# Patient Record
Sex: MALE | Race: ASIAN | NOT HISPANIC OR LATINO
[De-identification: names, ages, dates, MRNs, and addresses within clinical notes are randomized per-mention and may not be internally consistent; named-entity substitution may affect disease eponyms.]

---

## 2017-01-03 ENCOUNTER — MEDICATION RENEWAL (OUTPATIENT)
Age: 72
End: 2017-01-03

## 2017-01-09 ENCOUNTER — MEDICATION RENEWAL (OUTPATIENT)
Age: 72
End: 2017-01-09

## 2017-01-24 ENCOUNTER — APPOINTMENT (OUTPATIENT)
Dept: INTERNAL MEDICINE | Facility: CLINIC | Age: 72
End: 2017-01-24

## 2017-01-24 VITALS — WEIGHT: 160 LBS | HEIGHT: 65.5 IN | BODY MASS INDEX: 26.34 KG/M2

## 2017-01-24 VITALS
SYSTOLIC BLOOD PRESSURE: 160 MMHG | TEMPERATURE: 97.9 F | OXYGEN SATURATION: 96 % | HEART RATE: 71 BPM | DIASTOLIC BLOOD PRESSURE: 86 MMHG

## 2017-01-24 VITALS — DIASTOLIC BLOOD PRESSURE: 80 MMHG | SYSTOLIC BLOOD PRESSURE: 160 MMHG

## 2017-01-25 LAB
25(OH)D3 SERPL-MCNC: 22.9 NG/ML
BASOPHILS # BLD AUTO: 0.01 K/UL
BASOPHILS NFR BLD AUTO: 0.1 %
EOSINOPHIL # BLD AUTO: 0.03 K/UL
EOSINOPHIL NFR BLD AUTO: 0.4 %
HBA1C MFR BLD HPLC: 6.2 %
HCT VFR BLD CALC: 44.3 %
HGB BLD-MCNC: 15.2 G/DL
IMM GRANULOCYTES NFR BLD AUTO: 0.1 %
LYMPHOCYTES # BLD AUTO: 2.1 K/UL
LYMPHOCYTES NFR BLD AUTO: 27.3 %
MAN DIFF?: NORMAL
MCHC RBC-ENTMCNC: 33.3 PG
MCHC RBC-ENTMCNC: 34.3 GM/DL
MCV RBC AUTO: 97.1 FL
MONOCYTES # BLD AUTO: 0.56 K/UL
MONOCYTES NFR BLD AUTO: 7.3 %
NEUTROPHILS # BLD AUTO: 4.99 K/UL
NEUTROPHILS NFR BLD AUTO: 64.8 %
PLATELET # BLD AUTO: 222 K/UL
RBC # BLD: 4.56 M/UL
RBC # FLD: 13.7 %
WBC # FLD AUTO: 7.7 K/UL

## 2017-01-27 LAB
ALBUMIN SERPL ELPH-MCNC: 4.4 G/DL
ALP BLD-CCNC: 70 U/L
ALT SERPL-CCNC: 41 U/L
ANION GAP SERPL CALC-SCNC: 22 MMOL/L
AST SERPL-CCNC: 36 U/L
BILIRUB SERPL-MCNC: 1 MG/DL
BUN SERPL-MCNC: 17 MG/DL
CALCIUM SERPL-MCNC: 9.9 MG/DL
CHLORIDE SERPL-SCNC: 102 MMOL/L
CHOLEST SERPL-MCNC: 164 MG/DL
CHOLEST/HDLC SERPL: 2.9 RATIO
CO2 SERPL-SCNC: 20 MMOL/L
CREAT SERPL-MCNC: 0.96 MG/DL
GLUCOSE SERPL-MCNC: 113 MG/DL
HDLC SERPL-MCNC: 56 MG/DL
LDLC SERPL CALC-MCNC: 81 MG/DL
POTASSIUM SERPL-SCNC: 5.8 MMOL/L
PROT SERPL-MCNC: 7.5 G/DL
SODIUM SERPL-SCNC: 144 MMOL/L
T4 SERPL-MCNC: 8.9 UG/DL
TRIGL SERPL-MCNC: 137 MG/DL
TSH SERPL-ACNC: 2.55 UIU/ML

## 2017-02-07 ENCOUNTER — LABORATORY RESULT (OUTPATIENT)
Age: 72
End: 2017-02-07

## 2017-02-10 ENCOUNTER — MEDICATION RENEWAL (OUTPATIENT)
Age: 72
End: 2017-02-10

## 2017-10-05 ENCOUNTER — APPOINTMENT (OUTPATIENT)
Dept: INTERNAL MEDICINE | Facility: CLINIC | Age: 72
End: 2017-10-05
Payer: MEDICARE

## 2017-10-05 PROCEDURE — G0008: CPT

## 2017-10-05 PROCEDURE — 90662 IIV NO PRSV INCREASED AG IM: CPT

## 2017-11-06 ENCOUNTER — RX RENEWAL (OUTPATIENT)
Age: 72
End: 2017-11-06

## 2017-11-28 ENCOUNTER — MEDICATION RENEWAL (OUTPATIENT)
Age: 72
End: 2017-11-28

## 2018-05-07 ENCOUNTER — MEDICATION RENEWAL (OUTPATIENT)
Age: 73
End: 2018-05-07

## 2018-10-12 ENCOUNTER — APPOINTMENT (OUTPATIENT)
Dept: INTERNAL MEDICINE | Facility: CLINIC | Age: 73
End: 2018-10-12
Payer: MEDICARE

## 2018-10-12 PROCEDURE — G0008: CPT

## 2018-10-12 PROCEDURE — 90662 IIV NO PRSV INCREASED AG IM: CPT

## 2018-11-26 ENCOUNTER — MEDICATION RENEWAL (OUTPATIENT)
Age: 73
End: 2018-11-26

## 2018-11-27 ENCOUNTER — MEDICATION RENEWAL (OUTPATIENT)
Age: 73
End: 2018-11-27

## 2018-12-12 LAB
ALBUMIN SERPL ELPH-MCNC: 4 G/DL
ALP BLD-CCNC: 62 U/L
ALT SERPL-CCNC: 18 U/L
ANION GAP SERPL CALC-SCNC: 10 MMOL/L
AST SERPL-CCNC: 22 U/L
BASOPHILS # BLD AUTO: 0.01 K/UL
BASOPHILS NFR BLD AUTO: 0.2 %
BILIRUB SERPL-MCNC: 1.2 MG/DL
BUN SERPL-MCNC: 17 MG/DL
CALCIUM SERPL-MCNC: 8.8 MG/DL
CHLORIDE SERPL-SCNC: 104 MMOL/L
CHOLEST SERPL-MCNC: 117 MG/DL
CHOLEST/HDLC SERPL: 2.4 RATIO
CK SERPL-CCNC: 166 U/L
CO2 SERPL-SCNC: 26 MMOL/L
CREAT SERPL-MCNC: 1.19 MG/DL
EOSINOPHIL # BLD AUTO: 0.11 K/UL
EOSINOPHIL NFR BLD AUTO: 1.9 %
GLUCOSE SERPL-MCNC: 110 MG/DL
HBA1C MFR BLD HPLC: 6.2 %
HCT VFR BLD CALC: 41.1 %
HDLC SERPL-MCNC: 48 MG/DL
HGB BLD-MCNC: 14.2 G/DL
IMM GRANULOCYTES NFR BLD AUTO: 0 %
LDLC SERPL CALC-MCNC: 48 MG/DL
LYMPHOCYTES # BLD AUTO: 2 K/UL
LYMPHOCYTES NFR BLD AUTO: 34.2 %
MAN DIFF?: NORMAL
MCHC RBC-ENTMCNC: 32.5 PG
MCHC RBC-ENTMCNC: 34.5 GM/DL
MCV RBC AUTO: 94.1 FL
MONOCYTES # BLD AUTO: 0.45 K/UL
MONOCYTES NFR BLD AUTO: 7.7 %
NEUTROPHILS # BLD AUTO: 3.27 K/UL
NEUTROPHILS NFR BLD AUTO: 56 %
NT-PROBNP SERPL-MCNC: 1612 PG/ML
PLATELET # BLD AUTO: 168 K/UL
POTASSIUM SERPL-SCNC: 4.3 MMOL/L
PROT SERPL-MCNC: 7 G/DL
RBC # BLD: 4.37 M/UL
RBC # FLD: 13.9 %
SODIUM SERPL-SCNC: 140 MMOL/L
TRIGL SERPL-MCNC: 106 MG/DL
URATE SERPL-MCNC: 5.1 MG/DL
WBC # FLD AUTO: 5.84 K/UL

## 2018-12-13 LAB
CREAT SPEC-SCNC: 192 MG/DL
MICROALBUMIN 24H UR DL<=1MG/L-MCNC: 3.2 MG/DL
MICROALBUMIN/CREAT 24H UR-RTO: 17 MG/G
T4 SERPL-MCNC: 7.9 UG/DL
TSH SERPL-ACNC: 3.17 UIU/ML

## 2018-12-19 ENCOUNTER — APPOINTMENT (OUTPATIENT)
Dept: INTERNAL MEDICINE | Facility: CLINIC | Age: 73
End: 2018-12-19
Payer: MEDICARE

## 2018-12-19 VITALS
SYSTOLIC BLOOD PRESSURE: 165 MMHG | HEIGHT: 65.5 IN | TEMPERATURE: 97.5 F | HEART RATE: 76 BPM | BODY MASS INDEX: 27.33 KG/M2 | WEIGHT: 166 LBS | OXYGEN SATURATION: 96 % | DIASTOLIC BLOOD PRESSURE: 94 MMHG

## 2018-12-19 DIAGNOSIS — Z86.79 PERSONAL HISTORY OF OTHER DISEASES OF THE CIRCULATORY SYSTEM: ICD-10-CM

## 2018-12-19 DIAGNOSIS — J06.9 ACUTE UPPER RESPIRATORY INFECTION, UNSPECIFIED: ICD-10-CM

## 2018-12-19 DIAGNOSIS — R19.8 OTHER SPECIFIED SYMPTOMS AND SIGNS INVOLVING THE DIGESTIVE SYSTEM AND ABDOMEN: ICD-10-CM

## 2018-12-19 DIAGNOSIS — H81.10 BENIGN PAROXYSMAL VERTIGO, UNSPECIFIED EAR: ICD-10-CM

## 2018-12-19 DIAGNOSIS — M54.5 LOW BACK PAIN: ICD-10-CM

## 2018-12-19 DIAGNOSIS — Z92.29 PERSONAL HISTORY OF OTHER DRUG THERAPY: ICD-10-CM

## 2018-12-19 DIAGNOSIS — Z86.39 PERSONAL HISTORY OF OTHER ENDOCRINE, NUTRITIONAL AND METABOLIC DISEASE: ICD-10-CM

## 2018-12-19 DIAGNOSIS — M54.16 RADICULOPATHY, LUMBAR REGION: ICD-10-CM

## 2018-12-19 PROCEDURE — G0442 ANNUAL ALCOHOL SCREEN 15 MIN: CPT | Mod: 59

## 2018-12-19 PROCEDURE — G0439: CPT

## 2018-12-19 PROCEDURE — 99214 OFFICE O/P EST MOD 30 MIN: CPT | Mod: 25

## 2018-12-19 PROCEDURE — G0444 DEPRESSION SCREEN ANNUAL: CPT | Mod: 59

## 2018-12-19 RX ORDER — BENZONATATE 100 MG/1
100 CAPSULE ORAL 3 TIMES DAILY
Qty: 30 | Refills: 1 | Status: DISCONTINUED | COMMUNITY
Start: 2017-01-09 | End: 2018-12-19

## 2018-12-19 RX ORDER — CEFUROXIME AXETIL 500 MG/1
500 TABLET ORAL
Qty: 20 | Refills: 1 | Status: DISCONTINUED | COMMUNITY
Start: 2017-01-09 | End: 2018-12-19

## 2018-12-19 RX ORDER — LOTEPREDNOL ETABONATE 5 MG/G
0.5 OINTMENT OPHTHALMIC
Qty: 35 | Refills: 0 | Status: DISCONTINUED | COMMUNITY
Start: 2018-08-30 | End: 2018-12-19

## 2018-12-19 RX ORDER — MECLIZINE HYDROCHLORIDE 12.5 MG/1
12.5 TABLET ORAL
Qty: 60 | Refills: 1 | Status: DISCONTINUED | COMMUNITY
Start: 2017-01-24 | End: 2018-12-19

## 2018-12-19 RX ORDER — NEPAFENAC 3 MG/ML
0.3 SUSPENSION/ DROPS OPHTHALMIC
Qty: 12 | Refills: 0 | Status: DISCONTINUED | COMMUNITY
Start: 2016-10-25 | End: 2018-12-19

## 2018-12-19 RX ORDER — AZITHROMYCIN 250 MG/1
250 TABLET, FILM COATED ORAL
Qty: 6 | Refills: 2 | Status: DISCONTINUED | COMMUNITY
Start: 2017-11-28 | End: 2018-12-19

## 2018-12-19 RX ORDER — NEOMYCIN AND POLYMYXIN B SULFATES AND DEXAMETHASONE 3.5; 10000; 1 MG/G; [IU]/G; MG/G
3.5-10000-0.1 OINTMENT OPHTHALMIC
Qty: 35 | Refills: 0 | Status: COMPLETED | COMMUNITY
Start: 2018-04-17

## 2018-12-19 NOTE — PLAN
[FreeTextEntry1] : Medicare Annual\par vaccine and colon up to date\par \par \par blood pressure too high recommend increasing losartan to discuss with cardiology\par Some exertional sob\par mild urination issue\par on flomax and see urologist\par occasional hemmorhoid to rectal surgeon\par back not too bad\par lipids excellent on med\par no alcohol or smoking\par very upbeat\par under care of cardiologist\par

## 2018-12-19 NOTE — HISTORY OF PRESENT ILLNESS
[FreeTextEntry1] : Medicare Annual [de-identified] : Medicare annual\par had flu and pneumonia shot\par colon 2016\par \par afib on warfarin\par recent cst/echo\par narrowing aortic valve\par no cp or sob\par hx of AS\par get oob\par may 2015 change mitral valve and cabg\par See Dr Aaron does psa recently\par

## 2018-12-19 NOTE — HEALTH RISK ASSESSMENT
[Good] : ~his/her~  mood as  good [No falls in past year] : Patient reported no falls in the past year [0] : 2) Feeling down, depressed, or hopeless: Not at all (0) [Retired] : retired [College] : College [] :  [Feels Safe at Home] : Feels safe at home [Fully functional (bathing, dressing, toileting, transferring, walking, feeding)] : Fully functional (bathing, dressing, toileting, transferring, walking, feeding) [Fully functional (using the telephone, shopping, preparing meals, housekeeping, doing laundry, using] : Fully functional and needs no help or supervision to perform IADLs (using the telephone, shopping, preparing meals, housekeeping, doing laundry, using transportation, managing medications and managing finances) [Reports changes in hearing] : Reports changes in hearing [Smoke Detector] : smoke detector [Carbon Monoxide Detector] : carbon monoxide detector [Seat Belt] :  uses seat belt [] : No [[id number]] : 0 [Change in mental status noted] : No change in mental status noted [Language] : denies difficulty with language [Behavior] : denies difficulty with behavior [Learning/Retaining New Information] : denies difficulty learning/retaining new information [Handling Complex Tasks] : denies difficulty handling complex tasks [Reasoning] : denies difficulty with reasoning [Spatial Ability and Orientation] : denies difficulty with spatial ability and orientation [Reports changes in vision] : Reports no changes in vision [Reports changes in dental health] : Reports no changes in dental health [Guns at Home] : no guns at home

## 2018-12-19 NOTE — PHYSICAL EXAM
[No Acute Distress] : no acute distress [Well Nourished] : well nourished [Normal Outer Ear/Nose] : the outer ears and nose were normal in appearance [Normal Oropharynx] : the oropharynx was normal [No JVD] : no jugular venous distention [Supple] : supple [No Respiratory Distress] : no respiratory distress  [Clear to Auscultation] : lungs were clear to auscultation bilaterally [No Edema] : there was no peripheral edema [No Extremity Clubbing/Cyanosis] : no extremity clubbing/cyanosis [No HSM] : no HSM [Normal Bowel Sounds] : normal bowel sounds [No Joint Swelling] : no joint swelling [Grossly Normal Strength/Tone] : grossly normal strength/tone [de-identified] : afib at 66 with syst murmur

## 2018-12-19 NOTE — REVIEW OF SYSTEMS
[Fever] : no fever [Earache] : no earache [Nasal Discharge] : no nasal discharge [Chest Pain] : no chest pain [Orthopena] : no orthopnea [Shortness Of Breath] : no shortness of breath [Wheezing] : no wheezing [Abdominal Pain] : no abdominal pain [Vomiting] : no vomiting [Dysuria] : no dysuria [Hematuria] : no hematuria

## 2019-04-04 ENCOUNTER — MEDICATION RENEWAL (OUTPATIENT)
Age: 74
End: 2019-04-04

## 2019-10-07 ENCOUNTER — APPOINTMENT (OUTPATIENT)
Dept: INTERNAL MEDICINE | Facility: CLINIC | Age: 74
End: 2019-10-07
Payer: MEDICARE

## 2019-10-07 PROCEDURE — G0008: CPT

## 2019-10-07 PROCEDURE — 90662 IIV NO PRSV INCREASED AG IM: CPT

## 2019-10-09 ENCOUNTER — TRANSCRIPTION ENCOUNTER (OUTPATIENT)
Age: 74
End: 2019-10-09

## 2020-01-09 ENCOUNTER — RX RENEWAL (OUTPATIENT)
Age: 75
End: 2020-01-09

## 2020-05-04 RX ADMIN — OXYCODONE HYDROCHLORIDE 5 MILLIGRAM(S): 5 TABLET ORAL at 23:03

## 2020-07-31 DIAGNOSIS — Z11.59 ENCOUNTER FOR SCREENING FOR OTHER VIRAL DISEASES: ICD-10-CM

## 2020-08-06 LAB
25(OH)D3 SERPL-MCNC: 25 NG/ML
ALBUMIN SERPL ELPH-MCNC: 4.4 G/DL
ALP BLD-CCNC: 59 U/L
ALT SERPL-CCNC: 22 U/L
ANION GAP SERPL CALC-SCNC: 13 MMOL/L
APPEARANCE: CLEAR
AST SERPL-CCNC: 23 U/L
BACTERIA: NEGATIVE
BASOPHILS # BLD AUTO: 0.02 K/UL
BASOPHILS NFR BLD AUTO: 0.2 %
BILIRUB SERPL-MCNC: 1.2 MG/DL
BILIRUBIN URINE: NEGATIVE
BLOOD URINE: NEGATIVE
BUN SERPL-MCNC: 18 MG/DL
CALCIUM SERPL-MCNC: 9 MG/DL
CHLORIDE SERPL-SCNC: 102 MMOL/L
CHOLEST SERPL-MCNC: 132 MG/DL
CHOLEST/HDLC SERPL: 2.9 RATIO
CO2 SERPL-SCNC: 21 MMOL/L
COLOR: NORMAL
CREAT SERPL-MCNC: 1.07 MG/DL
EOSINOPHIL # BLD AUTO: 0.05 K/UL
EOSINOPHIL NFR BLD AUTO: 0.6 %
ESTIMATED AVERAGE GLUCOSE: 126 MG/DL
GLUCOSE QUALITATIVE U: NEGATIVE
GLUCOSE SERPL-MCNC: 112 MG/DL
HBA1C MFR BLD HPLC: 6 %
HCT VFR BLD CALC: 40 %
HDLC SERPL-MCNC: 46 MG/DL
HGB BLD-MCNC: 13.1 G/DL
HYALINE CASTS: 0 /LPF
IMM GRANULOCYTES NFR BLD AUTO: 0.5 %
KETONES URINE: NEGATIVE
LDLC SERPL CALC-MCNC: 60 MG/DL
LEUKOCYTE ESTERASE URINE: NEGATIVE
LYMPHOCYTES # BLD AUTO: 2.19 K/UL
LYMPHOCYTES NFR BLD AUTO: 26.6 %
MAN DIFF?: NORMAL
MCHC RBC-ENTMCNC: 32.2 PG
MCHC RBC-ENTMCNC: 32.8 GM/DL
MCV RBC AUTO: 98.3 FL
MICROSCOPIC-UA: NORMAL
MONOCYTES # BLD AUTO: 0.64 K/UL
MONOCYTES NFR BLD AUTO: 7.8 %
NEUTROPHILS # BLD AUTO: 5.29 K/UL
NEUTROPHILS NFR BLD AUTO: 64.3 %
NITRITE URINE: NEGATIVE
PH URINE: 6.5
PLATELET # BLD AUTO: 176 K/UL
POTASSIUM SERPL-SCNC: 5.1 MMOL/L
PROT SERPL-MCNC: 6.7 G/DL
PROTEIN URINE: NEGATIVE
RBC # BLD: 4.07 M/UL
RBC # FLD: 13.7 %
RED BLOOD CELLS URINE: 1 /HPF
SARS-COV-2 IGG SERPL IA-ACNC: 0.09 INDEX
SARS-COV-2 IGG SERPL QL IA: NEGATIVE
SODIUM SERPL-SCNC: 136 MMOL/L
SPECIFIC GRAVITY URINE: 1.01
SQUAMOUS EPITHELIAL CELLS: 0 /HPF
T4 FREE SERPL-MCNC: 1.5 NG/DL
TRIGL SERPL-MCNC: 128 MG/DL
TSH SERPL-ACNC: 2.08 UIU/ML
UROBILINOGEN URINE: NORMAL
WBC # FLD AUTO: 8.23 K/UL
WHITE BLOOD CELLS URINE: 1 /HPF

## 2020-08-10 ENCOUNTER — APPOINTMENT (OUTPATIENT)
Dept: INTERNAL MEDICINE | Facility: CLINIC | Age: 75
End: 2020-08-10
Payer: MEDICARE

## 2020-08-10 VITALS
BODY MASS INDEX: 27.16 KG/M2 | WEIGHT: 165 LBS | TEMPERATURE: 98.2 F | HEART RATE: 77 BPM | DIASTOLIC BLOOD PRESSURE: 81 MMHG | SYSTOLIC BLOOD PRESSURE: 160 MMHG | OXYGEN SATURATION: 99 % | HEIGHT: 65.5 IN

## 2020-08-10 PROCEDURE — 99214 OFFICE O/P EST MOD 30 MIN: CPT | Mod: 25

## 2020-08-10 PROCEDURE — G0439: CPT

## 2020-08-10 RX ORDER — LOSARTAN POTASSIUM 25 MG/1
25 TABLET, FILM COATED ORAL
Qty: 2 | Refills: 0 | Status: DISCONTINUED | COMMUNITY
End: 2020-08-10

## 2020-08-10 NOTE — HISTORY OF PRESENT ILLNESS
[de-identified] : Annual exam\par had all vaccine\par \par ashd under care of cardiology Dr herring s/p bypass and valve Dr Quevedo\par Urology under care of Dr Bello\par take warfarin last inr 2.5  30 mg per week

## 2020-08-10 NOTE — HEALTH RISK ASSESSMENT
[Very Good] : ~his/her~  mood as very good [No] : No [No falls in past year] : Patient reported no falls in the past year [0] : 1) Little interest or pleasure doing things: Not at all (0) [None] : None [Retired] : retired [With Family] : lives with family [College] : College [] :  [Feels Safe at Home] : Feels safe at home [Fully functional (using the telephone, shopping, preparing meals, housekeeping, doing laundry, using] : Fully functional and needs no help or supervision to perform IADLs (using the telephone, shopping, preparing meals, housekeeping, doing laundry, using transportation, managing medications and managing finances) [Fully functional (bathing, dressing, toileting, transferring, walking, feeding)] : Fully functional (bathing, dressing, toileting, transferring, walking, feeding) [Carbon Monoxide Detector] : carbon monoxide detector [Smoke Detector] : smoke detector [Seat Belt] :  uses seat belt [] : No [[id number]] : 0 [Change in mental status noted] : No change in mental status noted [Behavior] : denies difficulty with behavior [Language] : denies difficulty with language [Handling Complex Tasks] : denies difficulty handling complex tasks [Learning/Retaining New Information] : denies difficulty learning/retaining new information [Reasoning] : denies difficulty with reasoning [Spatial Ability and Orientation] : denies difficulty with spatial ability and orientation [Reports changes in hearing] : Reports no changes in hearing [Reports changes in vision] : Reports no changes in vision [Reports changes in dental health] : Reports no changes in dental health [Guns at Home] : no guns at home

## 2020-08-10 NOTE — PHYSICAL EXAM
[No Acute Distress] : no acute distress [Normal Voice/Communication] : normal voice/communication [FreeTextEntry1] : palpable lesion anal verge with blood [Normal] : no carotid or abdominal bruits heard, no varicosities, pedal pulses are present, no peripheral edema, no extremity clubbing or cyanosis and no palpable aorta

## 2020-08-10 NOTE — PLAN
[FreeTextEntry1] : Annual exam\par had all vaccine\par  colon 2016\par \par rectal lesion to proctologist\par diabetes on med\par eye issue see Dr Miguel Cadena  injection monthly\par ashd stable\par \par

## 2020-09-04 ENCOUNTER — APPOINTMENT (OUTPATIENT)
Dept: SURGERY | Facility: CLINIC | Age: 75
End: 2020-09-04
Payer: MEDICARE

## 2020-09-04 VITALS
WEIGHT: 161 LBS | TEMPERATURE: 97.6 F | HEART RATE: 98 BPM | HEIGHT: 65.5 IN | RESPIRATION RATE: 17 BRPM | SYSTOLIC BLOOD PRESSURE: 155 MMHG | BODY MASS INDEX: 26.5 KG/M2 | DIASTOLIC BLOOD PRESSURE: 87 MMHG | OXYGEN SATURATION: 97 %

## 2020-09-04 PROCEDURE — 46600 DIAGNOSTIC ANOSCOPY SPX: CPT

## 2020-09-04 PROCEDURE — 99203 OFFICE O/P NEW LOW 30 MIN: CPT | Mod: 25

## 2020-09-04 NOTE — PHYSICAL EXAM
[Normal Rate and Rhythm] : normal rate and rhythm [Alert] : alert [Calm] : calm [Wheezing] : no wheezing was heard [Abdomen Tenderness] : ~T No ~M abdominal tenderness [de-identified] : nad [de-identified] : nl [de-identified] : Perianal inspection is normal.  There is no fissure fistula or abscess.  Digital rectal examination and anoscopy reveal a friable mobile firm distal rectal/anal canal lesion approximately 2 cm in diameter in the left anterior position.  This was mobile and abutted the dentate line..  Anoscopy reveals a friable ulcerated distal rectal/anal canal lesion which was biopsied with cold biopsy forceps.  Hemostasis was achieved with Monsel solution.

## 2020-09-04 NOTE — HISTORY OF PRESENT ILLNESS
[FreeTextEntry1] : The patient is a 74 year old male here w a hx of valve replacement, on Coumadin (since ). Colonoscopy from 16 demonstrates bleeding internal hemorrhoids were seen. Hx of RBL in . \par Pt reports BRBPR with almost every BM. Long standing history of constipation. Main concern is rectal swelling. He sometimes has to manually remove stool from rectum.  He denies nausea vomiting or any change in his bowel habits.  His mother  of colon cancer in her 60s.

## 2020-09-04 NOTE — ASSESSMENT
[FreeTextEntry1] : In summary the patient has a lesion in the distal rectum/anal canal.  This was biopsied in the office today.  Further work-up and treatment will be based on pathology.  He will likely need a colonoscopy and imaging for further work-up.

## 2020-09-10 ENCOUNTER — APPOINTMENT (OUTPATIENT)
Dept: SURGERY | Facility: CLINIC | Age: 75
End: 2020-09-10
Payer: MEDICARE

## 2020-09-10 LAB — CORE LAB BIOPSY: NORMAL

## 2020-09-10 PROCEDURE — 99213 OFFICE O/P EST LOW 20 MIN: CPT

## 2020-09-10 NOTE — HISTORY OF PRESENT ILLNESS
[FreeTextEntry1] : The patient is a 74 year old male here w a hx of valve replacement, on Coumadin (since ). Colonoscopy from 16 demonstrates bleeding internal hemorrhoids were seen. Hx of RBL in . \par Pt reports BRBPR with almost every BM. Long standing history of constipation. Main concern is rectal swelling. He sometimes has to manually remove stool from rectum. He denies nausea vomiting or any change in his bowel habits. His mother  of colon cancer in her 60s. \par \par The patient was seen in the office last week with complaints of rectal bleeding and difficulty evacuating his stool. Upon manually removing stool from his rectum, he felt a firm mass. Digital rectal examination and anoscopy revealed a friable mobile firm distal/anal canal lesion approximately 2 cm in diameter in the left anterior position. This was mobile and abutted the dentate line. This was biopsied with cold biopsy forceps. Pathology revealed Adenocarcinoma present in the lesion.

## 2020-09-11 ENCOUNTER — APPOINTMENT (OUTPATIENT)
Dept: CT IMAGING | Facility: IMAGING CENTER | Age: 75
End: 2020-09-11
Payer: MEDICARE

## 2020-09-11 ENCOUNTER — OUTPATIENT (OUTPATIENT)
Dept: OUTPATIENT SERVICES | Facility: HOSPITAL | Age: 75
LOS: 1 days | End: 2020-09-11
Payer: MEDICARE

## 2020-09-11 DIAGNOSIS — C20 MALIGNANT NEOPLASM OF RECTUM: ICD-10-CM

## 2020-09-11 PROCEDURE — 71260 CT THORAX DX C+: CPT | Mod: 26

## 2020-09-11 PROCEDURE — 82565 ASSAY OF CREATININE: CPT

## 2020-09-11 PROCEDURE — 74177 CT ABD & PELVIS W/CONTRAST: CPT | Mod: 26

## 2020-09-11 PROCEDURE — 71260 CT THORAX DX C+: CPT

## 2020-09-11 PROCEDURE — 74177 CT ABD & PELVIS W/CONTRAST: CPT

## 2020-09-12 ENCOUNTER — OUTPATIENT (OUTPATIENT)
Dept: OUTPATIENT SERVICES | Facility: HOSPITAL | Age: 75
LOS: 1 days | Discharge: HOME | End: 2020-09-12

## 2020-09-12 DIAGNOSIS — C20 MALIGNANT NEOPLASM OF RECTUM: ICD-10-CM

## 2020-09-13 ENCOUNTER — APPOINTMENT (OUTPATIENT)
Dept: MRI IMAGING | Facility: CLINIC | Age: 75
End: 2020-09-13

## 2020-09-14 ENCOUNTER — RESULT REVIEW (OUTPATIENT)
Age: 75
End: 2020-09-14

## 2020-09-14 ENCOUNTER — APPOINTMENT (OUTPATIENT)
Dept: HEMATOLOGY ONCOLOGY | Facility: CLINIC | Age: 75
End: 2020-09-14
Payer: MEDICARE

## 2020-09-14 ENCOUNTER — OUTPATIENT (OUTPATIENT)
Dept: OUTPATIENT SERVICES | Facility: HOSPITAL | Age: 75
LOS: 1 days | End: 2020-09-14
Payer: MEDICARE

## 2020-09-14 VITALS
HEART RATE: 86 BPM | HEIGHT: 65.75 IN | WEIGHT: 158.73 LBS | SYSTOLIC BLOOD PRESSURE: 129 MMHG | DIASTOLIC BLOOD PRESSURE: 85 MMHG | OXYGEN SATURATION: 99 % | RESPIRATION RATE: 16 BRPM | BODY MASS INDEX: 25.82 KG/M2 | TEMPERATURE: 99.8 F

## 2020-09-14 DIAGNOSIS — Z80.42 FAMILY HISTORY OF MALIGNANT NEOPLASM OF PROSTATE: ICD-10-CM

## 2020-09-14 DIAGNOSIS — C20 MALIGNANT NEOPLASM OF RECTUM: ICD-10-CM

## 2020-09-14 DIAGNOSIS — H35.3290 EXUDATIVE AGE-RELATED MACULAR DEGENERATION, UNSPECIFIED EYE, STAGE UNSPECIFIED: ICD-10-CM

## 2020-09-14 LAB
BASOPHILS # BLD AUTO: 0.03 K/UL — SIGNIFICANT CHANGE UP (ref 0–0.2)
BASOPHILS NFR BLD AUTO: 0.2 % — SIGNIFICANT CHANGE UP (ref 0–2)
EOSINOPHIL # BLD AUTO: 0.01 K/UL — SIGNIFICANT CHANGE UP (ref 0–0.5)
EOSINOPHIL NFR BLD AUTO: 0.1 % — SIGNIFICANT CHANGE UP (ref 0–6)
HCT VFR BLD CALC: 36 % — LOW (ref 39–50)
HGB BLD-MCNC: 12.2 G/DL — LOW (ref 13–17)
IMM GRANULOCYTES NFR BLD AUTO: 0.6 % — SIGNIFICANT CHANGE UP (ref 0–1.5)
LYMPHOCYTES # BLD AUTO: 1.79 K/UL — SIGNIFICANT CHANGE UP (ref 1–3.3)
LYMPHOCYTES # BLD AUTO: 13.1 % — SIGNIFICANT CHANGE UP (ref 13–44)
MCHC RBC-ENTMCNC: 32.6 PG — SIGNIFICANT CHANGE UP (ref 27–34)
MCHC RBC-ENTMCNC: 33.9 G/DL — SIGNIFICANT CHANGE UP (ref 32–36)
MCV RBC AUTO: 96.3 FL — SIGNIFICANT CHANGE UP (ref 80–100)
MONOCYTES # BLD AUTO: 0.82 K/UL — SIGNIFICANT CHANGE UP (ref 0–0.9)
MONOCYTES NFR BLD AUTO: 6 % — SIGNIFICANT CHANGE UP (ref 2–14)
NEUTROPHILS # BLD AUTO: 10.95 K/UL — HIGH (ref 1.8–7.4)
NEUTROPHILS NFR BLD AUTO: 80 % — HIGH (ref 43–77)
NRBC # BLD: 0 /100 WBCS — SIGNIFICANT CHANGE UP (ref 0–0)
PLATELET # BLD AUTO: 255 K/UL — SIGNIFICANT CHANGE UP (ref 150–400)
RBC # BLD: 3.74 M/UL — LOW (ref 4.2–5.8)
RBC # FLD: 12.9 % — SIGNIFICANT CHANGE UP (ref 10.3–14.5)
WBC # BLD: 13.68 K/UL — HIGH (ref 3.8–10.5)
WBC # FLD AUTO: 13.68 K/UL — HIGH (ref 3.8–10.5)

## 2020-09-14 PROCEDURE — 99205 OFFICE O/P NEW HI 60 MIN: CPT

## 2020-09-14 RX ORDER — METOPROLOL TARTRATE 75 MG/1
TABLET, FILM COATED ORAL
Refills: 0 | Status: DISCONTINUED | COMMUNITY
End: 2020-09-14

## 2020-09-14 RX ORDER — LOSARTAN POTASSIUM 100 MG/1
TABLET, FILM COATED ORAL
Refills: 0 | Status: DISCONTINUED | COMMUNITY
End: 2020-09-14

## 2020-09-14 NOTE — PHYSICAL EXAM
[Restricted in physically strenuous activity but ambulatory and able to carry out work of a light or sedentary nature] : Status 1- Restricted in physically strenuous activity but ambulatory and able to carry out work of a light or sedentary nature, e.g., light house work, office work [Normal] : affect appropriate [de-identified] : atrial fibrillation; no murmur [FreeTextEntry1] : patient has midline low-lying rectal mass. It is at 6 o'clock position, mobile, and abuts the proximal anal sphincters.

## 2020-09-14 NOTE — CONSULT LETTER
[Dear  ___] : Dear  [unfilled], [Please see my note below.] : Please see my note below. [Consult Letter:] : I had the pleasure of evaluating your patient, [unfilled]. [DrDrake  ___] : Dr. HEBERT [Consult Closing:] : Thank you very much for allowing me to participate in the care of this patient.  If you have any questions, please do not hesitate to contact me. [Sincerely,] : Sincerely, [DrDrake ___] : Dr. HEBERT [___] : [unfilled]

## 2020-09-14 NOTE — REVIEW OF SYSTEMS
[Negative] : Allergic/Immunologic [FreeTextEntry2] : appetite is down mildly [FreeTextEntry7] : stools are difficult to pass at times

## 2020-09-14 NOTE — HISTORY OF PRESENT ILLNESS
[Disease: _____________________] : Disease: [unfilled] [de-identified] : Colorectal Surgeon: Christian Connelly  [phone]\par Internist / GI:  Raciel Perkins [phone]\par Cardiology: Jona Draper [phone]\par Opthalmology: Michael Rivera [phone]\par Macular degeneration ophthalmology: Zeferino Raman  [phone]\par Cardiac surgeon at [facility]: Zion Weathers [phone]\par \par Daughter: Dr. Nicole Guillaume [phone]\par Daughter: Nimo Rodriguez\par Quoc - patient [phone]\par Lori - wife home phone [phone] [de-identified] : adenocarcinoma [de-identified] : Mr. Guillaume is a 74 year old male with DM2, HTN, AFib presenting to the office for an initial consultation of rectal CA.\par \par Patient reports BRBPR with almost every bowel movement.  He has a history of constipation, main concern is sensation of rectal swelling, and a hard lesion when places finger in rectum.  This is different from prior soft hemorrhoids. He sometimes must manually removes stool from his rectum. He brought this to the attention of his PCP, who palpated a rectal tumor, and referred to surgeon.\par \par Patient was seen by Dr. Christian Connelly (Colorectal Surgery) who performed JAYLIN and anoscopy which revealed a friable mobile firm distal/anal lesion approximately 2 cm in diameter in the left anterior position.  This lesion was biopsied on 9/4/2020. Pathology demonstrated adenocarcinoma. Dr. Connelly plans to do a colonoscopy.\par \par CT chest,abdomen/pelvis on 9/11/2020 demonstrated no evidence of metastatic disease in the chest, abdomen or pelvis\par \par MRI pelvis scheduled 9/15/2020.\par \par Patient had recent episode of red toe after a long car trip. The right big toe was painful and then it improved over a few days. He never had gout before. This had happened once before July 2020 as well. \par He notes that 36 hours after the CT scan, he had shaking chills (rigors), nausea with vomiting x 1 and then this subsided. He also had a drop in blood pressure to SBP upper 80's\par He had IV contrast and oral contrast only. \par He has no focal localizing symptoms for infection, such as cough, abdominal pain, dysuria, or diarrhea. He has baseline urinary frequency.

## 2020-09-15 ENCOUNTER — OUTPATIENT (OUTPATIENT)
Dept: OUTPATIENT SERVICES | Facility: HOSPITAL | Age: 75
LOS: 1 days | End: 2020-09-15

## 2020-09-15 ENCOUNTER — APPOINTMENT (OUTPATIENT)
Dept: MRI IMAGING | Facility: IMAGING CENTER | Age: 75
End: 2020-09-15
Payer: MEDICARE

## 2020-09-15 ENCOUNTER — RESULT REVIEW (OUTPATIENT)
Age: 75
End: 2020-09-15

## 2020-09-15 DIAGNOSIS — C20 MALIGNANT NEOPLASM OF RECTUM: ICD-10-CM

## 2020-09-15 LAB
25(OH)D3 SERPL-MCNC: 24.2 NG/ML
ALBUMIN SERPL ELPH-MCNC: 3.9 G/DL
ALP BLD-CCNC: 73 U/L
ALT SERPL-CCNC: 16 U/L
ANION GAP SERPL CALC-SCNC: 15 MMOL/L
APPEARANCE: CLEAR
APTT BLD: 40.3 SEC
AST SERPL-CCNC: 18 U/L
BACTERIA: NEGATIVE
BILIRUB SERPL-MCNC: 0.9 MG/DL
BILIRUBIN URINE: NEGATIVE
BLOOD URINE: NEGATIVE
BUN SERPL-MCNC: 25 MG/DL
CALCIUM SERPL-MCNC: 9.1 MG/DL
CEA SERPL-MCNC: 1.4 NG/ML
CHLORIDE SERPL-SCNC: 93 MMOL/L
CHOLEST SERPL-MCNC: 104 MG/DL
CHOLEST/HDLC SERPL: 2.9 RATIO
CO2 SERPL-SCNC: 25 MMOL/L
COLOR: YELLOW
CREAT SERPL-MCNC: 1.41 MG/DL
ESTIMATED AVERAGE GLUCOSE: 134 MG/DL
FERRITIN SERPL-MCNC: 138 NG/ML
GLUCOSE QUALITATIVE U: NEGATIVE
GLUCOSE SERPL-MCNC: 101 MG/DL
HBA1C MFR BLD HPLC: 6.3 %
HBV SURFACE AB SER QL: NONREACTIVE
HBV SURFACE AG SER QL: NONREACTIVE
HCV AB SER QL: NONREACTIVE
HCV S/CO RATIO: 0.12 S/CO
HDLC SERPL-MCNC: 37 MG/DL
HYALINE CASTS: 0 /LPF
INR PPP: 2.34 RATIO
KETONES URINE: NEGATIVE
LDLC SERPL CALC-MCNC: 45 MG/DL
LEUKOCYTE ESTERASE URINE: NEGATIVE
MICROSCOPIC-UA: NORMAL
NITRITE URINE: NEGATIVE
PH URINE: 7
POTASSIUM SERPL-SCNC: 4.7 MMOL/L
PROT SERPL-MCNC: 6.6 G/DL
PROTEIN URINE: NORMAL
PT BLD: 26.7 SEC
RED BLOOD CELLS URINE: 1 /HPF
SODIUM SERPL-SCNC: 132 MMOL/L
SPECIFIC GRAVITY URINE: 1.02
SQUAMOUS EPITHELIAL CELLS: 1 /HPF
TRIGL SERPL-MCNC: 114 MG/DL
URATE SERPL-MCNC: 8.1 MG/DL
UROBILINOGEN URINE: ABNORMAL
WHITE BLOOD CELLS URINE: 1 /HPF

## 2020-09-15 PROCEDURE — 86900 BLOOD TYPING SEROLOGIC ABO: CPT

## 2020-09-15 PROCEDURE — 86850 RBC ANTIBODY SCREEN: CPT

## 2020-09-15 PROCEDURE — A9585: CPT

## 2020-09-15 PROCEDURE — 86901 BLOOD TYPING SEROLOGIC RH(D): CPT

## 2020-09-15 PROCEDURE — 72196 MRI PELVIS W/DYE: CPT

## 2020-09-15 PROCEDURE — 72196 MRI PELVIS W/DYE: CPT | Mod: 26

## 2020-09-16 LAB — BACTERIA UR CULT: NORMAL

## 2020-09-17 ENCOUNTER — APPOINTMENT (OUTPATIENT)
Dept: SURGERY | Facility: CLINIC | Age: 75
End: 2020-09-17
Payer: MEDICARE

## 2020-09-17 PROCEDURE — 99442: CPT | Mod: 95

## 2020-09-17 NOTE — HISTORY OF PRESENT ILLNESS
[FreeTextEntry1] : Quoc is  a 73 y/o male with newly diagnosed adenocarcinoma of the distal rectum.  \par CT chest from 9/15/20 demonstrated no evidence of metastatic disease in the chest, abdomen or pelvis. CT A+P from 9/15/20 demonstrated no evidence of metastatic disease in the chest, abdomen or pelvis. MRI of the pelvis IC (9/15/20) demonstrates a 2.1 cm mass entirely contained within the anal canal, 2.4 cm above the anal verge. No regional lymphadenopathy (N0). Sphincter involvement (low rectal tumors): Invades internal sphincter (IS) only. As above, tumor superficially involves the internal sphincter anterior right laterally, but is at least 3 mm away from the outer border of the internal sphincter. Intersphincteric space is clear. No suspicious extra mesorectal nodes seen. \par CEA level 1.7 ng/mL. (9/14/20). A colonoscopy will be performed next week (9/21). \par Patient was seen by Dr. Dietz (ONC), possibilities of local surgery, APR or definitive GURDEEP + chemoradiation with surveillance (watch and wait) were all discussed. \par \par The patient's case was discussed at tumor board yesterday.  The consensus was for transanal excision.\par \par  \par \par

## 2020-09-17 NOTE — ASSESSMENT
[FreeTextEntry1] : In summary the patient has a newly diagnosed adenocarcinoma of the distal rectum/anal canal.  The lesion appears to be confined to the anal canal and only involves the superficial internal sphincter on pelvic MRI and there is no evidence of metastatic disease.  Therefore consensus was for transanal excision rather than abdominal perineal resection or GURDEEP/observation.  I will be performing a colonoscopy on the patient next week.  As long as no synchronous lesions are seen which would change the plan then transanal excision will be scheduled soon thereafter.  Risks benefits and alternatives of surgery including the risks of bleeding infection and recurrence were explained.  I had a detailed discussion with the patient and his family regarding the fact that the main disadvantage of transanal excision is that there is not a pathologic examination of the surrounding lymph nodes.  That being said with an early stage tumor his risk of lymph node metastases is low.

## 2020-09-17 NOTE — CONSULT LETTER
[Dear  ___] : Dear  [unfilled], [Consult Letter:] : I had the pleasure of evaluating your patient, [unfilled]. [Please see my note below.] : Please see my note below. [Consult Closing:] : Thank you very much for allowing me to participate in the care of this patient.  If you have any questions, please do not hesitate to contact me. [Sincerely,] : Sincerely, [FreeTextEntry3] : Christian Connelly M.D., F.A.C.S, F.A.S.C.R.S [DrDrake  ___] : Dr. HEBERT [DrDrake ___] : Dr. HEBERT

## 2020-09-18 ENCOUNTER — LABORATORY RESULT (OUTPATIENT)
Age: 75
End: 2020-09-18

## 2020-09-18 ENCOUNTER — APPOINTMENT (OUTPATIENT)
Dept: DISASTER EMERGENCY | Facility: CLINIC | Age: 75
End: 2020-09-18

## 2020-09-21 ENCOUNTER — RESULT REVIEW (OUTPATIENT)
Age: 75
End: 2020-09-21

## 2020-09-21 ENCOUNTER — OUTPATIENT (OUTPATIENT)
Dept: OUTPATIENT SERVICES | Facility: HOSPITAL | Age: 75
LOS: 1 days | End: 2020-09-21

## 2020-09-21 ENCOUNTER — APPOINTMENT (OUTPATIENT)
Dept: SURGERY | Facility: HOSPITAL | Age: 75
End: 2020-09-21
Payer: MEDICARE

## 2020-09-21 VITALS
SYSTOLIC BLOOD PRESSURE: 122 MMHG | DIASTOLIC BLOOD PRESSURE: 83 MMHG | OXYGEN SATURATION: 100 % | HEART RATE: 85 BPM | RESPIRATION RATE: 20 BRPM

## 2020-09-21 VITALS
OXYGEN SATURATION: 100 % | SYSTOLIC BLOOD PRESSURE: 126 MMHG | WEIGHT: 160.94 LBS | TEMPERATURE: 98 F | RESPIRATION RATE: 22 BRPM | HEART RATE: 96 BPM | HEIGHT: 65 IN | DIASTOLIC BLOOD PRESSURE: 88 MMHG

## 2020-09-21 DIAGNOSIS — C20 MALIGNANT NEOPLASM OF RECTUM: ICD-10-CM

## 2020-09-21 PROCEDURE — 45385 COLONOSCOPY W/LESION REMOVAL: CPT

## 2020-09-21 PROCEDURE — 88305 TISSUE EXAM BY PATHOLOGIST: CPT | Mod: 26

## 2020-09-21 RX ORDER — SODIUM CHLORIDE 9 MG/ML
1000 INJECTION INTRAMUSCULAR; INTRAVENOUS; SUBCUTANEOUS
Refills: 0 | Status: DISCONTINUED | OUTPATIENT
Start: 2020-09-21 | End: 2020-10-06

## 2020-09-21 RX ADMIN — SODIUM CHLORIDE 30 MILLILITER(S): 9 INJECTION INTRAMUSCULAR; INTRAVENOUS; SUBCUTANEOUS at 13:49

## 2020-09-21 NOTE — PRE PROCEDURE NOTE - PRE PROCEDURE EVALUATION
Attending Physician:              Dr. 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    S/P left inguinal hernia repair  2002    S/P ablation of atrial fibrillation  3/2010    Hx of coronary angioplasty  stent to LAD 5/30/1997      ALLERGIES:  No Known Allergies    HOME MEDICATIONS:    AICD/PPM: [ ] yes   [ ] no    PERTINENT LAB DATA:                      PHYSICAL EXAMINATION:    Height (cm): 165.1  Weight (kg): 73  BMI (kg/m2): 26.8  BSA (m2): 1.8T(C): --  HR: --  BP: --  RR: --  SpO2: --    Constitutional: NAD  HEENT: PERRLA, EOMI,    Neck:  No JVD  Respiratory: CTAB/L  Cardiovascular: S1 and S2  Gastrointestinal: BS+, soft, NT/ND  Extremities: No peripheral edema  Neurological: A/O x 3, no focal deficits  Psychiatric: Normal mood, normal affect  Skin: No rashes    ASA Class: I [ ]  II [ ]  III [ ]  IV [ ]    COMMENTS:    The patient is a suitable candidate for the planned procedure unless box checked [ ]  No, explain:

## 2020-09-21 NOTE — ASU PATIENT PROFILE, ADULT - PMH
AF (atrial fibrillation)  s/p ablation  After-cataract of both eyes  1996  BPH (Benign Prostatic Hyperplasia)    CAD (coronary artery disease)    Diverticulosis    H/O hemorrhoids    HTN (hypertension)    Hyperlipidemia

## 2020-09-21 NOTE — ASU PATIENT PROFILE, ADULT - PSH
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

## 2020-09-22 ENCOUNTER — APPOINTMENT (OUTPATIENT)
Dept: SURGERY | Facility: CLINIC | Age: 75
End: 2020-09-22
Payer: MEDICARE

## 2020-09-22 VITALS
SYSTOLIC BLOOD PRESSURE: 113 MMHG | DIASTOLIC BLOOD PRESSURE: 73 MMHG | TEMPERATURE: 99.7 F | OXYGEN SATURATION: 98 % | RESPIRATION RATE: 15 BRPM | HEART RATE: 76 BPM

## 2020-09-22 LAB — SURGICAL PATHOLOGY STUDY: SIGNIFICANT CHANGE UP

## 2020-09-22 PROCEDURE — 99213 OFFICE O/P EST LOW 20 MIN: CPT | Mod: PD

## 2020-09-22 NOTE — HISTORY OF PRESENT ILLNESS
[FreeTextEntry1] : Quoc is a 75 y/o male with newly diagnosed adenocarcinoma of the distal rectum. CT chest from 9/15/20 demonstrated no evidence of metastatic disease in the chest, abdomen or pelvis. CT A+P from 9/15/20 demonstrated no evidence of metastatic disease in the chest, abdomen or pelvis. MRI of the pelvis IC (9/15/20) demonstrates a 2.1 cm mass entirely contained within the anal canal, 2.4 cm above the anal verge. No regional lymphadenopathy (N0). Sphincter involvement (low rectal tumors): Invades internal sphincter (IS) only. As above, tumor superficially involves the internal sphincter anterior right laterally, but is at least 3 mm away from the outer border of the internal sphincter. Intersphincteric space is clear. No suspicious extra mesorectal nodes seen. CEA level 1.7 ng/mL. (9/14/20). Patient was seen by Dr. Dietz (ONC), possibilities of local surgery, APR or definitive GURDEEP + chemoradiation with surveillance (watch and wait) were all discussed. The patient's case was discussed at tumor board. The consensus was for transanal excision. Last seen on 9/17/20. \par \par Colonoscopy yesterday demonstrated a small distal rectal tumor (known carcinoma) and 2 small proximal col colon polyps were removed.  I spoke with the pathologist today and both are benign.\par \par Colonoscopy from 9/21/20 demonstrated malignant tumor in the distal rectum. One 3 mm polyp at the hepatic flexure, removed with a hot snare. Resected and retrieved. One 3 mm polyp at 22 cm proximal to the anus, removed with a hot snare. Resected and retrieved. Diverticulosis in the entire examined colon.

## 2020-09-22 NOTE — ASSESSMENT
[FreeTextEntry1] : In summary the patient has a small likely T1 distal rectal carcinoma.  There is no sign of local or distant spread.  I  recommended he undergo transanal excision.  I explained the risks benefits and alternatives including the risks of bleeding infection and recurrence.  He has a dry cough and has been having chills for the past several days.  He will be following up with  for preoperative evaluation.

## 2020-09-23 ENCOUNTER — INPATIENT (INPATIENT)
Facility: HOSPITAL | Age: 75
LOS: 6 days | Discharge: HOME | DRG: 872 | End: 2020-09-30
Attending: INTERNAL MEDICINE | Admitting: INTERNAL MEDICINE
Payer: MEDICARE

## 2020-09-23 VITALS
WEIGHT: 160.06 LBS | SYSTOLIC BLOOD PRESSURE: 153 MMHG | OXYGEN SATURATION: 97 % | HEART RATE: 81 BPM | DIASTOLIC BLOOD PRESSURE: 102 MMHG | RESPIRATION RATE: 24 BRPM | HEIGHT: 65 IN | TEMPERATURE: 103 F

## 2020-09-23 DIAGNOSIS — R50.9 FEVER, UNSPECIFIED: ICD-10-CM

## 2020-09-23 DIAGNOSIS — C20 MALIGNANT NEOPLASM OF RECTUM: ICD-10-CM

## 2020-09-23 DIAGNOSIS — E11.9 TYPE 2 DIABETES MELLITUS WITHOUT COMPLICATIONS: ICD-10-CM

## 2020-09-23 DIAGNOSIS — I25.10 ATHEROSCLEROTIC HEART DISEASE OF NATIVE CORONARY ARTERY WITHOUT ANGINA PECTORIS: ICD-10-CM

## 2020-09-23 DIAGNOSIS — I48.91 UNSPECIFIED ATRIAL FIBRILLATION: ICD-10-CM

## 2020-09-23 DIAGNOSIS — E78.5 HYPERLIPIDEMIA, UNSPECIFIED: ICD-10-CM

## 2020-09-23 DIAGNOSIS — I10 ESSENTIAL (PRIMARY) HYPERTENSION: ICD-10-CM

## 2020-09-23 DIAGNOSIS — J18.9 PNEUMONIA, UNSPECIFIED ORGANISM: ICD-10-CM

## 2020-09-23 LAB
ALBUMIN SERPL ELPH-MCNC: 3.9 G/DL — SIGNIFICANT CHANGE UP (ref 3.3–5)
ALP SERPL-CCNC: 79 U/L — SIGNIFICANT CHANGE UP (ref 40–120)
ALT FLD-CCNC: 13 U/L — SIGNIFICANT CHANGE UP (ref 10–45)
ANION GAP SERPL CALC-SCNC: 15 MMOL/L — SIGNIFICANT CHANGE UP (ref 5–17)
ANISOCYTOSIS BLD QL: SLIGHT — SIGNIFICANT CHANGE UP
APPEARANCE UR: CLEAR — SIGNIFICANT CHANGE UP
APTT BLD: 25.7 SEC — LOW (ref 27.5–35.5)
AST SERPL-CCNC: 20 U/L — SIGNIFICANT CHANGE UP (ref 10–40)
BACTERIA # UR AUTO: NEGATIVE — SIGNIFICANT CHANGE UP
BASE EXCESS BLDV CALC-SCNC: -1.8 MMOL/L — SIGNIFICANT CHANGE UP (ref -2–2)
BASE EXCESS BLDV CALC-SCNC: -2.2 MMOL/L — LOW (ref -2–2)
BASOPHILS # BLD AUTO: 0 K/UL — SIGNIFICANT CHANGE UP (ref 0–0.2)
BASOPHILS NFR BLD AUTO: 0 % — SIGNIFICANT CHANGE UP (ref 0–2)
BILIRUB SERPL-MCNC: 1 MG/DL — SIGNIFICANT CHANGE UP (ref 0.2–1.2)
BILIRUB UR-MCNC: NEGATIVE — SIGNIFICANT CHANGE UP
BUN SERPL-MCNC: 17 MG/DL — SIGNIFICANT CHANGE UP (ref 7–23)
CA-I SERPL-SCNC: 1.15 MMOL/L — SIGNIFICANT CHANGE UP (ref 1.12–1.3)
CALCIUM SERPL-MCNC: 9.1 MG/DL — SIGNIFICANT CHANGE UP (ref 8.4–10.5)
CHLORIDE BLDV-SCNC: 100 MMOL/L — SIGNIFICANT CHANGE UP (ref 96–108)
CHLORIDE SERPL-SCNC: 98 MMOL/L — SIGNIFICANT CHANGE UP (ref 96–108)
CO2 BLDV-SCNC: 23 MMOL/L — SIGNIFICANT CHANGE UP (ref 22–30)
CO2 BLDV-SCNC: 25 MMOL/L — SIGNIFICANT CHANGE UP (ref 22–30)
CO2 SERPL-SCNC: 23 MMOL/L — SIGNIFICANT CHANGE UP (ref 22–31)
COLOR SPEC: YELLOW — SIGNIFICANT CHANGE UP
CREAT SERPL-MCNC: 1.21 MG/DL — SIGNIFICANT CHANGE UP (ref 0.5–1.3)
DACRYOCYTES BLD QL SMEAR: SLIGHT — SIGNIFICANT CHANGE UP
DIFF PNL FLD: NEGATIVE — SIGNIFICANT CHANGE UP
ELLIPTOCYTES BLD QL SMEAR: SLIGHT — SIGNIFICANT CHANGE UP
EOSINOPHIL # BLD AUTO: 0 K/UL — SIGNIFICANT CHANGE UP (ref 0–0.5)
EOSINOPHIL NFR BLD AUTO: 0 % — SIGNIFICANT CHANGE UP (ref 0–6)
EPI CELLS # UR: 1 /HPF — SIGNIFICANT CHANGE UP
FULL GENE SEQUENCE RESULT: NORMAL
GAS PNL BLDV: 133 MMOL/L — LOW (ref 135–145)
GAS PNL BLDV: SIGNIFICANT CHANGE UP
GAS PNL BLDV: SIGNIFICANT CHANGE UP
GLUCOSE BLDC GLUCOMTR-MCNC: 120 MG/DL — HIGH (ref 70–99)
GLUCOSE BLDC GLUCOMTR-MCNC: 178 MG/DL — HIGH (ref 70–99)
GLUCOSE BLDV-MCNC: 158 MG/DL — HIGH (ref 70–99)
GLUCOSE SERPL-MCNC: 160 MG/DL — HIGH (ref 70–99)
GLUCOSE UR QL: NEGATIVE — SIGNIFICANT CHANGE UP
HCO3 BLDV-SCNC: 22 MMOL/L — SIGNIFICANT CHANGE UP (ref 21–29)
HCO3 BLDV-SCNC: 24 MMOL/L — SIGNIFICANT CHANGE UP (ref 21–29)
HCT VFR BLD CALC: 37.3 % — LOW (ref 39–50)
HCT VFR BLDA CALC: 45 % — SIGNIFICANT CHANGE UP (ref 39–50)
HGB BLD CALC-MCNC: 14.7 G/DL — SIGNIFICANT CHANGE UP (ref 13–17)
HGB BLD-MCNC: 12.6 G/DL — LOW (ref 13–17)
HYALINE CASTS # UR AUTO: 6 /LPF — HIGH (ref 0–2)
INR BLD: 1.81 RATIO — HIGH (ref 0.88–1.16)
INTERPRETATION PGDFRB: NORMAL
KETONES UR-MCNC: NEGATIVE — SIGNIFICANT CHANGE UP
LACTATE BLDV-MCNC: 2.7 MMOL/L — HIGH (ref 0.7–2)
LACTATE BLDV-MCNC: 4.5 MMOL/L — CRITICAL HIGH (ref 0.7–2)
LEUKOCYTE ESTERASE UR-ACNC: NEGATIVE — SIGNIFICANT CHANGE UP
LG PLATELETS BLD QL AUTO: SLIGHT — SIGNIFICANT CHANGE UP
LYMPHOCYTES # BLD AUTO: 0.77 K/UL — LOW (ref 1–3.3)
LYMPHOCYTES # BLD AUTO: 10 % — LOW (ref 13–44)
Lab: NORMAL
MACROCYTES BLD QL: SLIGHT — SIGNIFICANT CHANGE UP
MANUAL SMEAR VERIFICATION: SIGNIFICANT CHANGE UP
MCHC RBC-ENTMCNC: 32.3 PG — SIGNIFICANT CHANGE UP (ref 27–34)
MCHC RBC-ENTMCNC: 33.8 GM/DL — SIGNIFICANT CHANGE UP (ref 32–36)
MCV RBC AUTO: 95.6 FL — SIGNIFICANT CHANGE UP (ref 80–100)
METAMYELOCYTES # FLD: 1 % — HIGH (ref 0–0)
MONOCYTES # BLD AUTO: 0 K/UL — SIGNIFICANT CHANGE UP (ref 0–0.9)
MONOCYTES NFR BLD AUTO: 0 % — LOW (ref 2–14)
NEUTROPHILS # BLD AUTO: 6.88 K/UL — SIGNIFICANT CHANGE UP (ref 1.8–7.4)
NEUTROPHILS NFR BLD AUTO: 84 % — HIGH (ref 43–77)
NEUTS BAND # BLD: 5 % — SIGNIFICANT CHANGE UP (ref 0–8)
NITRITE UR-MCNC: NEGATIVE — SIGNIFICANT CHANGE UP
NRBC # BLD: 0 /100 — SIGNIFICANT CHANGE UP (ref 0–0)
PCO2 BLDV: 37 MMHG — SIGNIFICANT CHANGE UP (ref 35–50)
PCO2 BLDV: 45 MMHG — SIGNIFICANT CHANGE UP (ref 35–50)
PH BLDV: 7.34 — LOW (ref 7.35–7.45)
PH BLDV: 7.39 — SIGNIFICANT CHANGE UP (ref 7.35–7.45)
PH UR: 6 — SIGNIFICANT CHANGE UP (ref 5–8)
PLAT MORPH BLD: ABNORMAL
PLATELET # BLD AUTO: 223 K/UL — SIGNIFICANT CHANGE UP (ref 150–400)
PO2 BLDV: 38 MMHG — SIGNIFICANT CHANGE UP (ref 25–45)
PO2 BLDV: <20 MMHG — LOW (ref 25–45)
POIKILOCYTOSIS BLD QL AUTO: SLIGHT — SIGNIFICANT CHANGE UP
POLYCHROMASIA BLD QL SMEAR: SLIGHT — SIGNIFICANT CHANGE UP
POTASSIUM BLDV-SCNC: 3.6 MMOL/L — SIGNIFICANT CHANGE UP (ref 3.5–5.3)
POTASSIUM SERPL-MCNC: 3.8 MMOL/L — SIGNIFICANT CHANGE UP (ref 3.5–5.3)
POTASSIUM SERPL-SCNC: 3.8 MMOL/L — SIGNIFICANT CHANGE UP (ref 3.5–5.3)
PROT SERPL-MCNC: 7.2 G/DL — SIGNIFICANT CHANGE UP (ref 6–8.3)
PROT UR-MCNC: ABNORMAL
PROTHROM AB SERPL-ACNC: 20.9 SEC — HIGH (ref 10.6–13.6)
RBC # BLD: 3.9 M/UL — LOW (ref 4.2–5.8)
RBC # FLD: 13.2 % — SIGNIFICANT CHANGE UP (ref 10.3–14.5)
RBC BLD AUTO: ABNORMAL
RBC CASTS # UR COMP ASSIST: 2 /HPF — SIGNIFICANT CHANGE UP (ref 0–4)
REF LAB TEST METHOD: NORMAL
REVIEWED BY: NORMAL
ROULEAUX BLD QL SMEAR: PRESENT
SAO2 % BLDV: 17 % — LOW (ref 67–88)
SAO2 % BLDV: 69 % — SIGNIFICANT CHANGE UP (ref 67–88)
SARS-COV-2 RNA SPEC QL NAA+PROBE: SIGNIFICANT CHANGE UP
SODIUM SERPL-SCNC: 136 MMOL/L — SIGNIFICANT CHANGE UP (ref 135–145)
SP GR SPEC: 1.02 — SIGNIFICANT CHANGE UP (ref 1.01–1.02)
TA REPEAT RESULT: NORMAL
TEST PERFORMANCE INFO SPEC: NORMAL
UROBILINOGEN FLD QL: NEGATIVE — SIGNIFICANT CHANGE UP
WBC # BLD: 7.73 K/UL — SIGNIFICANT CHANGE UP (ref 3.8–10.5)
WBC # FLD AUTO: 7.73 K/UL — SIGNIFICANT CHANGE UP (ref 3.8–10.5)
WBC UR QL: 1 /HPF — SIGNIFICANT CHANGE UP (ref 0–5)

## 2020-09-23 PROCEDURE — 71045 X-RAY EXAM CHEST 1 VIEW: CPT | Mod: 26

## 2020-09-23 PROCEDURE — 93010 ELECTROCARDIOGRAM REPORT: CPT

## 2020-09-23 PROCEDURE — 99285 EMERGENCY DEPT VISIT HI MDM: CPT | Mod: CS

## 2020-09-23 PROCEDURE — 99223 1ST HOSP IP/OBS HIGH 75: CPT | Mod: GC,AI

## 2020-09-23 RX ORDER — DEXTROSE 50 % IN WATER 50 %
25 SYRINGE (ML) INTRAVENOUS ONCE
Refills: 0 | Status: DISCONTINUED | OUTPATIENT
Start: 2020-09-23 | End: 2020-09-30

## 2020-09-23 RX ORDER — SODIUM CHLORIDE 9 MG/ML
1000 INJECTION, SOLUTION INTRAVENOUS
Refills: 0 | Status: DISCONTINUED | OUTPATIENT
Start: 2020-09-23 | End: 2020-09-30

## 2020-09-23 RX ORDER — SODIUM CHLORIDE 9 MG/ML
500 INJECTION, SOLUTION INTRAVENOUS ONCE
Refills: 0 | Status: COMPLETED | OUTPATIENT
Start: 2020-09-23 | End: 2020-09-23

## 2020-09-23 RX ORDER — SODIUM CHLORIDE 9 MG/ML
1000 INJECTION, SOLUTION INTRAVENOUS ONCE
Refills: 0 | Status: DISCONTINUED | OUTPATIENT
Start: 2020-09-23 | End: 2020-09-23

## 2020-09-23 RX ORDER — MEROPENEM 1 G/30ML
1000 INJECTION INTRAVENOUS EVERY 8 HOURS
Refills: 0 | Status: DISCONTINUED | OUTPATIENT
Start: 2020-09-23 | End: 2020-09-24

## 2020-09-23 RX ORDER — ASPIRIN/CALCIUM CARB/MAGNESIUM 324 MG
81 TABLET ORAL DAILY
Refills: 0 | Status: DISCONTINUED | OUTPATIENT
Start: 2020-09-23 | End: 2020-09-30

## 2020-09-23 RX ORDER — METRONIDAZOLE 500 MG
500 TABLET ORAL ONCE
Refills: 0 | Status: COMPLETED | OUTPATIENT
Start: 2020-09-23 | End: 2020-09-23

## 2020-09-23 RX ORDER — ROSUVASTATIN CALCIUM 5 MG/1
1 TABLET ORAL
Qty: 0 | Refills: 0 | DISCHARGE

## 2020-09-23 RX ORDER — OMEGA-3 ACID ETHYL ESTERS 1 G
2 CAPSULE ORAL
Refills: 0 | Status: DISCONTINUED | OUTPATIENT
Start: 2020-09-23 | End: 2020-09-30

## 2020-09-23 RX ORDER — ATORVASTATIN CALCIUM 80 MG/1
40 TABLET, FILM COATED ORAL AT BEDTIME
Refills: 0 | Status: DISCONTINUED | OUTPATIENT
Start: 2020-09-23 | End: 2020-09-30

## 2020-09-23 RX ORDER — ACETAMINOPHEN 500 MG
975 TABLET ORAL ONCE
Refills: 0 | Status: COMPLETED | OUTPATIENT
Start: 2020-09-23 | End: 2020-09-23

## 2020-09-23 RX ORDER — METFORMIN HYDROCHLORIDE 850 MG/1
1 TABLET ORAL
Qty: 0 | Refills: 0 | DISCHARGE

## 2020-09-23 RX ORDER — INFLUENZA VIRUS VACCINE 15; 15; 15; 15 UG/.5ML; UG/.5ML; UG/.5ML; UG/.5ML
0.5 SUSPENSION INTRAMUSCULAR ONCE
Refills: 0 | Status: DISCONTINUED | OUTPATIENT
Start: 2020-09-23 | End: 2020-09-30

## 2020-09-23 RX ORDER — DEXTROSE 50 % IN WATER 50 %
15 SYRINGE (ML) INTRAVENOUS ONCE
Refills: 0 | Status: DISCONTINUED | OUTPATIENT
Start: 2020-09-23 | End: 2020-09-30

## 2020-09-23 RX ORDER — WARFARIN SODIUM 2.5 MG/1
1 TABLET ORAL
Qty: 0 | Refills: 0 | DISCHARGE

## 2020-09-23 RX ORDER — WARFARIN SODIUM 2.5 MG/1
5 TABLET ORAL ONCE
Refills: 0 | Status: COMPLETED | OUTPATIENT
Start: 2020-09-23 | End: 2020-09-23

## 2020-09-23 RX ORDER — VANCOMYCIN HCL 1 G
1000 VIAL (EA) INTRAVENOUS ONCE
Refills: 0 | Status: COMPLETED | OUTPATIENT
Start: 2020-09-23 | End: 2020-09-23

## 2020-09-23 RX ORDER — SODIUM CHLORIDE 9 MG/ML
1000 INJECTION, SOLUTION INTRAVENOUS
Refills: 0 | Status: DISCONTINUED | OUTPATIENT
Start: 2020-09-23 | End: 2020-09-24

## 2020-09-23 RX ORDER — VANCOMYCIN HCL 1 G
1000 VIAL (EA) INTRAVENOUS EVERY 12 HOURS
Refills: 0 | Status: DISCONTINUED | OUTPATIENT
Start: 2020-09-23 | End: 2020-09-24

## 2020-09-23 RX ORDER — INSULIN LISPRO 100/ML
VIAL (ML) SUBCUTANEOUS AT BEDTIME
Refills: 0 | Status: DISCONTINUED | OUTPATIENT
Start: 2020-09-23 | End: 2020-09-30

## 2020-09-23 RX ORDER — CEFEPIME 1 G/1
2000 INJECTION, POWDER, FOR SOLUTION INTRAMUSCULAR; INTRAVENOUS ONCE
Refills: 0 | Status: COMPLETED | OUTPATIENT
Start: 2020-09-23 | End: 2020-09-23

## 2020-09-23 RX ORDER — DEXTROSE 50 % IN WATER 50 %
12.5 SYRINGE (ML) INTRAVENOUS ONCE
Refills: 0 | Status: DISCONTINUED | OUTPATIENT
Start: 2020-09-23 | End: 2020-09-30

## 2020-09-23 RX ORDER — GLUCAGON INJECTION, SOLUTION 0.5 MG/.1ML
1 INJECTION, SOLUTION SUBCUTANEOUS ONCE
Refills: 0 | Status: DISCONTINUED | OUTPATIENT
Start: 2020-09-23 | End: 2020-09-30

## 2020-09-23 RX ORDER — INSULIN LISPRO 100/ML
VIAL (ML) SUBCUTANEOUS
Refills: 0 | Status: DISCONTINUED | OUTPATIENT
Start: 2020-09-23 | End: 2020-09-30

## 2020-09-23 RX ORDER — TAMSULOSIN HYDROCHLORIDE 0.4 MG/1
0.4 CAPSULE ORAL AT BEDTIME
Refills: 0 | Status: DISCONTINUED | OUTPATIENT
Start: 2020-09-23 | End: 2020-09-30

## 2020-09-23 RX ADMIN — ATORVASTATIN CALCIUM 40 MILLIGRAM(S): 80 TABLET, FILM COATED ORAL at 21:44

## 2020-09-23 RX ADMIN — SODIUM CHLORIDE 500 MILLILITER(S): 9 INJECTION, SOLUTION INTRAVENOUS at 11:27

## 2020-09-23 RX ADMIN — SODIUM CHLORIDE 100 MILLILITER(S): 9 INJECTION, SOLUTION INTRAVENOUS at 19:55

## 2020-09-23 RX ADMIN — SODIUM CHLORIDE 500 MILLILITER(S): 9 INJECTION, SOLUTION INTRAVENOUS at 14:48

## 2020-09-23 RX ADMIN — TAMSULOSIN HYDROCHLORIDE 0.4 MILLIGRAM(S): 0.4 CAPSULE ORAL at 21:44

## 2020-09-23 RX ADMIN — MEROPENEM 100 MILLIGRAM(S): 1 INJECTION INTRAVENOUS at 21:44

## 2020-09-23 RX ADMIN — Medication 975 MILLIGRAM(S): at 12:10

## 2020-09-23 RX ADMIN — SODIUM CHLORIDE 500 MILLILITER(S): 9 INJECTION, SOLUTION INTRAVENOUS at 12:49

## 2020-09-23 RX ADMIN — CEFEPIME 100 MILLIGRAM(S): 1 INJECTION, POWDER, FOR SOLUTION INTRAMUSCULAR; INTRAVENOUS at 12:19

## 2020-09-23 RX ADMIN — Medication 250 MILLIGRAM(S): at 12:40

## 2020-09-23 RX ADMIN — Medication 250 MILLIGRAM(S): at 22:51

## 2020-09-23 RX ADMIN — SODIUM CHLORIDE 500 MILLILITER(S): 9 INJECTION, SOLUTION INTRAVENOUS at 14:16

## 2020-09-23 RX ADMIN — Medication 81 MILLIGRAM(S): at 18:54

## 2020-09-23 RX ADMIN — Medication 2 GRAM(S): at 21:45

## 2020-09-23 RX ADMIN — Medication 100 MILLIGRAM(S): at 15:48

## 2020-09-23 RX ADMIN — Medication 975 MILLIGRAM(S): at 11:27

## 2020-09-23 RX ADMIN — SODIUM CHLORIDE 500 MILLILITER(S): 9 INJECTION, SOLUTION INTRAVENOUS at 12:10

## 2020-09-23 NOTE — H&P ADULT - PROBLEM SELECTOR PLAN 1
Pt meets SIRS criteria given fever and HR>90. WBC is wnl. Source of fever and chills is unclear, as pt's symptoms began in the last 2 weeks following his diagnosis of rectal cancer. Differential diagnosis includes infection and immune reaction to cancer.   - S/p IV resuscitation in ED.  - Elevated lactate, downtrending   - Pt currently hypotensive, continue with LR infusion at 100cc/hr  - BCx, UCx pending  - S/p vanco, cefepime, flagyl in ED  - c/w vanco and meropenem; narrow depending on culture results  - Recent CT abdomen, chest, pelvis negative for any pathology; consider repeat scans if pt's symptoms do not improve  - Daily CBC to trend WBC  - Trend lactate until wnl Pt meets SIRS criteria given fever and HR>90. WBC is wnl. Source of fever and chills is unclear, as pt's symptoms began in the last 2 weeks following his diagnosis of rectal cancer. Differential diagnosis includes infection and immune reaction to cancer. Recent colonoscopy may have also seeded bacteria into the blood.   - S/p IV resuscitation in ED.  - Elevated lactate, downtrending   - Pt currently hypotensive, continue with LR infusion at 100cc/hr  - BCx, UCx pending  - S/p vanco, cefepime, flagyl in ED  - c/w vanco and meropenem; narrow depending on culture results  - Recent CT abdomen, chest, pelvis negative for any pathology; consider repeat scans if pt's symptoms do not improve  - Daily CBC to trend WBC  - Trend lactate until wnl

## 2020-09-23 NOTE — H&P ADULT - HISTORY OF PRESENT ILLNESS
Pt is 74M hx CAD, AFib, prosthetic mitral valve (2015), HTN, macular degeneration, hemorrhoids, localized rectal cancer (dx 9/2020), presenting with chills for 10 days. Symptoms worsened over the last several days, which prompted him to present to hospital. Symptoms began 2 days after his cancer staging CT scans. He had one episode of vomiting 10 days ago. Pt has had several days of dry cough but denies any fever until 5 days ago. Denies any nausea or diarrhea. Denies any CP, SOB, abdominal pain. Denies any dysuria, hematuria, or blood in the stool, although he states he usually has difficulty passing bowel movements. 5 days ago, he developed fever that was responsive Tylenol. 2 days ago, pt had a colonoscopy that he tolerated well. The day following his colonoscopy, he developed shaking and severe chills.     ED course: Temperature 103.4, SBP 98/58, RR 20, HR 95. Pt received vanco, cefepime, flagyl, 2L LR. Pt is 74M hx DM, CAD, AFib, prosthetic mitral valve (2015), HTN, macular degeneration, hemorrhoids, localized rectal cancer (dx 9/2020), presenting with chills for 10 days. Symptoms worsened over the last several days, which prompted him to present to hospital. Symptoms began 2 days after his cancer staging CT scans. He had one episode of vomiting 10 days ago. Pt has had several days of dry cough but denies any fever until 5 days ago. Denies any nausea or diarrhea. Denies any CP, SOB, abdominal pain. Denies any dysuria, hematuria, or blood in the stool, although he states he usually has difficulty passing bowel movements. 5 days ago, he developed fever that was responsive Tylenol. 2 days ago, pt had a colonoscopy that he tolerated well. The day following his colonoscopy, he developed shaking and severe chills.     ED course: Temperature 103.4, SBP 98/58, RR 20, HR 95. Pt received vanco, cefepime, flagyl, 2L LR. Pt is 74M hx DM, CAD, AFib, prosthetic mitral valve (2015), HTN, macular degeneration, hemorrhoids, localized rectal cancer (dx 9/2020), presenting with chills for 10 days. Symptoms worsened over the last several days, which prompted him to present to hospital. Symptoms began 2 days after his cancer staging CT scans. He had one episode of vomiting 10 days ago. Pt has had several days of dry cough but denies any fever until 5 days ago. Denies any nausea or diarrhea. Denies any CP, SOB, abdominal pain. Denies any dysuria, hematuria, or blood in the stool, although he states he usually has difficulty passing bowel movements. 5 days ago, he developed fever that was responsive Tylenol. 2 days ago, pt had a colonoscopy that he tolerated well. The day following his colonoscopy, he developed shaking and severe chills. Pt is planned for surgical resection of his rectal cancer in the upcoming weeks.     ED course: Temperature 103.4, SBP 98/58, RR 20, HR 95. Pt received vanco, cefepime, flagyl, 2L LR.

## 2020-09-23 NOTE — H&P ADULT - NSHPREVIEWOFSYSTEMS_GEN_ALL_CORE
CONSTITUTIONAL: + fever, + chills, + fatigue, no weight losse  EYES: No eye pain, visual disturbances, or discharge  ENMT:  No difficulty hearing, tinnitus, vertigo; No sinus or throat pain  NECK: No pain or stiffness  RESPIRATORY: No cough, wheezing, chills or hemoptysis; No shortness of breath  CARDIOVASCULAR: No chest pain, palpitations, dizziness, or leg swelling  GASTROINTESTINAL: No abdominal or epigastric pain. No nausea, vomiting, or hematemesis; No diarrhea or constipation. No melena or hematochezia.  GENITOURINARY: No dysuria, frequency, hematuria, or incontinence  NEUROLOGICAL: No headaches, memory loss, loss of strength, numbness, or tremors  SKIN: No itching, burning, rashes, or lesions   LYMPH NODES: No enlarged glands  ENDOCRINE: No heat or cold intolerance; No hair loss  MUSCULOSKELETAL: No joint pain or swelling; No muscle, back, or extremity pain  PSYCHIATRIC: No depression, anxiety, mood swings, or difficulty sleeping  HEME/LYMPH: No easy bruising, or bleeding gums  ALLERY AND IMMUNOLOGIC: No hives or eczema    Review of Systems: Systems otherwise negative except as per HPI

## 2020-09-23 NOTE — ED PROVIDER NOTE - OBJECTIVE STATEMENT
73 yo male with PMHx of HTN, HLD, CAD s/p stent, CHF, Afib p/w fever.  Patient reports that he was recently diagnosed with rectal cancer in the beginning of september. Malignancy work up with CT chest/Abd/pelvis were unremarkable.  Pelvis MRI also negative for lymph node involved.  4 days ago patient noted fevers, chills and rigors.  Taking Tylenol with some improvement.  On Monday, patient had a repeat colonoscopy with his rectal surgeon, Dr. Connelly without complications.  Fevers, chills and rigors have continued.  Contacted his PMD, Dr. michael Perkins and was directed to the ER.  Denies headache, CP, SOB, abd pain, diarrhea, but does endorse intermittent dry cough and one episode of vomiting a few days ago.

## 2020-09-23 NOTE — ED ADULT NURSE NOTE - OBJECTIVE STATEMENT
74YOM pmh HTN, HTN, CAD s/p stent, CHF, afib presents to ED c/o fever, chills. Pt states he was diagnosed with rectal CA on sept 4th. Pt had a CT done saturday and since then having fevers. Pt took advil yesterday. No meds taken today. Pt called his pcp and sent to ED. Pt also c/o nonbloody emsis episode couple days ago. Denies CP, cough, SOB, abd pain. N/V/D, burning upon urination. EKG done, pt placed on cardiac monitor. Safety and comfort measures provided. Will continue to monitor.

## 2020-09-23 NOTE — ED PROVIDER NOTE - PROGRESS NOTE DETAILS
Attending MD Lynn: Informed patient had a 2 second pause, requested repeat EKG, patient admitted by previous team, asked VASU Mercer to inform primary team.  Patient reports feeling "fine" and VASU Mercer was bedside at time of pause.  Patient reports feeling unchanged.  Primary team to be informed.

## 2020-09-23 NOTE — H&P ADULT - NSICDXPASTSURGICALHX_GEN_ALL_CORE_FT
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

## 2020-09-23 NOTE — H&P ADULT - NSICDXPASTMEDICALHX_GEN_ALL_CORE_FT
PAST MEDICAL HISTORY:  AF (atrial fibrillation) s/p ablation    After-cataract of both eyes 1996    BPH (Benign Prostatic Hyperplasia)     CAD (coronary artery disease)     Diverticulosis     H/O hemorrhoids     HTN (hypertension)     Hyperlipidemia

## 2020-09-23 NOTE — ED PROVIDER NOTE - CLINICAL SUMMARY MEDICAL DECISION MAKING FREE TEXT BOX
Satya MARINA: hx of new rectal cancer, here for fever x 4 days, s/p colonoscopy 3 days ago, fever - plan for sepsis work up, cultures, u/a, CXR. patient to be admitted.

## 2020-09-23 NOTE — H&P ADULT - PROBLEM SELECTOR PLAN 7
- Pt is planned for surgical resection, will proceed as an outpatient once pt's current symptoms resolve and possible infection is cleared.

## 2020-09-23 NOTE — H&P ADULT - PROBLEM SELECTOR PLAN 6
- Pt is planned for surgical resection, will proceed as an outpatient once pt's current symptoms resolve and possible infection is cleared. Pt takes 500mg metformin at home  - Low dose ISS  - F/u A1c in AM

## 2020-09-23 NOTE — H&P ADULT - ATTENDING COMMENTS
Follow up with your pediatrician in 1-2 days after discharge Pt w recently diagnosed rectal ca aw high grade fever. Continue broad spectrum abx. F/u cx.

## 2020-09-23 NOTE — H&P ADULT - NSHPLABSRESULTS_GEN_ALL_CORE
LABS:  cret                        12.6   7.73  )-----------( 223      ( 23 Sep 2020 11:31 )             37.3     Manual Differential (09.23.20 @ 11:31)   Elliptocytes: Slight   Red Cell Morphology: Abnormal   Platelet Morphology: Abnormal   Large Platelets: Slight   Manual Smear Verification: Performed   Tear Drops: Slight   Poikilocytosis: Slight   Polychromasia: Slight   Rouleaux Formation: Present   Macrocytosis: Slight   Anisocytosis: Slight   Band Neutrophils %: 5.0 %   Metamyelocytes %: 1.0 %   Nucleated RBC: 0 /100         09-23    136  |  98  |  17  ----------------------------<  160<H>  3.8   |  23  |  1.21    Ca    9.1      23 Sep 2020 11:31    TPro  7.2  /  Alb  3.9  /  TBili  1.0  /  DBili  x   /  AST  20  /  ALT  13  /  AlkPhos  79  09-23    PT/INR - ( 23 Sep 2020 11:31 )   PT: 20.9 sec;   INR: 1.81 ratio         PTT - ( 23 Sep 2020 11:31 )  PTT:25.7 sec    Blood Gas Venous - Lactate: 2.7: Elevated lactate. Consider ordering follow-up lactate to trend. mmoL/L (09.23.20 @ 13:52)   Blood Gas Venous - Lactate: 4.5 mmoL/L (09.23.20 @ 11:20)     Urinalysis (09.23.20 @ 15:31)   Glucose Qualitative, Urine: Negative   Blood, Urine: Negative   pH Urine: 6.0   Color: Yellow   Urine Appearance: Clear   Bilirubin: Negative   Ketone - Urine: Negative   Specific Gravity: 1.016   Protein, Urine: Trace   Urobilinogen: Negative   Nitrite: Negative   Leukocyte Esterase Concentration: Negative       IMAGING:    EXAM:  XR CHEST AP OR PA 1V                        PROCEDURE DATE:  09/23/2020    IMPRESSION:  Small right infiltrate.      EXAM:  MR PELVIS IC    PROCEDURE DATE:  09/15/2020    IMPRESSION:  A 2.1 cm mass entirely contained within the anal canal, 2.4 cm above the anal verge. No regional lymphadenopathy (N0).    Sphincter involvement (low rectal tumors): Invades internal sphincter (IS) only. As above, tumor superficially involves the internal sphincter anterior right laterally, but is at least 3 mm away from the outer border of the internal sphincter. Intersphincteric space is clear.    Suspicious extra mesorectal nodes: None      EXAM:  CT CHEST IC    EXAM:  CT ABDOMEN AND PELVIS   PROCEDURE DATE:  09/11/2020   IMPRESSION:  No evidence of metastatic disease in the chest, abdomen or pelvis.    Colonoscopy:  Impression:          - Malignant tumor in the distal rectum.                       - One 3 mm polyp at the hepatic flexure, removed with a hot snare. Resected                        and retrieved.   - One 3 mm polyp at 22 cm proximal to the anus, removed with a hot snare.                        Resected and retrieved.                       - Diverticulosis in the entire examined colon.                       - The examination was otherwise normal.  Recommendation:      - Repeat colonoscopy in 1 year for surveillance.                       - Await pathology results.
12-Apr-2020 20:44

## 2020-09-23 NOTE — ED ADULT NURSE REASSESSMENT NOTE - NS ED NURSE REASSESS COMMENT FT1
At 1545 pt has a pause of 2 seconds a s seen in the monitor. BP 99/75. O2 sat RA %. Pt denies chest pain, sob or palpitation. Pt fully awake, alert and orientedx4. Repeat EKG done and shows Afib with HR 91-97/min. Dr. Coffman made aware at 1620. States pt is asymptomatic. No further orders made. Will closely monitor patient.

## 2020-09-23 NOTE — CHART NOTE - NSCHARTNOTEFT_GEN_A_CORE
TO BE COMPLETED WITHIN 6 HOURS OF INITIAL ASSESSMENT:    For use in patients that have 2 sepsis criteria and new organ dysfunction   •	New or increased oxygen requirement  •	Creatinine >2mg/dL  •	Bilirubin>2mg/dL  •	Platelet <100,000/mm3  •	INR >1.5, PTT>60  •	Lactate >2    If patient persistent hypotension (SBP<90) or any lactate >4 then provider evaluation (Physician/PA/NP) within 30 minutes of bolus completion is required.    Vital Signs Last 24 Hrs  T(C): 36.9 (23 Sep 2020 13:30), Max: 39.7 (23 Sep 2020 10:30)  T(F): 98.5 (23 Sep 2020 13:30), Max: 103.4 (23 Sep 2020 10:30)  HR: 75 (23 Sep 2020 16:12) (72 - 96)  BP: 99/75 (23 Sep 2020 16:12) (88/54 - 153/102)  BP(mean): 65 (23 Sep 2020 13:00) (65 - 65)  RR: 18 (23 Sep 2020 16:12) (18 - 24)  SpO2: 100% (23 Sep 2020 16:12) (97% - 100%)  		  LUNGS:  [ x ] Clear bilaterally [  ] Wheeze [  ] Rhonchi [  ] Rales [  ] Crackles; Other:  HEART: [  ]RRR [  ] No murmur [  ]  Normal S1S2 [ x ] Tachycardia;  Other:  CAPILLARY REFILL:  	Fingers: [x  ] less than 2 seconds [  ] more than 2 seconds                                           Toes: [ x ]  less than 2 seconds [  ] more than 2 seconds   PERIPHERAL PULSES:  Radial: [ x ] Palpable  [  ]  non-palpable                                         Dorsalis Pedis: [x  ] Palpable  [  ] non-palpable                                         Posterior Tibial: [  x] Palpable  [  ] non-palpable                                          Other:  SKIN:   [  ]  Diaphoretic  [  ]  Mottling  [  ]  Cold extremities  [ x ]  Warm [ x ]  Dry                      Other    BEDSIDE ULTRASOUND FINDINGS (IF APPLICABLE):    Labs:  23 Sep 2020 11:31    136    |  98     |  17     ----------------------------<  160    3.8     |  23     |  1.21     Ca    9.1        23 Sep 2020 11:31    TPro  7.2    /  Alb  3.9    /  TBili  1.0    /  DBili  x      /  AST  20     /  ALT  13     /  AlkPhos  79     23 Sep 2020 11:31                          12.6   7.73  )-----------( 223      ( 23 Sep 2020 11:31 )             37.3     PT/INR - ( 23 Sep 2020 11:31 )   PT: 20.9 sec;   INR: 1.81 ratio         PTT - ( 23 Sep 2020 11:31 )  PTT:25.7 sec  Lactate:    [ ] I have re-evaluated the patient's fluid status and reviewed vital signs. Clinical evaluation demonstrates an appropriate response to fluid resuscitation, with subsequent plan as follows:    Patient received a modified total of fluid resuscitation for the following reason:  [ ] obesity BMI > 30, patient received 30 cc/kg according to Ideal Body Weight   [ ] acute, decompensated CHF   [ ] pulmonary edema    [ ] ESRD with signs of fluid overload  [ ] presence of LVAD     Plan (orders must be placed in EMR):     [  ]  Check Repeat Lactate   [ x ]  No change in current plan  [  ]  Start Vasopressors:  [  ]  Repeat Fluid Bolus:  [  ] other:

## 2020-09-23 NOTE — H&P ADULT - ASSESSMENT
Pt is 74M hx CAD, AFib, prosthetic mitral valve (2015), HTN, macular degeneration, hemorrhoids, localized rectal cancer (dx 9/2020), presenting with fever and chills. Pt meeting SIRS criteria. Pt is 74M hx DM, CAD, AFib, prosthetic mitral valve (2015), HTN, macular degeneration, hemorrhoids, localized rectal cancer (dx 9/2020), presenting with fever and chills. Pt meeting SIRS criteria.

## 2020-09-23 NOTE — H&P ADULT - PROBLEM SELECTOR PLAN 2
Pt takes warfarin 5mg Tuesday, Wednesday, Friday, Sat, Sun; 2.5mg Mon and Thurs  - C/w home regimen of warfarin  - Hold home metoprolol given pt's hypotension

## 2020-09-24 LAB
-  STREPTOCOCCUS SP. (NOT GRP A, B OR S PNEUMONIAE): SIGNIFICANT CHANGE UP
A1C WITH ESTIMATED AVERAGE GLUCOSE RESULT: 6.2 % — HIGH (ref 4–5.6)
ALBUMIN SERPL ELPH-MCNC: 3.1 G/DL — LOW (ref 3.3–5)
ALP SERPL-CCNC: 63 U/L — SIGNIFICANT CHANGE UP (ref 40–120)
ALT FLD-CCNC: 10 U/L — SIGNIFICANT CHANGE UP (ref 10–45)
ANION GAP SERPL CALC-SCNC: 6 MMOL/L — SIGNIFICANT CHANGE UP (ref 5–17)
APTT BLD: 34.3 SEC — SIGNIFICANT CHANGE UP (ref 27.5–35.5)
AST SERPL-CCNC: 16 U/L — SIGNIFICANT CHANGE UP (ref 10–40)
BASOPHILS # BLD AUTO: 0.04 K/UL — SIGNIFICANT CHANGE UP (ref 0–0.2)
BASOPHILS NFR BLD AUTO: 0.2 % — SIGNIFICANT CHANGE UP (ref 0–2)
BILIRUB SERPL-MCNC: 0.7 MG/DL — SIGNIFICANT CHANGE UP (ref 0.2–1.2)
BUN SERPL-MCNC: 14 MG/DL — SIGNIFICANT CHANGE UP (ref 7–23)
CALCIUM SERPL-MCNC: 8.5 MG/DL — SIGNIFICANT CHANGE UP (ref 8.4–10.5)
CHLORIDE SERPL-SCNC: 102 MMOL/L — SIGNIFICANT CHANGE UP (ref 96–108)
CHOLEST SERPL-MCNC: 70 MG/DL — SIGNIFICANT CHANGE UP (ref 10–199)
CO2 SERPL-SCNC: 25 MMOL/L — SIGNIFICANT CHANGE UP (ref 22–31)
CREAT SERPL-MCNC: 1.01 MG/DL — SIGNIFICANT CHANGE UP (ref 0.5–1.3)
CULTURE RESULTS: NO GROWTH — SIGNIFICANT CHANGE UP
EOSINOPHIL # BLD AUTO: 0.07 K/UL — SIGNIFICANT CHANGE UP (ref 0–0.5)
EOSINOPHIL NFR BLD AUTO: 0.3 % — SIGNIFICANT CHANGE UP (ref 0–6)
ESTIMATED AVERAGE GLUCOSE: 131 MG/DL — HIGH (ref 68–114)
GLUCOSE BLDC GLUCOMTR-MCNC: 126 MG/DL — HIGH (ref 70–99)
GLUCOSE BLDC GLUCOMTR-MCNC: 137 MG/DL — HIGH (ref 70–99)
GLUCOSE BLDC GLUCOMTR-MCNC: 170 MG/DL — HIGH (ref 70–99)
GLUCOSE BLDC GLUCOMTR-MCNC: 184 MG/DL — HIGH (ref 70–99)
GLUCOSE SERPL-MCNC: 99 MG/DL — SIGNIFICANT CHANGE UP (ref 70–99)
GRAM STN FLD: SIGNIFICANT CHANGE UP
HCT VFR BLD CALC: 28.9 % — LOW (ref 39–50)
HCT VFR BLD CALC: 31.9 % — LOW (ref 39–50)
HDLC SERPL-MCNC: 27 MG/DL — LOW
HGB BLD-MCNC: 10.9 G/DL — LOW (ref 13–17)
HGB BLD-MCNC: 9.9 G/DL — LOW (ref 13–17)
IMM GRANULOCYTES NFR BLD AUTO: 0.7 % — SIGNIFICANT CHANGE UP (ref 0–1.5)
INR BLD: 2.02 RATIO — HIGH (ref 0.88–1.16)
LACTATE SERPL-SCNC: 1.4 MMOL/L — SIGNIFICANT CHANGE UP (ref 0.7–2)
LIPID PNL WITH DIRECT LDL SERPL: 27 MG/DL — SIGNIFICANT CHANGE UP
LYMPHOCYTES # BLD AUTO: 1.74 K/UL — SIGNIFICANT CHANGE UP (ref 1–3.3)
LYMPHOCYTES # BLD AUTO: 8.6 % — LOW (ref 13–44)
MCHC RBC-ENTMCNC: 32.5 PG — SIGNIFICANT CHANGE UP (ref 27–34)
MCHC RBC-ENTMCNC: 32.5 PG — SIGNIFICANT CHANGE UP (ref 27–34)
MCHC RBC-ENTMCNC: 34.2 GM/DL — SIGNIFICANT CHANGE UP (ref 32–36)
MCHC RBC-ENTMCNC: 34.3 GM/DL — SIGNIFICANT CHANGE UP (ref 32–36)
MCV RBC AUTO: 94.8 FL — SIGNIFICANT CHANGE UP (ref 80–100)
MCV RBC AUTO: 95.2 FL — SIGNIFICANT CHANGE UP (ref 80–100)
METHOD TYPE: SIGNIFICANT CHANGE UP
MONOCYTES # BLD AUTO: 0.98 K/UL — HIGH (ref 0–0.9)
MONOCYTES NFR BLD AUTO: 4.8 % — SIGNIFICANT CHANGE UP (ref 2–14)
NEUTROPHILS # BLD AUTO: 17.24 K/UL — HIGH (ref 1.8–7.4)
NEUTROPHILS NFR BLD AUTO: 85.4 % — HIGH (ref 43–77)
NRBC # BLD: 0 /100 WBCS — SIGNIFICANT CHANGE UP (ref 0–0)
NRBC # BLD: 0 /100 WBCS — SIGNIFICANT CHANGE UP (ref 0–0)
PLATELET # BLD AUTO: 187 K/UL — SIGNIFICANT CHANGE UP (ref 150–400)
PLATELET # BLD AUTO: 205 K/UL — SIGNIFICANT CHANGE UP (ref 150–400)
POTASSIUM SERPL-MCNC: 3.5 MMOL/L — SIGNIFICANT CHANGE UP (ref 3.5–5.3)
POTASSIUM SERPL-SCNC: 3.5 MMOL/L — SIGNIFICANT CHANGE UP (ref 3.5–5.3)
PROT SERPL-MCNC: 5.4 G/DL — LOW (ref 6–8.3)
PROTHROM AB SERPL-ACNC: 23.3 SEC — HIGH (ref 10.6–13.6)
RBC # BLD: 3.05 M/UL — LOW (ref 4.2–5.8)
RBC # BLD: 3.35 M/UL — LOW (ref 4.2–5.8)
RBC # FLD: 13.2 % — SIGNIFICANT CHANGE UP (ref 10.3–14.5)
RBC # FLD: 13.3 % — SIGNIFICANT CHANGE UP (ref 10.3–14.5)
SODIUM SERPL-SCNC: 133 MMOL/L — LOW (ref 135–145)
SPECIMEN SOURCE: SIGNIFICANT CHANGE UP
TOTAL CHOLESTEROL/HDL RATIO MEASUREMENT: 2.6 RATIO — LOW (ref 3.4–9.6)
TRIGL SERPL-MCNC: 83 MG/DL — SIGNIFICANT CHANGE UP (ref 10–149)
WBC # BLD: 20.21 K/UL — HIGH (ref 3.8–10.5)
WBC # BLD: 21.03 K/UL — HIGH (ref 3.8–10.5)
WBC # FLD AUTO: 20.21 K/UL — HIGH (ref 3.8–10.5)
WBC # FLD AUTO: 21.03 K/UL — HIGH (ref 3.8–10.5)

## 2020-09-24 PROCEDURE — 99233 SBSQ HOSP IP/OBS HIGH 50: CPT | Mod: GC

## 2020-09-24 PROCEDURE — 93306 TTE W/DOPPLER COMPLETE: CPT | Mod: 26

## 2020-09-24 RX ORDER — WARFARIN SODIUM 2.5 MG/1
2.5 TABLET ORAL ONCE
Refills: 0 | Status: DISCONTINUED | OUTPATIENT
Start: 2020-09-25 | End: 2020-09-25

## 2020-09-24 RX ORDER — CEFTRIAXONE 500 MG/1
2000 INJECTION, POWDER, FOR SOLUTION INTRAMUSCULAR; INTRAVENOUS EVERY 24 HOURS
Refills: 0 | Status: COMPLETED | OUTPATIENT
Start: 2020-09-24 | End: 2020-09-30

## 2020-09-24 RX ADMIN — Medication 1: at 18:07

## 2020-09-24 RX ADMIN — Medication 81 MILLIGRAM(S): at 11:55

## 2020-09-24 RX ADMIN — CEFTRIAXONE 100 MILLIGRAM(S): 500 INJECTION, POWDER, FOR SOLUTION INTRAMUSCULAR; INTRAVENOUS at 14:45

## 2020-09-24 RX ADMIN — Medication 2 GRAM(S): at 06:30

## 2020-09-24 RX ADMIN — Medication 250 MILLIGRAM(S): at 06:29

## 2020-09-24 RX ADMIN — Medication 2 GRAM(S): at 17:10

## 2020-09-24 RX ADMIN — MEROPENEM 100 MILLIGRAM(S): 1 INJECTION INTRAVENOUS at 06:28

## 2020-09-24 RX ADMIN — TAMSULOSIN HYDROCHLORIDE 0.4 MILLIGRAM(S): 0.4 CAPSULE ORAL at 21:34

## 2020-09-24 RX ADMIN — ATORVASTATIN CALCIUM 40 MILLIGRAM(S): 80 TABLET, FILM COATED ORAL at 21:34

## 2020-09-24 RX ADMIN — SODIUM CHLORIDE 100 MILLILITER(S): 9 INJECTION, SOLUTION INTRAVENOUS at 17:10

## 2020-09-24 RX ADMIN — WARFARIN SODIUM 5 MILLIGRAM(S): 2.5 TABLET ORAL at 04:20

## 2020-09-24 NOTE — PROGRESS NOTE ADULT - PROBLEM SELECTOR PLAN 1
Pt meets SIRS criteria given fever and HR>90. WBC is wnl. Source of fever and chills is unclear, as pt's symptoms began in the last 2 weeks following his diagnosis of rectal cancer. Differential diagnosis includes infection and immune reaction to cancer. Recent colonoscopy may have also seeded bacteria into the blood.   - S/p IV resuscitation in ED.  - Elevated lactate, downtrending now to normal range   - Pt currently hypotensive, continue with LR infusion at 100cc/hr  - BCx showing strep (not group A, B, pneumo)  - Likely to be group D strep (strep bovis). This is highly associated with colorectal cancer, which patient has. Also highly associated with infective endocarditis  - Given pt's fever, chills, prosthetic heart valve, will obtain TTE  - UCx pending  - S/p vanco, cefepime, flagyl in ED  - d/c vanco and josie; start ceftriaxone 2g q24h; narrow depending on culture results  - Recent CT abdomen, chest, pelvis negative for any pathology; consider repeat scans if pt's symptoms do not improve  - Daily CBC to trend WBC Pt meets SIRS criteria given fever and HR>90. WBC 7--> 21. Source of fever and chills is unclear, as pt's symptoms began in the last 2 weeks following his diagnosis of rectal cancer. Differential diagnosis includes infection and immune reaction to cancer. Recent colonoscopy may have also seeded bacteria into the blood.   - S/p IV resuscitation in ED.  - Elevated lactate, downtrending now to normal range   - Pt currently hypotensive, continue with LR infusion at 100cc/hr  - BCx showing strep (not group A, B, pneumo)  - Likely to be group D strep (strep bovis). This is highly associated with colorectal cancer, which patient has. Also highly associated with infective endocarditis  - Given pt's fever, chills, prosthetic heart valve, will obtain TTE  - UCx pending  - S/p vanco, cefepime, flagyl in ED  - d/c vanco and josie; start ceftriaxone 2g q24h; narrow depending on culture results  - Recent CT abdomen, chest, pelvis negative for any pathology; consider repeat scans if pt's symptoms do not improve  - Daily CBC to trend WBC Pt meets SIRS criteria given fever and HR>90. WBC 7--> 21. Source of fever and chills is unclear, as pt's symptoms began in the last 2 weeks following his diagnosis of rectal cancer. Differential diagnosis includes infection and immune reaction to cancer. Recent colonoscopy may have also seeded bacteria into the blood.   - S/p IV resuscitation in ED.  - Elevated lactate, downtrending now to normal range   - Pt currently hypotensive, continue with LR infusion at 100cc/hr  - BCx showing strep (not group A, B, pneumo)  - Repeat BCx until clearance  - Likely to be group D strep (strep bovis). This is highly associated with colorectal cancer, which patient has. Also highly associated with infective endocarditis  - Given pt's fever, chills, prosthetic heart valve, will obtain TTE  - UCx pending  - S/p vanco, cefepime, flagyl in ED  - d/c vanco and josie; start ceftriaxone 2g q24h; narrow depending on culture results  - Recent CT abdomen, chest, pelvis negative for any pathology; consider repeat scans if pt's symptoms do not improve  - Daily CBC to trend WBC

## 2020-09-24 NOTE — PROGRESS NOTE ADULT - SUBJECTIVE AND OBJECTIVE BOX
PROGRESS NOTE:     CONTACT INFO:  Kenyatta Arias MD   PGY-1  Pager: [phone] [facility]    Patient is a 74y old  Male who presents with a chief complaint of Fever (24 Sep 2020 13:26)      SUBJECTIVE / OVERNIGHT EVENTS:  No acute events overnight. Patient seen and evaluated at bedside. No fever/chills.  Denies SOB at rest, chest pain, palpitations, abdominal pain, nausea/vomiting. Denies any dysuria, hematuria, blood in stool.     ADDITIONAL REVIEW OF SYSTEMS:    Negative except as above.     MEDICATIONS  (STANDING):  aspirin enteric coated 81 milliGRAM(s) Oral daily  atorvastatin 40 milliGRAM(s) Oral at bedtime  cefTRIAXone   IVPB 2000 milliGRAM(s) IV Intermittent every 24 hours  dextrose 5%. 1000 milliLiter(s) (50 mL/Hr) IV Continuous <Continuous>  dextrose 50% Injectable 12.5 Gram(s) IV Push once  dextrose 50% Injectable 25 Gram(s) IV Push once  dextrose 50% Injectable 25 Gram(s) IV Push once  influenza   Vaccine 0.5 milliLiter(s) IntraMuscular once  insulin lispro (HumaLOG) corrective regimen sliding scale   SubCutaneous three times a day before meals  insulin lispro (HumaLOG) corrective regimen sliding scale   SubCutaneous at bedtime  lactated ringers. 1000 milliLiter(s) (100 mL/Hr) IV Continuous <Continuous>  omega-3-Acid Ethyl Esters 2 Gram(s) Oral two times a day  tamsulosin 0.4 milliGRAM(s) Oral at bedtime    MEDICATIONS  (PRN):  dextrose 40% Gel 15 Gram(s) Oral once PRN Blood Glucose LESS THAN 70 milliGRAM(s)/deciliter  glucagon  Injectable 1 milliGRAM(s) IntraMuscular once PRN Glucose LESS THAN 70 milligrams/deciliter      CAPILLARY BLOOD GLUCOSE      POCT Blood Glucose.: 170 mg/dL (24 Sep 2020 12:31)  POCT Blood Glucose.: 126 mg/dL (24 Sep 2020 09:23)  POCT Blood Glucose.: 178 mg/dL (23 Sep 2020 21:42)  POCT Blood Glucose.: 120 mg/dL (23 Sep 2020 18:41)    I&O's Summary    23 Sep 2020 07:01  -  24 Sep 2020 07:00  --------------------------------------------------------  IN: 1920 mL / OUT: 1000 mL / NET: 920 mL    24 Sep 2020 07:01  -  24 Sep 2020 14:45  --------------------------------------------------------  IN: 500 mL / OUT: 150 mL / NET: 350 mL        PHYSICAL EXAM:  Vital Signs Last 24 Hrs  T(C): 36.7 (24 Sep 2020 12:01), Max: 36.7 (23 Sep 2020 18:57)  T(F): 98.1 (24 Sep 2020 12:), Max: 98.1 (24 Sep 2020 12:01)  HR: 92 (24 Sep 2020 12:) (73 - 92)  BP: 90/52 (24 Sep 2020 12:) (90/52 - 104/64)  BP(mean): --  RR: 18 (24 Sep 2020 12:01) (18 - 18)  SpO2: 100% (24 Sep 2020 12:01) (99% - 100%)    GENERAL: no acute distress; well-developed  HEAD:  Atraumatic, Normocephalic  EYES: EOMI, PERRLA, conjunctiva and sclera clear, + exophthalmos OD  ENT: MMM; oropharynx clear  NECK: Supple, No JVD  CHEST/LUNG: Clear to auscultation bilaterally; No wheeze  HEART: Irregular rate, normal S1S2, no murmurs, rubs, gallops  ABDOMEN: Soft, Nontender, Nondistended; Bowel sounds present  EXTREMITIES:  2+ Peripheral Pulses, No clubbing, cyanosis, or edema  PSYCH: AAOx3  NEUROLOGY: no focal motor or sensory deficits. 5/5 muscle strength in all extremities.   SKIN: chronic discoloration noted on back; no rashes.    LABS:                        10.9   20.21 )-----------( 205      ( 24 Sep 2020 11:57 )             31.9     09-24    133<L>  |  102  |  14  ----------------------------<  99  3.5   |  25  |  1.01    Ca    8.5      24 Sep 2020 07:36    TPro  5.4<L>  /  Alb  3.1<L>  /  TBili  0.7  /  DBili  x   /  AST  16  /  ALT  10  /  AlkPhos  63  09-24    PT/INR - ( 24 Sep 2020 07:18 )   PT: 23.3 sec;   INR: 2.02 ratio         PTT - ( 24 Sep 2020 07:18 )  PTT:34.3 sec      Lactate, Blood: 1.4 mmol/L (20 @ 07:18)       Urinalysis Basic - ( 23 Sep 2020 15:31 )    Color: Yellow / Appearance: Clear / S.016 / pH: x  Gluc: x / Ketone: Negative  / Bili: Negative / Urobili: Negative   Blood: x / Protein: Trace / Nitrite: Negative   Leuk Esterase: Negative / RBC: 2 /hpf / WBC 1 /HPF   Sq Epi: x / Non Sq Epi: 1 /hpf / Bacteria: Negative      Culture - Blood (collected 23 Sep 2020 16:35)  Source: .Blood Blood-Peripheral  Gram Stain (24 Sep 2020 13:09):    Growth in aerobic bottle: Gram Positive Cocci in Pairs and Chains  Preliminary Report (24 Sep 2020 13:09):    Growth in aerobic bottle: Gram Positive Cocci in Pairs and Chains    Culture - Blood (collected 23 Sep 2020 16:35)  Source: .Blood Blood-Peripheral  Gram Stain (24 Sep 2020 11:48):    Growth in aerobic bottle: Gram Positive Cocci in Pairs and Chains    Growth in anaerobic bottle: Gram Positive Cocci in Pairs and Chains  Preliminary Report (24 Sep 2020 11:48):    Growth in aerobic bottle: Gram Positive Cocci in Pairs and Chains    Growth in anaerobic bottle: Gram Positive Cocci in Pairs and Chains    ***Blood Panel PCR results on this specimen are available    approximately 3 hours after the Gram stain result.***    Gram stain, PCR, and/or culture results may not always    correspond due to difference in methodologies.    ************************************************************    This PCR assay was performed using "[employer].    The following targets are tested for: Enterococcus,    vancomycin resistant enterococci, Listeria monocytogenes,    coagulase negative staphylococci, S. aureus,    methicillin resistant S. aureus, Streptococcus agalactiae    (Group B), S. pneumoniae, S. pyogenes (Group A),    Acinetobacter baumannii, Enterobacter cloacae, E. coli,    Klebsiella oxytoca, K. pneumoniae, Proteus sp.,    Serratia marcescens, Haemophilus influenzae,    Neisseria meningitidis, Pseudomonas aeruginosa, Candida    albicans, C. glabrata, C krusei, C parapsilosis,    C. tropicalis and the KPC resistance gene.    "Due to technical problems, Proteus sp. will Not be reported as part of    the BCID panel until further notice"  Organism: Blood Culture PCR (24 Sep 2020 07:28)  Organism: Blood Culture PCR (24 Sep 2020 07:28)    - Streptococcus sp. (Not Grp A, B or S pneumoniae): Detec (20 @ 16:35)       RADIOLOGY & ADDITIONAL TESTS:  Results Reviewed:   Imaging Personally Reviewed:  Electrocardiogram Personally Reviewed:    COORDINATION OF CARE:  Care Discussed with Consultants/Other Providers [Y/N]:  Prior or Outpatient Records Reviewed [Y/N]:

## 2020-09-24 NOTE — PROGRESS NOTE ADULT - ASSESSMENT
Pt is 74M hx DM, CAD, AFib, prosthetic mitral valve (2015), HTN, macular degeneration, hemorrhoids, localized rectal cancer (dx 9/2020), presenting with fever and chills. Pt meeting SIRS criteria.

## 2020-09-24 NOTE — CONSULT NOTE ADULT - ASSESSMENT
74M with known rectal cancer, now with bacteremia  - Recommend echo, ID consult  - Will plan fo resection as outpt  - D/w Dr. Connelly    [phone]

## 2020-09-24 NOTE — CHART NOTE - NSCHARTNOTEFT_GEN_A_CORE
pt seen and examined- full note to follow  know rectal ca  admitted w sterp bacteremia (potential source includes rectal malignancy)  h/o bioprosthetic mitral valve  rec:  ID eval for abx  echo  elective trans anal excuision as out pt once sepsis controlled  will follow

## 2020-09-24 NOTE — CONSULT NOTE ADULT - SUBJECTIVE AND OBJECTIVE BOX
General Surgery Consult  Consulting surgical team: [facility]  Consulting attending: Maricel     HPI:  Pt is 74M hx DM, CAD, AFib, prosthetic mitral valve (2015), HTN, macular degeneration, hemorrhoids, localized rectal cancer (dx 2020), presenting with chills for 10 days. Symptoms worsened over the last several days, which prompted him to present to hospital. Symptoms began 2 days after his cancer staging CT scans. He had one episode of vomiting 10 days ago. Pt has had several days of dry cough but denies any fever until 5 days ago. Denies any nausea or diarrhea. Denies any CP, SOB, abdominal pain. Denies any dysuria, hematuria, or blood in the stool, although he states he usually has difficulty passing bowel movements. 5 days ago, he developed fever that was responsive Tylenol. 2 days ago, pt had a colonoscopy that he tolerated well. The day following his colonoscopy, he developed shaking and severe chills. Pt is planned for surgical resection of his rectal cancer in the upcoming weeks. Found to have step bacteremia          PAST MEDICAL HISTORY:  H/O hemorrhoids    Diverticulosis    BPH (Benign Prostatic Hyperplasia)    After-cataract of both eyes    Hyperlipidemia    HTN (hypertension)    AF (atrial fibrillation)    CAD (coronary artery disease)        PAST SURGICAL HISTORY:  Retina disorder, left    S/P left inguinal hernia repair    S/P ablation of atrial fibrillation    Hx of coronary angioplasty        MEDICATIONS:  aspirin enteric coated 81 milliGRAM(s) Oral daily  atorvastatin 40 milliGRAM(s) Oral at bedtime  cefTRIAXone   IVPB 2000 milliGRAM(s) IV Intermittent every 24 hours  dextrose 40% Gel 15 Gram(s) Oral once PRN  dextrose 5%. 1000 milliLiter(s) IV Continuous <Continuous>  dextrose 50% Injectable 12.5 Gram(s) IV Push once  dextrose 50% Injectable 25 Gram(s) IV Push once  dextrose 50% Injectable 25 Gram(s) IV Push once  glucagon  Injectable 1 milliGRAM(s) IntraMuscular once PRN  influenza   Vaccine 0.5 milliLiter(s) IntraMuscular once  insulin lispro (HumaLOG) corrective regimen sliding scale   SubCutaneous three times a day before meals  insulin lispro (HumaLOG) corrective regimen sliding scale   SubCutaneous at bedtime  lactated ringers. 1000 milliLiter(s) IV Continuous <Continuous>  omega-3-Acid Ethyl Esters 2 Gram(s) Oral two times a day  tamsulosin 0.4 milliGRAM(s) Oral at bedtime      ALLERGIES:  No Known Allergies      VITALS & I/Os:  Vital Signs Last 24 Hrs  T(C): 36.7 (24 Sep 2020 12:01), Max: 36.7 (23 Sep 2020 18:57)  T(F): 98.1 (24 Sep 2020 12:01), Max: 98.1 (24 Sep 2020 12:01)  HR: 92 (24 Sep 2020 12:01) (73 - 92)  BP: 90/52 (24 Sep 2020 12:01) (90/52 - 104/64)  BP(mean): --  RR: 18 (24 Sep 2020 12:01) (18 - 18)  SpO2: 100% (24 Sep 2020 12:01) (99% - 100%)    I&O's Summary    23 Sep 2020 07:01  -  24 Sep 2020 07:00  --------------------------------------------------------  IN: 1920 mL / OUT: 1000 mL / NET: 920 mL    24 Sep 2020 07:01  -  24 Sep 2020 17:16  --------------------------------------------------------  IN: 500 mL / OUT: 150 mL / NET: 350 mL        PHYSICAL EXAM:  General: No acute distress  Respiratory: Nonlabored  Cardiovascular: RRR  Abdominal: Soft, nondistended, nontender.  Extremities: Warm    LABS:                        10.9   20.21 )-----------( 205      ( 24 Sep 2020 11:57 )             31.9     09-24    133<L>  |  102  |  14  ----------------------------<  99  3.5   |  25  |  1.01    Ca    8.5      24 Sep 2020 07:36    TPro  5.4<L>  /  Alb  3.1<L>  /  TBili  0.7  /  DBili  x   /  AST  16  /  ALT  10  /  AlkPhos  63  09-24    Lactate: Lactate, Blood: 1.4 mmol/L ( @ 07:18)     PT/INR - ( 24 Sep 2020 07:18 )   PT: 23.3 sec;   INR: 2.02 ratio         PTT - ( 24 Sep 2020 07:18 )  PTT:34.3 sec          Urinalysis Basic - ( 23 Sep 2020 15:31 )    Color: Yellow / Appearance: Clear / S.016 / pH: x  Gluc: x / Ketone: Negative  / Bili: Negative / Urobili: Negative   Blood: x / Protein: Trace / Nitrite: Negative   Leuk Esterase: Negative / RBC: 2 /hpf / WBC 1 /HPF   Sq Epi: x / Non Sq Epi: 1 /hpf / Bacteria: Negative        IMAGING:

## 2020-09-25 LAB
ALBUMIN SERPL ELPH-MCNC: 3 G/DL — LOW (ref 3.3–5)
ALP SERPL-CCNC: 63 U/L — SIGNIFICANT CHANGE UP (ref 40–120)
ALT FLD-CCNC: 11 U/L — SIGNIFICANT CHANGE UP (ref 10–45)
ANION GAP SERPL CALC-SCNC: 10 MMOL/L — SIGNIFICANT CHANGE UP (ref 5–17)
APTT BLD: 32.9 SEC — SIGNIFICANT CHANGE UP (ref 27.5–35.5)
AST SERPL-CCNC: 18 U/L — SIGNIFICANT CHANGE UP (ref 10–40)
BILIRUB SERPL-MCNC: 0.4 MG/DL — SIGNIFICANT CHANGE UP (ref 0.2–1.2)
BUN SERPL-MCNC: 10 MG/DL — SIGNIFICANT CHANGE UP (ref 7–23)
CALCIUM SERPL-MCNC: 8.8 MG/DL — SIGNIFICANT CHANGE UP (ref 8.4–10.5)
CHLORIDE SERPL-SCNC: 103 MMOL/L — SIGNIFICANT CHANGE UP (ref 96–108)
CO2 SERPL-SCNC: 24 MMOL/L — SIGNIFICANT CHANGE UP (ref 22–31)
CREAT SERPL-MCNC: 0.87 MG/DL — SIGNIFICANT CHANGE UP (ref 0.5–1.3)
GLUCOSE BLDC GLUCOMTR-MCNC: 129 MG/DL — HIGH (ref 70–99)
GLUCOSE BLDC GLUCOMTR-MCNC: 147 MG/DL — HIGH (ref 70–99)
GLUCOSE BLDC GLUCOMTR-MCNC: 152 MG/DL — HIGH (ref 70–99)
GLUCOSE BLDC GLUCOMTR-MCNC: 182 MG/DL — HIGH (ref 70–99)
GLUCOSE SERPL-MCNC: 121 MG/DL — HIGH (ref 70–99)
HCT VFR BLD CALC: 31.9 % — LOW (ref 39–50)
HGB BLD-MCNC: 10.8 G/DL — LOW (ref 13–17)
INR BLD: 1.79 RATIO — HIGH (ref 0.88–1.16)
MAGNESIUM SERPL-MCNC: 1.8 MG/DL — SIGNIFICANT CHANGE UP (ref 1.6–2.6)
MCHC RBC-ENTMCNC: 32 PG — SIGNIFICANT CHANGE UP (ref 27–34)
MCHC RBC-ENTMCNC: 33.9 GM/DL — SIGNIFICANT CHANGE UP (ref 32–36)
MCV RBC AUTO: 94.4 FL — SIGNIFICANT CHANGE UP (ref 80–100)
NRBC # BLD: 0 /100 WBCS — SIGNIFICANT CHANGE UP (ref 0–0)
PHOSPHATE SERPL-MCNC: 2.9 MG/DL — SIGNIFICANT CHANGE UP (ref 2.5–4.5)
PLATELET # BLD AUTO: 219 K/UL — SIGNIFICANT CHANGE UP (ref 150–400)
POTASSIUM SERPL-MCNC: 3.8 MMOL/L — SIGNIFICANT CHANGE UP (ref 3.5–5.3)
POTASSIUM SERPL-SCNC: 3.8 MMOL/L — SIGNIFICANT CHANGE UP (ref 3.5–5.3)
PROT SERPL-MCNC: 5.5 G/DL — LOW (ref 6–8.3)
PROTHROM AB SERPL-ACNC: 20.7 SEC — HIGH (ref 10.6–13.6)
RBC # BLD: 3.38 M/UL — LOW (ref 4.2–5.8)
RBC # FLD: 13.3 % — SIGNIFICANT CHANGE UP (ref 10.3–14.5)
SODIUM SERPL-SCNC: 137 MMOL/L — SIGNIFICANT CHANGE UP (ref 135–145)
WBC # BLD: 13.12 K/UL — HIGH (ref 3.8–10.5)
WBC # FLD AUTO: 13.12 K/UL — HIGH (ref 3.8–10.5)

## 2020-09-25 PROCEDURE — 99231 SBSQ HOSP IP/OBS SF/LOW 25: CPT | Mod: GC

## 2020-09-25 PROCEDURE — 99222 1ST HOSP IP/OBS MODERATE 55: CPT | Mod: GC

## 2020-09-25 PROCEDURE — 99232 SBSQ HOSP IP/OBS MODERATE 35: CPT | Mod: GC

## 2020-09-25 RX ORDER — WARFARIN SODIUM 2.5 MG/1
5 TABLET ORAL ONCE
Refills: 0 | Status: DISCONTINUED | OUTPATIENT
Start: 2020-09-26 | End: 2020-09-26

## 2020-09-25 RX ORDER — WARFARIN SODIUM 2.5 MG/1
5 TABLET ORAL ONCE
Refills: 0 | Status: COMPLETED | OUTPATIENT
Start: 2020-09-25 | End: 2020-09-25

## 2020-09-25 RX ORDER — METOPROLOL TARTRATE 50 MG
25 TABLET ORAL
Refills: 0 | Status: DISCONTINUED | OUTPATIENT
Start: 2020-09-25 | End: 2020-09-30

## 2020-09-25 RX ADMIN — Medication 100 MILLIGRAM(S): at 14:36

## 2020-09-25 RX ADMIN — Medication 100 MILLIGRAM(S): at 20:54

## 2020-09-25 RX ADMIN — TAMSULOSIN HYDROCHLORIDE 0.4 MILLIGRAM(S): 0.4 CAPSULE ORAL at 22:24

## 2020-09-25 RX ADMIN — Medication 81 MILLIGRAM(S): at 11:09

## 2020-09-25 RX ADMIN — Medication 1: at 12:11

## 2020-09-25 RX ADMIN — CEFTRIAXONE 100 MILLIGRAM(S): 500 INJECTION, POWDER, FOR SOLUTION INTRAMUSCULAR; INTRAVENOUS at 14:31

## 2020-09-25 RX ADMIN — Medication 2 GRAM(S): at 17:28

## 2020-09-25 RX ADMIN — Medication 100 MILLIGRAM(S): at 08:32

## 2020-09-25 RX ADMIN — Medication 25 MILLIGRAM(S): at 17:31

## 2020-09-25 RX ADMIN — Medication 2 GRAM(S): at 06:20

## 2020-09-25 RX ADMIN — ATORVASTATIN CALCIUM 40 MILLIGRAM(S): 80 TABLET, FILM COATED ORAL at 22:24

## 2020-09-25 RX ADMIN — WARFARIN SODIUM 5 MILLIGRAM(S): 2.5 TABLET ORAL at 22:25

## 2020-09-25 NOTE — CONSULT NOTE ADULT - SUBJECTIVE AND OBJECTIVE BOX
Patient is a 74y old  Male who presents with a chief complaint of Fever (25 Sep 2020 11:56)    HPI:  Pt is 74M hx DM, CAD, AFib, prosthetic mitral valve (), HTN, macular degeneration, hemorrhoids, localized rectal cancer (dx 2020), presenting with chills for 10 days. Symptoms worsened over the last several days, which prompted him to present to hospital. Symptoms began 2 days after his cancer staging CT scans. He had one episode of vomiting 10 days ago. Pt has had several days of dry cough but denies any fever until 5 days ago. Denies any nausea or diarrhea. Denies any CP, SOB, abdominal pain. Denies any dysuria, hematuria, or blood in the stool, although he states he usually has difficulty passing bowel movements. 5 days ago, he developed fever that was responsive Tylenol. 2 days ago, pt had a colonoscopy that he tolerated well. The day following his colonoscopy, he developed shaking and severe chills. Pt is planned for surgical resection of his rectal cancer in the upcoming weeks.     ED course: Temperature 103.4, SBP 98/58, RR 20, HR 95. Pt received vanco, cefepime, flagyl, 2L LR.  (23 Sep 2020 17:21)      prior hospital charts reviewed [V]  primary team notes reviewed [V]  other consultant notes reviewed [V]    PAST MEDICAL & SURGICAL HISTORY:  H/O hemorrhoids    Diverticulosis    BPH (Benign Prostatic Hyperplasia)    After-cataract of both eyes      Hyperlipidemia    HTN (hypertension)    AF (atrial fibrillation)  s/p ablation    CAD (coronary artery disease)    Retina disorder, left  detachment repair 1984    S/P left inguinal hernia repair      S/P ablation of atrial fibrillation  3/2010    Hx of coronary angioplasty  stent to LAD 1997        SOCIAL HISTORY:    - Denied smoking/vaping/alcohol/recreational drug use    FAMILY HISTORY:      Allergies  No Known Allergies        ANTIMICROBIALS:  cefTRIAXone   IVPB 2000 every 24 hours      ANTIMICROBIALS (past 90 days):  MEDICATIONS  (STANDING):  cefepime   IVPB   100 mL/Hr IV Intermittent (20 @ 12:19)    cefTRIAXone   IVPB   100 mL/Hr IV Intermittent (20 @ 14:45)    meropenem  IVPB   100 mL/Hr IV Intermittent (20 @ 06:28)   100 mL/Hr IV Intermittent (20 @ 21:44)    metroNIDAZOLE  IVPB   100 mL/Hr IV Intermittent (20 @ 15:48)    vancomycin  IVPB   250 mL/Hr IV Intermittent (20 @ 12:40)    vancomycin  IVPB   250 mL/Hr IV Intermittent (20 @ 06:29)   250 mL/Hr IV Intermittent (20 @ 22:51)        OTHER MEDS:   MEDICATIONS  (STANDING):  aspirin enteric coated 81 daily  atorvastatin 40 at bedtime  dextrose 40% Gel 15 once PRN  dextrose 50% Injectable 12.5 once  dextrose 50% Injectable 25 once  dextrose 50% Injectable 25 once  glucagon  Injectable 1 once PRN  guaiFENesin   Syrup  (Sugar-Free) 100 every 6 hours PRN  influenza   Vaccine 0.5 once  insulin lispro (HumaLOG) corrective regimen sliding scale  three times a day before meals  insulin lispro (HumaLOG) corrective regimen sliding scale  at bedtime  metoprolol tartrate 25 two times a day  tamsulosin 0.4 at bedtime  warfarin 5 once      REVIEW OF SYSTEMS  [  ] ROS unobtainable because:    [ V ] All other systems negative except as noted below:	    Constitutional:  [ V] fever [V ] chills  [ ] weight loss  [ ] weakness  Skin:  [ ] rash [ ] phlebitis	  Eyes: [ ] icterus [ ] pain  [ ] discharge	  ENMT: [ ] sore throat  [ ] thrush [ ] ulcers [ ] exudates  Respiratory: [ ] dyspnea [ ] hemoptysis [ ] cough [ ] sputum	  Cardiovascular:  [ ] chest pain [ ] palpitations [ ] edema	  Gastrointestinal:  [ ] nausea [ ] vomiting [ ] diarrhea [ ] constipation [ ] pain	  Genitourinary:  [ ] dysuria [ ] frequency [ ] hematuria [ ] discharge [ ] flank pain  [ ] incontinence  Musculoskeletal:  [ ] myalgias [ ] arthralgias [ ] arthritis  [ ] back pain  Neurological:  [ ] headache [ ] seizures  [ ] confusion/altered mental status  Psychiatric:  [ ] anxiety [ ] depression	  Hematology/Lymphatics:  [ ] lymphadenopathy  Endocrine:  [ ] adrenal [ ] thyroid  Allergic/Immunologic:	 [ ] transplant [ ] seasonal    VITALS:  Vital Signs Last 24 Hrs  T(F): 98.3 (20 @ 05:05), Max: 103.4 (20 @ 10:30)    Vital Signs Last 24 Hrs  HR: 85 (20 @ 05:05) (79 - 85)  BP: 126/74 (20 @ 05:05) (126/74 - 131/76)  RR: 18 (20 @ 05:05)  SpO2: 96% (20 @ 05:05) (96% - 98%)  Wt(kg): --    EXAM:  GA: NAD  HEENT: oral cavity no lesion  CV: nl S1/S2, no RMG  Lungs: CTAB  Abd: BS+, soft, nontender, no rebounding pain  Ext: no edema  Skin:  IV: no phlebitis    Labs:                        10.8   13.12 )-----------( 219      ( 25 Sep 2020 06:51 )             31.9         137  |  103  |  10  ----------------------------<  121<H>  3.8   |  24  |  0.87    Ca    8.8      25 Sep 2020 06:55  Phos  2.9       Mg     1.8         TPro  5.5<L>  /  Alb  3.0<L>  /  TBili  0.4  /  DBili  x   /  AST  18  /  ALT  11  /  AlkPhos  63        WBC Trend:  WBC Count: 13.12 (20 @ 06:51)  WBC Count: 20.21 (20 @ 11:57)  WBC Count: 21.03 (20 @ 07:32)  WBC Count: 7.73 (20 @ 11:31)      Auto Neutrophil #: 17.24 K/uL (20 @ 11:57)  Auto Neutrophil #: 6.88 K/uL (20 @ 11:31)  Band Neutrophils %: 5.0 % (20 @ 11:31)  Auto Neutrophil #: 10.95 K/uL (20 @ 17:38)      Creatine Trend:  Creatinine, Serum: 0.87 ()  Creatinine, Serum: 1.01 ()  Creatinine, Serum: 1.21 ()      Liver Biochemical Testing Trend:  Alanine Aminotransferase (ALT/SGPT): 11 ()  Alanine Aminotransferase (ALT/SGPT): 10 ()  Alanine Aminotransferase (ALT/SGPT): 13 ()  Aspartate Aminotransferase (AST/SGOT): 18 (20 @ 06:55)  Aspartate Aminotransferase (AST/SGOT): 16 (20 @ 07:36)  Aspartate Aminotransferase (AST/SGOT): 20 (20 @ 11:31)  Bilirubin Total, Serum: 0.4 ()  Bilirubin Total, Serum: 0.7 ()  Bilirubin Total, Serum: 1.0 ()    Urinalysis Basic - ( 23 Sep 2020 15:31 )  Color: Yellow / Appearance: Clear / S.016 / pH: x  Gluc: x / Ketone: Negative  / Bili: Negative / Urobili: Negative   Blood: x / Protein: Trace / Nitrite: Negative   Leuk Esterase: Negative / RBC: 2 /hpf / WBC 1 /HPF   Sq Epi: x / Non Sq Epi: 1 /hpf / Bacteria: Negative        MICROBIOLOGY:      Culture - Urine (collected 23 Sep 2020 20:55)  Source: .Urine Clean Catch (Midstream)  Final Report:    No growth    Culture - Blood (collected 23 Sep 2020 16:35)  Source: .Blood Blood-Peripheral  Preliminary Report:    Growth in aerobic bottle: Streptococcus mitis/oralis group See previous    culture [id number]    Growth in anaerobic bottle: Gram Positive Cocci in Pairs and Chains    Culture - Blood (collected 23 Sep 2020 16:35)  Source: .Blood Blood-Peripheral  Preliminary Report:    Growth in aerobic and anaerobic bottles: Streptococcus mitis/oralis group    ***Blood Panel PCR results on this specimen are available    approximately 3 hours after the Gram stain result.***    Gram stain, PCR, and/or culture results may not always    correspond due to difference in methodologies.    ************************************************************    This PCR assay was performed using [facility].    The following targets are tested for: Enterococcus,    vancomycin resistant enterococci, Listeria monocytogenes,    coagulase negative staphylococci, S. aureus,    methicillin resistant S. aureus, Streptococcus agalactiae    (Group B), S. pneumoniae, S. pyogenes (Group A),    Acinetobacter baumannii, Enterobacter cloacae, E. coli,    Klebsiella oxytoca, K. pneumoniae, Proteus sp.,    Serratia marcescens, Haemophilus influenzae,    Neisseria meningitidis, Pseudomonas aeruginosa, Candida    albicans, C. glabrata, C krusei, C parapsilosis,    C. tropicalis and the KPC resistance gene.    "Due to technical problems, Proteus sp. will Not be reported as part of    the BCID panel until further notice"  Organism: Blood Culture PCR  Organism: Blood Culture PCR    Sensitivities:      -  Streptococcus sp. (Not Grp A, B or S pneumoniae): Detec      Method Type: PCR        OTHER TESTS:  COVID-19 PCR: NotDetec (20 @ 11:38)  Lactate, Blood: 1.4 ( @ 07:18)  Blood Gas Venous - Lactate: 2.7 ( @ 13:52)  Blood Gas Venous - Lactate: 4.5 ( @ 11:20)    A1C with Estimated Average Glucose Result: 6.2 % (20 @ 10:41)      RADIOLOGY:  imaging below personally reviewed    < from: Transthoracic Echocardiogram (20 @ 17:58) >  Conclusions:  1. Bioprosthetic mitral valve replacement. Peak mitral  valve gradient equals 18 mm Hg, mean transmitral valve  gradient equals 8 mm Hg, which is elevated even in the  setting of a bioprosthetic mitral valve replacement.  2. Calcified aortic valve. Peak transaortic valve gradient  equals 5 mm Hg. Minimal aortic regurgitation.  3. Aortic Root: 4.1 cm.  4. Moderately dilated left atrium.  LA volume index = 44  cc/m2.  5. Mild concentric left ventricular hypertrophy.  6. Normal left ventricular systolic function. No segmental  wall motion abnormalities.  7. Mild diastolic dysfunction (Stage I).  8. Normal right ventricular size and function.  9. Estimated right ventricular systolic pressure equals 35  mm Hg, assuming right atrial pressure equals 8 mm Hg,  consistent with borderline pulmonary hypertension.  10. Normal tricuspid valve. Mild tricuspid regurgitation.  *** No previous Echo exam.    < end of copied text >      < from: Xray Chest 1 View AP/PA (20 @ 12:02) >  IMPRESSION:  Small right infiltrate.    < end of copied text >    < from: MR Pelvis w/ IV Cont (09.15.20 @ 15:36) >  IMPRESSION:  A 2.1 cm mass entirely contained within the anal canal, 2.4 cm above the anal verge. No regional lymphadenopathy (N0).    Sphincter involvement (low rectal tumors): Invades internal sphincter (IS) only. As above, tumor superficially involves the internal sphincter anterior right laterally, but is at least 3 mm away from the outer border of the internal sphincter. Intersphincteric space is clear.    Suspicious extra mesorectal nodes: None    < end of copied text >    < from: CT Chest w/ IV Cont (20 @ 17:06) >  FINDINGS:  CHEST:  LUNGS AND LARGE AIRWAYS: Patent central airways. No pulmonary nodules.  PLEURA: No pleural effusion.  VESSELS: Atherosclerotic changes of the aorta and coronary arteries. Status post CABG.  HEART: Cardiomegaly. Mitral valve replacement. Aortic valvular calcification. No pericardial effusion.  MEDIASTINUM AND JOSH: No lymphadenopathy.  CHEST WALL AND LOWER NECK: Within normal limits.    ABDOMEN AND PELVIS:  LIVER: Within normal limits.  BILE DUCTS: Normal caliber.  GALLBLADDER: Cholelithiasis.  SPLEEN: Within normal limits.  PANCREAS: Within normal limits.  ADRENALS: Within normal limits.  KIDNEYS/URETERS: Within normal limits.    BLADDER: Small diverticulum at the right lateral bladder wall.  REPRODUCTIVE ORGANS: Enlarged prostate.    BOWEL: Patient's known rectal mass ispoorly delineated at CT. No bowel obstruction. Scattered colonic diverticulosis. Normal appendix.  PERITONEUM: No ascites.  VESSELS: Within normal limits.  RETROPERITONEUM/LYMPH NODES: No lymphadenopathy.  ABDOMINAL WALL: Fat-containing left inguinalhernia.  BONES: Degenerative changes.    IMPRESSION:  No evidence of metastatic disease in the chest, abdomen or pelvis.    < end of copied text >    < from: Colonoscopy (20 @ 13:59) >  Impression:          - Malignant tumor in the distal rectum.                       - One 3 mm polyp at the hepatic flexure, removed with a hot snare. Resected                        and retrieved.   - One 3 mm polyp at 22 cm proximal to the anus, removed with a hot snare.                        Resected and retrieved.                       - Diverticulosis in the entire examined colon.                       - The examination was otherwise normal.    < end of copied text >        Surgical Pathology Report:  (20 @ 14:30)  Surgical Final Report  Final Diagnosis  1. Colon, hepatic flexure, polyp, biopsy  - Tubular adenoma.    2. Colon, polyp at 22 cm, biopsy  - Polypoid colonic mucosa with diathermy artifact.  - Negative for malignancy.       Patient is a 74y old  Male who presents with a chief complaint of Fever (25 Sep 2020 11:56)    HPI:  Pt is 74M hx DM, CAD, AFib, prosthetic mitral valve (), HTN, macular degeneration, hemorrhoids, localized rectal cancer (dx 2020), presenting with chills for 10 days. Symptoms worsened over the last several days, which prompted him to present to hospital. Symptoms began 2 days after his cancer staging CT scans. He had one episode of vomiting 10 days ago. Pt has had several days of dry cough but denies any fever until 5 days ago. Denies any nausea or diarrhea. Denies any CP, SOB, abdominal pain. Denies any dysuria, hematuria, or blood in the stool, although he states he usually has difficulty passing bowel movements. 5 days ago, he developed fever that was responsive Tylenol. 2 days ago, pt had a colonoscopy that he tolerated well. The day following his colonoscopy, he developed shaking and severe chills. Pt is planned for surgical resection of his rectal cancer in the upcoming weeks.     ED course: Temperature 103.4, SBP 98/58, RR 20, HR 95. Pt received vanco, cefepime, flagyl, 2L LR.  (23 Sep 2020 17:21)          prior hospital charts reviewed [V]  primary team notes reviewed [V]  other consultant notes reviewed [V]    PAST MEDICAL & SURGICAL HISTORY:  H/O hemorrhoids    Diverticulosis    BPH (Benign Prostatic Hyperplasia)    After-cataract of both eyes      Hyperlipidemia    HTN (hypertension)    AF (atrial fibrillation)  s/p ablation    CAD (coronary artery disease)    Retina disorder, left  detachment repair 1984    S/P left inguinal hernia repair      S/P ablation of atrial fibrillation  3/2010    Hx of coronary angioplasty  stent to LAD 1997        SOCIAL HISTORY:    - Denied smoking/vaping/alcohol/recreational drug use  - Born in [place], moved [place] in  when he was 27 yo.  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  - Lives with wife in [place]. No pet.    FAMILY HISTORY:  - Father- heart disease  - Mother- colon cancer at 64 yo  - Brother - heart attack  - Brother- heart disease, EtOH  - Brother- prostate disease    Allergies  No Known Allergies      ANTIMICROBIALS:  cefTRIAXone   IVPB 2000 every 24 hours      ANTIMICROBIALS (past 90 days):  MEDICATIONS  (STANDING):  cefepime   IVPB   100 mL/Hr IV Intermittent (20 @ 12:19)    cefTRIAXone   IVPB   100 mL/Hr IV Intermittent (20 @ 14:45)    meropenem  IVPB   100 mL/Hr IV Intermittent (20 @ 06:28)   100 mL/Hr IV Intermittent (20 @ 21:44)    metroNIDAZOLE  IVPB   100 mL/Hr IV Intermittent (20 @ 15:48)    vancomycin  IVPB   250 mL/Hr IV Intermittent (20 @ 12:40)    vancomycin  IVPB   250 mL/Hr IV Intermittent (20 @ 06:29)   250 mL/Hr IV Intermittent (20 @ 22:51)        OTHER MEDS:   MEDICATIONS  (STANDING):  aspirin enteric coated 81 daily  atorvastatin 40 at bedtime  dextrose 40% Gel 15 once PRN  dextrose 50% Injectable 12.5 once  dextrose 50% Injectable 25 once  dextrose 50% Injectable 25 once  glucagon  Injectable 1 once PRN  guaiFENesin   Syrup  (Sugar-Free) 100 every 6 hours PRN  influenza   Vaccine 0.5 once  insulin lispro (HumaLOG) corrective regimen sliding scale  three times a day before meals  insulin lispro (HumaLOG) corrective regimen sliding scale  at bedtime  metoprolol tartrate 25 two times a day  tamsulosin 0.4 at bedtime  warfarin 5 once      REVIEW OF SYSTEMS  [  ] ROS unobtainable because:    [ V ] All other systems negative except as noted below:	    Constitutional:  [ V] fever [V ] chills  [ ] weight loss  [ ] weakness  Skin:  [ ] rash [ ] phlebitis	  Eyes: [ ] icterus [ ] pain  [ ] discharge	  ENMT: [ ] sore throat  [ ] thrush [ ] ulcers [ ] exudates  Respiratory: [ ] dyspnea [ ] hemoptysis [V ] cough [ ] sputum	  Cardiovascular:  [ ] chest pain [ ] palpitations [ ] edema	  Gastrointestinal:  [ ] nausea [ ] vomiting [ ] diarrhea [ ] constipation [ ] pain	  Genitourinary:  [ ] dysuria [ ] frequency [ ] hematuria [ ] discharge [ ] flank pain  [ ] incontinence  Musculoskeletal:  [ ] myalgias [ ] arthralgias [ ] arthritis  [ ] back pain  Neurological:  [ ] headache [ ] seizures  [ ] confusion/altered mental status  Psychiatric:  [ ] anxiety [ ] depression	  Hematology/Lymphatics:  [ ] lymphadenopathy  Endocrine:  [ ] adrenal [ ] thyroid  Allergic/Immunologic:	 [ ] transplant [ ] seasonal    VITALS:  Vital Signs Last 24 Hrs  T(F): 98.3 (20 @ 05:05), Max: 103.4 (20 @ 10:30)    Vital Signs Last 24 Hrs  HR: 85 (20 @ 05:05) (79 - 85)  BP: 126/74 (20 @ 05:05) (126/74 - 131/76)  RR: 18 (20 @ 05:05)  SpO2: 96% (20 @ 05:05) (96% - 98%)  Wt(kg): --    EXAM:  GA: NAD  HEENT: missing several left lower teeth, there is no obvious tooth decay, there is a left sided small salivary stone  CV: nl S1/S2, no RMG  Lungs: CTAB  Abd: BS+, soft, nontender, no rebounding pain  Ext: no edema  Skin: no rash  IV: no phlebitis    Labs:                        10.8   13.12 )-----------( 219      ( 25 Sep 2020 06:51 )             31.9         137  |  103  |  10  ----------------------------<  121<H>  3.8   |  24  |  0.87    Ca    8.8      25 Sep 2020 06:55  Phos  2.9       Mg     1.8         TPro  5.5<L>  /  Alb  3.0<L>  /  TBili  0.4  /  DBili  x   /  AST  18  /  ALT  11  /  AlkPhos  63        WBC Trend:  WBC Count: 13.12 (20 @ 06:51)  WBC Count: 20.21 (20 @ 11:57)  WBC Count: 21.03 (20 @ 07:32)  WBC Count: 7.73 (20 @ 11:31)      Auto Neutrophil #: 17.24 K/uL (20 @ 11:57)  Auto Neutrophil #: 6.88 K/uL (20 @ 11:31)  Band Neutrophils %: 5.0 % (20 @ 11:31)  Auto Neutrophil #: 10.95 K/uL (20 @ 17:38)      Creatine Trend:  Creatinine, Serum: 0.87 ()  Creatinine, Serum: 1.01 ()  Creatinine, Serum: 1.21 ()      Liver Biochemical Testing Trend:  Alanine Aminotransferase (ALT/SGPT): 11 ()  Alanine Aminotransferase (ALT/SGPT): 10 ()  Alanine Aminotransferase (ALT/SGPT): 13 ()  Aspartate Aminotransferase (AST/SGOT): 18 (20 @ 06:55)  Aspartate Aminotransferase (AST/SGOT): 16 (20 @ 07:36)  Aspartate Aminotransferase (AST/SGOT): 20 (20 @ 11:31)  Bilirubin Total, Serum: 0.4 ()  Bilirubin Total, Serum: 0.7 ()  Bilirubin Total, Serum: 1.0 ()    Urinalysis Basic - ( 23 Sep 2020 15:31 )  Color: Yellow / Appearance: Clear / S.016 / pH: x  Gluc: x / Ketone: Negative  / Bili: Negative / Urobili: Negative   Blood: x / Protein: Trace / Nitrite: Negative   Leuk Esterase: Negative / RBC: 2 /hpf / WBC 1 /HPF   Sq Epi: x / Non Sq Epi: 1 /hpf / Bacteria: Negative        MICROBIOLOGY:      Culture - Urine (collected 23 Sep 2020 20:55)  Source: .Urine Clean Catch (Midstream)  Final Report:    No growth    Culture - Blood (collected 23 Sep 2020 16:35)  Source: .Blood Blood-Peripheral  Preliminary Report:    Growth in aerobic bottle: Streptococcus mitis/oralis group See previous    culture [id number]    Growth in anaerobic bottle: Gram Positive Cocci in Pairs and Chains    Culture - Blood (collected 23 Sep 2020 16:35)  Source: .Blood Blood-Peripheral  Preliminary Report:    Growth in aerobic and anaerobic bottles: Streptococcus mitis/oralis group    ***Blood Panel PCR results on this specimen are available    approximately 3 hours after the Gram stain result.***    Gram stain, PCR, and/or culture results may not always    correspond due to difference in methodologies.    ************************************************************    This PCR assay was performed using [employer] [facility].    The following targets are tested for: Enterococcus,    vancomycin resistant enterococci, Listeria monocytogenes,    coagulase negative staphylococci, S. aureus,    methicillin resistant S. aureus, Streptococcus agalactiae    (Group B), S. pneumoniae, S. pyogenes (Group A),    Acinetobacter baumannii, Enterobacter cloacae, E. coli,    Klebsiella oxytoca, K. pneumoniae, Proteus sp.,    Serratia marcescens, Haemophilus influenzae,    Neisseria meningitidis, Pseudomonas aeruginosa, Candida    albicans, C. glabrata, C krusei, C parapsilosis,    C. tropicalis and the KPC resistance gene.    "Due to technical problems, Proteus sp. will Not be reported as part of    the BCID panel until further notice"  Organism: Blood Culture PCR  Organism: Blood Culture PCR    Sensitivities:      -  Streptococcus sp. (Not Grp A, B or S pneumoniae): Detec      Method Type: PCR        OTHER TESTS:  COVID-19 PCR: NotDetec (20 @ 11:38)  Lactate, Blood: 1.4 ( @ 07:18)  Blood Gas Venous - Lactate: 2.7 ( @ 13:52)  Blood Gas Venous - Lactate: 4.5 ( @ 11:20)    A1C with Estimated Average Glucose Result: 6.2 % (20 @ 10:41)      RADIOLOGY:  imaging below personally reviewed    < from: Transthoracic Echocardiogram (20 @ 17:58) >  Conclusions:  1. Bioprosthetic mitral valve replacement. Peak mitral  valve gradient equals 18 mm Hg, mean transmitral valve  gradient equals 8 mm Hg, which is elevated even in the  setting of a bioprosthetic mitral valve replacement.  2. Calcified aortic valve. Peak transaortic valve gradient  equals 5 mm Hg. Minimal aortic regurgitation.  3. Aortic Root: 4.1 cm.  4. Moderately dilated left atrium.  LA volume index = 44  cc/m2.  5. Mild concentric left ventricular hypertrophy.  6. Normal left ventricular systolic function. No segmental  wall motion abnormalities.  7. Mild diastolic dysfunction (Stage I).  8. Normal right ventricular size and function.  9. Estimated right ventricular systolic pressure equals 35  mm Hg, assuming right atrial pressure equals 8 mm Hg,  consistent with borderline pulmonary hypertension.  10. Normal tricuspid valve. Mild tricuspid regurgitation.  *** No previous Echo exam.    < end of copied text >      < from: Xray Chest 1 View AP/PA (20 @ 12:02) >  IMPRESSION:  Small right infiltrate.    < end of copied text >    < from: MR Pelvis w/ IV Cont (09.15.20 @ 15:36) >  IMPRESSION:  A 2.1 cm mass entirely contained within the anal canal, 2.4 cm above the anal verge. No regional lymphadenopathy (N0).    Sphincter involvement (low rectal tumors): Invades internal sphincter (IS) only. As above, tumor superficially involves the internal sphincter anterior right laterally, but is at least 3 mm away from the outer border of the internal sphincter. Intersphincteric space is clear.    Suspicious extra mesorectal nodes: None    < end of copied text >    < from: CT Chest w/ IV Cont (20 @ 17:06) >  FINDINGS:  CHEST:  LUNGS AND LARGE AIRWAYS: Patent central airways. No pulmonary nodules.  PLEURA: No pleural effusion.  VESSELS: Atherosclerotic changes of the aorta and coronary arteries. Status post CABG.  HEART: Cardiomegaly. Mitral valve replacement. Aortic valvular calcification. No pericardial effusion.  MEDIASTINUM AND JOSH: No lymphadenopathy.  CHEST WALL AND LOWER NECK: Within normal limits.    ABDOMEN AND PELVIS:  LIVER: Within normal limits.  BILE DUCTS: Normal caliber.  GALLBLADDER: Cholelithiasis.  SPLEEN: Within normal limits.  PANCREAS: Within normal limits.  ADRENALS: Within normal limits.  KIDNEYS/URETERS: Within normal limits.    BLADDER: Small diverticulum at the right lateral bladder wall.  REPRODUCTIVE ORGANS: Enlarged prostate.    BOWEL: Patient's known rectal mass ispoorly delineated at CT. No bowel obstruction. Scattered colonic diverticulosis. Normal appendix.  PERITONEUM: No ascites.  VESSELS: Within normal limits.  RETROPERITONEUM/LYMPH NODES: No lymphadenopathy.  ABDOMINAL WALL: Fat-containing left inguinalhernia.  BONES: Degenerative changes.    IMPRESSION:  No evidence of metastatic disease in the chest, abdomen or pelvis.    < end of copied text >    < from: Colonoscopy (20 @ 13:59) >  Impression:          - Malignant tumor in the distal rectum.                       - One 3 mm polyp at the hepatic flexure, removed with a hot snare. Resected                        and retrieved.   - One 3 mm polyp at 22 cm proximal to the anus, removed with a hot snare.                        Resected and retrieved.                       - Diverticulosis in the entire examined colon.                       - The examination was otherwise normal.    < end of copied text >        Surgical Pathology Report:  (20 @ 14:30)  Surgical Final Report  Final Diagnosis  1. Colon, hepatic flexure, polyp, biopsy  - Tubular adenoma.    2. Colon, polyp at 22 cm, biopsy  - Polypoid colonic mucosa with diathermy artifact.  - Negative for malignancy.

## 2020-09-25 NOTE — CONSULT NOTE ADULT - ATTENDING COMMENTS
Patient seen and examined  Above verified  Agree with above unless as noted below.  74M hx DM(a1c=6.2%), CAD, AFib, prosthetic mitral valve (2015), HTN, macular degeneration, hemorrhoids, localized rectal cancer (dx 9/2020), presenting with chills for 10 days.   S mitis oralis bacteremia-high grade  No abd symptoms  Cta s above  TTea s above  Hoevere clinical picture and high grade bacteremia suspicious for possible PVE  Await VANCE of Strep  await repeat blood Cx  Pt hemodynamically stable  No s/s peripheral stigmata or embolic events  rec:  1) follow Id sensi of isolate  2) REEMA-will help define management and r/o establish baseline/complications of possible IE  3) Continue ceftriaxone 2 gm IV daily  agent x 1 today  may need further gentamicin with close monitoring depending on VANCE and course/Cx/REEMA data  4)Abdominal imaging  5) dental eval    Will tailor plan for ID issues  per course,results.Will defer to primary team on management of other issues.  Assessment, plan and recommendations as detailed above were discussed with the medical/primary  team.  Infectious Diseases Service will cover over weekend.  Please call [phone] if issues

## 2020-09-25 NOTE — PROGRESS NOTE ADULT - PROBLEM SELECTOR PLAN 1
Pt meets SIRS criteria given fever and HR>90. WBC 7--> 21-->13. BCx showing strep species. Likely 2/2 pt's recently diagnosed rectal cancer. Recent biopsy/colonoscopy may have also seeded bacteria into the blood.   - S/p IV resuscitation in ED.  - Elevated lactate, downtrending now to normal range   - Pt with soft-normal pressures, continue with LR infusion at 100cc/hr  - BCx showing strep (not group A, B, pneumo)  - Repeat BCx until clearance  - Likely to be group D strep (strep bovis). This is highly associated with colorectal cancer, which patient has. Also highly associated with infective endocarditis  - TTE negative for vegetation  - UCx negative  - ID consulted as pt will need clearance for surgery; f/u recs  - S/p vanco, cefepime, flagyl in ED  - C/w ceftriaxone 2g q24h; narrow depending on sensitivity results  - Recent CT abdomen, chest, pelvis negative for any pathology  - Daily CBC to trend WBC

## 2020-09-25 NOTE — PROGRESS NOTE ADULT - ASSESSMENT
74M with known rectal cancer, now with bacteremia.  - Recommend echo, ID consult  - Will plan fo resection as outpt  - D/w Dr. Maric[facility] [phone]

## 2020-09-25 NOTE — PROGRESS NOTE ADULT - SUBJECTIVE AND OBJECTIVE BOX
Subjective: NAEON. Denies CP, SOB, fever, chills    Objective:  Exam:  General: No acute distress  Respiratory: Nonlabored  Cardiovascular: RRR  Abdominal: Soft, nondistended, nontender.  Extremities: Warm    Vital Signs Last 24 Hrs  T(C): 36.8 (25 Sep 2020 05:05), Max: 36.8 (24 Sep 2020 20:44)  T(F): 98.3 (25 Sep 2020 05:05), Max: 98.3 (25 Sep 2020 05:05)  HR: 85 (25 Sep 2020 05:05) (79 - 92)  BP: 126/74 (25 Sep 2020 05:05) (90/52 - 131/76)  BP(mean): --  RR: 18 (25 Sep 2020 05:05) (18 - 18)  SpO2: 96% (25 Sep 2020 05:05) (96% - 100%)    I&O's Detail    23 Sep 2020 07:01  -  24 Sep 2020 07:00  --------------------------------------------------------  IN:    IV PiggyBack: 600 mL    Lactated Ringers: 1200 mL    Oral Fluid: 120 mL  Total IN: 1920 mL    OUT:    Voided (mL): 1000 mL  Total OUT: 1000 mL    Total NET: 920 mL      24 Sep 2020 07:01  -  25 Sep 2020 05:29  --------------------------------------------------------  IN:    Lactated Ringers: 500 mL    Oral Fluid: 810 mL  Total IN: 1310 mL    OUT:    Voided (mL): 1100 mL  Total OUT: 1100 mL    Total NET: 210 mL      MEDICATIONS  (STANDING):  aspirin enteric coated 81 milliGRAM(s) Oral daily  atorvastatin 40 milliGRAM(s) Oral at bedtime  cefTRIAXone   IVPB 2000 milliGRAM(s) IV Intermittent every 24 hours  dextrose 5%. 1000 milliLiter(s) (50 mL/Hr) IV Continuous <Continuous>  dextrose 50% Injectable 12.5 Gram(s) IV Push once  dextrose 50% Injectable 25 Gram(s) IV Push once  dextrose 50% Injectable 25 Gram(s) IV Push once  influenza   Vaccine 0.5 milliLiter(s) IntraMuscular once  insulin lispro (HumaLOG) corrective regimen sliding scale   SubCutaneous three times a day before meals  insulin lispro (HumaLOG) corrective regimen sliding scale   SubCutaneous at bedtime  omega-3-Acid Ethyl Esters 2 Gram(s) Oral two times a day  tamsulosin 0.4 milliGRAM(s) Oral at bedtime  warfarin 2.5 milliGRAM(s) Oral once    MEDICATIONS  (PRN):  dextrose 40% Gel 15 Gram(s) Oral once PRN Blood Glucose LESS THAN 70 milliGRAM(s)/deciliter  glucagon  Injectable 1 milliGRAM(s) IntraMuscular once PRN Glucose LESS THAN 70 milligrams/deciliter      LABS:                        10.9   20.21 )-----------( 205      ( 24 Sep 2020 11:57 )             31.9     09-24    133<L>  |  102  |  14  ----------------------------<  99  3.5   |  25  |  1.01    Ca    8.5      24 Sep 2020 07:36    TPro  5.4<L>  /  Alb  3.1<L>  /  TBili  0.7  /  DBili  x   /  AST  16  /  ALT  10  /  AlkPhos  63  09-24    PT/INR - ( 24 Sep 2020 07:18 )   PT: 23.3 sec;   INR: 2.02 ratio         PTT - ( 24 Sep 2020 07:18 )  PTT:34.3 sec  LIVER FUNCTIONS - ( 24 Sep 2020 07:36 )  Alb: 3.1 g/dL / Pro: 5.4 g/dL / ALK PHOS: 63 U/L / ALT: 10 U/L / AST: 16 U/L / GGT: x           Urinalysis Basic - ( 23 Sep 2020 15:31 )    Color: Yellow / Appearance: Clear / S.016 / pH: x  Gluc: x / Ketone: Negative  / Bili: Negative / Urobili: Negative   Blood: x / Protein: Trace / Nitrite: Negative   Leuk Esterase: Negative / RBC: 2 /hpf / WBC 1 /HPF   Sq Epi: x / Non Sq Epi: 1 /hpf / Bacteria: Negative

## 2020-09-25 NOTE — CONSULT NOTE ADULT - SUBJECTIVE AND OBJECTIVE BOX
74 year old male S/P tissue MVR and chronic AF had a biopsy of a rectal mass on 9/4 with out antibiotic prophylaxis and then had a formal colonoscopy on 9/12 also with out antibiotic prophylaxis which confirmed rectal carcinoma.  He then developed fever and shaking chills and has been admitted with 2/2 bottle Strep species bacteremia.  He is now OOB to the chair and is feeling better.  He denies dyspnea or orthopnea    Meds:  Warfarin             Ceftriaxone             ASA 81 mg qd             Lipitor 40 mg qd             Flomax 0.4 mg qd             Lovaza 2 grams bid    /74  HR 85 (AF)  Lungs clear   Irregular rhythm 1/6 systolic murmur  No edema    WBC 20.21 down to 13.12  Hgb 10.8  Hct 31.9  Plt 219K  BUN 10  Crt 0.87  INR 1.79     Echo - normal EF  prosthetic MV with mildly elevated transmitral pressures  no MR  Minimal AI            moderately dilated LA    No vegetations noted    EKG:  AF    Imp:  Strep bacteremia likely due to the rectal mass biopsy and then the colonoscopy with out antibiotics.  No evidence of CHF or valvular dysfunction.  He is now afebrile with improvement in the WBC.    Rec:  ID and sensitivities of the Strep species.  ID to determine the length of antibiotics  No need for REEMA at the present time.  Continue Warfarin to maintain an INR between 2-3.0  Would restart Metoprolol 25 mg bid for AF rate control

## 2020-09-25 NOTE — PROGRESS NOTE ADULT - SUBJECTIVE AND OBJECTIVE BOX
PROGRESS NOTE:     CONTACT INFO:  Kenyatta Arias MD   PGY-1  Pager: [phone] [facility]    Patient is a 74y old  Male who presents with a chief complaint of Fever (25 Sep 2020 05:22)      SUBJECTIVE / OVERNIGHT EVENTS:  No acute events overnight. Patient seen and evaluated at bedside. No fever/chills.  Denies SOB at rest, chest pain, palpitations, abdominal pain, nausea/vomiting    ADDITIONAL REVIEW OF SYSTEMS:    MEDICATIONS  (STANDING):  aspirin enteric coated 81 milliGRAM(s) Oral daily  atorvastatin 40 milliGRAM(s) Oral at bedtime  cefTRIAXone   IVPB 2000 milliGRAM(s) IV Intermittent every 24 hours  dextrose 5%. 1000 milliLiter(s) (50 mL/Hr) IV Continuous <Continuous>  dextrose 50% Injectable 12.5 Gram(s) IV Push once  dextrose 50% Injectable 25 Gram(s) IV Push once  dextrose 50% Injectable 25 Gram(s) IV Push once  influenza   Vaccine 0.5 milliLiter(s) IntraMuscular once  insulin lispro (HumaLOG) corrective regimen sliding scale   SubCutaneous three times a day before meals  insulin lispro (HumaLOG) corrective regimen sliding scale   SubCutaneous at bedtime  omega-3-Acid Ethyl Esters 2 Gram(s) Oral two times a day  tamsulosin 0.4 milliGRAM(s) Oral at bedtime  warfarin 5 milliGRAM(s) Oral once    MEDICATIONS  (PRN):  dextrose 40% Gel 15 Gram(s) Oral once PRN Blood Glucose LESS THAN 70 milliGRAM(s)/deciliter  glucagon  Injectable 1 milliGRAM(s) IntraMuscular once PRN Glucose LESS THAN 70 milligrams/deciliter      CAPILLARY BLOOD GLUCOSE      POCT Blood Glucose.: 137 mg/dL (24 Sep 2020 21:36)  POCT Blood Glucose.: 184 mg/dL (24 Sep 2020 17:51)  POCT Blood Glucose.: 170 mg/dL (24 Sep 2020 12:31)  POCT Blood Glucose.: 126 mg/dL (24 Sep 2020 09:23)    I&O's Summary    24 Sep 2020 07:01  -  25 Sep 2020 07:00  --------------------------------------------------------  IN: 1310 mL / OUT: 1100 mL / NET: 210 mL    25 Sep 2020 07:01  -  25 Sep 2020 08:13  --------------------------------------------------------  IN: 120 mL / OUT: 150 mL / NET: -30 mL        PHYSICAL EXAM:  Vital Signs Last 24 Hrs  T(C): 36.8 (25 Sep 2020 05:05), Max: 36.8 (24 Sep 2020 20:44)  T(F): 98.3 (25 Sep 2020 05:05), Max: 98.3 (25 Sep 2020 05:05)  HR: 85 (25 Sep 2020 05:05) (79 - 92)  BP: 126/74 (25 Sep 2020 05:05) (90/52 - 131/76)  BP(mean): --  RR: 18 (25 Sep 2020 05:05) (18 - 18)  SpO2: 96% (25 Sep 2020 05:05) (96% - 100%)    CONSTITUTIONAL: NAD, well-developed  RESPIRATORY: Normal respiratory effort; lungs are clear to auscultation bilaterally  CARDIOVASCULAR: Regular rate and rhythm, normal S1 and S2, no murmur/rub/gallop; No lower extremity edema; Peripheral pulses are 2+ bilaterally  ABDOMEN: Nontender to palpation, normoactive bowel sounds, no rebound/guarding; No hepatosplenomegaly  MUSCLOSKELETAL: no clubbing or cyanosis of digits; no joint swelling or tenderness to palpation  PSYCH: A+O to person, place, and time; affect appropriate    LABS:                        10.8   13.12 )-----------( 219      ( 25 Sep 2020 06:51 )             31.9     09-25    137  |  103  |  10  ----------------------------<  121<H>  3.8   |  24  |  0.87    Ca    8.8      25 Sep 2020 06:55  Phos  2.9     09-25  Mg     1.8     -25    TPro  5.5<L>  /  Alb  3.0<L>  /  TBili  0.4  /  DBili  x   /  AST  18  /  ALT  11  /  AlkPhos  63  09-25    PT/INR - ( 25 Sep 2020 06:55 )   PT: 20.7 sec;   INR: 1.79 ratio         PTT - ( 25 Sep 2020 06:55 )  PTT:32.9 sec      Urinalysis Basic - ( 23 Sep 2020 15:31 )    Color: Yellow / Appearance: Clear / S.016 / pH: x  Gluc: x / Ketone: Negative  / Bili: Negative / Urobili: Negative   Blood: x / Protein: Trace / Nitrite: Negative   Leuk Esterase: Negative / RBC: 2 /hpf / WBC 1 /HPF   Sq Epi: x / Non Sq Epi: 1 /hpf / Bacteria: Negative        Culture - Urine (collected 23 Sep 2020 20:55)  Source: .Urine Clean Catch (Midstream)  Final Report (24 Sep 2020 20:23):    No growth    Culture - Blood (collected 23 Sep 2020 16:35)  Source: .Blood Blood-Peripheral  Gram Stain (24 Sep 2020 16:48):    Growth in aerobic bottle: Gram Positive Cocci in Pairs and Chains    Growth in anaerobic bottle: Gram Positive Cocci in Pairs and Chains  Preliminary Report (24 Sep 2020 16:48):    Growth in aerobic bottle: Gram Positive Cocci in Pairs and Chains    Growth in anaerobic bottle: Gram Positive Cocci in Pairs and Chains    Culture - Blood (collected 23 Sep 2020 16:35)  Source: .Blood Blood-Peripheral  Gram Stain (24 Sep 2020 11:48):    Growth in aerobic bottle: Gram Positive Cocci in Pairs and Chains    Growth in anaerobic bottle: Gram Positive Cocci in Pairs and Chains  Preliminary Report (24 Sep 2020 11:48):    Growth in aerobic bottle: Gram Positive Cocci in Pairs and Chains    Growth in anaerobic bottle: Gram Positive Cocci in Pairs and Chains    ***Blood Panel PCR results on this specimen are available    approximately 3 hours after the Gram stain result.***    Gram stain, PCR, and/or culture results may not always    correspond due to difference in methodologies.    ************************************************************    This PCR assay was performed using [employer].    The following targets are tested for: Enterococcus,    vancomycin resistant enterococci, Listeria monocytogenes,    coagulase negative staphylococci, S. aureus,    methicillin resistant S. aureus, Streptococcus agalactiae    (Group B), S. pneumoniae, S. pyogenes (Group A),    Acinetobacter baumannii, Enterobacter cloacae, E. coli,    Klebsiella oxytoca, K. pneumoniae, Proteus sp.,    Serratia marcescens, Haemophilus influenzae,    Neisseria meningitidis, Pseudomonas aeruginosa, Candida    albicans, C. glabrata, C krusei, C parapsilosis,    C. tropicalis and the KPC resistance gene.    "Due to technical problems, Proteus sp. will Not be reported as part of    the BCID panel until further notice"  Organism: Blood Culture PCR (24 Sep 2020 07:28)  Organism: Blood Culture PCR (24 Sep 2020 07:28)        RADIOLOGY & ADDITIONAL TESTS:  Results Reviewed:   Imaging Personally Reviewed:  Electrocardiogram Personally Reviewed:    COORDINATION OF CARE:  Care Discussed with Consultants/Other Providers [Y/N]:  Prior or Outpatient Records Reviewed [Y/N]:   PROGRESS NOTE:     CONTACT INFO:  Kenyatta Arias MD   PGY-1  Pager: [phone] [facility]    Patient is a 74y old  Male who presents with a chief complaint of Fever (25 Sep 2020 05:22)      SUBJECTIVE / OVERNIGHT EVENTS:  No acute events overnight. Patient seen and evaluated at bedside. Reports feeling well overall. Complains of chronic dry cough. No fever/chills.  Denies SOB at rest, chest pain, palpitations, abdominal pain, nausea/vomiting.     ADDITIONAL REVIEW OF SYSTEMS:    Negative except as above.     MEDICATIONS  (STANDING):  aspirin enteric coated 81 milliGRAM(s) Oral daily  atorvastatin 40 milliGRAM(s) Oral at bedtime  cefTRIAXone   IVPB 2000 milliGRAM(s) IV Intermittent every 24 hours  dextrose 5%. 1000 milliLiter(s) (50 mL/Hr) IV Continuous <Continuous>  dextrose 50% Injectable 12.5 Gram(s) IV Push once  dextrose 50% Injectable 25 Gram(s) IV Push once  dextrose 50% Injectable 25 Gram(s) IV Push once  influenza   Vaccine 0.5 milliLiter(s) IntraMuscular once  insulin lispro (HumaLOG) corrective regimen sliding scale   SubCutaneous three times a day before meals  insulin lispro (HumaLOG) corrective regimen sliding scale   SubCutaneous at bedtime  omega-3-Acid Ethyl Esters 2 Gram(s) Oral two times a day  tamsulosin 0.4 milliGRAM(s) Oral at bedtime  warfarin 5 milliGRAM(s) Oral once    MEDICATIONS  (PRN):  dextrose 40% Gel 15 Gram(s) Oral once PRN Blood Glucose LESS THAN 70 milliGRAM(s)/deciliter  glucagon  Injectable 1 milliGRAM(s) IntraMuscular once PRN Glucose LESS THAN 70 milligrams/deciliter      CAPILLARY BLOOD GLUCOSE      POCT Blood Glucose.: 137 mg/dL (24 Sep 2020 21:36)  POCT Blood Glucose.: 184 mg/dL (24 Sep 2020 17:51)  POCT Blood Glucose.: 170 mg/dL (24 Sep 2020 12:31)  POCT Blood Glucose.: 126 mg/dL (24 Sep 2020 09:23)    I&O's Summary    24 Sep 2020 07:01  -  25 Sep 2020 07:00  --------------------------------------------------------  IN: 1310 mL / OUT: 1100 mL / NET: 210 mL    25 Sep 2020 07:01  -  25 Sep 2020 08:13  --------------------------------------------------------  IN: 120 mL / OUT: 150 mL / NET: -30 mL        PHYSICAL EXAM:  Vital Signs Last 24 Hrs  T(C): 36.8 (25 Sep 2020 05:05), Max: 36.8 (24 Sep 2020 20:44)  T(F): 98.3 (25 Sep 2020 05:05), Max: 98.3 (25 Sep 2020 05:05)  HR: 85 (25 Sep 2020 05:05) (79 - 92)  BP: 126/74 (25 Sep 2020 05:05) (90/52 - 131/76)  BP(mean): --  RR: 18 (25 Sep 2020 05:05) (18 - 18)  SpO2: 96% (25 Sep 2020 05:05) (96% - 100%)    GENERAL: no acute distress; well-developed  HEAD:  Atraumatic, Normocephalic  EYES: EOMI, PERRLA, conjunctiva and sclera clear, + exophthalmos OD  ENT: MMM; oropharynx clear  NECK: Supple, No JVD  CHEST/LUNG: Clear to auscultation bilaterally; No wheeze  HEART: Irregular rate, normal S1S2, no murmurs, rubs, gallops  ABDOMEN: Soft, Nontender, Nondistended; Bowel sounds present  EXTREMITIES:  2+ Peripheral Pulses, No clubbing, cyanosis, or edema  PSYCH: AAOx3  NEUROLOGY: no focal motor or sensory deficits. 5/5 muscle strength in all extremities.   SKIN: chronic discoloration noted on back; no rashes.    LABS:                        10.8   13.12 )-----------( 219      ( 25 Sep 2020 06:51 )             31.9     -    137  |  103  |  10  ----------------------------<  121<H>  3.8   |  24  |  0.87    Ca    8.8      25 Sep 2020 06:55  Phos  2.9     09-25  Mg     1.8     09-25    TPro  5.5<L>  /  Alb  3.0<L>  /  TBili  0.4  /  DBili  x   /  AST  18  /  ALT  11  /  AlkPhos  63  09-25    PT/INR - ( 25 Sep 2020 06:55 )   PT: 20.7 sec;   INR: 1.79 ratio         PTT - ( 25 Sep 2020 06:55 )  PTT:32.9 sec      Urinalysis Basic - ( 23 Sep 2020 15:31 )    Color: Yellow / Appearance: Clear / S.016 / pH: x  Gluc: x / Ketone: Negative  / Bili: Negative / Urobili: Negative   Blood: x / Protein: Trace / Nitrite: Negative   Leuk Esterase: Negative / RBC: 2 /hpf / WBC 1 /HPF   Sq Epi: x / Non Sq Epi: 1 /hpf / Bacteria: Negative        Culture - Urine (collected 23 Sep 2020 20:55)  Source: .Urine Clean Catch (Midstream)  Final Report (24 Sep 2020 20:23):    No growth    Culture - Blood (collected 23 Sep 2020 16:35)  Source: .Blood Blood-Peripheral  Gram Stain (24 Sep 2020 16:48):    Growth in aerobic bottle: Gram Positive Cocci in Pairs and Chains    Growth in anaerobic bottle: Gram Positive Cocci in Pairs and Chains  Preliminary Report (24 Sep 2020 16:48):    Growth in aerobic bottle: Gram Positive Cocci in Pairs and Chains    Growth in anaerobic bottle: Gram Positive Cocci in Pairs and Chains    Culture - Blood (collected 23 Sep 2020 16:35)  Source: .Blood Blood-Peripheral  Gram Stain (24 Sep 2020 11:48):    Growth in aerobic bottle: Gram Positive Cocci in Pairs and Chains    Growth in anaerobic bottle: Gram Positive Cocci in Pairs and Chains  Preliminary Report (24 Sep 2020 11:48):    Growth in aerobic bottle: Gram Positive Cocci in Pairs and Chains    Growth in anaerobic bottle: Gram Positive Cocci in Pairs and Chains    ***Blood Panel PCR results on this specimen are available    approximately 3 hours after the Gram stain result.***    Gram stain, PCR, and/or culture results may not always    correspond due to difference in methodologies.    ************************************************************    This PCR assay was performed using [employer].    The following targets are tested for: Enterococcus,    vancomycin resistant enterococci, Listeria monocytogenes,    coagulase negative staphylococci, S. aureus,    methicillin resistant S. aureus, Streptococcus agalactiae    (Group B), S. pneumoniae, S. pyogenes (Group A),    Acinetobacter baumannii, Enterobacter cloacae, E. coli,    Klebsiella oxytoca, K. pneumoniae, Proteus sp.,    Serratia marcescens, Haemophilus influenzae,    Neisseria meningitidis, Pseudomonas aeruginosa, Candida    albicans, C. glabrata, C krusei, C parapsilosis,    C. tropicalis and the KPC resistance gene.    "Due to technical problems, Proteus sp. will Not be reported as part of    the BCID panel until further notice"  Organism: Blood Culture PCR (24 Sep 2020 07:28)  Organism: Blood Culture PCR (24 Sep 2020 07:28)    - Streptococcus sp. (Not Grp A, B or S pneumoniae): Detec (20 @ 16:35)       RADIOLOGY & ADDITIONAL TESTS:    TTE :  Conclusions:  1. Bioprosthetic mitral valve replacement. Peak mitral  valve gradient equals 18 mm Hg, mean transmitral valve  gradient equals 8 mm Hg, which is elevated even in the  setting of a bioprosthetic mitral valve replacement.  2. Calcified aortic valve. Peak transaortic valve gradient  equals 5 mm Hg. Minimal aortic regurgitation.  3. Aortic Root: 4.1 cm.  4. Moderately dilated left atrium.  LA volume index = 44  cc/m2.  5. Mild concentric left ventricular hypertrophy.  6. Normal left ventricular systolic function. No segmental  wall motion abnormalities.  7. Mild diastolic dysfunction (Stage I).  8. Normal right ventricular size and function.  9. Estimated right ventricular systolic pressure equals 35  mm Hg, assuming right atrial pressure equals 8 mm Hg,  consistent with borderline pulmonary hypertension.  10. Normal tricuspid valve. Mild tricuspid regurgitation.

## 2020-09-26 DIAGNOSIS — R78.81 BACTEREMIA: ICD-10-CM

## 2020-09-26 LAB
-  CEFTRIAXONE: SIGNIFICANT CHANGE UP
-  CLINDAMYCIN: SIGNIFICANT CHANGE UP
-  ERYTHROMYCIN: SIGNIFICANT CHANGE UP
-  LEVOFLOXACIN: SIGNIFICANT CHANGE UP
-  PENICILLIN: SIGNIFICANT CHANGE UP
-  VANCOMYCIN: SIGNIFICANT CHANGE UP
ALBUMIN SERPL ELPH-MCNC: 3.3 G/DL — SIGNIFICANT CHANGE UP (ref 3.3–5)
ALP SERPL-CCNC: 70 U/L — SIGNIFICANT CHANGE UP (ref 40–120)
ALT FLD-CCNC: 21 U/L — SIGNIFICANT CHANGE UP (ref 10–45)
ANION GAP SERPL CALC-SCNC: 12 MMOL/L — SIGNIFICANT CHANGE UP (ref 5–17)
APTT BLD: 33.4 SEC — SIGNIFICANT CHANGE UP (ref 27.5–35.5)
AST SERPL-CCNC: 27 U/L — SIGNIFICANT CHANGE UP (ref 10–40)
BILIRUB SERPL-MCNC: 0.5 MG/DL — SIGNIFICANT CHANGE UP (ref 0.2–1.2)
BUN SERPL-MCNC: 11 MG/DL — SIGNIFICANT CHANGE UP (ref 7–23)
CALCIUM SERPL-MCNC: 9 MG/DL — SIGNIFICANT CHANGE UP (ref 8.4–10.5)
CHLORIDE SERPL-SCNC: 99 MMOL/L — SIGNIFICANT CHANGE UP (ref 96–108)
CO2 SERPL-SCNC: 25 MMOL/L — SIGNIFICANT CHANGE UP (ref 22–31)
CREAT SERPL-MCNC: 0.9 MG/DL — SIGNIFICANT CHANGE UP (ref 0.5–1.3)
CULTURE RESULTS: SIGNIFICANT CHANGE UP
CULTURE RESULTS: SIGNIFICANT CHANGE UP
GLUCOSE BLDC GLUCOMTR-MCNC: 106 MG/DL — HIGH (ref 70–99)
GLUCOSE BLDC GLUCOMTR-MCNC: 128 MG/DL — HIGH (ref 70–99)
GLUCOSE BLDC GLUCOMTR-MCNC: 132 MG/DL — HIGH (ref 70–99)
GLUCOSE BLDC GLUCOMTR-MCNC: 133 MG/DL — HIGH (ref 70–99)
GLUCOSE SERPL-MCNC: 109 MG/DL — HIGH (ref 70–99)
HCT VFR BLD CALC: 33.8 % — LOW (ref 39–50)
HGB BLD-MCNC: 11.5 G/DL — LOW (ref 13–17)
INR BLD: 1.5 RATIO — HIGH (ref 0.88–1.16)
MAGNESIUM SERPL-MCNC: 1.7 MG/DL — SIGNIFICANT CHANGE UP (ref 1.6–2.6)
MCHC RBC-ENTMCNC: 32.2 PG — SIGNIFICANT CHANGE UP (ref 27–34)
MCHC RBC-ENTMCNC: 34 GM/DL — SIGNIFICANT CHANGE UP (ref 32–36)
MCV RBC AUTO: 94.7 FL — SIGNIFICANT CHANGE UP (ref 80–100)
METHOD TYPE: SIGNIFICANT CHANGE UP
METHOD TYPE: SIGNIFICANT CHANGE UP
NRBC # BLD: 0 /100 WBCS — SIGNIFICANT CHANGE UP (ref 0–0)
ORGANISM # SPEC MICROSCOPIC CNT: SIGNIFICANT CHANGE UP
PHOSPHATE SERPL-MCNC: 3.2 MG/DL — SIGNIFICANT CHANGE UP (ref 2.5–4.5)
PLATELET # BLD AUTO: 230 K/UL — SIGNIFICANT CHANGE UP (ref 150–400)
POTASSIUM SERPL-MCNC: 3.8 MMOL/L — SIGNIFICANT CHANGE UP (ref 3.5–5.3)
POTASSIUM SERPL-SCNC: 3.8 MMOL/L — SIGNIFICANT CHANGE UP (ref 3.5–5.3)
PROT SERPL-MCNC: 6.2 G/DL — SIGNIFICANT CHANGE UP (ref 6–8.3)
PROTHROM AB SERPL-ACNC: 17.5 SEC — HIGH (ref 10.6–13.6)
RBC # BLD: 3.57 M/UL — LOW (ref 4.2–5.8)
RBC # FLD: 13.2 % — SIGNIFICANT CHANGE UP (ref 10.3–14.5)
SODIUM SERPL-SCNC: 136 MMOL/L — SIGNIFICANT CHANGE UP (ref 135–145)
SPECIMEN SOURCE: SIGNIFICANT CHANGE UP
SPECIMEN SOURCE: SIGNIFICANT CHANGE UP
WBC # BLD: 10.77 K/UL — HIGH (ref 3.8–10.5)
WBC # FLD AUTO: 10.77 K/UL — HIGH (ref 3.8–10.5)

## 2020-09-26 PROCEDURE — 74177 CT ABD & PELVIS W/CONTRAST: CPT | Mod: 26

## 2020-09-26 PROCEDURE — 99233 SBSQ HOSP IP/OBS HIGH 50: CPT | Mod: GC

## 2020-09-26 PROCEDURE — 99232 SBSQ HOSP IP/OBS MODERATE 35: CPT

## 2020-09-26 RX ORDER — WARFARIN SODIUM 2.5 MG/1
7.5 TABLET ORAL ONCE
Refills: 0 | Status: COMPLETED | OUTPATIENT
Start: 2020-09-26 | End: 2020-09-26

## 2020-09-26 RX ADMIN — Medication 100 MILLIGRAM(S): at 08:28

## 2020-09-26 RX ADMIN — WARFARIN SODIUM 7.5 MILLIGRAM(S): 2.5 TABLET ORAL at 21:53

## 2020-09-26 RX ADMIN — ATORVASTATIN CALCIUM 40 MILLIGRAM(S): 80 TABLET, FILM COATED ORAL at 21:53

## 2020-09-26 RX ADMIN — TAMSULOSIN HYDROCHLORIDE 0.4 MILLIGRAM(S): 0.4 CAPSULE ORAL at 21:53

## 2020-09-26 RX ADMIN — CEFTRIAXONE 100 MILLIGRAM(S): 500 INJECTION, POWDER, FOR SOLUTION INTRAMUSCULAR; INTRAVENOUS at 15:15

## 2020-09-26 RX ADMIN — Medication 100 MILLIGRAM(S): at 17:49

## 2020-09-26 RX ADMIN — Medication 2 GRAM(S): at 17:43

## 2020-09-26 RX ADMIN — Medication 81 MILLIGRAM(S): at 11:54

## 2020-09-26 RX ADMIN — Medication 25 MILLIGRAM(S): at 05:53

## 2020-09-26 RX ADMIN — Medication 25 MILLIGRAM(S): at 17:43

## 2020-09-26 RX ADMIN — Medication 2 GRAM(S): at 05:53

## 2020-09-26 NOTE — PROGRESS NOTE ADULT - SUBJECTIVE AND OBJECTIVE BOX
Department of Internal Medicine  Noemi Hernandez M.D. | PGY-3  Pager: [phone] (NS), [phone] (GRECIAJ)        Patient is a 74y old  Male who presents with a chief complaint of Fever (25 Sep 2020 13:11)      SUBJECTIVE / OVERNIGHT EVENTS:  ADDITIONAL REVIEW OF SYSTEMS:    MEDICATIONS  (STANDING):  aspirin enteric coated 81 milliGRAM(s) Oral daily  atorvastatin 40 milliGRAM(s) Oral at bedtime  cefTRIAXone   IVPB 2000 milliGRAM(s) IV Intermittent every 24 hours  dextrose 5%. 1000 milliLiter(s) (50 mL/Hr) IV Continuous <Continuous>  dextrose 50% Injectable 12.5 Gram(s) IV Push once  dextrose 50% Injectable 25 Gram(s) IV Push once  dextrose 50% Injectable 25 Gram(s) IV Push once  influenza   Vaccine 0.5 milliLiter(s) IntraMuscular once  insulin lispro (HumaLOG) corrective regimen sliding scale   SubCutaneous three times a day before meals  insulin lispro (HumaLOG) corrective regimen sliding scale   SubCutaneous at bedtime  metoprolol tartrate 25 milliGRAM(s) Oral two times a day  omega-3-Acid Ethyl Esters 2 Gram(s) Oral two times a day  tamsulosin 0.4 milliGRAM(s) Oral at bedtime  warfarin 5 milliGRAM(s) Oral once    MEDICATIONS  (PRN):  dextrose 40% Gel 15 Gram(s) Oral once PRN Blood Glucose LESS THAN 70 milliGRAM(s)/deciliter  glucagon  Injectable 1 milliGRAM(s) IntraMuscular once PRN Glucose LESS THAN 70 milligrams/deciliter  guaiFENesin   Syrup  (Sugar-Free) 100 milliGRAM(s) Oral every 6 hours PRN Cough      CAPILLARY BLOOD GLUCOSE      POCT Blood Glucose.: 152 mg/dL (25 Sep 2020 22:31)  POCT Blood Glucose.: 129 mg/dL (25 Sep 2020 17:11)  POCT Blood Glucose.: 182 mg/dL (25 Sep 2020 11:54)  POCT Blood Glucose.: 147 mg/dL (25 Sep 2020 08:27)    I&O's Summary    24 Sep 2020 07:01  -  25 Sep 2020 07:00  --------------------------------------------------------  IN: 1310 mL / OUT: 1100 mL / NET: 210 mL    25 Sep 2020 07:01  -  26 Sep 2020 06:40  --------------------------------------------------------  IN: 890 mL / OUT: 1050 mL / NET: -160 mL        PHYSICAL EXAM:  Vital Signs Last 24 Hrs  T(C): 36.6 (26 Sep 2020 06:10), Max: 36.9 (25 Sep 2020 20:45)  T(F): 97.8 (26 Sep 2020 06:10), Max: 98.5 (25 Sep 2020 20:45)  HR: 99 (26 Sep 2020 06:10) (75 - 99)  BP: 145/87 (26 Sep 2020 06:10) (112/68 - 145/87)  BP(mean): --  RR: 18 (26 Sep 2020 06:10) (18 - 18)  SpO2: 99% (26 Sep 2020 06:10) (98% - 99%)  CONSTITUTIONAL: NAD, well-developed, well-groomed  EYES: PERRLA; conjunctiva and sclera clear  ENMT: Moist oral mucosa, no pharyngeal injection or exudates; normal dentition  NECK: Supple, no palpable masses; no thyromegaly  RESPIRATORY: Normal respiratory effort; lungs are clear to auscultation bilaterally  CARDIOVASCULAR: Regular rate and rhythm, normal S1 and S2, no murmur/rub/gallop; No lower extremity edema; Peripheral pulses are 2+ bilaterally  ABDOMEN: Nontender to palpation, normoactive bowel sounds, no rebound/guarding; No hepatosplenomegaly  MUSCULOSKELETAL:  Normal gait; no clubbing or cyanosis of digits; no joint swelling or tenderness to palpation  PSYCH: A+O to person, place, and time; affect appropriate  NEUROLOGY: CN 2-12 are intact and symmetric; no gross sensory deficits   SKIN: No rashes; no palpable lesions    LABS:                        10.8   13.12 )-----------( 219      ( 25 Sep 2020 06:51 )             31.9     09-25    137  |  103  |  10  ----------------------------<  121<H>  3.8   |  24  |  0.87    Ca    8.8      25 Sep 2020 06:55  Phos  2.9     09-25  Mg     1.8     09-25    TPro  5.5<L>  /  Alb  3.0<L>  /  TBili  0.4  /  DBili  x   /  AST  18  /  ALT  11  /  AlkPhos  63  09-25    PT/INR - ( 25 Sep 2020 06:55 )   PT: 20.7 sec;   INR: 1.79 ratio         PTT - ( 25 Sep 2020 06:55 )  PTT:32.9 sec          Culture - Urine (collected 23 Sep 2020 20:55)  Source: .Urine Clean Catch (Midstream)  Final Report (24 Sep 2020 20:23):    No growth    Culture - Blood (collected 23 Sep 2020 16:35)  Source: .Blood Blood-Peripheral  Gram Stain (24 Sep 2020 16:48):    Growth in aerobic bottle: Gram Positive Cocci in Pairs and Chains    Growth in anaerobic bottle: Gram Positive Cocci in Pairs and Chains  Preliminary Report (25 Sep 2020 17:22):    Growth in aerobic and anaerobic bottles: Streptococcus mitis/oralis group    See previous culture [id number]    Culture - Blood (collected 23 Sep 2020 16:35)  Source: .Blood Blood-Peripheral  Gram Stain (24 Sep 2020 11:48):    Growth in aerobic bottle: Gram Positive Cocci in Pairs and Chains    Growth in anaerobic bottle: Gram Positive Cocci in Pairs and Chains  Preliminary Report (25 Sep 2020 12:35):    Growth in aerobic and anaerobic bottles: Streptococcus mitis/oralis group    ***Blood Panel PCR results on this specimen are available    approximately 3 hours after the Gram stain result.***    Gram stain, PCR, and/or culture results may not always    correspond due to difference in methodologies.    ************************************************************    This PCR assay was performed using [employer].    The following targets are tested for: Enterococcus,    vancomycin resistant enterococci, Listeria monocytogenes,    coagulase negative staphylococci, S. aureus,    methicillin resistant S. aureus, Streptococcus agalactiae    (Group B), S. pneumoniae, S. pyogenes (Group A),    Acinetobacter baumannii, Enterobacter cloacae, E. coli,    Klebsiella oxytoca, K. pneumoniae, Proteus sp.,    Serratia marcescens, Haemophilus influenzae,    Neisseria meningitidis, Pseudomonas aeruginosa, Candida    albicans, C. glabrata, C krusei, C parapsilosis,    C. tropicalis and the KPC resistance gene.    "Due to technical problems, Proteus sp. will Not be reported as part of    the BCID panel until further notice"  Organism: Blood Culture PCR (24 Sep 2020 07:28)  Organism: Blood Culture PCR (24 Sep 2020 07:28)        RADIOLOGY & ADDITIONAL TESTS:  Results Reviewed:   Imaging Personally Reviewed:  Electrocardiogram Personally Reviewed:    COORDINATION OF CARE:  Care Discussed with Consultants/Other Providers [Y/N]:  Prior or Outpatient Records Reviewed [Y/N]:   Department of Internal Medicine  Noemi Hernandez M.D. | PGY-3  Pager: [phone] (NS), [phone] (URMILA)        Patient is a 74y old  Male who presents with a chief complaint of Fever (25 Sep 2020 13:11)      SUBJECTIVE / OVERNIGHT EVENTS:  - No acute events overnight  - Has a dry cough that is improving with Robitussin  - No fevers/chills, chest pain, shortness of breath, abdominal pain, nausea or vomiting  ADDITIONAL REVIEW OF SYSTEMS:    MEDICATIONS  (STANDING):  aspirin enteric coated 81 milliGRAM(s) Oral daily  atorvastatin 40 milliGRAM(s) Oral at bedtime  cefTRIAXone   IVPB 2000 milliGRAM(s) IV Intermittent every 24 hours  dextrose 5%. 1000 milliLiter(s) (50 mL/Hr) IV Continuous <Continuous>  dextrose 50% Injectable 12.5 Gram(s) IV Push once  dextrose 50% Injectable 25 Gram(s) IV Push once  dextrose 50% Injectable 25 Gram(s) IV Push once  influenza   Vaccine 0.5 milliLiter(s) IntraMuscular once  insulin lispro (HumaLOG) corrective regimen sliding scale   SubCutaneous three times a day before meals  insulin lispro (HumaLOG) corrective regimen sliding scale   SubCutaneous at bedtime  metoprolol tartrate 25 milliGRAM(s) Oral two times a day  omega-3-Acid Ethyl Esters 2 Gram(s) Oral two times a day  tamsulosin 0.4 milliGRAM(s) Oral at bedtime  warfarin 5 milliGRAM(s) Oral once    MEDICATIONS  (PRN):  dextrose 40% Gel 15 Gram(s) Oral once PRN Blood Glucose LESS THAN 70 milliGRAM(s)/deciliter  glucagon  Injectable 1 milliGRAM(s) IntraMuscular once PRN Glucose LESS THAN 70 milligrams/deciliter  guaiFENesin   Syrup  (Sugar-Free) 100 milliGRAM(s) Oral every 6 hours PRN Cough      CAPILLARY BLOOD GLUCOSE      POCT Blood Glucose.: 152 mg/dL (25 Sep 2020 22:31)  POCT Blood Glucose.: 129 mg/dL (25 Sep 2020 17:11)  POCT Blood Glucose.: 182 mg/dL (25 Sep 2020 11:54)  POCT Blood Glucose.: 147 mg/dL (25 Sep 2020 08:27)    I&O's Summary    24 Sep 2020 07:01  -  25 Sep 2020 07:00  --------------------------------------------------------  IN: 1310 mL / OUT: 1100 mL / NET: 210 mL    25 Sep 2020 07:01  -  26 Sep 2020 06:40  --------------------------------------------------------  IN: 890 mL / OUT: 1050 mL / NET: -160 mL        PHYSICAL EXAM:  Vital Signs Last 24 Hrs  T(C): 36.6 (26 Sep 2020 06:10), Max: 36.9 (25 Sep 2020 20:45)  T(F): 97.8 (26 Sep 2020 06:10), Max: 98.5 (25 Sep 2020 20:45)  HR: 99 (26 Sep 2020 06:10) (75 - 99)  BP: 145/87 (26 Sep 2020 06:10) (112/68 - 145/87)  BP(mean): --  RR: 18 (26 Sep 2020 06:10) (18 - 18)  SpO2: 99% (26 Sep 2020 06:10) (98% - 99%)      GENERAL: no acute distress; well-developed  HEAD:  Atraumatic, Normocephalic  EYES: EOMI, PERRLA, conjunctiva and sclera clear, + exophthalmos OD  ENT: MMM; oropharynx clear  NECK: Supple, No JVD  CHEST/LUNG: Clear to auscultation bilaterally; No wheeze  HEART: Irregular rate, normal S1S2, no murmurs, rubs, gallops  ABDOMEN: Soft, Nontender, Nondistended; Bowel sounds present  EXTREMITIES:  2+ Peripheral Pulses, No clubbing, cyanosis, or edema  PSYCH: AAOx3  NEUROLOGY: no focal motor or sensory deficits. 5/5 muscle strength in all extremities.   SKIN: warm and dry       LABS:                                    11.5   10.77 )-----------( 230      ( 26 Sep 2020 06:56 )             33.8     09-26    136  |  99  |  11  ----------------------------<  109<H>  3.8   |  25  |  0.90    Ca    9.0      26 Sep 2020 06:55  Phos  3.2     09-26  Mg     1.7     09-26    TPro  6.2  /  Alb  3.3  /  TBili  0.5  /  DBili  x   /  AST  27  /  ALT  21  /  AlkPhos  70  09-26    PT/INR - ( 26 Sep 2020 06:57 )   PT: 17.5 sec;   INR: 1.50 ratio         PTT - ( 26 Sep 2020 06:57 )  PTT:33.4 sec            Culture - Urine (collected 23 Sep 2020 20:55)  Source: .Urine Clean Catch (Midstream)  Final Report (24 Sep 2020 20:23):    No growth    Culture - Blood (collected 23 Sep 2020 16:35)  Source: .Blood Blood-Peripheral  Gram Stain (24 Sep 2020 16:48):    Growth in aerobic bottle: Gram Positive Cocci in Pairs and Chains    Growth in anaerobic bottle: Gram Positive Cocci in Pairs and Chains  Preliminary Report (25 Sep 2020 17:22):    Growth in aerobic and anaerobic bottles: Streptococcus mitis/oralis group    See previous culture [id number]    Culture - Blood (collected 23 Sep 2020 16:35)  Source: .Blood Blood-Peripheral  Gram Stain (24 Sep 2020 11:48):    Growth in aerobic bottle: Gram Positive Cocci in Pairs and Chains    Growth in anaerobic bottle: Gram Positive Cocci in Pairs and Chains  Preliminary Report (25 Sep 2020 12:35):    Growth in aerobic and anaerobic bottles: Streptococcus mitis/oralis group    ***Blood Panel PCR results on this specimen are available    approximately 3 hours after the Gram stain result.***    Gram stain, PCR, and/or culture results may not always    correspond due to difference in methodologies.    ************************************************************    This PCR assay was performed using [employer].    The following targets are tested for: Enterococcus,    vancomycin resistant enterococci, Listeria monocytogenes,    coagulase negative staphylococci, S. aureus,    methicillin resistant S. aureus, Streptococcus agalactiae    (Group B), S. pneumoniae, S. pyogenes (Group A),    Acinetobacter baumannii, Enterobacter cloacae, E. coli,    Klebsiella oxytoca, K. pneumoniae, Proteus sp.,    Serratia marcescens, Haemophilus influenzae,    Neisseria meningitidis, Pseudomonas aeruginosa, Candida    albicans, C. glabrata, C krusei, C parapsilosis,    C. tropicalis and the KPC resistance gene.    "Due to technical problems, Proteus sp. will Not be reported as part of    the BCID panel until further notice"  Organism: Blood Culture PCR (24 Sep 2020 07:28)  Organism: Blood Culture PCR (24 Sep 2020 07:28)

## 2020-09-26 NOTE — PROGRESS NOTE ADULT - PROBLEM SELECTOR PLAN 1
Pt meets SIRS criteria given fever and HR>90. WBC 7--> 21-->13. BCx showing strep species. Likely 2/2 pt's recently diagnosed rectal cancer. Recent biopsy/colonoscopy may have also seeded bacteria into the blood.   - S/p IV resuscitation in ED.  - Elevated lactate, downtrending now to normal range   - Pt with soft-normal pressures, continue with LR infusion at 100cc/hr  - BCx showing strep (not group A, B, pneumo)  - Repeat BCx pending  - Likely to be group D strep (strep bovis). This is highly associated with colorectal cancer, which patient has. Also highly associated with infective endocarditis  - TTE negative for vegetation  - UCx negative  - ID consulted as pt will need clearance for surgery; f/u recs  - C/w ceftriaxone 2g q24h; narrow depending on sensitivity results  - Recent CT abdomen, chest, pelvis negative for any pathology pending repeat CT A/P   - Daily CBC to trend WBC Likely 2/2 pt's recently diagnosed rectal cancer. Recent biopsy/colonoscopy may have also seeded bacteria into the blood.   - Repeat BCx NGTD 9/25  - As per ID repeat BCx 9/27 ordered for AM  - Likely to be group D strep (strep bovis). This is highly associated with colorectal cancer, which patient has. Also highly associated with infective endocarditis  - TTE negative for vegetation  - UCx negative  - ID consulted as pt will need clearance for surgery; f/u recs  - C/w ceftriaxone 2g q24h; narrow depending on sensitivity results  - Recent CT abdomen, chest, pelvis negative for any pathology pending repeat CT A/P   - Daily CBC to trend WBC  - REEMA to r/o endocarditis/vegetation

## 2020-09-26 NOTE — PHYSICAL THERAPY INITIAL EVALUATION ADULT - PLANNED THERAPY INTERVENTIONS, PT EVAL
gait training/bed mobility training/stair training; GOAL: Pt will negotiate up & down 12 stairs independently with unilateral HR & least restrictive AD , in 2 weeks./transfer training/balance training

## 2020-09-26 NOTE — PHYSICAL THERAPY INITIAL EVALUATION ADULT - PERTINENT HX OF CURRENT PROBLEM, REHAB EVAL
74M hx DM, CAD, AFib, prosthetic mitral valve (2015), HTN, macular degeneration, hemorrhoids, localized rectal cancer (dx 9/2020), p/w fever & chills, symptoms worsened over the last several days, which prompted him to present to hospital. Pt is planned for surgical resection of his rectal cancer in the upcoming weeks. Found to have step bacteremia.

## 2020-09-26 NOTE — PHYSICAL THERAPY INITIAL EVALUATION ADULT - CRITERIA FOR SKILLED THERAPEUTIC INTERVENTIONS
risk reduction/prevention/therapy frequency/rehab potential/anticipated equipment needs at discharge/anticipated discharge recommendation/functional limitations in following categories/impairments found

## 2020-09-26 NOTE — PROGRESS NOTE ADULT - PROBLEM SELECTOR PLAN 2
Pt takes warfarin 5mg Tuesday, Wednesday, Friday, Sat, Sun; 2.5mg Mon and Thurs  - C/w home regimen of warfarin  - Hold home metoprolol given pt's hypotension Pt takes warfarin 5mg Tuesday, Wednesday, Friday, Sat, Sun; 2.5mg Mon and Thurs  - Continue Warfarin dosing, noted to have INR 1.50 goal INR 2-3  - Continue Metoprolol Tartrate 25mg BID for rate control

## 2020-09-26 NOTE — PROGRESS NOTE ADULT - ASSESSMENT
Pt is 74M hx DM, CAD, AFib, prosthetic mitral valve (2015), HTN, macular degeneration, hemorrhoids, localized rectal cancer (dx 9/2020), presenting with fever and chills in the setting of Strep mitis/oralis bacteremia likely 2/2 to history of rectal cancer. Pending REEMA.

## 2020-09-26 NOTE — PHYSICAL THERAPY INITIAL EVALUATION ADULT - LIVES WITH, PROFILE
Pt lives with spouse in home with a few stairs to entrance. Prior to admission pt independent with all functional mobility including ambulation without AD./spouse Pt lives with spouse in home with 3 + 5 stairs to entrance b/l HR however far apart, flight of stairs up to bedroom with b/l HR. Prior to admission pt independent with all functional mobility including community ambulation without AD, however has cane from previous hospitalization/spouse

## 2020-09-26 NOTE — PROGRESS NOTE ADULT - ASSESSMENT
74 year old male S/P tissue MVR and chronic AF had a biopsy of a rectal mass on 9/4 with out antibiotic prophylaxis and then had a formal colonoscopy on 9/12 also with out antibiotic prophylaxis which confirmed rectal carcinoma.  He then developed fever and shaking chills and has been admitted with 2/2 bottle Strep species bacteremia.  Species is streptococcus mitis  Patient feeling better  ABx as per ID.  Keep INR 2-3  The patient is stable from a cardiovascular perspective.

## 2020-09-26 NOTE — PROGRESS NOTE ADULT - SUBJECTIVE AND OBJECTIVE BOX
SUBJECTIVE: The patient denies chest pain, shortness of breath, arm pain or jaw pain, dizziness or palpitations.    	  MEDICATIONS:  metoprolol tartrate 25 milliGRAM(s) Oral two times a day  tamsulosin 0.4 milliGRAM(s) Oral at bedtime    cefTRIAXone   IVPB 2000 milliGRAM(s) IV Intermittent every 24 hours    guaiFENesin   Syrup  (Sugar-Free) 100 milliGRAM(s) Oral every 6 hours PRN        atorvastatin 40 milliGRAM(s) Oral at bedtime  dextrose 40% Gel 15 Gram(s) Oral once PRN  dextrose 50% Injectable 12.5 Gram(s) IV Push once  dextrose 50% Injectable 25 Gram(s) IV Push once  dextrose 50% Injectable 25 Gram(s) IV Push once  glucagon  Injectable 1 milliGRAM(s) IntraMuscular once PRN  insulin lispro (HumaLOG) corrective regimen sliding scale   SubCutaneous three times a day before meals  insulin lispro (HumaLOG) corrective regimen sliding scale   SubCutaneous at bedtime    aspirin enteric coated 81 milliGRAM(s) Oral daily  dextrose 5%. 1000 milliLiter(s) IV Continuous <Continuous>  influenza   Vaccine 0.5 milliLiter(s) IntraMuscular once  warfarin 5 milliGRAM(s) Oral once      REVIEW OF SYSTEMS:    CONSTITUTIONAL: No fever, weight loss, or fatigue  EYES: No eye pain, visual disturbances, or discharge  NECK: No pain or stiffness  RESPIRATORY: No cough, wheezing, chills or hemoptysis; No Shortness of Breath  CARDIOVASCULAR: No chest pain, palpitations, dizziness, or leg swelling  GASTROINTESTINAL: No abdominal or epigastric pain. No nausea, vomiting, or hematemesis; No diarrhea or constipation. No melena or hematochezia.  GENITOURINARY: No dysuria, frequency, hematuria, or incontinence  NEUROLOGICAL: No headaches, memory loss, loss of strength, numbness, or tremors  SKIN: No itching, burning, rashes, or lesions   LYMPH Nodes: No enlarged glands  MUSCULOSKELETAL: No joint pain or swelling; No muscle, back, or extremity pain  All other review of systems are negative.  	  [ ] Unable to obtain    PHYSICAL EXAM:  T(C): 36.6 (09-26-20 @ 06:10), Max: 36.9 (09-25-20 @ 20:45)  HR: 99 (09-26-20 @ 06:10) (75 - 99)  BP: 145/87 (09-26-20 @ 06:10) (112/68 - 145/87)  RR: 18 (09-26-20 @ 06:10) (18 - 18)  SpO2: 99% (09-26-20 @ 06:10) (98% - 99%)  Wt(kg): --  I&O's Summary    25 Sep 2020 07:01  -  26 Sep 2020 07:00  --------------------------------------------------------  IN: 890 mL / OUT: 1050 mL / NET: -160 mL          PHYSICAL EXAM    Appearance: Normal	  HEENT:   Normal oral mucosa, PERRL, EOMI	  NECK: Soft and supple, No LAD, No JVD  Cardiovascular: Regular Rate and Rhythm, Normal S1 S2, No murmurs, No clicks, gallops or rubs  Respiratory: Lungs clear to auscultation	  Gastrointestinal:  Soft, Non-tender, + BS	  Skin: No rashes, No ecchymoses, No cyanosis  Neurologic: Non-focal  Extremities: No clubbing, cyanosis or edema  Vascular: Peripheral pulses palpable 2+ bilaterally    	    LABS:	 	  Blood Cultures:  Culture Results:   Growth in aerobic and anaerobic bottles: Streptococcus mitis/oralis group                                 11.5   10.77 )-----------( 230      ( 26 Sep 2020 06:56 )             33.8     09-26    136  |  99  |  11  ----------------------------<  109<H>  3.8   |  25  |  0.90    Ca    9.0      26 Sep 2020 06:55  Phos  3.2     09-26  Mg     1.7     09-26    TPro  6.2  /  Alb  3.3  /  TBili  0.5  /  DBili  x   /  AST  27  /  ALT  21  /  AlkPhos  70  09-26      INR: 1.50    < from: Transthoracic Echocardiogram (09.24.20 @ 17:58) >  Conclusions:  1. Bioprosthetic mitral valve replacement. Peak mitral  valve gradient equals 18 mm Hg, mean transmitral valve  gradient equals 8 mm Hg, which is elevated even in the  setting of a bioprosthetic mitral valve replacement.  2. Calcified aortic valve. Peak transaortic valve gradient  equals 5 mm Hg. Minimal aortic regurgitation.  3. Aortic Root: 4.1 cm.  4. Moderately dilated left atrium.  LA volume index = 44  cc/m2.  5. Mild concentric left ventricular hypertrophy.  6. Normal left ventricular systolic function. No segmental  wall motion abnormalities.  7. Mild diastolic dysfunction (Stage I).  8. Normal right ventricular size and function.  9. Estimated right ventricular systolic pressure equals 35  mm Hg, assuming right atrial pressure equals 8 mm Hg,  consistent with borderline pulmonary hypertension.  10. Normal tricuspid valve. Mild tricuspid regurgitation.  *** No previous Echo exam.    < end of copied text >

## 2020-09-26 NOTE — PROGRESS NOTE ADULT - ASSESSMENT
ASSESSMENT/RECOMMENDATIONS:  Pt is 74M hx DM(a1c=6.2%), CAD, AFib, prosthetic mitral valve (2015), HTN, macular degeneration, hemorrhoids, localized rectal cancer (dx 9/2020), presenting with chills for 10 days.     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 at [facility]  Rectal biopsy on 9/4/2020 without abx  Started having severe chills on 9/13  Had colonscopy on 9/21  Went to ED with severe shaking chills on 9/23    Sazs=647.4 (9/23)  Leukocytosis WBC: 7>21>13  Band=5% (9/23)  EUGENE, resolved, now kidney function normal  UA, CXR negative  BCx (9/23): Streptococcus mitis/oralis group  TTE did not report vegetation    #Streptococcus mitis/oralis group bacteremia: source GI vs oral- S/P biopsy of anal mass and colonoscopy (no Abx prophylaxis)  - BC from 9/23 S to CAX and PCN  - c/w Ceftriaxone 2g q24h (since 9/24-)  - With high grade bacteremia and prophetic bovine mitral valve, will need to treat 4-6 weeks of IV abx, preferentially 6 weeks as if having endocarditis  - S/P one dose of gentamicin 140mg IV  - Will need REEMA next week to decide if he needs longer duration of gentamicin. REEMA will help define management and r/o establish baseline/complications of possible IE  - Will repeat BCx on 8/27 Sunday  - Will need dental evaluation as this can be a source of S peace Sky MD  pager [phone]  office [phone]  After 5pm/weekends call [phone]

## 2020-09-26 NOTE — PROGRESS NOTE ADULT - SUBJECTIVE AND OBJECTIVE BOX
Patient is a 74y old  Male who presents with a chief complaint of Fever (26 Sep 2020 08:30)    Being followed by ID for Streptococcal bacteremia    Interval history:  Repeat BC remain negative  Afebrile  Decline in WBC  No acute events      ROS:  No cough, SOB, CP  No N/V/D./abd pain  No other complaints      Antimicrobials:    cefTRIAXone   IVPB 2000 milliGRAM(s) IV Intermittent every 24 hours      Vital Signs Last 24 Hrs  T(C): 36.6 (09-26-20 @ 06:10), Max: 36.9 (09-25-20 @ 20:45)  T(F): 97.8 (09-26-20 @ 06:10), Max: 98.5 (09-25-20 @ 20:45)  HR: 99 (09-26-20 @ 06:10) (75 - 99)  BP: 145/87 (09-26-20 @ 06:10) (112/68 - 145/87)  BP(mean): --  RR: 18 (09-26-20 @ 06:10) (18 - 18)  SpO2: 99% (09-26-20 @ 06:10) (98% - 99%)    Physical Exam:    Constitutional well preserved, NAD    HEENT EOMI, No pallor or icterus    No oral exudate or erythema, S/P dental extractions    Neck supple no LN    Chest Clear to auscultation    CVS S1 S2 WNl No murmur or rub or gallop    Abd soft BS normal No tenderness no masses    Ext No cyanosis clubbing or edema    IV site no erythema tenderness or discharge    Joints no swelling or LOM    CNS AAO X 3 non focal    Lab Data:                          11.5   10.77 )-----------( 230      ( 26 Sep 2020 06:56 )             33.8       09-26    136  |  99  |  11  ----------------------------<  109<H>  3.8   |  25  |  0.90    Ca    9.0      26 Sep 2020 06:55  Phos  3.2     09-26  Mg     1.7     09-26    TPro  6.2  /  Alb  3.3  /  TBili  0.5  /  DBili  x   /  AST  27  /  ALT  21  /  AlkPhos  70  09-26        .Blood Blood-Venous  09-25-20   No growth to date.  --  --      .Urine Clean Catch (Midstream)  09-23-20   No growth  --  --      .Blood Blood-Peripheral  09-23-20   Growth in aerobic and anaerobic bottles: Streptococcus mitis/oralis group  ***Blood Panel PCR results on this specimen are available  approximately 3 hours after the Gram stain result.***  Gram stain, PCR, and/or culture results may not always  correspond due to difference in methodologies.  ************************************************************  This PCR assay was performed using [employer].  The following targets are tested for: Enterococcus,  vancomycin resistant enterococci, Listeria monocytogenes,  coagulase negative staphylococci, S. aureus,  methicillin resistant S. aureus, Streptococcus agalactiae  (Group B), S. pneumoniae, S. pyogenes (Group A),  Acinetobacter baumannii, Enterobacter cloacae, E. coli,  Klebsiella oxytoca, K. pneumoniae, Proteus sp.,  Serratia marcescens, Haemophilus influenzae,  Neisseria meningitidis, Pseudomonas aeruginosa, Candida  albicans, C. glabrata, C krusei, C parapsilosis,  C. tropicalis and the KPC resistance gene.  "Due to technical problems, Proteus sp. will Not be reported as part of  the BCID panel until further notice"  --  Blood Culture PCR  Streptococcus mitis/oralis group      < from: Xray Chest 1 View AP/PA (09.23.20 @ 12:02) >  EXAM:  XR CHEST AP OR PA 1V                            PROCEDURE DATE:  09/23/2020            INTERPRETATION:  Chest one view    HISTORY: Sepsis    COMPARISON STUDY: 9/11/2020    Frontal expiratory view of the chest shows the heart to be similarly enlarged in size. The lungs show small right lower lobe infiltrate and there is no evidence of pneumothorax nor pleural effusion.    IMPRESSION:  Small right infiltrate.    < end of copied text >  < from: MR Pelvis w/ IV Cont (09.15.20 @ 15:36) >  EXAM:  MR PELVIS IC        PROCEDURE DATE:  09/15/2020           INTERPRETATION:  CLINICAL INFORMATION: Biopsy-proven distal rectal adenocarcinoma.    COMPARISON: CT chest, abdomen and pelvis 09/11/2020.    PROCEDURE:  MR of the pelvis was performed with and without IV contrast as per Rectal Cancer Staging Protocol including thin section T2 weighted imaging.  IV Contrast: Gadavist. 7 cc administered, 3 cc discarded.  Levsin 0.25 mg administered sublingual.  Rectal Gel: 60 cc rectal gel administered.    FINDINGS:    TUMOR LOCATION: Low (0-5.0 cm)    Tumor is entirely contained within the anal canal.    Distance of lowest extent of tumor from anal verge: 2.4 cm  Distance of lowest extent of tumor from top of anal sphincter: Not applicable.  Relationship to anterior peritoneal reflection: Below    TUMOR CHARACTERISTICS:  Craniocaudal extent: 2.1 cm  Circumferential extent/location (clock face): Right anterolateral/10-12:00.  Morphology: Polypoid  Mucinous: No.    Low Rectal Tumors (Invasion of the anal sphincter complex): Invades internal sphincter (IS) only. Tumor superficially involves the internal sphincter anterior right laterally, but is at least 3 mm away from the outer border of the internal sphincter (series 7, image 27). Intersphincteric space is free of tumor.    EXTRAMURAL VASCULAR INVASION (EMVI): Absent    CRM (FOR T3 ONLY): Not applicable.    LYMPH NODES (suspicious criteria: round shape, irregular borders, heterogeneous signal intensity):    Mesorectal/Superior Rectal Lymph Nodes: N0: No visible lymph nodes/deposits    Extramesorectal lymph nodes: None.    ADDITIONAL COMMENTS: Mildly trabeculated urinary bladder wall, may be seen in the setting of chronic bladder outlet obstruction, with a 1.6 cm right posterolateral bladder wall diverticulum. Prostate measures 4.6 cm in transverse dimension with median lobe hypertrophy. Sigmoid diverticulosis. Small bilateral fat-containing inguinal hernias, asymmetric to the left. A small right hydrocele.    IMPRESSION:  A 2.1 cm mass entirely contained within the anal canal, 2.4 cm above the anal verge. No regional lymphadenopathy (N0).    Sphincter involvement (low rectal tumors): Invades internal sphincter (IS) only. As above, tumor superficially involves the internal sphincter anterior right laterally, but is at least 3 mm away from the outer border of the internal sphincter. Intersphincteric space is clear.    Suspicious extra mesorectal nodes: None    Findings were discussed with Dr. DOUGLAS DONNELLY on 09/16/2020.        < end of copied text >      < from: Transthoracic Echocardiogram (09.24.20 @ 17:58) >  Patient name: JOSEPHINE DODD  YOB: 1945   Age: 74 (M)   MR#: [id number]  Study Date: 9/24/2020  Location: [address] [place], [place] [id number]: Yarelis Torres [facility]  Study quality: Technically difficult  Referring Physician: Brittani Naranjo MD  Blood Pressure: 90/52 mmHg  Height: 165 cm  Weight: 73 kg  BSA: 1.8 m2  ------------------------------------------------------------------------  PROCEDURE: Transthoracic echocardiogram with 2-D, M-Mode  and complete spectral and color flow Doppler.  INDICATION: Acute and subacute infective endocarditis  (I33.0)  ------------------------------------------------------------------------  Dimensions:    Normal Values:  LA:     5.8    2.0 - 4.0 cm  Ao:     4.1    2.0 - 3.8 cm  SEPTUM: 1.2    0.6 - 1.2 cm  PWT:    1.3    0.6 - 1.1 cm  LVIDd:  4.6    3.0 - 5.6 cm  LVIDs:  2.9    1.8 - 4.0 cm  Derived variables:  LVMI: 121 g/m2  RWT: 0.56  Fractional short: 37 %  Doppler Peak Velocity (m/sec): MV=2.1 AoV=1.1  ------------------------------------------------------------------------  Observations:  Mitral Valve: Bioprosthetic mitral valve replacement. Peak  mitral valve gradient equals 18 mm Hg, mean transmitral  valve gradient equals 8 mm Hg, which is elevated even in  the setting of a bioprosthetic mitral valve replacement.  Aortic Valve/Aorta: Calcified aortic valve. Peak  transaortic valve gradient equals 5 mm Hg. Minimal aortic  regurgitation.  Peak left ventricular outflow tract  gradient equals 2 mm Hg.  Aortic Root: 4.1 cm.  Left Atrium: Moderately dilated left atrium.  LA volume  index = 44 cc/m2.  Left Ventricle: Normal left ventricular systolic function.  No segmental wall motion abnormalities. Mild concentric  left ventricular hypertrophy. Mild diastolic dysfunction  (Stage I).  Right Heart: Normal right atrium. Normal right ventricular  size and function. Normal tricuspid valve. Mild tricuspid  regurgitation. Normal pulmonic valve. Mild pulmonic  regurgitation.  Pericardium/Pleura: Normal pericardium with no pericardial  effusion.  Hemodynamic: Estimated right atrial pressure is 8 mm Hg.  Estimated right ventricular systolic pressure equals 35 mm  Hg, assuming right atrial pressure equals 8 mm Hg,  consistent with borderline pulmonary hypertension.  ------------------------------------------------------------------------  Conclusions:  1. Bioprosthetic mitral valve replacement. Peak mitral  valve gradient equals 18 mm Hg, mean transmitral valve  gradient equals 8 mm Hg, which is elevated even in the  setting of a bioprosthetic mitral valve replacement.  2. Calcified aortic valve. Peak transaortic valve gradient  equals 5 mm Hg. Minimal aortic regurgitation.  3. Aortic Root: 4.1 cm.  4. Moderately dilated left atrium.  LA volume index = 44  cc/m2.  5. Mild concentric left ventricular hypertrophy.  6. Normal left ventricular systolic function. No segmental  wall motion abnormalities.  7. Mild diastolic dysfunction (Stage I).  8. Normal right ventricular size and function.  9. Estimated right ventricular systolic pressure equals 35  mm Hg, assuming right atrial pressure equals 8 mm Hg,  consistent with borderline pulmonary hypertension.  10. Normal tricuspid valve. Mild tricuspid regurgitation.  *** No previous Echo exam.  ------------------------------------------------------------------------  Confirmed on  9/24/2020 - 21:54:38 by Catrina Enamorado M.D.  ------------------------------------------------------------------------    < end of copied text >

## 2020-09-27 LAB
ALBUMIN SERPL ELPH-MCNC: 3.1 G/DL — LOW (ref 3.3–5)
ALP SERPL-CCNC: 65 U/L — SIGNIFICANT CHANGE UP (ref 40–120)
ALT FLD-CCNC: 24 U/L — SIGNIFICANT CHANGE UP (ref 10–45)
ANION GAP SERPL CALC-SCNC: 10 MMOL/L — SIGNIFICANT CHANGE UP (ref 5–17)
APTT BLD: 32.1 SEC — SIGNIFICANT CHANGE UP (ref 27.5–35.5)
AST SERPL-CCNC: 27 U/L — SIGNIFICANT CHANGE UP (ref 10–40)
BILIRUB SERPL-MCNC: 0.4 MG/DL — SIGNIFICANT CHANGE UP (ref 0.2–1.2)
BUN SERPL-MCNC: 10 MG/DL — SIGNIFICANT CHANGE UP (ref 7–23)
CALCIUM SERPL-MCNC: 8.9 MG/DL — SIGNIFICANT CHANGE UP (ref 8.4–10.5)
CHLORIDE SERPL-SCNC: 100 MMOL/L — SIGNIFICANT CHANGE UP (ref 96–108)
CO2 SERPL-SCNC: 24 MMOL/L — SIGNIFICANT CHANGE UP (ref 22–31)
CREAT SERPL-MCNC: 0.84 MG/DL — SIGNIFICANT CHANGE UP (ref 0.5–1.3)
DPYD GENOTYPE: NORMAL
DPYD PHENOTYPE: NORMAL
DPYD SPECIMEN: NORMAL
GLUCOSE BLDC GLUCOMTR-MCNC: 111 MG/DL — HIGH (ref 70–99)
GLUCOSE BLDC GLUCOMTR-MCNC: 117 MG/DL — HIGH (ref 70–99)
GLUCOSE BLDC GLUCOMTR-MCNC: 124 MG/DL — HIGH (ref 70–99)
GLUCOSE BLDC GLUCOMTR-MCNC: 137 MG/DL — HIGH (ref 70–99)
GLUCOSE SERPL-MCNC: 123 MG/DL — HIGH (ref 70–99)
HCT VFR BLD CALC: 31.4 % — LOW (ref 39–50)
HGB BLD-MCNC: 10.6 G/DL — LOW (ref 13–17)
INR BLD: 1.59 RATIO — HIGH (ref 0.88–1.16)
MAGNESIUM SERPL-MCNC: 1.8 MG/DL — SIGNIFICANT CHANGE UP (ref 1.6–2.6)
MCHC RBC-ENTMCNC: 32 PG — SIGNIFICANT CHANGE UP (ref 27–34)
MCHC RBC-ENTMCNC: 33.8 GM/DL — SIGNIFICANT CHANGE UP (ref 32–36)
MCV RBC AUTO: 94.9 FL — SIGNIFICANT CHANGE UP (ref 80–100)
NRBC # BLD: 0 /100 WBCS — SIGNIFICANT CHANGE UP (ref 0–0)
PHOSPHATE SERPL-MCNC: 3.4 MG/DL — SIGNIFICANT CHANGE UP (ref 2.5–4.5)
PLATELET # BLD AUTO: 238 K/UL — SIGNIFICANT CHANGE UP (ref 150–400)
POTASSIUM SERPL-MCNC: 4 MMOL/L — SIGNIFICANT CHANGE UP (ref 3.5–5.3)
POTASSIUM SERPL-SCNC: 4 MMOL/L — SIGNIFICANT CHANGE UP (ref 3.5–5.3)
PROT SERPL-MCNC: 5.8 G/DL — LOW (ref 6–8.3)
PROTHROM AB SERPL-ACNC: 18.5 SEC — HIGH (ref 10.6–13.6)
RBC # BLD: 3.31 M/UL — LOW (ref 4.2–5.8)
RBC # FLD: 13.2 % — SIGNIFICANT CHANGE UP (ref 10.3–14.5)
SODIUM SERPL-SCNC: 134 MMOL/L — LOW (ref 135–145)
WBC # BLD: 6.54 K/UL — SIGNIFICANT CHANGE UP (ref 3.8–10.5)
WBC # FLD AUTO: 6.54 K/UL — SIGNIFICANT CHANGE UP (ref 3.8–10.5)

## 2020-09-27 PROCEDURE — 99232 SBSQ HOSP IP/OBS MODERATE 35: CPT | Mod: GC

## 2020-09-27 RX ORDER — PETROLATUM,WHITE
1 JELLY (GRAM) TOPICAL
Refills: 0 | Status: DISCONTINUED | OUTPATIENT
Start: 2020-09-27 | End: 2020-09-30

## 2020-09-27 RX ORDER — WARFARIN SODIUM 2.5 MG/1
5 TABLET ORAL ONCE
Refills: 0 | Status: DISCONTINUED | OUTPATIENT
Start: 2020-09-27 | End: 2020-09-27

## 2020-09-27 RX ORDER — WARFARIN SODIUM 2.5 MG/1
7.5 TABLET ORAL ONCE
Refills: 0 | Status: COMPLETED | OUTPATIENT
Start: 2020-09-27 | End: 2020-09-27

## 2020-09-27 RX ADMIN — Medication 2 GRAM(S): at 17:20

## 2020-09-27 RX ADMIN — TAMSULOSIN HYDROCHLORIDE 0.4 MILLIGRAM(S): 0.4 CAPSULE ORAL at 22:31

## 2020-09-27 RX ADMIN — Medication 100 MILLIGRAM(S): at 22:39

## 2020-09-27 RX ADMIN — Medication 1 APPLICATION(S): at 17:21

## 2020-09-27 RX ADMIN — Medication 100 MILLIGRAM(S): at 16:13

## 2020-09-27 RX ADMIN — Medication 2 GRAM(S): at 05:35

## 2020-09-27 RX ADMIN — ATORVASTATIN CALCIUM 40 MILLIGRAM(S): 80 TABLET, FILM COATED ORAL at 22:31

## 2020-09-27 RX ADMIN — WARFARIN SODIUM 7.5 MILLIGRAM(S): 2.5 TABLET ORAL at 22:32

## 2020-09-27 RX ADMIN — Medication 25 MILLIGRAM(S): at 05:35

## 2020-09-27 RX ADMIN — Medication 25 MILLIGRAM(S): at 17:20

## 2020-09-27 RX ADMIN — CEFTRIAXONE 100 MILLIGRAM(S): 500 INJECTION, POWDER, FOR SOLUTION INTRAMUSCULAR; INTRAVENOUS at 14:41

## 2020-09-27 RX ADMIN — Medication 81 MILLIGRAM(S): at 10:56

## 2020-09-27 NOTE — CONSULT NOTE ADULT - SUBJECTIVE AND OBJECTIVE BOX
Patient is a 74y old  Male who presents with a chief complaint of Fever (27 Sep 2020 09:22)      HPI:  Pt is 74M hx DM, CAD, AFib, prosthetic mitral valve (2015), HTN, macular degeneration, hemorrhoids, localized rectal cancer (dx 9/2020), presenting with chills for 10 days. Symptoms worsened over the last several days, which prompted him to present to hospital. Symptoms began 2 days after his cancer staging CT scans. He had one episode of vomiting 10 days ago. Pt has had several days of dry cough but denies any fever until 5 days ago. Denies any nausea or diarrhea. Denies any CP, SOB, abdominal pain. Denies any dysuria, hematuria, or blood in the stool, although he states he usually has difficulty passing bowel movements. 5 days ago, he developed fever that was responsive Tylenol. 2 days ago, pt had a colonoscopy that he tolerated well. The day following his colonoscopy, he developed shaking and severe chills. Pt is planned for surgical resection of his rectal cancer in the upcoming weeks.     ED course: Temperature 103.4, SBP 98/58, RR 20, HR 95. Pt received vanco, cefepime, flagyl, 2L LR.  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 1000 milliLiter(s) (50 mL/Hr) IV Continuous <Continuous>  dextrose 50% Injectable 12.5 Gram(s) IV Push once  dextrose 50% Injectable 25 Gram(s) IV Push once  dextrose 50% Injectable 25 Gram(s) IV Push once  influenza   Vaccine 0.5 milliLiter(s) IntraMuscular once  insulin lispro (HumaLOG) corrective regimen sliding scale   SubCutaneous three times a day before meals  insulin lispro (HumaLOG) corrective regimen sliding scale   SubCutaneous at bedtime  metoprolol tartrate 25 milliGRAM(s) Oral two times a day  omega-3-Acid Ethyl Esters 2 Gram(s) Oral two times a day  petrolatum white Ointment 1 Application(s) Topical two times a day  tamsulosin 0.4 milliGRAM(s) Oral at bedtime  warfarin 7.5 milliGRAM(s) Oral once    MEDICATIONS  (PRN):  dextrose 40% Gel 15 Gram(s) Oral once PRN Blood Glucose LESS THAN 70 milliGRAM(s)/deciliter  glucagon  Injectable 1 milliGRAM(s) IntraMuscular once PRN Glucose LESS THAN 70 milligrams/deciliter  guaiFENesin   Syrup  (Sugar-Free) 100 milliGRAM(s) Oral every 6 hours PRN Cough      Allergies    No Known Allergies    Intolerances      Vital Signs Last 24 Hrs  T(C): 36.6 (27 Sep 2020 17:19), Max: 36.7 (26 Sep 2020 20:36)  T(F): 97.8 (27 Sep 2020 17:19), Max: 98.1 (26 Sep 2020 20:36)  HR: 71 (27 Sep 2020 17:19) (68 - 86)  BP: 128/84 (27 Sep 2020 17:19) (128/84 - 154/84)  BP(mean): --  RR: 16 (27 Sep 2020 17:19) (16 - 18)  SpO2: 99% (27 Sep 2020 17:19) (96% - 100%)    EOE:  TMJ ( -  ) clicks                    ( -   ) pops                    (  -  ) crepitus             Mandible FROM             Facial bones and MOM grossly intact             ( -  ) trismus             ( -  ) LAD             ( -  ) swelling             ( -  ) asymmetry             ( -  ) palpation             ( -  ) SOB             ( -  ) dysphagia             ( -  ) LOC    IOE:  permanent dentition: grossly intact with multiple teeth presenting with caries, and few missing teeth            hard/soft palate:  ( -  ) palatal torus           tongue/FOM WNL           labial/buccal mucosa WNL           ( -  ) percussion           ( -  ) palpation           ( -  ) swelling     Dentition present: multiple teeth present  Mobility: Class 1 mobility on #1  Caries: no gross decay visible    Radiographs: 1 pan taken and interpreted. PAP visible on #30 PA and existing root remnant visible for #20    LABS:                        10.6   6.54  )-----------( 238      ( 27 Sep 2020 07:19 )             31.4     09-27    134<L>  |  100  |  10  ----------------------------<  123<H>  4.0   |  24  |  0.84    Ca    8.9      27 Sep 2020 07:19  Phos  3.4     09-27  Mg     1.8     09-27    TPro  5.8<L>  /  Alb  3.1<L>  /  TBili  0.4  /  DBili  x   /  AST  27  /  ALT  24  /  AlkPhos  65  09-27    WBC Count: 6.54 K/uL [3.80 - 10.50] (09-27 @ 07:19)    Platelet Count - Automated: 238 K/uL [150 - 400] (09-27 @ 07:19)  Platelet Count - Automated: 230 K/uL [150 - 400] (09-26 @ 06:56)    INR: 1.59 ratio <H> [0.88 - 1.16] (09-27 @ 07:19)  INR: 1.50 ratio <H> [0.88 - 1.16] (09-26 @ 06:57)    Culture Results:   No growth to date. (09-25 @ 09:50)  Culture Results:   No growth to date. (09-25 @ 09:50)      PROCEDURE:  Verbal and written consent given. 1 pan taken and interpreted. Limited Oral Evaluation completed. No acute signs of infection and swelling visible. Patient reports that he premedicates prior to his 4 month recall appointments since 2009. Patient denies any pain or discomfort intra-orally, and denies any pain upon palpation of #30 and #20. Patient reports that he is aware of #30 PAP infection and root remnant. Patient informed of dental recommendations to seek outpatient dental treatment in regards to #30 and #20, and rest of comprehensive treatment. Med Team informed that dental source of infection is highly unlikely. Patient dismissed.     RECOMMENDATIONS:   1) Dental F/U with outpatient dentist for comprehensive dental care.   2) If any difficulty swallowing/breathing, fever occur, page dental.     Orin Franz DMD, #[id number]  Oral surgeon consulted: Dr. Palacio

## 2020-09-27 NOTE — PROGRESS NOTE ADULT - SUBJECTIVE AND OBJECTIVE BOX
PROGRESS NOTE:     CONTACT INFO:  Kenyatta Arias MD   PGY-1  Pager: [phone] [facility]    Patient is a 74y old  Male who presents with a chief complaint of Fever (26 Sep 2020 10:09)      SUBJECTIVE / OVERNIGHT EVENTS:  No acute events overnight. Patient seen and evaluated at bedside. No fever/chills. Complains of continued dry cough when speaking.  Denies SOB at rest, chest pain, palpitations, abdominal pain, nausea/vomiting    ADDITIONAL REVIEW OF SYSTEMS:    Negative except as above.     MEDICATIONS  (STANDING):  aspirin enteric coated 81 milliGRAM(s) Oral daily  atorvastatin 40 milliGRAM(s) Oral at bedtime  cefTRIAXone   IVPB 2000 milliGRAM(s) IV Intermittent every 24 hours  dextrose 5%. 1000 milliLiter(s) (50 mL/Hr) IV Continuous <Continuous>  dextrose 50% Injectable 12.5 Gram(s) IV Push once  dextrose 50% Injectable 25 Gram(s) IV Push once  dextrose 50% Injectable 25 Gram(s) IV Push once  influenza   Vaccine 0.5 milliLiter(s) IntraMuscular once  insulin lispro (HumaLOG) corrective regimen sliding scale   SubCutaneous three times a day before meals  insulin lispro (HumaLOG) corrective regimen sliding scale   SubCutaneous at bedtime  metoprolol tartrate 25 milliGRAM(s) Oral two times a day  omega-3-Acid Ethyl Esters 2 Gram(s) Oral two times a day  tamsulosin 0.4 milliGRAM(s) Oral at bedtime  warfarin 5 milliGRAM(s) Oral once    MEDICATIONS  (PRN):  dextrose 40% Gel 15 Gram(s) Oral once PRN Blood Glucose LESS THAN 70 milliGRAM(s)/deciliter  glucagon  Injectable 1 milliGRAM(s) IntraMuscular once PRN Glucose LESS THAN 70 milligrams/deciliter  guaiFENesin   Syrup  (Sugar-Free) 100 milliGRAM(s) Oral every 6 hours PRN Cough      CAPILLARY BLOOD GLUCOSE      POCT Blood Glucose.: 133 mg/dL (26 Sep 2020 22:04)  POCT Blood Glucose.: 106 mg/dL (26 Sep 2020 17:56)  POCT Blood Glucose.: 128 mg/dL (26 Sep 2020 12:21)    I&O's Summary    26 Sep 2020 07:01  -  27 Sep 2020 07:00  --------------------------------------------------------  IN: 0 mL / OUT: 2150 mL / NET: -2150 mL        PHYSICAL EXAM:  Vital Signs Last 24 Hrs  T(C): 36.5 (27 Sep 2020 05:28), Max: 36.8 (26 Sep 2020 15:00)  T(F): 97.7 (27 Sep 2020 05:28), Max: 98.2 (26 Sep 2020 15:00)  HR: 86 (27 Sep 2020 05:28) (68 - 86)  BP: 154/84 (27 Sep 2020 05:28) (119/67 - 154/84)  BP(mean): --  RR: 18 (27 Sep 2020 05:28) (16 - 18)  SpO2: 96% (27 Sep 2020 05:28) (96% - 100%)    GENERAL: no acute distress; well-developed  HEAD:  Atraumatic, Normocephalic  EYES: EOMI, PERRLA, conjunctiva and sclera clear, + exophthalmos OD  ENT: MMM; oropharynx clear  NECK: Supple, No JVD  CHEST/LUNG: Clear to auscultation bilaterally; No wheeze  HEART: Irregular rate, normal S1S2, no murmurs, rubs, gallops  ABDOMEN: Soft, Nontender, Nondistended; Bowel sounds present  EXTREMITIES:  2+ Peripheral Pulses, No clubbing, cyanosis, or edema  PSYCH: AAOx3  NEUROLOGY: no focal motor or sensory deficits. 5/5 muscle strength in all extremities.   SKIN: chronic discoloration noted on back; no rashes.    LABS:                        10.6   6.54  )-----------( 238      ( 27 Sep 2020 07:19 )             31.4     09-27    134<L>  |  100  |  10  ----------------------------<  123<H>  4.0   |  24  |  0.84    Ca    8.9      27 Sep 2020 07:19  Phos  3.4     09-27  Mg     1.8     09-27    TPro  5.8<L>  /  Alb  3.1<L>  /  TBili  0.4  /  DBili  x   /  AST  27  /  ALT  24  /  AlkPhos  65  09-27    PT/INR - ( 27 Sep 2020 07:19 )   PT: 18.5 sec;   INR: 1.59 ratio         PTT - ( 27 Sep 2020 07:19 )  PTT:32.1 sec          Culture - Blood (collected 25 Sep 2020 09:50)  Source: .Blood Blood-Peripheral  Preliminary Report (26 Sep 2020 10:01):    No growth to date.    Culture - Blood (collected 25 Sep 2020 09:50)  Source: .Blood Blood-Venous  Preliminary Report (26 Sep 2020 10:01):    No growth to date.        RADIOLOGY & ADDITIONAL TESTS:    EXAM:  CT ABDOMEN AND PELVIS IC                          PROCEDURE DATE:  09/26/2020      INTERPRETATION:  CLINICAL INFORMATION: Sepsis of unclear etiology. Distal rectal adenocarcinoma.    COMPARISON: CT abdomen pelvis 9/11/2020.    IMPRESSION:  No evidence of infectious source in the abdomen or pelvis.

## 2020-09-27 NOTE — PROGRESS NOTE ADULT - SUBJECTIVE AND OBJECTIVE BOX
SUBJECTIVE: The patient denies chest pain, shortness of breath, arm pain or jaw pain, dizziness or palpitations.    MEDICATIONS  (STANDING):  aspirin enteric coated 81 milliGRAM(s) Oral daily  atorvastatin 40 milliGRAM(s) Oral at bedtime  cefTRIAXone   IVPB 2000 milliGRAM(s) IV Intermittent every 24 hours  dextrose 5%. 1000 milliLiter(s) (50 mL/Hr) IV Continuous <Continuous>  dextrose 50% Injectable 12.5 Gram(s) IV Push once  dextrose 50% Injectable 25 Gram(s) IV Push once  dextrose 50% Injectable 25 Gram(s) IV Push once  influenza   Vaccine 0.5 milliLiter(s) IntraMuscular once  insulin lispro (HumaLOG) corrective regimen sliding scale   SubCutaneous three times a day before meals  insulin lispro (HumaLOG) corrective regimen sliding scale   SubCutaneous at bedtime  metoprolol tartrate 25 milliGRAM(s) Oral two times a day  omega-3-Acid Ethyl Esters 2 Gram(s) Oral two times a day  tamsulosin 0.4 milliGRAM(s) Oral at bedtime  warfarin 5 milliGRAM(s) Oral once    MEDICATIONS  (PRN):  dextrose 40% Gel 15 Gram(s) Oral once PRN Blood Glucose LESS THAN 70 milliGRAM(s)/deciliter  glucagon  Injectable 1 milliGRAM(s) IntraMuscular once PRN Glucose LESS THAN 70 milligrams/deciliter  guaiFENesin   Syrup  (Sugar-Free) 100 milliGRAM(s) Oral every 6 hours PRN Cough        REVIEW OF SYSTEMS:    CONSTITUTIONAL: No fever, weight loss, or fatigue  EYES: No eye pain, visual disturbances, or discharge  NECK: No pain or stiffness  RESPIRATORY: No cough, wheezing, chills or hemoptysis; No Shortness of Breath  CARDIOVASCULAR: No chest pain, palpitations, dizziness, or leg swelling  GASTROINTESTINAL: No abdominal or epigastric pain. No nausea, vomiting, or hematemesis; No diarrhea or constipation. No melena or hematochezia.  GENITOURINARY: No dysuria, frequency, hematuria, or incontinence  NEUROLOGICAL: No headaches, memory loss, loss of strength, numbness, or tremors  SKIN: No itching, burning, rashes, or lesions   LYMPH Nodes: No enlarged glands  MUSCULOSKELETAL: No joint pain or swelling; No muscle, back, or extremity pain  All other review of systems are negative.  	  [ ] Unable to obtain    PHYSICAL EXAM:  T(F): 97.7 (09-27-20 @ 05:28), Max: 98.2 (09-26-20 @ 15:00)  HR: 86 (09-27-20 @ 05:28) (68 - 86)  BP: 154/84 (09-27-20 @ 05:28) (119/67 - 154/84)  RR: 18 (09-27-20 @ 05:28) (16 - 18)  SpO2: 96% (09-27-20 @ 05:28) (96% - 100%)  Wt(kg): --  ,   I&O's Summary    26 Sep 2020 07:01  -  27 Sep 2020 07:00  --------------------------------------------------------  IN: 0 mL / OUT: 2150 mL / NET: -2150 mL          PHYSICAL EXAM    Appearance: Normal	  HEENT:   Normal oral mucosa, PERRL, EOMI	  NECK: Soft and supple, No LAD, No JVD  Cardiovascular: Regular Rate and Rhythm, Normal S1 S2, No murmurs, No clicks, gallops or rubs  Respiratory: Lungs clear to auscultation	  Gastrointestinal:  Soft, Non-tender, + BS	  Skin: No rashes, No ecchymoses, No cyanosis  Neurologic: Non-focal  Extremities: No clubbing, cyanosis or edema  Vascular: Peripheral pulses palpable 2+ bilaterally    	    LABS:	 	  Blood Cultures:  Culture Results:   Growth in aerobic and anaerobic bottles: Streptococcus mitis/oralis group                             10.6   6.54  )-----------( 238      ( 27 Sep 2020 07:19 )             31.4               09-27    134<L>  |  100  |  10  ----------------------------<  123<H>  4.0   |  24  |  0.84    Ca    8.9      27 Sep 2020 07:19  Phos  3.4     09-27  Mg     1.8     09-27    TPro  5.8<L>  /  Alb  3.1<L>  /  TBili  0.4  /  DBili  x   /  AST  27  /  ALT  24  /  AlkPhos  65  09-27    PT/INR - ( 27 Sep 2020 07:19 )   PT: 18.5 sec;   INR: 1.59 ratio         PTT - ( 27 Sep 2020 07:19 )  PTT:32.1 sec                         < from: Transthoracic Echocardiogram (09.24.20 @ 17:58) >  Conclusions:  1. Bioprosthetic mitral valve replacement. Peak mitral  valve gradient equals 18 mm Hg, mean transmitral valve  gradient equals 8 mm Hg, which is elevated even in the  setting of a bioprosthetic mitral valve replacement.  2. Calcified aortic valve. Peak transaortic valve gradient  equals 5 mm Hg. Minimal aortic regurgitation.  3. Aortic Root: 4.1 cm.  4. Moderately dilated left atrium.  LA volume index = 44  cc/m2.  5. Mild concentric left ventricular hypertrophy.  6. Normal left ventricular systolic function. No segmental  wall motion abnormalities.  7. Mild diastolic dysfunction (Stage I).  8. Normal right ventricular size and function.  9. Estimated right ventricular systolic pressure equals 35  mm Hg, assuming right atrial pressure equals 8 mm Hg,  consistent with borderline pulmonary hypertension.  10. Normal tricuspid valve. Mild tricuspid regurgitation.  *** No previous Echo exam.    < end of copied text >

## 2020-09-27 NOTE — PROGRESS NOTE ADULT - PROBLEM SELECTOR PLAN 1
Likely 2/2 pt's recently diagnosed rectal cancer. Recent biopsy/colonoscopy may have also seeded bacteria into the blood.   - Repeat BCx NGTD 9/25  - F/u repeat BCx 9/27  - Found to have S. mitis  - Dental consult to evaluate for dental source of bacteremia, as S. mitis is known oral narda  - TTE negative for vegetation  - REEMA tomorrow to rule out endocarditis  - UCx negative  - ID consulted as pt will need clearance for surgery; f/u recs  - C/w ceftriaxone 2g q24h  - Per ID, pt will need 4-6 weeks of abx, preferrably 6 weeks.   - Will need PICC line placement  - S/P one dose of gentamicin 140mg IV  - f/u REEMA results to decide if he needs longer duration of gentamicin.   - Recent CT abdomen, chest, pelvis negative for any pathology   - repeat CT A/P this admission also negative   - Daily CBC to trend WBC Pt with bactermemia due to S. mitis. Pt with bovine prosthetic mitral valve.   - Repeat BCx NGTD 9/25  - F/u repeat BCx 9/27  - Found to have S. mitis  - Dental consult to evaluate for dental source of bacteremia, as S. mitis is known oral narda  - TTE negative for vegetation  - REEMA tomorrow to rule out endocarditis  - UCx negative  - ID consulted as pt will need clearance for surgery; f/u recs  - C/w ceftriaxone 2g q24h  - Per ID, pt will need 4-6 weeks of abx, preferrably 6 weeks.   - Will need PICC line placement  - S/P one dose of gentamicin 140mg IV  - f/u REEMA results to decide if he needs longer duration of gentamicin.   - Recent CT abdomen, chest, pelvis negative for any pathology   - repeat CT A/P this admission also negative   - Daily CBC to trend WBC

## 2020-09-27 NOTE — PROGRESS NOTE ADULT - PROBLEM SELECTOR PLAN 2
Pt takes warfarin 5mg Tuesday, Wednesday, Friday, Sat, Sun; 2.5mg Mon and Thurs  - Continue Warfarin dosing, noted to have INR 1.59 goal INR 2-3  - Continue Metoprolol Tartrate 25mg BID for rate control

## 2020-09-27 NOTE — PROGRESS NOTE ADULT - ASSESSMENT
74 year old male S/P tissue MVR and chronic AF had a biopsy of a rectal mass on 9/4 with out antibiotic prophylaxis and then had a formal colonoscopy on 9/12 also with out antibiotic prophylaxis which confirmed rectal carcinoma.  He then developed fever and shaking chills and has been admitted with 2/2 bottle Strep species bacteremia.  Species is streptococcus mitis  For REEMA to evaluate prosthetic mitral valve tomorrow  ABx as per ID.  Keep INR 2-3  The patient is stable from a cardiovascular perspective.

## 2020-09-28 DIAGNOSIS — R05 COUGH: ICD-10-CM

## 2020-09-28 LAB
ALBUMIN SERPL ELPH-MCNC: 3.1 G/DL — LOW (ref 3.3–5)
ALP SERPL-CCNC: 68 U/L — SIGNIFICANT CHANGE UP (ref 40–120)
ALT FLD-CCNC: 27 U/L — SIGNIFICANT CHANGE UP (ref 10–45)
ANION GAP SERPL CALC-SCNC: 11 MMOL/L — SIGNIFICANT CHANGE UP (ref 5–17)
APTT BLD: 36.2 SEC — HIGH (ref 27.5–35.5)
AST SERPL-CCNC: 25 U/L — SIGNIFICANT CHANGE UP (ref 10–40)
BILIRUB SERPL-MCNC: 0.3 MG/DL — SIGNIFICANT CHANGE UP (ref 0.2–1.2)
BUN SERPL-MCNC: 12 MG/DL — SIGNIFICANT CHANGE UP (ref 7–23)
CALCIUM SERPL-MCNC: 9.1 MG/DL — SIGNIFICANT CHANGE UP (ref 8.4–10.5)
CHLORIDE SERPL-SCNC: 101 MMOL/L — SIGNIFICANT CHANGE UP (ref 96–108)
CO2 SERPL-SCNC: 25 MMOL/L — SIGNIFICANT CHANGE UP (ref 22–31)
CREAT SERPL-MCNC: 0.83 MG/DL — SIGNIFICANT CHANGE UP (ref 0.5–1.3)
GLUCOSE BLDC GLUCOMTR-MCNC: 110 MG/DL — HIGH (ref 70–99)
GLUCOSE BLDC GLUCOMTR-MCNC: 122 MG/DL — HIGH (ref 70–99)
GLUCOSE BLDC GLUCOMTR-MCNC: 130 MG/DL — HIGH (ref 70–99)
GLUCOSE BLDC GLUCOMTR-MCNC: 151 MG/DL — HIGH (ref 70–99)
GLUCOSE SERPL-MCNC: 126 MG/DL — HIGH (ref 70–99)
HCT VFR BLD CALC: 32.1 % — LOW (ref 39–50)
HGB BLD-MCNC: 10.9 G/DL — LOW (ref 13–17)
INR BLD: 2.17 RATIO — HIGH (ref 0.88–1.16)
MAGNESIUM SERPL-MCNC: 1.9 MG/DL — SIGNIFICANT CHANGE UP (ref 1.6–2.6)
MCHC RBC-ENTMCNC: 32.2 PG — SIGNIFICANT CHANGE UP (ref 27–34)
MCHC RBC-ENTMCNC: 34 GM/DL — SIGNIFICANT CHANGE UP (ref 32–36)
MCV RBC AUTO: 94.7 FL — SIGNIFICANT CHANGE UP (ref 80–100)
NRBC # BLD: 0 /100 WBCS — SIGNIFICANT CHANGE UP (ref 0–0)
PHOSPHATE SERPL-MCNC: 3.8 MG/DL — SIGNIFICANT CHANGE UP (ref 2.5–4.5)
PLATELET # BLD AUTO: 245 K/UL — SIGNIFICANT CHANGE UP (ref 150–400)
POTASSIUM SERPL-MCNC: 4.1 MMOL/L — SIGNIFICANT CHANGE UP (ref 3.5–5.3)
POTASSIUM SERPL-SCNC: 4.1 MMOL/L — SIGNIFICANT CHANGE UP (ref 3.5–5.3)
PROT SERPL-MCNC: 5.9 G/DL — LOW (ref 6–8.3)
PROTHROM AB SERPL-ACNC: 24.9 SEC — HIGH (ref 10.6–13.6)
RBC # BLD: 3.39 M/UL — LOW (ref 4.2–5.8)
RBC # FLD: 12.9 % — SIGNIFICANT CHANGE UP (ref 10.3–14.5)
SODIUM SERPL-SCNC: 137 MMOL/L — SIGNIFICANT CHANGE UP (ref 135–145)
WBC # BLD: 6.13 K/UL — SIGNIFICANT CHANGE UP (ref 3.8–10.5)
WBC # FLD AUTO: 6.13 K/UL — SIGNIFICANT CHANGE UP (ref 3.8–10.5)

## 2020-09-28 PROCEDURE — 93312 ECHO TRANSESOPHAGEAL: CPT | Mod: 26

## 2020-09-28 PROCEDURE — 99233 SBSQ HOSP IP/OBS HIGH 50: CPT | Mod: GC

## 2020-09-28 PROCEDURE — 99232 SBSQ HOSP IP/OBS MODERATE 35: CPT

## 2020-09-28 PROCEDURE — 93320 DOPPLER ECHO COMPLETE: CPT | Mod: 26

## 2020-09-28 PROCEDURE — 93325 DOPPLER ECHO COLOR FLOW MAPG: CPT | Mod: 26

## 2020-09-28 RX ORDER — WARFARIN SODIUM 2.5 MG/1
2.5 TABLET ORAL ONCE
Refills: 0 | Status: DISCONTINUED | OUTPATIENT
Start: 2020-09-28 | End: 2020-09-28

## 2020-09-28 RX ORDER — WARFARIN SODIUM 2.5 MG/1
2.5 TABLET ORAL ONCE
Refills: 0 | Status: COMPLETED | OUTPATIENT
Start: 2020-09-28 | End: 2020-09-28

## 2020-09-28 RX ADMIN — Medication 1: at 17:17

## 2020-09-28 RX ADMIN — Medication 81 MILLIGRAM(S): at 13:55

## 2020-09-28 RX ADMIN — Medication 2 GRAM(S): at 17:18

## 2020-09-28 RX ADMIN — Medication 100 MILLIGRAM(S): at 06:49

## 2020-09-28 RX ADMIN — Medication 100 MILLIGRAM(S): at 21:17

## 2020-09-28 RX ADMIN — Medication 25 MILLIGRAM(S): at 06:50

## 2020-09-28 RX ADMIN — Medication 100 MILLIGRAM(S): at 13:55

## 2020-09-28 RX ADMIN — Medication 1 APPLICATION(S): at 17:18

## 2020-09-28 RX ADMIN — TAMSULOSIN HYDROCHLORIDE 0.4 MILLIGRAM(S): 0.4 CAPSULE ORAL at 21:17

## 2020-09-28 RX ADMIN — Medication 25 MILLIGRAM(S): at 17:18

## 2020-09-28 RX ADMIN — CEFTRIAXONE 100 MILLIGRAM(S): 500 INJECTION, POWDER, FOR SOLUTION INTRAMUSCULAR; INTRAVENOUS at 13:55

## 2020-09-28 RX ADMIN — WARFARIN SODIUM 2.5 MILLIGRAM(S): 2.5 TABLET ORAL at 21:17

## 2020-09-28 RX ADMIN — Medication 2 GRAM(S): at 06:50

## 2020-09-28 RX ADMIN — ATORVASTATIN CALCIUM 40 MILLIGRAM(S): 80 TABLET, FILM COATED ORAL at 21:16

## 2020-09-28 RX ADMIN — Medication 1 APPLICATION(S): at 06:50

## 2020-09-28 NOTE — PROGRESS NOTE ADULT - SUBJECTIVE AND OBJECTIVE BOX
SUBJECTIVE:  No CP  no SOB    MEDICATIONS:  metoprolol tartrate 25 milliGRAM(s) Oral two times a day  tamsulosin 0.4 milliGRAM(s) Oral at bedtime    cefTRIAXone   IVPB 2000 milliGRAM(s) IV Intermittent every 24 hours    benzonatate 100 milliGRAM(s) Oral three times a day  guaiFENesin   Syrup  (Sugar-Free) 100 milliGRAM(s) Oral every 6 hours PRN      atorvastatin 40 milliGRAM(s) Oral at bedtime  dextrose 40% Gel 15 Gram(s) Oral once PRN  dextrose 50% Injectable 25 Gram(s) IV Push once  dextrose 50% Injectable 12.5 Gram(s) IV Push once  dextrose 50% Injectable 25 Gram(s) IV Push once  glucagon  Injectable 1 milliGRAM(s) IntraMuscular once PRN  insulin lispro (HumaLOG) corrective regimen sliding scale   SubCutaneous three times a day before meals  insulin lispro (HumaLOG) corrective regimen sliding scale   SubCutaneous at bedtime    aspirin enteric coated 81 milliGRAM(s) Oral daily  dextrose 5%. 1000 milliLiter(s) IV Continuous <Continuous>  influenza   Vaccine 0.5 milliLiter(s) IntraMuscular once  petrolatum white Ointment 1 Application(s) Topical two times a day  warfarin 2.5 milliGRAM(s) Oral once      REVIEW OF SYSTEMS:    CONSTITUTIONAL: No fever, weight loss, or fatigue  EYES: No eye pain, visual disturbances, or discharge  NECK: No pain or stiffness  RESPIRATORY: No cough, wheezing, chills or hemoptysis; No Shortness of Breath  CARDIOVASCULAR: No chest pain, palpitations, dizziness, or leg swelling  GASTROINTESTINAL: No abdominal or epigastric pain. No nausea, vomiting, or hematemesis; No diarrhea or constipation. No melena or hematochezia.  GENITOURINARY: No dysuria, frequency, hematuria, or incontinence  NEUROLOGICAL: No headaches, memory loss, loss of strength, numbness, or tremors  SKIN: No itching, burning, rashes, or lesions   LYMPH Nodes: No enlarged glands  MUSCULOSKELETAL: No joint pain or swelling; No muscle, back, or extremity pain  All other review of systems are negative.  	  [ ] Unable to obtain    PHYSICAL EXAM:  T(C): 36.6 (09-28-20 @ 11:45), Max: 36.6 (09-27-20 @ 20:48)  HR: 67 (09-28-20 @ 13:30) (64 - 82)  BP: 149/75 (09-28-20 @ 13:30) (100/63 - 154/80)  RR: 18 (09-28-20 @ 13:30) (16 - 18)  SpO2: 100% (09-28-20 @ 13:30) (97% - 100%)  Wt(kg): --  I&O's Summary    27 Sep 2020 07:01  -  28 Sep 2020 07:00  --------------------------------------------------------  IN: 240 mL / OUT: 1600 mL / NET: -1360 mL    28 Sep 2020 07:01  -  28 Sep 2020 18:10  --------------------------------------------------------  IN: 50 mL / OUT: 0 mL / NET: 50 mL      Height (cm): 165.1 (09-28 @ 10:42)  Weight (kg): 72.6 (09-28 @ 10:42)  BMI (kg/m2): 26.6 (09-28 @ 10:42)  BSA (m2): 1.8 (09-28 @ 10:42)    PHYSICAL EXAM    Appearance: Normal	  HEENT:   Normal oral mucosa, PERRL, EOMI	  NECK: Soft and supple, No LAD, No JVD  Cardiovascular: Regular Rate and Rhythm, Normal S1 S2,II/VI SM  Respiratory: Lungs clear to auscultation	  Gastrointestinal:  Soft, Non-tender, + BS	  Skin: No rashes, No ecchymoses, No cyanosis  Neurologic: Non-focal  Extremities: No clubbing, cyanosis or edema  Vascular: Peripheral pulses palpable 2+ bilaterally    LABS:	 	                            10.9   6.13  )-----------( 245      ( 28 Sep 2020 07:16 )             32.1     09-28    137  |  101  |  12  ----------------------------<  126<H>  4.1   |  25  |  0.83    Ca    9.1      28 Sep 2020 07:16  Phos  3.8     09-28  Mg     1.9     09-28    TPro  5.9<L>  /  Alb  3.1<L>  /  TBili  0.3  /  DBili  x   /  AST  25  /  ALT  27  /  AlkPhos  68  09-28            < from: Transthoracic Echocardiogram (09.24.20 @ 17:58) >  Conclusions:  1. Bioprosthetic mitral valve replacement. Peak mitral  valve gradient equals 18 mm Hg, mean transmitral valve  gradient equals 8 mm Hg, which is elevated even in the  setting of a bioprosthetic mitral valve replacement.  2. Calcified aortic valve. Peak transaortic valve gradient  equals 5 mm Hg. Minimal aortic regurgitation.  3. Aortic Root: 4.1 cm.  4. Moderately dilated left atrium.  LA volume index = 44  cc/m2.  5. Mild concentric left ventricular hypertrophy.  6. Normal left ventricular systolic function. No segmental  wall motion abnormalities.  7. Mild diastolic dysfunction (Stage I).  8. Normal right ventricular size and function.  9. Estimated right ventricular systolic pressure equals 35  mm Hg, assuming right atrial pressure equals 8 mm Hg,  consistent with borderline pulmonary hypertension.  10. Normal tricuspid valve. Mild tricuspid regurgitation.  *** No previous Echo exam.

## 2020-09-28 NOTE — PROGRESS NOTE ADULT - SUBJECTIVE AND OBJECTIVE BOX
PROGRESS NOTE:     CONTACT INFO:  Kenyatta Arias MD   PGY-1  Pager: [phone] [facility]    Patient is a 74y old  Male who presents with a chief complaint of Fever (28 Sep 2020 10:21)      SUBJECTIVE / OVERNIGHT EVENTS:  No acute events overnight. Patient seen and evaluated at bedside. No fever/chills.  Denies SOB at rest, chest pain, palpitations, abdominal pain, nausea/vomiting. States that his cough has improved with Tessalon Perle.     ADDITIONAL REVIEW OF SYSTEMS:    Negative except as above.     MEDICATIONS  (STANDING):  aspirin enteric coated 81 milliGRAM(s) Oral daily  atorvastatin 40 milliGRAM(s) Oral at bedtime  benzonatate 100 milliGRAM(s) Oral three times a day  cefTRIAXone   IVPB 2000 milliGRAM(s) IV Intermittent every 24 hours  dextrose 5%. 1000 milliLiter(s) (50 mL/Hr) IV Continuous <Continuous>  dextrose 50% Injectable 12.5 Gram(s) IV Push once  dextrose 50% Injectable 25 Gram(s) IV Push once  dextrose 50% Injectable 25 Gram(s) IV Push once  influenza   Vaccine 0.5 milliLiter(s) IntraMuscular once  insulin lispro (HumaLOG) corrective regimen sliding scale   SubCutaneous three times a day before meals  insulin lispro (HumaLOG) corrective regimen sliding scale   SubCutaneous at bedtime  metoprolol tartrate 25 milliGRAM(s) Oral two times a day  omega-3-Acid Ethyl Esters 2 Gram(s) Oral two times a day  petrolatum white Ointment 1 Application(s) Topical two times a day  tamsulosin 0.4 milliGRAM(s) Oral at bedtime  warfarin 2.5 milliGRAM(s) Oral once    MEDICATIONS  (PRN):  dextrose 40% Gel 15 Gram(s) Oral once PRN Blood Glucose LESS THAN 70 milliGRAM(s)/deciliter  glucagon  Injectable 1 milliGRAM(s) IntraMuscular once PRN Glucose LESS THAN 70 milligrams/deciliter  guaiFENesin   Syrup  (Sugar-Free) 100 milliGRAM(s) Oral every 6 hours PRN Cough      CAPILLARY BLOOD GLUCOSE      POCT Blood Glucose.: 130 mg/dL (28 Sep 2020 08:25)  POCT Blood Glucose.: 137 mg/dL (27 Sep 2020 22:03)  POCT Blood Glucose.: 111 mg/dL (27 Sep 2020 17:32)  POCT Blood Glucose.: 117 mg/dL (27 Sep 2020 13:05)    I&O's Summary    27 Sep 2020 07:01  -  28 Sep 2020 07:00  --------------------------------------------------------  IN: 240 mL / OUT: 1600 mL / NET: -1360 mL        PHYSICAL EXAM:  Vital Signs Last 24 Hrs  T(C): 36.6 (28 Sep 2020 11:45), Max: 36.6 (27 Sep 2020 17:19)  T(F): 97.8 (28 Sep 2020 11:45), Max: 97.9 (28 Sep 2020 04:41)  HR: 64 (28 Sep 2020 12:45) (64 - 84)  BP: 154/80 (28 Sep 2020 12:45) (100/63 - 154/80)  BP(mean): --  RR: 16 (28 Sep 2020 12:45) (16 - 18)  SpO2: 100% (28 Sep 2020 12:45) (97% - 100%)    CGENERAL: no acute distress; well-developed  HEAD:  Atraumatic, Normocephalic  EYES: EOMI, PERRLA, conjunctiva and sclera clear, + exophthalmos OD  ENT: MMM; oropharynx clear  NECK: Supple, No JVD  CHEST/LUNG: Clear to auscultation bilaterally; No wheeze  HEART: Irregular rate, normal S1S2, no murmurs, rubs, gallops  ABDOMEN: Soft, Nontender, Nondistended; Bowel sounds present  EXTREMITIES:  2+ Peripheral Pulses, No clubbing, cyanosis, or edema  PSYCH: AAOx3  NEUROLOGY: no focal motor or sensory deficits. 5/5 muscle strength in all extremities.   SKIN: chronic discoloration noted on back; no rashes.    LABS:                        10.9   6.13  )-----------( 245      ( 28 Sep 2020 07:16 )             32.1     09-28    137  |  101  |  12  ----------------------------<  126<H>  4.1   |  25  |  0.83    Ca    9.1      28 Sep 2020 07:16  Phos  3.8     09-28  Mg     1.9     09-28    TPro  5.9<L>  /  Alb  3.1<L>  /  TBili  0.3  /  DBili  x   /  AST  25  /  ALT  27  /  AlkPhos  68  09-28    PT/INR - ( 27 Sep 2020 07:19 )   PT: 18.5 sec;   INR: 1.59 ratio         PTT - ( 27 Sep 2020 07:19 )  PTT:32.1 sec        Culture - Blood (collected 27 Sep 2020 09:58)  Source: .Blood Blood-Venous  Preliminary Report (28 Sep 2020 10:01):    No growth to date.    Culture - Blood (collected 27 Sep 2020 09:58)  Source: .Blood Blood-Peripheral  Preliminary Report (28 Sep 2020 10:01):    No growth to date.

## 2020-09-28 NOTE — PROGRESS NOTE ADULT - SUBJECTIVE AND OBJECTIVE BOX
Discussed with pt that probably better to stay at dose of 40 mg daily for amphe/dextroamphet.  Refills submitted.   Patient is a 74y old  Male who presents with a chief complaint of Fever (27 Sep 2020 19:24)    Being followed by ID for S mitis bacteremia    Interval history:feels well  no complaints  for REEMA today   No other acute events      ROS:  No cough,SOB,CP  No N/V/D  No abd pain  No urinary complaints  No HA  No joint or limb pain  No other complaints      Antimicrobials:    cefTRIAXone   IVPB 2000 milliGRAM(s) IV Intermittent every 24 hours      other medications reviewed    Vital Signs Last 24 Hrs  T(C): 36.6 (09-28-20 @ 04:41), Max: 36.6 (09-27-20 @ 17:19)  T(F): 97.9 (09-28-20 @ 04:41), Max: 97.9 (09-28-20 @ 04:41)  HR: 77 (09-28-20 @ 06:50) (71 - 84)  BP: 124/78 (09-28-20 @ 06:50) (100/63 - 132/84)  BP(mean): --  RR: 18 (09-28-20 @ 04:41) (16 - 18)  SpO2: 98% (09-28-20 @ 04:41) (97% - 99%)    Physical Exam:    Constitutional well preserved,comfortable,pleasant    HEENT PERRLA EOMI,No pallor or icterus    No oral exudate or erythema    Neck supple no JVD or LN    Chest Good AE,CTA    CVS RRR S1 S2 WNl PSM no  rub or gallop    Abd soft BS normal No tenderness no masses    Ext No cyanosis clubbing or edema    IV site no erythema tenderness or discharge    Joints no swelling or LOM    CNS AAO X 3 no focal    Lab Data:                          10.9   6.13  )-----------( 245      ( 28 Sep 2020 07:16 )             32.1       09-28    137  |  101  |  12  ----------------------------<  126<H>  4.1   |  25  |  0.83    Ca    9.1      28 Sep 2020 07:16  Phos  3.8     09-28  Mg     1.9     09-28    TPro  5.9<L>  /  Alb  3.1<L>  /  TBili  0.3  /  DBili  x   /  AST  25  /  ALT  27  /  AlkPhos  68  09-28        Culture - Blood (collected 27 Sep 2020 09:58)  Source: .Blood Blood-Venous  Preliminary Report (28 Sep 2020 10:01):    No growth to date.    Culture - Blood (collected 27 Sep 2020 09:58)  Source: .Blood Blood-Peripheral  Preliminary Report (28 Sep 2020 10:01):    No growth to date.    Culture - Blood (collected 25 Sep 2020 09:50)  Source: .Blood Blood-Peripheral  Preliminary Report (26 Sep 2020 10:01):    No growth to date.    Culture - Blood (collected 25 Sep 2020 09:50)  Source: .Blood Blood-Venous  Preliminary Report (26 Sep 2020 10:01):    No growth to date.    Culture - Urine (collected 23 Sep 2020 20:55)  Source: .Urine Clean Catch (Midstream)  Final Report (24 Sep 2020 20:23):    No growth    Culture - Blood (collected 23 Sep 2020 16:35)  Source: .Blood Blood-Peripheral  Gram Stain (24 Sep 2020 16:48):    Growth in aerobic bottle: Gram Positive Cocci in Pairs and Chains    Growth in anaerobic bottle: Gram Positive Cocci in Pairs and Chains  Final Report (26 Sep 2020 11:29):    Growth in aerobic and anaerobic bottles: Streptococcus mitis/oralis group       Culture - Blood (collected 23 Sep 2020 16:35)  Source: .Blood Blood-Peripheral  Gram Stain (24 Sep 2020 11:48):    Growth in aerobic bottle: Gram Positive Cocci in Pairs and Chains    Growth in anaerobic bottle: Gram Positive Cocci in Pairs and Chains  Final Report (26 Sep 2020 08:56):    Growth in aerobic and anaerobic bottles: Streptococcus mitis/oralis group   Organism: Streptococcus mitis/oralis group (26 Sep 2020 08:56)      -  Ceftriaxone: S 0.032      -  Penicillin: S 0.032      Method Type: ETEST  Organism: Streptococcus mitis/oralis group (26 Sep 2020 08:56)      -  Clindamycin: S      -  Erythromycin: I      -  Levofloxacin: S      -  Vancomycin: S      Method Type: KB  Organism: Blood Culture PCR (26 Sep 2020 08:56)      -  Streptococcus sp. (Not Grp A, B or S pneumoniae): Detec      Method Type: PCR              < from: CT Abdomen and Pelvis w/ IV Cont (09.26.20 @ 13:13) >    IMPRESSION:  No evidence of infectious source in the abdomen or pelvis.        < end of copied text >          Imaging studies(films) independently reviewed.Findings as detailed in report above

## 2020-09-28 NOTE — PROGRESS NOTE ADULT - ASSESSMENT
74M hx DM(a1c=6.2%), CAD, AFib, prosthetic mitral valve (2015), HTN, macular degeneration, hemorrhoids, localized rectal cancer (dx 9/2020), presenting with chills for 10 days.   S mitis oralis bacteremia-high grade  No abd symptoms  Clinically stable  Repeat Cx so far negative  abd Ct no abscess  PCn VANCE 0.03    A) Strep mitis bacteremia  1) follow repeat cx  2) Follow REEMA  3) Continue ceftriaxone  will  decide on gent based on REEMA and repeat cx-pt will be at risk for toxicity and given low VANCE may be ok with short course /no gent  4) dental input noted-will defer to them on plan  5) will need 6 weeks IV abx- PIVCC wednesday if Cx stay negative  Opat to be set up    B) Prosthetic valve ? endocarditis  await REEMA  Cx as above  plan as above    C) Rectal ca  continue care per CRS  timing of surgery per CRS    Will tailor plan for ID issues  per course,results.Will defer to primary team on management of other issues.  Assessment, plan and recommendations as detailed above were discussed with the medical/primary  team.  Will Follow.  Beeper [phone] [facility] [id number].   Wknd/afterhours/No response-[phone] or Fellow on call

## 2020-09-28 NOTE — PROGRESS NOTE ADULT - ASSESSMENT
74 year old male S/P tissue MVR and chronic AF had a biopsy of a rectal mass on 9/4 with out antibiotic prophylaxis and then had a formal colonoscopy on 9/12 also with out antibiotic prophylaxis which confirmed rectal carcinoma.  He then developed fever and shaking chills and has been admitted with 2/2 bottle Strep species bacteremia.  Species is streptococcus mitis  ABx as per ID.  Keep INR 2-3  REEMA performed --> Normal left ventricular systolic function. No segmental  wall motion abnormalities.  Bioprosthetic mitral valve with normal function. Mild  mitral regurgitation.  No evidence of endocarditits. If clinical index of  suspicion is high, consider repeat REEMA in one week.    Thankfully no sign of prosthetic valve endocarditis. Continue present treatment plan. Stable from CV standpoint

## 2020-09-28 NOTE — PROGRESS NOTE ADULT - PROBLEM SELECTOR PLAN 2
Pt takes warfarin 5mg Tuesday, Wednesday, Friday, Sat, Sun; 2.5mg Mon and Thurs  - Continue Warfarin dosing, noted to have INR 1.59 on admission, goal INR 2-3  - Continue Metoprolol Tartrate 25mg BID for rate control

## 2020-09-28 NOTE — PROGRESS NOTE ADULT - PROBLEM SELECTOR PLAN 1
Pt with bactermemia due to S. mitis. Pt with bovine prosthetic mitral valve.   - Repeat BCx NGTD 9/25  - Repeat BCx NGTD 9/27  - Found to have S. mitis  - Dental consult to evaluate for dental source of bacteremia, as S. mitis is known oral narda  - Per dental, no active infection and pt should f/u for regular dental care as an outpt.  - TTE negative for vegetation  - REEMA today to rule out endocarditis  - UCx negative  - Per ID, pt should receive PICC line on Wednesday and should receive 6 weeks abx starting from date of first negative BCx.  - If REEMA positive for vegetation, will add gentamicin  - S/P one dose of gentamicin 140mg IV  - C/w ceftriaxone 2g q24h  - Recent CT abdomen, chest, pelvis negative for any pathology   - repeat CT A/P this admission also negative   - Daily CBC to trend WBC

## 2020-09-29 ENCOUNTER — TRANSCRIPTION ENCOUNTER (OUTPATIENT)
Age: 75
End: 2020-09-29

## 2020-09-29 LAB
ALBUMIN SERPL ELPH-MCNC: 3.2 G/DL — LOW (ref 3.3–5)
ALP SERPL-CCNC: 69 U/L — SIGNIFICANT CHANGE UP (ref 40–120)
ALT FLD-CCNC: 25 U/L — SIGNIFICANT CHANGE UP (ref 10–45)
ANION GAP SERPL CALC-SCNC: 11 MMOL/L — SIGNIFICANT CHANGE UP (ref 5–17)
APTT BLD: 35.5 SEC — SIGNIFICANT CHANGE UP (ref 27.5–35.5)
AST SERPL-CCNC: 25 U/L — SIGNIFICANT CHANGE UP (ref 10–40)
BILIRUB SERPL-MCNC: 0.3 MG/DL — SIGNIFICANT CHANGE UP (ref 0.2–1.2)
BUN SERPL-MCNC: 15 MG/DL — SIGNIFICANT CHANGE UP (ref 7–23)
CALCIUM SERPL-MCNC: 8.9 MG/DL — SIGNIFICANT CHANGE UP (ref 8.4–10.5)
CHLORIDE SERPL-SCNC: 102 MMOL/L — SIGNIFICANT CHANGE UP (ref 96–108)
CO2 SERPL-SCNC: 24 MMOL/L — SIGNIFICANT CHANGE UP (ref 22–31)
CREAT SERPL-MCNC: 0.87 MG/DL — SIGNIFICANT CHANGE UP (ref 0.5–1.3)
GLUCOSE BLDC GLUCOMTR-MCNC: 107 MG/DL — HIGH (ref 70–99)
GLUCOSE BLDC GLUCOMTR-MCNC: 124 MG/DL — HIGH (ref 70–99)
GLUCOSE BLDC GLUCOMTR-MCNC: 143 MG/DL — HIGH (ref 70–99)
GLUCOSE BLDC GLUCOMTR-MCNC: 151 MG/DL — HIGH (ref 70–99)
GLUCOSE SERPL-MCNC: 108 MG/DL — HIGH (ref 70–99)
HCT VFR BLD CALC: 34.6 % — LOW (ref 39–50)
HGB BLD-MCNC: 11.7 G/DL — LOW (ref 13–17)
INR BLD: 2.49 RATIO — HIGH (ref 0.88–1.16)
MAGNESIUM SERPL-MCNC: 1.8 MG/DL — SIGNIFICANT CHANGE UP (ref 1.6–2.6)
MCHC RBC-ENTMCNC: 32.1 PG — SIGNIFICANT CHANGE UP (ref 27–34)
MCHC RBC-ENTMCNC: 33.8 GM/DL — SIGNIFICANT CHANGE UP (ref 32–36)
MCV RBC AUTO: 94.8 FL — SIGNIFICANT CHANGE UP (ref 80–100)
NRBC # BLD: 0 /100 WBCS — SIGNIFICANT CHANGE UP (ref 0–0)
PHOSPHATE SERPL-MCNC: 3.9 MG/DL — SIGNIFICANT CHANGE UP (ref 2.5–4.5)
PLATELET # BLD AUTO: 251 K/UL — SIGNIFICANT CHANGE UP (ref 150–400)
POTASSIUM SERPL-MCNC: 4 MMOL/L — SIGNIFICANT CHANGE UP (ref 3.5–5.3)
POTASSIUM SERPL-SCNC: 4 MMOL/L — SIGNIFICANT CHANGE UP (ref 3.5–5.3)
PROT SERPL-MCNC: 6.1 G/DL — SIGNIFICANT CHANGE UP (ref 6–8.3)
PROTHROM AB SERPL-ACNC: 28.4 SEC — HIGH (ref 10.6–13.6)
RBC # BLD: 3.65 M/UL — LOW (ref 4.2–5.8)
RBC # FLD: 13 % — SIGNIFICANT CHANGE UP (ref 10.3–14.5)
SODIUM SERPL-SCNC: 137 MMOL/L — SIGNIFICANT CHANGE UP (ref 135–145)
WBC # BLD: 6.39 K/UL — SIGNIFICANT CHANGE UP (ref 3.8–10.5)
WBC # FLD AUTO: 6.39 K/UL — SIGNIFICANT CHANGE UP (ref 3.8–10.5)

## 2020-09-29 PROCEDURE — 99233 SBSQ HOSP IP/OBS HIGH 50: CPT

## 2020-09-29 PROCEDURE — 99232 SBSQ HOSP IP/OBS MODERATE 35: CPT | Mod: GC

## 2020-09-29 RX ORDER — WARFARIN SODIUM 2.5 MG/1
5 TABLET ORAL ONCE
Refills: 0 | Status: COMPLETED | OUTPATIENT
Start: 2020-09-29 | End: 2020-09-29

## 2020-09-29 RX ORDER — CEFTRIAXONE 500 MG/1
2 INJECTION, POWDER, FOR SOLUTION INTRAMUSCULAR; INTRAVENOUS
Qty: 72 | Refills: 0
Start: 2020-09-29 | End: 2020-11-03

## 2020-09-29 RX ADMIN — Medication 100 MILLIGRAM(S): at 05:37

## 2020-09-29 RX ADMIN — Medication 1 APPLICATION(S): at 05:37

## 2020-09-29 RX ADMIN — Medication 2 GRAM(S): at 05:37

## 2020-09-29 RX ADMIN — TAMSULOSIN HYDROCHLORIDE 0.4 MILLIGRAM(S): 0.4 CAPSULE ORAL at 21:47

## 2020-09-29 RX ADMIN — ATORVASTATIN CALCIUM 40 MILLIGRAM(S): 80 TABLET, FILM COATED ORAL at 21:47

## 2020-09-29 RX ADMIN — Medication 25 MILLIGRAM(S): at 17:21

## 2020-09-29 RX ADMIN — CEFTRIAXONE 100 MILLIGRAM(S): 500 INJECTION, POWDER, FOR SOLUTION INTRAMUSCULAR; INTRAVENOUS at 13:41

## 2020-09-29 RX ADMIN — WARFARIN SODIUM 5 MILLIGRAM(S): 2.5 TABLET ORAL at 21:47

## 2020-09-29 RX ADMIN — Medication 2 GRAM(S): at 17:21

## 2020-09-29 RX ADMIN — Medication 100 MILLIGRAM(S): at 21:47

## 2020-09-29 RX ADMIN — Medication 1 APPLICATION(S): at 17:21

## 2020-09-29 RX ADMIN — Medication 100 MILLIGRAM(S): at 11:55

## 2020-09-29 RX ADMIN — Medication 100 MILLIGRAM(S): at 13:37

## 2020-09-29 RX ADMIN — Medication 81 MILLIGRAM(S): at 11:52

## 2020-09-29 RX ADMIN — Medication 25 MILLIGRAM(S): at 05:36

## 2020-09-29 NOTE — PROGRESS NOTE ADULT - PROBLEM SELECTOR PLAN 1
Pt with bactermemia due to S. mitis. Pt with bovine prosthetic mitral valve.   - Repeat BCx Negative 9/25  - Repeat BCx Negative 9/27  - Found to have S. mitis  - Dental consult to evaluate for dental source of bacteremia, as S. mitis is known oral narda  - Per dental, no active infection and pt should f/u for regular dental care as an outpt.  - TTE negative for vegetation  - REEMA negative for endocarditis  - UCx negative  - Per ID, pt should receive PICC today and should receive 6 weeks abx starting from date of first negative BCx.  - Per ID, will hold off on gentamicin  - Per ID, reasonable to proceed with surgery in 3-4 weeks-per Dr Connelly not advisable to wait longer.  - C/w ceftriaxone 2g q24h  - Recent CT abdomen, chest, pelvis negative for any pathology   - repeat CT A/P this admission also negative   - Daily CBC to trend WBC

## 2020-09-29 NOTE — DISCHARGE NOTE PROVIDER - NSDCMRMEDTOKEN_GEN_ALL_CORE_FT
aspirin 81 mg oral tablet: 1 tab(s) orally once a day  cefTRIAXone 2 g injection: 2 gram(s) intravenously every 24 hours   Centrum oral tablet: 1 tab(s) orally once a day  CoQ10 300 mg oral capsule: 1 cap(s) orally once a day  Edarbyclor 40 mg-12.5 mg oral tablet: 1 tab(s) orally once a day  Flomax 0.4 mg oral capsule: 1 cap(s) orally once a day  Lovaza 1000 mg oral capsule: 2 cap(s) orally 2 times a day  metFORMIN 500 mg oral tablet, extended release: 1 tab(s) orally once a day  metoprolol tartrate 25 mg oral tablet: 1 tab(s) orally 2 times a day  rosuvastatin 10 mg oral tablet: 1 tab(s) orally once a day  warfarin 2.5 mg oral tablet: 1 tab(s) orally once a day Monday and Thursday  warfarin 5 mg oral tablet: 1 tab(s) orally once a day, Tuesday, Wednesday, Friday, Saturday, Sunday   aspirin 81 mg oral tablet: 1 tab(s) orally once a day  Centrum oral tablet: 1 tab(s) orally once a day  CoQ10 300 mg oral capsule: 1 cap(s) orally once a day  Edarbyclor 40 mg-12.5 mg oral tablet: 1 tab(s) orally once a day  Flomax 0.4 mg oral capsule: 1 cap(s) orally once a day  Lovaza 1000 mg oral capsule: 2 cap(s) orally 2 times a day  metFORMIN 500 mg oral tablet, extended release: 1 tab(s) orally once a day  metoprolol tartrate 25 mg oral tablet: 1 tab(s) orally 2 times a day  rosuvastatin 10 mg oral tablet: 1 tab(s) orally once a day  Tessalon Perles 100 mg oral capsule: 1 cap(s) orally every 2 hours, As Needed   warfarin 2.5 mg oral tablet: 1 tab(s) orally once a day Monday and Thursday  warfarin 5 mg oral tablet: 1 tab(s) orally once a day, Tuesday, Wednesday, Friday, Saturday, Sunday

## 2020-09-29 NOTE — DISCHARGE NOTE PROVIDER - CARE PROVIDERS DIRECT ADDRESSES
[email or web address]

## 2020-09-29 NOTE — PROGRESS NOTE ADULT - ASSESSMENT
#S/p tissue mvr  #AF on coumadin, inr 2.49   #streptococcus mitis bacteremia.  ABx as per ID.  REEMA: No evidence of endocarditis.  Awaits picc line for completion home antibiotic.

## 2020-09-29 NOTE — PROGRESS NOTE ADULT - PROBLEM SELECTOR PLAN 2
Pt takes warfarin 5mg Tuesday, Wednesday, Friday, Sat, Sun; 2.5mg Mon and Thurs  - Continue Warfarin dosing, goal INR 2-3  - Continue Metoprolol Tartrate 25mg BID for rate control

## 2020-09-29 NOTE — PROGRESS NOTE ADULT - ASSESSMENT
Pt is 74M hx DM, CAD, AFib, prosthetic mitral valve (2015), HTN, macular degeneration, hemorrhoids, localized rectal cancer (dx 9/2020), presenting with fever and chills in the setting of Strep mitis/oralis bacteremia likely 2/2 to history of rectal cancer. REEMA negative.

## 2020-09-29 NOTE — DIETITIAN INITIAL EVALUATION ADULT. - ADD RECOMMEND
Encouraged continued good PO intake as tolerated. Diet education provided. Patient made aware RD remains available. Monitor PO intake, weight, labs, skin, GI status, diet.

## 2020-09-29 NOTE — PROGRESS NOTE ADULT - SUBJECTIVE AND OBJECTIVE BOX
Patient is a 74y old  Male who presents with a chief complaint of Fever (29 Sep 2020 03:28)    Being followed by ID for S mitis oralis bacteremia    Interval history:feels well  no complaints   No other acute events      ROS:  No cough,SOB,CP  No N/V/D  No abd pain  No urinary complaints  No HA  No joint or limb pain  No other complaints      Antimicrobials:    cefTRIAXone   IVPB 2000 milliGRAM(s) IV Intermittent every 24 hours      other medications reviewed    Vital Signs Last 24 Hrs  T(C): 36.6 (09-29-20 @ 05:14), Max: 36.6 (09-28-20 @ 11:45)  T(F): 97.9 (09-29-20 @ 05:14), Max: 97.9 (09-29-20 @ 05:14)  HR: 76 (09-29-20 @ 05:14) (64 - 82)  BP: 135/74 (09-29-20 @ 05:14) (101/78 - 154/80)  BP(mean): --  RR: 18 (09-29-20 @ 05:14) (16 - 18)  SpO2: 96% (09-29-20 @ 05:14) (96% - 100%)    Physical Exam:    Constitutional well preserved,comfortable,pleasant    HEENT PERRLA EOMI,No pallor or icterus    No oral exudate or erythema    Neck supple no JVD or LN    Chest Good AE,CTA    CVS RRR S1 S2 WNl ESM no  rub or gallop    Abd soft BS normal No tenderness no masses    Ext No cyanosis clubbing or edema    IV site no erythema tenderness or discharge    Joints no swelling or LOM    CNS AAO X 3 no focal    Lab Data:                          11.7   6.39  )-----------( 251      ( 29 Sep 2020 07:11 )             34.6       09-29    137  |  102  |  15  ----------------------------<  108<H>  4.0   |  24  |  0.87    Ca    8.9      29 Sep 2020 07:11  Phos  3.9     09-29  Mg     1.8     09-29    TPro  6.1  /  Alb  3.2<L>  /  TBili  0.3  /  DBili  x   /  AST  25  /  ALT  25  /  AlkPhos  69  09-29        Culture - Blood (collected 27 Sep 2020 09:58)  Source: .Blood Blood-Venous  Preliminary Report (28 Sep 2020 10:01):    No growth to date.    Culture - Blood (collected 27 Sep 2020 09:58)  Source: .Blood Blood-Peripheral  Preliminary Report (28 Sep 2020 10:01):    No growth to date.    Culture - Blood (collected 25 Sep 2020 09:50)  Source: .Blood Blood-Peripheral  Preliminary Report (26 Sep 2020 10:01):    No growth to date.    Culture - Blood (collected 25 Sep 2020 09:50)  Source: .Blood Blood-Venous  Preliminary Report (26 Sep 2020 10:01):    No growth to date.      < from: Transesophageal Echocardiogram w/o TTE (09.28.20 @ 10:54) >  Conclusions:  Normal left ventricular systolic function. No segmental  wall motion abnormalities.  Bioprosthetic mitral valve with normal function. Mild  mitral regurgitation.  No evidence of endocarditits. If clinical index of  suspicion is high, consider repeat REEMA in one week.    < end of copied text >    microGrowth in anaerobic bottle: Gram Positive Cocci in Pairs and Chains   - Ceftriaxone: S 0.032   - Clindamycin: S   - Erythromycin: I   - Levofloxacin: S   - Penicillin: S 0.032   - Vancomycin: S             < from: CT Abdomen and Pelvis w/ IV Cont (09.26.20 @ 13:13) >    IMPRESSION:  No evidence of infectious source in the abdomen or pelvis.            < end of copied text >    Imaging studies(films) independently reviewed.Findings as detailed in report above

## 2020-09-29 NOTE — DISCHARGE NOTE PROVIDER - HOSPITAL COURSE
Pt is 74M hx DM, CAD, AFib, prosthetic mitral valve (2015), HTN, macular degeneration, hemorrhoids, localized rectal cancer (dx 9/2020), presenting with chills for 10 days. Symptoms worsened over the last several days, which prompted him to present to hospital. Symptoms began 2 days after his cancer staging CT scans. He had one episode of vomiting 10 days ago. Pt has had several days of dry cough but denies any fever until 5 days ago. Denies any nausea or diarrhea. Denies any CP, SOB, abdominal pain. Denies any dysuria, hematuria, or blood in the stool, although he states he usually has difficulty passing bowel movements. 5 days ago, he developed fever that was responsive Tylenol. 2 days ago, pt had a colonoscopy that he tolerated well. The day following his colonoscopy, he developed shaking and severe chills. Pt is planned for surgical resection of his rectal cancer in the upcoming weeks.     ED course: Temperature 103.4, SBP 98/58, RR 20, HR 95. Pt received vanco, cefepime, flagyl, 2L LR.     Blood culture grew S. mitis species. Pt was started on ceftriaxone. Dental evaluation was done and found no active infection. TTE and REEMA were negative for endocarditis. CT A/P was negative for any intra-abdominal pathology. Given pt's history of prosthetic heart valve, ID decided pt should have 6 weeks of ceftriaxone. There was no need for gentamicin. PICC line was placed prior to discharge and pt was given a prescription for ceftriaxone to end on 11/4/20. Per ID, it is reasonable to proceed with colorectal surgery in 3-4 weeks if Dr. Connelly feels comfortable with it.    Pt was continued on his home regimen of warfarin for AFib. He was continued on Metoprolol Tartrate 25mg BID for rate control.    Pt was discharged afebrile and hemodynamically stable.     Pt is 74M hx DM, CAD, AFib, bioprosthetic mitral valve (2015), HTN, macular degeneration, hemorrhoids, recently diagnosed localized rectal cancer (dx 9/2020), presenting with chills for 10 days. Symptoms worsened over the last several days, which prompted him to present to hospital.     Symptoms began 2 days after his cancer staging CT scans. He had one episode of vomiting 10 days ago. Pt has had several days of dry cough but denies any fever until 5 days ago. 2 days ago, pt had a colonoscopy that he tolerated well. The day following his colonoscopy, he developed shaking and severe chills. Pt is planned for surgical resection of his rectal cancer in the upcoming weeks.     ED course: Temperature 103.4, SBP 98/58, RR 20, HR 95. Pt received vanco, cefepime, flagyl, 2L LR. Sepsis present on admission.     Blood culture grew S. mitis species. Pt was started on ceftriaxone. Dental evaluation was done and found no active infection. TTE and REEMA were negative for endocarditis. CT A/P was negative for any intra-abdominal pathology.     Given pt's history of bioprosthetic heart valve, ID decided pt should have 6 weeks of ceftriaxone. There was no need for gentamicin. PICC line was placed prior to discharge and pt was given a prescription for ceftriaxone to end on 11/4/20. Per ID, it is reasonable to proceed with colorectal surgery in 3-4 weeks if Dr. Connelly feels comfortable with it.    Pt was continued on his home regimen of warfarin for AFib. He was continued on Metoprolol Tartrate 25mg BID for rate control.    Pt was discharged afebrile and hemodynamically stable.        Diagnoses: Streptococcus bacteremia; Sepsis due to bacteremia; Rectal cancer; Chronic a fib; HTN; Therapeutic drug monitoring; Hyponatremia; Prerenal azotemia; DM2   Pt is 74M hx DM, CAD, AFib, bioprosthetic mitral valve (2015), HTN, macular degeneration, hemorrhoids, recently diagnosed localized rectal cancer (dx 9/2020), presenting with chills for 10 days. Symptoms worsened over the last several days, which prompted him to present to hospital.     Symptoms began 2 days after his cancer staging CT scans. He had one episode of vomiting 10 days ago. Pt has had several days of dry cough but denies any fever until 5 days ago. 2 days ago, pt had a colonoscopy that he tolerated well. The day following his colonoscopy, he developed shaking and severe chills. Pt is planned for surgical resection of his rectal cancer in the upcoming weeks.     ED course: Temperature 103.4, SBP 98/58, RR 20, HR 95. Pt received vanco, cefepime, flagyl, 2L LR. Severe sepsis w hypotension and lactic acidosis present on admission.     Blood culture grew S. mitis species. Pt was started on ceftriaxone. Dental evaluation was done and found no active infection. TTE and REEMA were negative for endocarditis. CT A/P was negative for any intra-abdominal pathology.     Given pt's history of bioprosthetic heart valve, ID decided pt should have 6 weeks of ceftriaxone. There was no need for gentamicin. PICC line was placed prior to discharge and pt was given a prescription for ceftriaxone to end on 11/4/20. Per ID, it is reasonable to proceed with colorectal surgery in 3-4 weeks if Dr. Connelly feels comfortable with it.    Pt was continued on his home regimen of warfarin for AFib. He was continued on Metoprolol Tartrate 25mg BID for rate control.    Pt was discharged afebrile and hemodynamically stable.        Diagnoses: Streptococcus bacteremia; Severe sepsis due to bacteremia; Rectal cancer; Chronic a fib; HTN; Therapeutic drug monitoring; Hyponatremia; Prerenal azotemia; DM2; Lactic acidosis

## 2020-09-29 NOTE — DIETITIAN INITIAL EVALUATION ADULT. - OTHER INFO
Pt reports good appetite and PO intake, consuming 100% of meals. Denies nausea/vomiting/diarrhea/constipation. Last BM today (9/29) per pt. Denies difficulties chewing/swallowing. Confirmed NKFA.    Pt reports good appetite and PO intake PTA with no recent changes. Pt reports following [employer] Vegetarian diet, accepts milk and eggs. Denies recent weight changes. Reports taking multivitamin (Centrum) PTA. Pt reports being mindful of carbohydrate intake. Checks glucose fingersticks at home, once in morning and once in afternoon, states values typically ~100-110 mg/dL. Denies insulin use PTA, DM managed with metformin. Most recent HbA1c (6.2) suggests good glycemic control.     Encouraged continued good PO intake as tolerated. Reinforced importance of being mindful of carbohydrate intake. Pt declined further education at this time. Pt with no nutrition-related questions or concerns. Made aware RD remains available.

## 2020-09-29 NOTE — PROVIDER CONTACT NOTE (OTHER) - RECOMMENDATIONS
Contacted by member of PICC team stating unable to place PICC line due to high INR. Stated if night dose of coumadin was held would lower INR to recommended range.

## 2020-09-29 NOTE — PROGRESS NOTE ADULT - SUBJECTIVE AND OBJECTIVE BOX
PROGRESS NOTE:     CONTACT INFO:  Kenyatta Arias MD   PGY-1  Pager: [phone] [facility]    Patient is a 74y old  Male who presents with a chief complaint of Fever (29 Sep 2020 11:06)      SUBJECTIVE / OVERNIGHT EVENTS:  No acute events overnight. Patient seen and evaluated at bedside. Reports that his cough has resolved. No fever/chills.  Denies SOB at rest, chest pain, palpitations, abdominal pain, nausea/vomiting    ADDITIONAL REVIEW OF SYSTEMS:    Negative except as above.     MEDICATIONS  (STANDING):  aspirin enteric coated 81 milliGRAM(s) Oral daily  atorvastatin 40 milliGRAM(s) Oral at bedtime  benzonatate 100 milliGRAM(s) Oral three times a day  cefTRIAXone   IVPB 2000 milliGRAM(s) IV Intermittent every 24 hours  dextrose 5%. 1000 milliLiter(s) (50 mL/Hr) IV Continuous <Continuous>  dextrose 50% Injectable 12.5 Gram(s) IV Push once  dextrose 50% Injectable 25 Gram(s) IV Push once  dextrose 50% Injectable 25 Gram(s) IV Push once  influenza   Vaccine 0.5 milliLiter(s) IntraMuscular once  insulin lispro (HumaLOG) corrective regimen sliding scale   SubCutaneous three times a day before meals  insulin lispro (HumaLOG) corrective regimen sliding scale   SubCutaneous at bedtime  metoprolol tartrate 25 milliGRAM(s) Oral two times a day  omega-3-Acid Ethyl Esters 2 Gram(s) Oral two times a day  petrolatum white Ointment 1 Application(s) Topical two times a day  tamsulosin 0.4 milliGRAM(s) Oral at bedtime  warfarin 5 milliGRAM(s) Oral once    MEDICATIONS  (PRN):  dextrose 40% Gel 15 Gram(s) Oral once PRN Blood Glucose LESS THAN 70 milliGRAM(s)/deciliter  glucagon  Injectable 1 milliGRAM(s) IntraMuscular once PRN Glucose LESS THAN 70 milligrams/deciliter  guaiFENesin   Syrup  (Sugar-Free) 100 milliGRAM(s) Oral every 6 hours PRN Cough      CAPILLARY BLOOD GLUCOSE      POCT Blood Glucose.: 143 mg/dL (29 Sep 2020 09:55)  POCT Blood Glucose.: 122 mg/dL (28 Sep 2020 22:00)  POCT Blood Glucose.: 151 mg/dL (28 Sep 2020 17:15)  POCT Blood Glucose.: 110 mg/dL (28 Sep 2020 14:07)    I&O's Summary    28 Sep 2020 07:01  -  29 Sep 2020 07:00  --------------------------------------------------------  IN: 50 mL / OUT: 1000 mL / NET: -950 mL    29 Sep 2020 07:01  -  29 Sep 2020 12:45  --------------------------------------------------------  IN: 400 mL / OUT: 300 mL / NET: 100 mL        PHYSICAL EXAM:  Vital Signs Last 24 Hrs  T(C): 36.6 (29 Sep 2020 05:14), Max: 36.6 (28 Sep 2020 21:09)  T(F): 97.9 (29 Sep 2020 05:14), Max: 97.9 (29 Sep 2020 05:14)  HR: 76 (29 Sep 2020 05:14) (67 - 76)  BP: 135/74 (29 Sep 2020 05:14) (109/66 - 149/75)  BP(mean): --  RR: 18 (29 Sep 2020 05:14) (18 - 18)  SpO2: 96% (29 Sep 2020 05:14) (96% - 100%)    GENERAL: no acute distress; well-developed  HEAD:  Atraumatic, Normocephalic  EYES: EOMI, PERRLA, conjunctiva and sclera clear, + exophthalmos OD  ENT: MMM; oropharynx clear  NECK: Supple, No JVD  CHEST/LUNG: Clear to auscultation bilaterally; No wheeze  HEART: Irregular rate, normal S1S2, no murmurs, rubs, gallops  ABDOMEN: Soft, Nontender, Nondistended; Bowel sounds present  EXTREMITIES:  2+ Peripheral Pulses, No clubbing, cyanosis, or edema  PSYCH: AAOx3  NEUROLOGY: no focal motor or sensory deficits. 5/5 muscle strength in all extremities.   SKIN: chronic discoloration noted on back; no rashes.    LABS:                        11.7   6.39  )-----------( 251      ( 29 Sep 2020 07:11 )             34.6     09-29    137  |  102  |  15  ----------------------------<  108<H>  4.0   |  24  |  0.87    Ca    8.9      29 Sep 2020 07:11  Phos  3.9     09-29  Mg     1.8     09-29    TPro  6.1  /  Alb  3.2<L>  /  TBili  0.3  /  DBili  x   /  AST  25  /  ALT  25  /  AlkPhos  69  09-29    PT/INR - ( 29 Sep 2020 07:11 )   PT: 28.4 sec;   INR: 2.49 ratio         PTT - ( 29 Sep 2020 07:11 )  PTT:35.5 sec          Culture - Blood (collected 27 Sep 2020 09:58)  Source: .Blood Blood-Venous  Preliminary Report (28 Sep 2020 10:01):    No growth to date.    Culture - Blood (collected 27 Sep 2020 09:58)  Source: .Blood Blood-Peripheral  Preliminary Report (28 Sep 2020 10:01):    No growth to date.        RADIOLOGY & ADDITIONAL TESTS:      PROCEDURE: Transesophageal (REEMA) was performed.  Informed  consent was first obtained for REEMA. The patient was sedated  - see anesthesia record.  The procedure was monitored with  automatic blood pressure monitoring, ECG tracings and pulse  oximetry. The transesophageal probe was placed in the  esophagus posterior to the heart without complications.  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 Mean gradient 4.9 mmHg at  79 mmHg.  Mild mitral regurgitation.  Aortic Valve/Aorta: Calcified aortic valve with normal  opening. Mild aortic regurgitation.  Normal sized aortic root, aortic arch and descending  thoracic aorta.  No atherosclerosis seen.  Left Atrium: Severe left atrial enlargement.  Left Ventricle: Normal left ventricular internal dimensions  and wall thickness.  Normal left ventricular systolic function. No segmental  wall motion abnormalities.  Right Heart: Moderate right atrial enlargement. Normal  right ventricular size and function.  Normal appearing tricuspid valve leaflets. Moderate  tricuspid regurgitation.  Normal pulmonic valve. Mild pulmonic regurgitation.  Pericardium/Pleura: Normal pericardium with no pericardial  effusion.  Hemodynamic: No evidence of pulmoanry hypertension.  ------------------------------------------------------------------------  Conclusions:  Normal left ventricular systolic function. No segmental  wall motion abnormalities.  Bioprosthetic mitral valve with normal function. Mild  mitral regurgitation.  No evidence of endocarditits. If clinical index of  suspicion is high, consider repeat REEMA in one week.

## 2020-09-29 NOTE — DIETITIAN INITIAL EVALUATION ADULT. - PROBLEM SELECTOR PLAN 1
Pt meets SIRS criteria given fever and HR>90. WBC is wnl. Source of fever and chills is unclear, as pt's symptoms began in the last 2 weeks following his diagnosis of rectal cancer. Differential diagnosis includes infection and immune reaction to cancer. Recent colonoscopy may have also seeded bacteria into the blood.   - S/p IV resuscitation in ED.  - Elevated lactate, downtrending   - Pt currently hypotensive, continue with LR infusion at 100cc/hr  - BCx, UCx pending  - S/p vanco, cefepime, flagyl in ED  - c/w vanco and meropenem; narrow depending on culture results  - Recent CT abdomen, chest, pelvis negative for any pathology; consider repeat scans if pt's symptoms do not improve  - Daily CBC to trend WBC  - Trend lactate until wnl

## 2020-09-29 NOTE — DISCHARGE NOTE PROVIDER - NSDCCPTREATMENT_GEN_ALL_CORE_FT
PRINCIPAL PROCEDURE  Procedure: Transthoracic echocardiography (TTE)  Findings and Treatment: Conclusions:  Normal left ventricular systolic function. No segmental  wall motion abnormalities.  Bioprosthetic mitral valve with normal function. Mild  mitral regurgitation.  No evidence of endocarditits. If clinical index of  suspicion is high, consider repeat REEMA in one week.      SECONDARY PROCEDURE  Procedure: CT abdomen and pelvis  Findings and Treatment: IMPRESSION:  No evidence of infectious source in the abdomen or pelvis.    Procedure: Transesophageal echocardiogram  Findings and Treatment: Conclusions:  Normal left ventricular systolic function. No segmental  wall motion abnormalities.  Bioprosthetic mitral valve with normal function. Mild  mitral regurgitation.  No evidence of endocarditits. If clinical index of  suspicion is high, consider repeat REEMA in one week.

## 2020-09-29 NOTE — PROGRESS NOTE ADULT - SUBJECTIVE AND OBJECTIVE BOX
Surgery Progress Note    SUBJECTIVE: Pt seen and examined at bedside. Patient comfortable and in no-apparent distress. No nausea, vomiting, diarrhea. Pain is controlled.     Vital Signs Last 24 Hrs  T(C): 36.6 (28 Sep 2020 21:09), Max: 36.6 (28 Sep 2020 04:41)  T(F): 97.8 (28 Sep 2020 21:09), Max: 97.9 (28 Sep 2020 04:41)  HR: 70 (28 Sep 2020 21:09) (64 - 82)  BP: 109/66 (28 Sep 2020 21:09) (101/78 - 154/80)  BP(mean): --  RR: 18 (28 Sep 2020 21:09) (16 - 18)  SpO2: 98% (28 Sep 2020 21:09) (98% - 100%)    PHYSICAL EXAM:  General: No acute distress  Respiratory: Nonlabored  Cardiovascular: RRR  Abdominal: Soft, nondistended, nontender.  Extremities: Warm  LABS:                        10.9   6.13  )-----------( 245      ( 28 Sep 2020 07:16 )             32.1     09-28    137  |  101  |  12  ----------------------------<  126<H>  4.1   |  25  |  0.83    Ca    9.1      28 Sep 2020 07:16  Phos  3.8     09-28  Mg     1.9     09-28    TPro  5.9<L>  /  Alb  3.1<L>  /  TBili  0.3  /  DBili  x   /  AST  25  /  ALT  27  /  AlkPhos  68  09-28    PT/INR - ( 28 Sep 2020 15:16 )   PT: 24.9 sec;   INR: 2.17 ratio         PTT - ( 28 Sep 2020 15:16 )  PTT:36.2 sec      INs and OUTs:    09-27-20 @ 07:01  -  09-28-20 @ 07:00  --------------------------------------------------------  IN: 240 mL / OUT: 1600 mL / NET: -1360 mL    09-28-20 @ 07:01  -  09-29-20 @ 03:29  --------------------------------------------------------  IN: 50 mL / OUT: 0 mL / NET: 50 mL

## 2020-09-29 NOTE — PROGRESS NOTE ADULT - SUBJECTIVE AND OBJECTIVE BOX
SUBJECTIVE: Asymptomatic.  	  MEDICATIONS:  metoprolol tartrate 25 milliGRAM(s) Oral two times a day  tamsulosin 0.4 milliGRAM(s) Oral at bedtime  cefTRIAXone   IVPB 2000 milliGRAM(s) IV Intermittent every 24 hours  benzonatate 100 milliGRAM(s) Oral three times a day  guaiFENesin   Syrup  (Sugar-Free) 100 milliGRAM(s) Oral every 6 hours PRN  atorvastatin 40 milliGRAM(s) Oral at bedtime  dextrose 40% Gel 15 Gram(s) Oral once PRN  dextrose 50% Injectable 12.5 Gram(s) IV Push once  dextrose 50% Injectable 25 Gram(s) IV Push once  dextrose 50% Injectable 25 Gram(s) IV Push once  glucagon  Injectable 1 milliGRAM(s) IntraMuscular once PRN  insulin lispro (HumaLOG) corrective regimen sliding scale   SubCutaneous three times a day before meals  insulin lispro (HumaLOG) corrective regimen sliding scale   SubCutaneous at bedtime  aspirin enteric coated 81 milliGRAM(s) Oral daily  dextrose 5%. 1000 milliLiter(s) IV Continuous <Continuous>  influenza   Vaccine 0.5 milliLiter(s) IntraMuscular once  petrolatum white Ointment 1 Application(s) Topical two times a day  warfarin 5 milliGRAM(s) Oral once      REVIEW OF SYSTEMS:    CONSTITUTIONAL: No fever, weight loss, or fatigue  EYES: No eye pain, visual disturbances, or discharge  NECK: No pain or stiffness  RESPIRATORY: No cough, wheezing, chills or hemoptysis; No Shortness of Breath  CARDIOVASCULAR: No chest pain, palpitations, dizziness, or leg swelling  GASTROINTESTINAL: No abdominal or epigastric pain. No nausea, vomiting, or hematemesis; No diarrhea or constipation. No melena or hematochezia.  GENITOURINARY: No dysuria, frequency, hematuria, or incontinence  NEUROLOGICAL: No headaches, memory loss, loss of strength, numbness, or tremors  SKIN: No itching, burning, rashes, or lesions   LYMPH Nodes: No enlarged glands  MUSCULOSKELETAL: No joint pain or swelling; No muscle, back, or extremity pain  All other review of systems are negative.  	      PHYSICAL EXAM:  T(C): 36.6 (09-29-20 @ 05:14), Max: 36.6 (09-28-20 @ 11:45)  HR: 76 (09-29-20 @ 05:14) (64 - 82)  BP: 135/74 (09-29-20 @ 05:14) (101/78 - 154/80)  RR: 18 (09-29-20 @ 05:14) (16 - 18)  SpO2: 96% (09-29-20 @ 05:14) (96% - 100%)  Wt(kg): --  I&O's Summary    28 Sep 2020 07:01  -  29 Sep 2020 07:00  --------------------------------------------------------  IN: 50 mL / OUT: 1000 mL / NET: -950 mL      Height (cm): 165.1 (09-28 @ 10:42)  Weight (kg): 72.6 (09-28 @ 10:42)  BMI (kg/m2): 26.6 (09-28 @ 10:42)  BSA (m2): 1.8 (09-28 @ 10:42)    PHYSICAL EXAM    Appearance: Normal	  HEENT:   Normal oral mucosa, PERRL, EOMI	  NECK: Soft and supple, No LAD, No JVD  Cardiovascular: Regular Rate and Rhythm, Normal S1 S2, healed scar, crisp pv sounds  Skin: No rashes, No ecchymoses, No cyanosis  Neurologic: Non-focal  Extremities: No clubbing, cyanosis or edema    LABS:	 	             inr 2.49               11.7   6.39  )-----------( 251      ( 29 Sep 2020 07:11 )             34.6     09-29    137  |  102  |  15  ----------------------------<  108<H>  4.0   |  24  |  0.87    Ca    8.9      29 Sep 2020 07:11  Phos  3.9     09-29  Mg     1.8     09-29    TPro  6.1  /  Alb  3.2<L>  /  TBili  0.3  /  DBili  x   /  AST  25  /  ALT  25  /  AlkPhos  69  09-29

## 2020-09-29 NOTE — DISCHARGE NOTE PROVIDER - NSDCCPCAREPLAN_GEN_ALL_CORE_FT
PRINCIPAL DISCHARGE DIAGNOSIS  Diagnosis: Bacteremia due to Streptococcus  Assessment and Plan of Treatment: You came in to the hospital experiencing fever and chills. We alexandre blood cultures and found you to have bacteremia from Strep. mitis. We started you on an antibiotic called ceftriaxone. Per our ID specialists, you should continue this as an outpatient through your PICC line until 11/6/2020. We performed TTE and REEMA, which ruled out endocarditis. The dentists evaluated you and did not find any active infection. They suggested you follow up with a dentist as an outpatient for routine care. It is possible that the source of your infection was your rectal cancer.      SECONDARY DISCHARGE DIAGNOSES  Diagnosis: Rectal cancer  Assessment and Plan of Treatment: You have recently diagnosed rectal cancer. As explained above, this may have been the source of your bacterial infection. You are planned to have excisional surgery with Dr. Connelly in the upcoming weeks. You, Dr. Connelly, and Dr. Barrett should make a plan as to when this surgery will take place, given your ongoing antibiotic treatment.    Diagnosis: Atrial fibrillation  Assessment and Plan of Treatment: You have previously diagnosed atrial fibrillation on warfarin and metoprolol. We continued your home medication regimen.

## 2020-09-29 NOTE — DIETITIAN INITIAL EVALUATION ADULT. - DIET TYPE
PO diet: Regular, Lacto Veg Regular - Lacto Ovo Vegetarian (pt accepts milk and eggs). Monitor glucose fingersticks, add Consistent Carbohydrate restrictions as needed.

## 2020-09-29 NOTE — DIETITIAN INITIAL EVALUATION ADULT. - REASON INDICATOR FOR ASSESSMENT
Pt seen for length of stay assessment. Source: comprehensive chart review, pt. Pt is a 73 yo male with PMH of DM, CAD, afib, prosthetic mitral valve (2015), HTN, macular degeneration, hemorrhoids, localized rectal ca (dx 9/2020), who presented with chills x 10 days, admitted 9/23. Found with "strep mitis/oralis bacteremia likely 2/2 to history of rectal cancer. REEMA negative."

## 2020-09-29 NOTE — DISCHARGE NOTE PROVIDER - CARE PROVIDER_API CALL
Christian Connelly  COLON/RECTAL SURGERY  [address] [facility] [address], [address]  [place] [address]  Phone: [phone]  Fax: [phone]  Follow Up Time:     Fred Barrett  INFECTIOUS DISEASE  [address] [place] [address]  Phone: [phone]  Fax: [phone]  Follow Up Time:     Raciel Perkins  GASTROENTEROLOGY  [address] [place] [address], [address]  [place] [address]  Phone: [phone]  Fax: [phone]  Follow Up Time:

## 2020-09-29 NOTE — PROGRESS NOTE ADULT - ASSESSMENT
ASSESSMENT    Plan  74M with known rectal cancer, now with bacteremia  - f/u REEMA  - Will plan fo resection as outpt    Green Team Surgery [phone] ASSESSMENT  74M with known rectal cancer, now with bacteremia    Plan:  - TTE w/o endocarditis  - Will plan fo resection as outpt    Green Team Surgery [phone] ASSESSMENT  74M with known rectal cancer, now with bacteremia    Plan:  - TTE w/o endocarditis  - Will plan fo resection as outpt  - will sign off at this time, call for any questions or concerns    Green Team Surgery [phone]

## 2020-09-29 NOTE — DIETITIAN INITIAL EVALUATION ADULT. - PHYSICAL APPEARANCE
other (specify)/well nourished Ht: 65 inches (165.1 cm) Wt: 160 pounds (72.7 kg)  BMI: 26.7 kg/m2  IBW: 136 pounds +/-10% %IBW: 118%  Skin: no pressure injuries per flowsheets   Edema: no edema per flowsheets

## 2020-09-30 ENCOUNTER — TRANSCRIPTION ENCOUNTER (OUTPATIENT)
Age: 75
End: 2020-09-30

## 2020-09-30 VITALS
SYSTOLIC BLOOD PRESSURE: 153 MMHG | RESPIRATION RATE: 18 BRPM | OXYGEN SATURATION: 99 % | TEMPERATURE: 99 F | HEART RATE: 75 BPM | DIASTOLIC BLOOD PRESSURE: 73 MMHG

## 2020-09-30 LAB
ALBUMIN SERPL ELPH-MCNC: 3.4 G/DL — SIGNIFICANT CHANGE UP (ref 3.3–5)
ALP SERPL-CCNC: 67 U/L — SIGNIFICANT CHANGE UP (ref 40–120)
ALT FLD-CCNC: 25 U/L — SIGNIFICANT CHANGE UP (ref 10–45)
ANION GAP SERPL CALC-SCNC: 9 MMOL/L — SIGNIFICANT CHANGE UP (ref 5–17)
APTT BLD: 40.5 SEC — HIGH (ref 27.5–35.5)
AST SERPL-CCNC: 25 U/L — SIGNIFICANT CHANGE UP (ref 10–40)
BILIRUB SERPL-MCNC: 0.3 MG/DL — SIGNIFICANT CHANGE UP (ref 0.2–1.2)
BUN SERPL-MCNC: 20 MG/DL — SIGNIFICANT CHANGE UP (ref 7–23)
CALCIUM SERPL-MCNC: 9 MG/DL — SIGNIFICANT CHANGE UP (ref 8.4–10.5)
CHLORIDE SERPL-SCNC: 101 MMOL/L — SIGNIFICANT CHANGE UP (ref 96–108)
CO2 SERPL-SCNC: 27 MMOL/L — SIGNIFICANT CHANGE UP (ref 22–31)
CREAT SERPL-MCNC: 1.03 MG/DL — SIGNIFICANT CHANGE UP (ref 0.5–1.3)
CULTURE RESULTS: SIGNIFICANT CHANGE UP
CULTURE RESULTS: SIGNIFICANT CHANGE UP
GLUCOSE BLDC GLUCOMTR-MCNC: 108 MG/DL — HIGH (ref 70–99)
GLUCOSE BLDC GLUCOMTR-MCNC: 110 MG/DL — HIGH (ref 70–99)
GLUCOSE BLDC GLUCOMTR-MCNC: 110 MG/DL — HIGH (ref 70–99)
GLUCOSE SERPL-MCNC: 110 MG/DL — HIGH (ref 70–99)
HCT VFR BLD CALC: 32.6 % — LOW (ref 39–50)
HGB BLD-MCNC: 10.9 G/DL — LOW (ref 13–17)
INR BLD: 2.5 RATIO — HIGH (ref 0.88–1.16)
MAGNESIUM SERPL-MCNC: 1.9 MG/DL — SIGNIFICANT CHANGE UP (ref 1.6–2.6)
MCHC RBC-ENTMCNC: 32.1 PG — SIGNIFICANT CHANGE UP (ref 27–34)
MCHC RBC-ENTMCNC: 33.4 GM/DL — SIGNIFICANT CHANGE UP (ref 32–36)
MCV RBC AUTO: 95.9 FL — SIGNIFICANT CHANGE UP (ref 80–100)
NRBC # BLD: 0 /100 WBCS — SIGNIFICANT CHANGE UP (ref 0–0)
PHOSPHATE SERPL-MCNC: 3.6 MG/DL — SIGNIFICANT CHANGE UP (ref 2.5–4.5)
PLATELET # BLD AUTO: 266 K/UL — SIGNIFICANT CHANGE UP (ref 150–400)
POTASSIUM SERPL-MCNC: 3.9 MMOL/L — SIGNIFICANT CHANGE UP (ref 3.5–5.3)
POTASSIUM SERPL-SCNC: 3.9 MMOL/L — SIGNIFICANT CHANGE UP (ref 3.5–5.3)
PROT SERPL-MCNC: 6.1 G/DL — SIGNIFICANT CHANGE UP (ref 6–8.3)
PROTHROM AB SERPL-ACNC: 28.7 SEC — HIGH (ref 10.6–13.6)
RBC # BLD: 3.4 M/UL — LOW (ref 4.2–5.8)
RBC # FLD: 13.2 % — SIGNIFICANT CHANGE UP (ref 10.3–14.5)
SODIUM SERPL-SCNC: 137 MMOL/L — SIGNIFICANT CHANGE UP (ref 135–145)
SPECIMEN SOURCE: SIGNIFICANT CHANGE UP
SPECIMEN SOURCE: SIGNIFICANT CHANGE UP
WBC # BLD: 5.79 K/UL — SIGNIFICANT CHANGE UP (ref 3.8–10.5)
WBC # FLD AUTO: 5.79 K/UL — SIGNIFICANT CHANGE UP (ref 3.8–10.5)

## 2020-09-30 PROCEDURE — 99285 EMERGENCY DEPT VISIT HI MDM: CPT | Mod: 25

## 2020-09-30 PROCEDURE — 87086 URINE CULTURE/COLONY COUNT: CPT

## 2020-09-30 PROCEDURE — 82962 GLUCOSE BLOOD TEST: CPT

## 2020-09-30 PROCEDURE — 85027 COMPLETE CBC AUTOMATED: CPT

## 2020-09-30 PROCEDURE — 71045 X-RAY EXAM CHEST 1 VIEW: CPT

## 2020-09-30 PROCEDURE — 88305 TISSUE EXAM BY PATHOLOGIST: CPT

## 2020-09-30 PROCEDURE — 84295 ASSAY OF SERUM SODIUM: CPT

## 2020-09-30 PROCEDURE — 85610 PROTHROMBIN TIME: CPT

## 2020-09-30 PROCEDURE — 93306 TTE W/DOPPLER COMPLETE: CPT

## 2020-09-30 PROCEDURE — 85018 HEMOGLOBIN: CPT

## 2020-09-30 PROCEDURE — 93320 DOPPLER ECHO COMPLETE: CPT

## 2020-09-30 PROCEDURE — 99232 SBSQ HOSP IP/OBS MODERATE 35: CPT

## 2020-09-30 PROCEDURE — 36573 INSJ PICC RS&I 5 YR+: CPT

## 2020-09-30 PROCEDURE — 80061 LIPID PANEL: CPT

## 2020-09-30 PROCEDURE — 83036 HEMOGLOBIN GLYCOSYLATED A1C: CPT

## 2020-09-30 PROCEDURE — 85014 HEMATOCRIT: CPT

## 2020-09-30 PROCEDURE — 87184 SC STD DISK METHOD PER PLATE: CPT

## 2020-09-30 PROCEDURE — 87150 DNA/RNA AMPLIFIED PROBE: CPT

## 2020-09-30 PROCEDURE — 83735 ASSAY OF MAGNESIUM: CPT

## 2020-09-30 PROCEDURE — 82947 ASSAY GLUCOSE BLOOD QUANT: CPT

## 2020-09-30 PROCEDURE — U0003: CPT

## 2020-09-30 PROCEDURE — 85730 THROMBOPLASTIN TIME PARTIAL: CPT

## 2020-09-30 PROCEDURE — 74177 CT ABD & PELVIS W/CONTRAST: CPT

## 2020-09-30 PROCEDURE — 99239 HOSP IP/OBS DSCHRG MGMT >30: CPT

## 2020-09-30 PROCEDURE — 82330 ASSAY OF CALCIUM: CPT

## 2020-09-30 PROCEDURE — 87040 BLOOD CULTURE FOR BACTERIA: CPT

## 2020-09-30 PROCEDURE — 83605 ASSAY OF LACTIC ACID: CPT

## 2020-09-30 PROCEDURE — 93005 ELECTROCARDIOGRAM TRACING: CPT

## 2020-09-30 PROCEDURE — C1751: CPT

## 2020-09-30 PROCEDURE — 84132 ASSAY OF SERUM POTASSIUM: CPT

## 2020-09-30 PROCEDURE — 85025 COMPLETE CBC W/AUTO DIFF WBC: CPT

## 2020-09-30 PROCEDURE — 82435 ASSAY OF BLOOD CHLORIDE: CPT

## 2020-09-30 PROCEDURE — 84100 ASSAY OF PHOSPHORUS: CPT

## 2020-09-30 PROCEDURE — 93312 ECHO TRANSESOPHAGEAL: CPT

## 2020-09-30 PROCEDURE — 97110 THERAPEUTIC EXERCISES: CPT

## 2020-09-30 PROCEDURE — 82803 BLOOD GASES ANY COMBINATION: CPT

## 2020-09-30 PROCEDURE — 97161 PT EVAL LOW COMPLEX 20 MIN: CPT

## 2020-09-30 PROCEDURE — 96374 THER/PROPH/DIAG INJ IV PUSH: CPT

## 2020-09-30 PROCEDURE — 81001 URINALYSIS AUTO W/SCOPE: CPT

## 2020-09-30 PROCEDURE — 96375 TX/PRO/DX INJ NEW DRUG ADDON: CPT

## 2020-09-30 PROCEDURE — 80053 COMPREHEN METABOLIC PANEL: CPT

## 2020-09-30 PROCEDURE — 93325 DOPPLER ECHO COLOR FLOW MAPG: CPT

## 2020-09-30 RX ORDER — WARFARIN SODIUM 2.5 MG/1
5 TABLET ORAL ONCE
Refills: 0 | Status: DISCONTINUED | OUTPATIENT
Start: 2020-09-30 | End: 2020-09-30

## 2020-09-30 RX ORDER — CEFTRIAXONE 500 MG/1
2 INJECTION, POWDER, FOR SOLUTION INTRAMUSCULAR; INTRAVENOUS
Qty: 70 | Refills: 0
Start: 2020-09-30 | End: 2020-11-03

## 2020-09-30 RX ADMIN — CEFTRIAXONE 100 MILLIGRAM(S): 500 INJECTION, POWDER, FOR SOLUTION INTRAMUSCULAR; INTRAVENOUS at 13:35

## 2020-09-30 RX ADMIN — Medication 1 APPLICATION(S): at 05:02

## 2020-09-30 RX ADMIN — Medication 2 GRAM(S): at 05:02

## 2020-09-30 RX ADMIN — Medication 2 GRAM(S): at 18:50

## 2020-09-30 RX ADMIN — Medication 100 MILLIGRAM(S): at 13:34

## 2020-09-30 RX ADMIN — Medication 1 APPLICATION(S): at 18:50

## 2020-09-30 RX ADMIN — Medication 81 MILLIGRAM(S): at 13:34

## 2020-09-30 RX ADMIN — Medication 25 MILLIGRAM(S): at 18:49

## 2020-09-30 RX ADMIN — Medication 25 MILLIGRAM(S): at 05:02

## 2020-09-30 RX ADMIN — Medication 100 MILLIGRAM(S): at 05:02

## 2020-09-30 NOTE — DISCHARGE NOTE NURSING/CASE MANAGEMENT/SOCIAL WORK - PATIENT PORTAL LINK FT
You can access the FollowMyHealth Patient Portal offered by [facility] by registering at the following website: [email or web address]. By joining [employer]’s FollowMyHealth portal, you will also be able to view your health information using other applications (apps) compatible with our system.

## 2020-09-30 NOTE — PROGRESS NOTE ADULT - SUBJECTIVE AND OBJECTIVE BOX
PROGRESS NOTE:     CONTACT INFO:  Kenyatta Arias MD   PGY-1  Pager: [phone] [facility]    Patient is a 74y old  Male who presents with a chief complaint of Fever (30 Sep 2020 10:43)      SUBJECTIVE / OVERNIGHT EVENTS:  No acute events overnight. Patient seen and evaluated at bedside. No fever/chills. States that his cough has resolved on Tessalon Perle. Denies SOB at rest, chest pain, palpitations, abdominal pain, nausea/vomiting. Pt is pending PICC placement today.     ADDITIONAL REVIEW OF SYSTEMS:    MEDICATIONS  (STANDING):  aspirin enteric coated 81 milliGRAM(s) Oral daily  atorvastatin 40 milliGRAM(s) Oral at bedtime  benzonatate 100 milliGRAM(s) Oral three times a day  cefTRIAXone   IVPB 2000 milliGRAM(s) IV Intermittent every 24 hours  dextrose 5%. 1000 milliLiter(s) (50 mL/Hr) IV Continuous <Continuous>  dextrose 50% Injectable 12.5 Gram(s) IV Push once  dextrose 50% Injectable 25 Gram(s) IV Push once  dextrose 50% Injectable 25 Gram(s) IV Push once  influenza   Vaccine 0.5 milliLiter(s) IntraMuscular once  insulin lispro (HumaLOG) corrective regimen sliding scale   SubCutaneous three times a day before meals  insulin lispro (HumaLOG) corrective regimen sliding scale   SubCutaneous at bedtime  metoprolol tartrate 25 milliGRAM(s) Oral two times a day  omega-3-Acid Ethyl Esters 2 Gram(s) Oral two times a day  petrolatum white Ointment 1 Application(s) Topical two times a day  tamsulosin 0.4 milliGRAM(s) Oral at bedtime    MEDICATIONS  (PRN):  dextrose 40% Gel 15 Gram(s) Oral once PRN Blood Glucose LESS THAN 70 milliGRAM(s)/deciliter  glucagon  Injectable 1 milliGRAM(s) IntraMuscular once PRN Glucose LESS THAN 70 milligrams/deciliter  guaiFENesin   Syrup  (Sugar-Free) 100 milliGRAM(s) Oral every 6 hours PRN Cough      CAPILLARY BLOOD GLUCOSE      POCT Blood Glucose.: 110 mg/dL (30 Sep 2020 08:39)  POCT Blood Glucose.: 151 mg/dL (29 Sep 2020 21:32)  POCT Blood Glucose.: 124 mg/dL (29 Sep 2020 18:32)  POCT Blood Glucose.: 107 mg/dL (29 Sep 2020 13:08)    I&O's Summary    29 Sep 2020 07:01  -  30 Sep 2020 07:00  --------------------------------------------------------  IN: 790 mL / OUT: 1700 mL / NET: -910 mL        PHYSICAL EXAM:  Vital Signs Last 24 Hrs  T(C): 36.5 (30 Sep 2020 04:16), Max: 36.7 (29 Sep 2020 13:30)  T(F): 97.7 (30 Sep 2020 04:16), Max: 98.1 (29 Sep 2020 13:30)  HR: 77 (30 Sep 2020 04:16) (69 - 77)  BP: 157/70 (30 Sep 2020 04:16) (105/62 - 157/70)  BP(mean): --  RR: 18 (30 Sep 2020 04:16) (18 - 18)  SpO2: 100% (30 Sep 2020 04:16) (100% - 100%)    GENERAL: no acute distress; well-developed  HEAD:  Atraumatic, Normocephalic  EYES: EOMI, PERRLA, conjunctiva and sclera clear, + exophthalmos OD  ENT: MMM; oropharynx clear  NECK: Supple, No JVD  CHEST/LUNG: Clear to auscultation bilaterally; No wheeze  HEART: Irregular rate, normal S1S2, no murmurs, rubs, gallops  ABDOMEN: Soft, Nontender, Nondistended; Bowel sounds present  EXTREMITIES:  2+ Peripheral Pulses, No clubbing, cyanosis, or edema  PSYCH: AAOx3  NEUROLOGY: no focal motor or sensory deficits. 5/5 muscle strength in all extremities.   SKIN: chronic discoloration noted on back; no rashes.    LABS:                        10.9   5.79  )-----------( 266      ( 30 Sep 2020 07:08 )             32.6     09-30    137  |  101  |  20  ----------------------------<  110<H>  3.9   |  27  |  1.03    Ca    9.0      30 Sep 2020 07:07  Phos  3.6     09-30  Mg     1.9     09-30    TPro  6.1  /  Alb  3.4  /  TBili  0.3  /  DBili  x   /  AST  25  /  ALT  25  /  AlkPhos  67  09-30    PT/INR - ( 29 Sep 2020 07:11 )   PT: 28.4 sec;   INR: 2.49 ratio         PTT - ( 29 Sep 2020 07:11 )  PTT:35.5 sec

## 2020-09-30 NOTE — PRE PROCEDURE NOTE - HISTORY OF PRESENT ILLNESS
Interventional Radiology  Pre-Procedure Note    This is a 74y  Male      HPI:  Pt is 74M hx DM, CAD, AFib, prosthetic mitral valve (2015), HTN, macular degeneration, hemorrhoids, localized rectal cancer (dx 9/2020), presenting with chills for 10 days. Symptoms worsened over the last several days, which prompted him to present to hospital. Symptoms began 2 days after his cancer staging CT scans. He had one episode of vomiting 10 days ago. Pt has had several days of dry cough but denies any fever until 5 days ago. Denies any nausea or diarrhea. Denies any CP, SOB, abdominal pain. Denies any dysuria, hematuria, or blood in the stool, although he states he usually has difficulty passing bowel movements. 5 days ago, he developed fever that was responsive Tylenol. 2 days ago, pt had a colonoscopy that he tolerated well. The day following his colonoscopy, he developed shaking and severe chills. Pt is planned for surgical resection of his rectal cancer in the upcoming weeks.     ED course: Temperature 103.4, SBP 98/58, RR 20, HR 95. Pt received vanco, cefepime, flagyl, 2L LR.  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 1000 milliLiter(s) IV Continuous <Continuous>  dextrose 50% Injectable 12.5 Gram(s) IV Push once  dextrose 50% Injectable 25 Gram(s) IV Push once  dextrose 50% Injectable 25 Gram(s) IV Push once  glucagon  Injectable 1 milliGRAM(s) IntraMuscular once PRN  guaiFENesin   Syrup  (Sugar-Free) 100 milliGRAM(s) Oral every 6 hours PRN  influenza   Vaccine 0.5 milliLiter(s) IntraMuscular once  insulin lispro (HumaLOG) corrective regimen sliding scale   SubCutaneous three times a day before meals  insulin lispro (HumaLOG) corrective regimen sliding scale   SubCutaneous at bedtime  metoprolol tartrate 25 milliGRAM(s) Oral two times a day  omega-3-Acid Ethyl Esters 2 Gram(s) Oral two times a day  petrolatum white Ointment 1 Application(s) Topical two times a day  tamsulosin 0.4 milliGRAM(s) Oral at bedtime  warfarin 5 milliGRAM(s) Oral once      Labs:                         10.9   5.79  )-----------( 266      ( 30 Sep 2020 07:08 )             32.6       09-30    137  |  101  |  20  ----------------------------<  110<H>  3.9   |  27  |  1.03    Ca    9.0      30 Sep 2020 07:07  Phos  3.6     09-30  Mg     1.9     09-30    TPro  6.1  /  Alb  3.4  /  TBili  0.3  /  DBili  x   /  AST  25  /  ALT  25  /  AlkPhos  67  09-30

## 2020-09-30 NOTE — CONSULT NOTE ADULT - CONSULT REASON
rectal ca
PICC placement for Abx tx
R/O endocarditis
Rule out source of dental infection
Streptococcus mitis/oralis group bacteremia

## 2020-09-30 NOTE — PROGRESS NOTE ADULT - PROBLEM SELECTOR PLAN 4
- atorvastatin as above  - lipid panel showing decreased HDL, LDL wnl
- atorvastatin as above  - f/u lipid panel
- atorvastatin as above  - lipid panel showing decreased HDL, LDL wnl
- atorvastatin as above  - lipid panel showing decreased HDL, LDL wnl

## 2020-09-30 NOTE — PROGRESS NOTE ADULT - ATTENDING COMMENTS
I agree with the above surgery resident's note.  out pt trans anal excision when cleared by ID
73 yo w recently diagnosed localized rectal ca, bioprosthetic mitral valve aw fever, strep mitis bacteremia. Improving, repeat cx sent today. REEMA next week.
Patient seen and examined. Feels well. 74M hx DM, CAD, AFib, prosthetic mitral valve (2015), HTN, macular degeneration, hemorrhoids, localized rectal cancer (dx 9/2020), presenting with fever and chills in the setting of Strep mitis/oralis bacteremia. Awaiting REEMA, NPO MN. High risk for underlying endocarditis given hx of valve replacement and likely will need prolonged abx course. On abx per ID. Afebrile. Cards following. Inc warfarin does 7.5mg. for low INR.    Maurizio Orta MD  Division of Hospital Medicine  Pager: [phone]  Office: [phone]
Strep bacteremia, possibly group D. On IV Ceftriaxone. Repeat cx tomorrow. Echo pending.
Awaiting PICC. D/c planning.
D/c home after PICC.
REEMA negative for vegetations. Will arrange PICC.
Patient seen and examined. Feels well. 74M hx DM, CAD, AFib, prosthetic mitral valve (2015), HTN, macular degeneration, hemorrhoids, localized rectal cancer (dx 9/2020), presenting with fever and chills in the setting of Strep mitis/oralis bacteremia. Awaiting REEMA. High risk for underlying endocarditis given valve replacement. On abx per ID. Afebrile. Cards following.    Maurizio Orta MD  Division of Hospital Medicine  Pager: [phone]  Office: [phone]

## 2020-09-30 NOTE — PROGRESS NOTE ADULT - PROBLEM SELECTOR PLAN 6
Pt takes 500mg metformin at home  - Low dose ISS  - A1c 6.2
Pt takes 500mg metformin at home  - Low dose ISS  - F/u A1c in AM
Pt takes 500mg metformin at home  - Low dose ISS  - A1c 6.2

## 2020-09-30 NOTE — DISCHARGE NOTE NURSING/CASE MANAGEMENT/SOCIAL WORK - NSSCNAMETXT_GEN_ALL_CORE
[facility] will be contacting you to schedule home visit for 10/1/2020.

## 2020-09-30 NOTE — PROGRESS NOTE ADULT - SUBJECTIVE AND OBJECTIVE BOX
Patient is a 74y old  Male who presents with a chief complaint of Fever (29 Sep 2020 15:55)    Being followed by ID for S mitis bacteremia, h/o valve replacement    Interval history:feels well  for PICC line  No complaints   No acute events      ROS:  No cough,SOB,CP  No N/V/D./abd pain  No other complaints      Antimicrobials:    cefTRIAXone   IVPB 2000 milliGRAM(s) IV Intermittent every 24 hours    Other medications reviewed    Vital Signs Last 24 Hrs  T(C): 36.5 (09-30-20 @ 04:16), Max: 36.7 (09-29-20 @ 13:30)  T(F): 97.7 (09-30-20 @ 04:16), Max: 98.1 (09-29-20 @ 13:30)  HR: 77 (09-30-20 @ 04:16) (69 - 77)  BP: 157/70 (09-30-20 @ 04:16) (105/62 - 157/70)  BP(mean): --  RR: 18 (09-30-20 @ 04:16) (18 - 18)  SpO2: 100% (09-30-20 @ 04:16) (100% - 100%)    Physical Exam:        HEENT PERRLA EOMI    No oral exudate or erythema    Chest Good AE,CTA    CVS RRR S1 S2 WNlPSM no rub or gallop    Abd soft BS normal No tenderness no masses    IV site no erythema tenderness or discharge    CNS AAO X 3 no focal    Lab Data:                          10.9   5.79  )-----------( 266      ( 30 Sep 2020 07:08 )             32.6       09-30    137  |  101  |  20  ----------------------------<  110<H>  3.9   |  27  |  1.03    Ca    9.0      30 Sep 2020 07:07  Phos  3.6     09-30  Mg     1.9     09-30    TPro  6.1  /  Alb  3.4  /  TBili  0.3  /  DBili  x   /  AST  25  /  ALT  25  /  AlkPhos  67  09-30        Culture - Blood (collected 27 Sep 2020 09:58)  Source: .Blood Blood-Venous  Preliminary Report (28 Sep 2020 10:01):    No growth to date.    Culture - Blood (collected 27 Sep 2020 09:58)  Source: .Blood Blood-Peripheral  Preliminary Report (28 Sep 2020 10:01):    No growth to date.      < from: CT Abdomen and Pelvis w/ IV Cont (09.26.20 @ 13:13) >    IMPRESSION:  No evidence of infectious source in the abdomen or pelvis.          < end of copied text >  < from: Transesophageal Echocardiogram w/o TTE (09.28.20 @ 10:54) >  ------------------------------------------------------------------------  Conclusions:  Normal left ventricular systolic function. No segmental  wall motion abnormalities.  Bioprosthetic mitral valve with normal function. Mild  mitral regurgitation.  No evidence of endocarditits. If clinical index of  suspicion is high, consider repeat REEMA in one week.    < end of copied text >

## 2020-09-30 NOTE — PROGRESS NOTE ADULT - PROBLEM SELECTOR PLAN 8
Pt complaining of chronic dry cough when speaking, now resolved.   - C/w robitussin PRN  - Cough improved on Tessalon Perle, continue.
Pt complaining of chronic dry cough when speaking.  - C/w robitussin PRN  - Cough improved on Tessalon Perle, continue.
Pt complaining of chronic dry cough when speaking.  - C/w robitussin PRN  - Cough improved on Tessalon Perle, continue.

## 2020-09-30 NOTE — CONSULT NOTE ADULT - SUBJECTIVE AND OBJECTIVE BOX
Vascular & Interventional Radiology Brief Consult Note    Evaluate for Procedure: PICC placement for Abx tx  HPI: 74y Male admitted s/p valvular repair and s. mitis infection. IR consulted for PICC placement for antibiotics.     Allergies:   Medications (Abx/Cardiac/Anticoagulation/Blood Products)  aspirin enteric coated: 81 milliGRAM(s) Oral (09-29 @ 11:52)  cefTRIAXone   IVPB: 100 mL/Hr IV Intermittent (09-29 @ 13:41)  metoprolol tartrate: 25 milliGRAM(s) Oral (09-30 @ 05:02)  tamsulosin: 0.4 milliGRAM(s) Oral (09-29 @ 21:47)  warfarin: 2.5 milliGRAM(s) Oral (09-28 @ 21:17)  warfarin: 5 milliGRAM(s) Oral (09-29 @ 21:47)    Data:    T(C): 36.5  HR: 77  BP: 157/70  RR: 18  SpO2: 100%    -WBC 5.79 / HgB 10.9 / Hct 32.6 / Plt 266  -Na 137 / Cl 101 / BUN 20 / Glucose 110  -K 3.9 / CO2 27 / Cr 1.03  -ALT 25 / Alk Phos 67 / T.Bili 0.3  -INR 2.49 / PTT 35.5    Assessment/Plan:   - 74y Male with s. mitis infection s/p valve repair for PICC line placement for Abx.   - Case reviewed and discussed with Dr. Engel, please place IR procedure order as discussed.

## 2020-09-30 NOTE — PROGRESS NOTE ADULT - ASSESSMENT
74M hx DM(a1c=6.2%), CAD, AFib, prosthetic mitral valve (2015), HTN, macular degeneration, hemorrhoids, localized rectal cancer (dx 9/2020), presenting with chills for 10 days.   S mitis oralis bacteremia-high grade  No abd symptoms  Clinically stable  Repeat Cx so far negative  abd Ct no abscess  PCN VANCE 0.03  REEMA no vegetation    A) Strep mitis bacteremia  repeat blood cx negative  Ct no abscess  REEMA no vegetation or s/s paravalvular abscess/leak  Given Prosthetic valve + immunecomproised status will recommend 6 weeks of antimicrobials  Given low VANCE + no PVE on REEMA + rapid clearance risk/benefit assessment will favor not using gentamicin (pt at significant risk for toxicity with his age)  Since getting fuull course no need for repeat REEMA unless meliza s/s worsening or repeat cx positive  Can proceed with PICC  PICC side effects,abx side effects discussed.Risks/benefits and alternatives explained.  CBC,CMP weekly,fax results to [phone].Call [phone] with critical results.  PICC care per home care.  To follow in ID clinic in 3-4 weeks ,asked to call and make appointment.      B) Prosthetic valve ? endocarditis    plan as above    C) Rectal ca  case d/w Dr Connelly  Think reasonable to proceed with surgery in 3-4 weeks-per Dr connelly not advisable to wait longer.Pt will be on abx + will ahve gotten atleast 2-3 weeks already  would recommend continuing ceftriaxone lopez-op and after for 6 weeks  and anaerobic coverage (flagyl) during surgery as prophylaxis.Other per protocol      Will tailor plan for ID issues  per course,results.Will defer to primary team on management of other issues.  Assessment, plan and recommendations as detailed above were discussed with the medical/primary  team , Dr Connelly (surgery) and daughter(who is a physycian)  For DC when OPAT setup    Will Follow.  Beeper [id number] [facility] [id number].   Wknd/afterhours/No response-[phone] or Fellow on call

## 2020-09-30 NOTE — PROGRESS NOTE ADULT - PROBLEM SELECTOR PROBLEM 2
AF (atrial fibrillation)

## 2020-09-30 NOTE — PRE PROCEDURE NOTE - NSPROCASSESMENT_GEN_ALL_CORE
PICC insertion. The full procedure including risks and benefits was discussed with the patient. Consent obtained.

## 2020-09-30 NOTE — PROGRESS NOTE ADULT - REASON FOR ADMISSION
Fever

## 2020-09-30 NOTE — PROGRESS NOTE ADULT - SUBJECTIVE AND OBJECTIVE BOX
No complaints   Awaiting PICC line  BP 10677  HR 77  Lungs clear  RR 1-2/6 systolic murmur  No edema    INR 2.49  WBC 5.79  Hgb 10.9  Hct 32.6  Plt 266K    Imp:  Strep Mitis bacteremia   No evidence of endocarditis  Awaiting PICC line and then DC on home antibiotics

## 2020-09-30 NOTE — PROGRESS NOTE ADULT - PROBLEM SELECTOR PLAN 7
- Pt is planned for surgical resection, will proceed as an outpatient once pt's current symptoms resolve and possible infection is cleared.
- Pt is planned for surgical resection, will proceed as an outpatient once pt's current symptoms resolve and possible infection is cleared.  - Per ID, OK for surgery in 3-4 weeks as above.
- Pt is planned for surgical resection, will proceed as an outpatient once pt's current symptoms resolve and possible infection is cleared.  - Per ID, OK for surgery in 3-4 weeks as above.
- Pt is planned for surgical resection, will proceed as an outpatient once pt's current symptoms resolve and possible infection is cleared.
- Pt is planned for surgical resection, will proceed as an outpatient once pt's current symptoms resolve and possible infection is cleared.

## 2020-09-30 NOTE — PROGRESS NOTE ADULT - PROBLEM SELECTOR PLAN 3
- c/w aspirin, atorvastatin

## 2020-10-01 DIAGNOSIS — J18.9 PNEUMONIA, UNSPECIFIED ORGANISM: ICD-10-CM

## 2020-10-02 LAB
CULTURE RESULTS: SIGNIFICANT CHANGE UP
CULTURE RESULTS: SIGNIFICANT CHANGE UP
SPECIMEN SOURCE: SIGNIFICANT CHANGE UP
SPECIMEN SOURCE: SIGNIFICANT CHANGE UP

## 2020-10-05 DIAGNOSIS — Z23 ENCOUNTER FOR IMMUNIZATION: ICD-10-CM

## 2020-10-06 ENCOUNTER — APPOINTMENT (OUTPATIENT)
Dept: INTERNAL MEDICINE | Facility: CLINIC | Age: 75
End: 2020-10-06
Payer: MEDICARE

## 2020-10-06 PROCEDURE — G0008: CPT

## 2020-10-06 PROCEDURE — 90662 IIV NO PRSV INCREASED AG IM: CPT

## 2020-10-08 ENCOUNTER — NON-APPOINTMENT (OUTPATIENT)
Age: 75
End: 2020-10-08

## 2020-10-08 ENCOUNTER — OUTPATIENT (OUTPATIENT)
Dept: OUTPATIENT SERVICES | Facility: HOSPITAL | Age: 75
LOS: 1 days | End: 2020-10-08
Payer: MEDICARE

## 2020-10-08 VITALS
SYSTOLIC BLOOD PRESSURE: 152 MMHG | OXYGEN SATURATION: 97 % | WEIGHT: 160.06 LBS | HEART RATE: 78 BPM | HEIGHT: 65 IN | TEMPERATURE: 99 F | RESPIRATION RATE: 16 BRPM | DIASTOLIC BLOOD PRESSURE: 88 MMHG

## 2020-10-08 DIAGNOSIS — I48.91 UNSPECIFIED ATRIAL FIBRILLATION: ICD-10-CM

## 2020-10-08 DIAGNOSIS — Z95.2 PRESENCE OF PROSTHETIC HEART VALVE: Chronic | ICD-10-CM

## 2020-10-08 DIAGNOSIS — C20 MALIGNANT NEOPLASM OF RECTUM: ICD-10-CM

## 2020-10-08 DIAGNOSIS — Z01.818 ENCOUNTER FOR OTHER PREPROCEDURAL EXAMINATION: ICD-10-CM

## 2020-10-08 DIAGNOSIS — I10 ESSENTIAL (PRIMARY) HYPERTENSION: ICD-10-CM

## 2020-10-08 DIAGNOSIS — E11.9 TYPE 2 DIABETES MELLITUS WITHOUT COMPLICATIONS: ICD-10-CM

## 2020-10-08 LAB
A1C WITH ESTIMATED AVERAGE GLUCOSE RESULT: 6.4 % — HIGH (ref 4–5.6)
ALBUMIN SERPL ELPH-MCNC: 4 G/DL — SIGNIFICANT CHANGE UP (ref 3.3–5)
ALP SERPL-CCNC: 79 U/L — SIGNIFICANT CHANGE UP (ref 40–120)
ALT FLD-CCNC: 20 U/L — SIGNIFICANT CHANGE UP (ref 10–45)
ANION GAP SERPL CALC-SCNC: 12 MMOL/L — SIGNIFICANT CHANGE UP (ref 5–17)
AST SERPL-CCNC: 20 U/L — SIGNIFICANT CHANGE UP (ref 10–40)
BILIRUB SERPL-MCNC: 0.6 MG/DL — SIGNIFICANT CHANGE UP (ref 0.2–1.2)
BLD GP AB SCN SERPL QL: NEGATIVE — SIGNIFICANT CHANGE UP
BUN SERPL-MCNC: 14 MG/DL — SIGNIFICANT CHANGE UP (ref 7–23)
CALCIUM SERPL-MCNC: 9.4 MG/DL — SIGNIFICANT CHANGE UP (ref 8.4–10.5)
CEA SERPL-MCNC: 1.4 NG/ML — SIGNIFICANT CHANGE UP (ref 0–3.8)
CHLORIDE SERPL-SCNC: 101 MMOL/L — SIGNIFICANT CHANGE UP (ref 96–108)
CO2 SERPL-SCNC: 22 MMOL/L — SIGNIFICANT CHANGE UP (ref 22–31)
CREAT SERPL-MCNC: 0.9 MG/DL — SIGNIFICANT CHANGE UP (ref 0.5–1.3)
ESTIMATED AVERAGE GLUCOSE: 137 MG/DL — HIGH (ref 68–114)
GLUCOSE SERPL-MCNC: 124 MG/DL — HIGH (ref 70–99)
HCT VFR BLD CALC: 36.4 % — LOW (ref 39–50)
HGB BLD-MCNC: 12 G/DL — LOW (ref 13–17)
INR BLD: 3.27 RATIO — HIGH (ref 0.88–1.16)
MCHC RBC-ENTMCNC: 31.4 PG — SIGNIFICANT CHANGE UP (ref 27–34)
MCHC RBC-ENTMCNC: 33 GM/DL — SIGNIFICANT CHANGE UP (ref 32–36)
MCV RBC AUTO: 95.3 FL — SIGNIFICANT CHANGE UP (ref 80–100)
NRBC # BLD: 0 /100 WBCS — SIGNIFICANT CHANGE UP (ref 0–0)
PLATELET # BLD AUTO: 271 K/UL — SIGNIFICANT CHANGE UP (ref 150–400)
POTASSIUM SERPL-MCNC: 4 MMOL/L — SIGNIFICANT CHANGE UP (ref 3.5–5.3)
POTASSIUM SERPL-SCNC: 4 MMOL/L — SIGNIFICANT CHANGE UP (ref 3.5–5.3)
PROT SERPL-MCNC: 7.2 G/DL — SIGNIFICANT CHANGE UP (ref 6–8.3)
PROTHROM AB SERPL-ACNC: 36.8 SEC — HIGH (ref 10.6–13.6)
RBC # BLD: 3.82 M/UL — LOW (ref 4.2–5.8)
RBC # FLD: 13.3 % — SIGNIFICANT CHANGE UP (ref 10.3–14.5)
RH IG SCN BLD-IMP: POSITIVE — SIGNIFICANT CHANGE UP
SODIUM SERPL-SCNC: 135 MMOL/L — SIGNIFICANT CHANGE UP (ref 135–145)
WBC # BLD: 6.02 K/UL — SIGNIFICANT CHANGE UP (ref 3.8–10.5)
WBC # FLD AUTO: 6.02 K/UL — SIGNIFICANT CHANGE UP (ref 3.8–10.5)

## 2020-10-08 PROCEDURE — G0463: CPT

## 2020-10-08 PROCEDURE — 83036 HEMOGLOBIN GLYCOSYLATED A1C: CPT

## 2020-10-08 PROCEDURE — 86901 BLOOD TYPING SEROLOGIC RH(D): CPT

## 2020-10-08 PROCEDURE — 80053 COMPREHEN METABOLIC PANEL: CPT

## 2020-10-08 PROCEDURE — 85027 COMPLETE CBC AUTOMATED: CPT

## 2020-10-08 PROCEDURE — 82378 CARCINOEMBRYONIC ANTIGEN: CPT

## 2020-10-08 PROCEDURE — 86850 RBC ANTIBODY SCREEN: CPT

## 2020-10-08 PROCEDURE — 86900 BLOOD TYPING SEROLOGIC ABO: CPT

## 2020-10-08 PROCEDURE — 85610 PROTHROMBIN TIME: CPT

## 2020-10-08 RX ORDER — OMEGA-3 ACID ETHYL ESTERS 1 G
2 CAPSULE ORAL
Qty: 0 | Refills: 0 | DISCHARGE

## 2020-10-08 RX ORDER — CEFOTETAN DISODIUM 1 G
2 VIAL (EA) INJECTION ONCE
Refills: 0 | Status: DISCONTINUED | OUTPATIENT
Start: 2020-10-15 | End: 2020-10-29

## 2020-10-08 RX ORDER — UBIDECARENONE 100 MG
1 CAPSULE ORAL
Qty: 0 | Refills: 0 | DISCHARGE

## 2020-10-08 RX ORDER — TAMSULOSIN HYDROCHLORIDE 0.4 MG/1
1 CAPSULE ORAL
Qty: 0 | Refills: 0 | DISCHARGE

## 2020-10-08 RX ORDER — ROSUVASTATIN CALCIUM 5 MG/1
1 TABLET ORAL
Qty: 0 | Refills: 0 | DISCHARGE

## 2020-10-08 RX ORDER — SODIUM CHLORIDE 9 MG/ML
3 INJECTION INTRAMUSCULAR; INTRAVENOUS; SUBCUTANEOUS EVERY 8 HOURS
Refills: 0 | Status: DISCONTINUED | OUTPATIENT
Start: 2020-10-15 | End: 2020-10-29

## 2020-10-08 RX ORDER — ASPIRIN/CALCIUM CARB/MAGNESIUM 324 MG
1 TABLET ORAL
Qty: 0 | Refills: 0 | DISCHARGE

## 2020-10-08 RX ORDER — MULTIVIT-MIN/FERROUS GLUCONATE 9 MG/15 ML
1 LIQUID (ML) ORAL
Qty: 0 | Refills: 0 | DISCHARGE

## 2020-10-08 RX ORDER — AZILSARTAN KAMEDOXOMIL AND CHLORTHALIDONE 40; 12.5 MG/1; MG/1
1 TABLET ORAL
Qty: 0 | Refills: 0 | DISCHARGE

## 2020-10-08 RX ORDER — METFORMIN HYDROCHLORIDE 850 MG/1
1 TABLET ORAL
Qty: 0 | Refills: 0 | DISCHARGE

## 2020-10-08 RX ORDER — LIDOCAINE HCL 20 MG/ML
0.2 VIAL (ML) INJECTION ONCE
Refills: 0 | Status: DISCONTINUED | OUTPATIENT
Start: 2020-10-15 | End: 2020-10-29

## 2020-10-08 NOTE — H&P PST ADULT - NSICDXPASTMEDICALHX_GEN_ALL_CORE_FT
PAST MEDICAL HISTORY:  AF (atrial fibrillation) s/p ablation 2010- on Coumadin    After-cataract of both eyes 1996    AMD (age related macular degeneration) Right eye    BPH (Benign Prostatic Hyperplasia)     CAD (coronary artery disease)     CAD (coronary artery disease) stented coronary- LAD x 1 1997    Diverticulosis     H/O hemorrhoids     HTN (hypertension)     Hyperlipidemia      PAST MEDICAL HISTORY:  AF (atrial fibrillation) s/p ablation 2010- on Coumadin    After-cataract of both eyes 1996    AMD (age related macular degeneration) Right eye    Bacteremia due to Streptococcus 9/2020- on Ceftriaxone via PICC line x 6 weeks    BPH (Benign Prostatic Hyperplasia)     CAD (coronary artery disease) stented coronary- LAD x 1 1997    CAD (coronary artery disease)     Diverticulosis     H/O hemorrhoids     HTN (hypertension)     Hyperlipidemia

## 2020-10-08 NOTE — H&P PST ADULT - NSICDXPASTSURGICALHX_GEN_ALL_CORE_FT
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

## 2020-10-08 NOTE — H&P PST ADULT - NSICDXPROBLEM_GEN_ALL_CORE_FT
PROBLEM DIAGNOSES  Problem: Malignant neoplasm of rectum  Assessment and Plan: Trans anal excision of rectal carcinoma  labs- CBC, CMP, Hb A1C, PT/INR, T&S  pre op instructions discussed    Problem: Type 2 diabetes mellitus  Assessment and Plan: FS BS DOS    Problem: Benign hypertension  Assessment and Plan: continue with meds    Problem: Atrial fibrillation  Assessment and Plan: LD of Coumadin 10/12/20

## 2020-10-08 NOTE — H&P PST ADULT - HISTORY OF PRESENT ILLNESS
75 yo M with h/o HTN, T2DM,MVR with bovine valve, CABG x 2V(2015), A.Fib (on coumadin LD 10/12/20) c/o irregular BM, rectal bleed since 6/2020. Pt had surgical consult-s/p colonoscopy/biopsy, CT abdomen/chest/pevis revealed rectal ca- scheduled for transanal excision of rectal carcinoma on 10/15/20  Pt has PICC line inserted on 9/30/20-on ceftriaxone x 6 weeks    **Covid 19 PCR on 10/12/20 75 yo M with h/o HTN, T2DM,MVR with bovine valve, CABG x 2V(2015), A.Fib (on coumadin LD 10/12/20) c/o irregular BM, rectal bleed since 6/2020. Pt had surgical consult-s/p colonoscopy/biopsy, CT abdomen/chest/pevis revealed rectal ca- scheduled for transanal excision of rectal carcinoma on 10/15/20  Pt was admitted to [facility] with bacteremia due to strep on 9/23/20, PICC line inserted on 9/30/20-on ceftriaxone x 6 weeks    **Covid 19 PCR on 10/12/20

## 2020-10-09 ENCOUNTER — APPOINTMENT (OUTPATIENT)
Dept: INTERNAL MEDICINE | Facility: CLINIC | Age: 75
End: 2020-10-09
Payer: MEDICARE

## 2020-10-09 VITALS
BODY MASS INDEX: 25.7 KG/M2 | DIASTOLIC BLOOD PRESSURE: 95 MMHG | OXYGEN SATURATION: 98 % | WEIGHT: 158 LBS | HEART RATE: 55 BPM | HEIGHT: 65.75 IN | TEMPERATURE: 97.9 F | SYSTOLIC BLOOD PRESSURE: 152 MMHG

## 2020-10-09 PROCEDURE — 99214 OFFICE O/P EST MOD 30 MIN: CPT

## 2020-10-09 NOTE — PHYSICAL EXAM
[Normal Rate] : normal rate  [Normal] : soft, non-tender, non-distended, no masses palpated, no HSM and normal bowel sounds [de-identified] : afib

## 2020-10-09 NOTE — HISTORY OF PRESENT ILLNESS
[Atrial Fibrillation] : atrial fibrillation [Coronary Artery Disease] : coronary artery disease [No Pertinent Pulmonary History] : no history of asthma, COPD, sleep apnea, or smoking [No Adverse Anesthesia Reaction] : no adverse anesthesia reaction in self or family member [Chronic Anticoagulation] : chronic anticoagulation [Aortic Stenosis] : no aortic stenosis [Recent Myocardial Infarction] : no recent myocardial infarction [Implantable Device/Pacemaker] : no implantable device/pacemaker [(Patient denies any chest pain, claudication, dyspnea on exertion, orthopnea, palpitations or syncope)] : Patient denies any chest pain, claudication, dyspnea on exertion, orthopnea, palpitations or syncope [FreeTextEntry1] : TRansanal rectal resection [FreeTextEntry2] : oct 15 [FreeTextEntry3] : Dr Connelly [FreeTextEntry4] : Rectal cancer for removal\par history of cabg and vlave replacement and afib\par maintained on asa and warfarin\par inr 3.27 on oct 8  (30mg) week\par recent blood infection still on ceftriaxone to get antibiotic pre op\par echo normal lv function

## 2020-10-09 NOTE — PLAN
[FreeTextEntry1] : Pre op\par nl lv fucntion\par cbc and cmp normal\par inr 3.27\par take 2.5 mg 10/8 and 10/9 and stop\par take inr day prior surgery\par continue aspirin and metoprolol\par \par stop diabetic med and edarbi day prior to surgery\par stop lovaza\par \par Medically cleared for surgery\par \par Antibiotics per other md

## 2020-10-12 ENCOUNTER — OUTPATIENT (OUTPATIENT)
Dept: OUTPATIENT SERVICES | Facility: HOSPITAL | Age: 75
LOS: 1 days | End: 2020-10-12
Payer: MEDICARE

## 2020-10-12 DIAGNOSIS — Z11.59 ENCOUNTER FOR SCREENING FOR OTHER VIRAL DISEASES: ICD-10-CM

## 2020-10-12 DIAGNOSIS — Z95.2 PRESENCE OF PROSTHETIC HEART VALVE: Chronic | ICD-10-CM

## 2020-10-12 LAB — SARS-COV-2 RNA SPEC QL NAA+PROBE: SIGNIFICANT CHANGE UP

## 2020-10-12 PROCEDURE — U0003: CPT

## 2020-10-13 ENCOUNTER — NON-APPOINTMENT (OUTPATIENT)
Age: 75
End: 2020-10-13

## 2020-10-14 ENCOUNTER — TRANSCRIPTION ENCOUNTER (OUTPATIENT)
Age: 75
End: 2020-10-14

## 2020-10-15 ENCOUNTER — OUTPATIENT (OUTPATIENT)
Dept: OUTPATIENT SERVICES | Facility: HOSPITAL | Age: 75
LOS: 1 days | End: 2020-10-15
Payer: MEDICARE

## 2020-10-15 ENCOUNTER — RESULT REVIEW (OUTPATIENT)
Age: 75
End: 2020-10-15

## 2020-10-15 ENCOUNTER — APPOINTMENT (OUTPATIENT)
Dept: SURGERY | Facility: HOSPITAL | Age: 75
End: 2020-10-15
Payer: MEDICARE

## 2020-10-15 VITALS
TEMPERATURE: 98 F | HEART RATE: 81 BPM | RESPIRATION RATE: 18 BRPM | OXYGEN SATURATION: 100 % | WEIGHT: 160.06 LBS | SYSTOLIC BLOOD PRESSURE: 125 MMHG | DIASTOLIC BLOOD PRESSURE: 78 MMHG | HEIGHT: 65 IN

## 2020-10-15 VITALS
HEART RATE: 75 BPM | RESPIRATION RATE: 18 BRPM | OXYGEN SATURATION: 100 % | SYSTOLIC BLOOD PRESSURE: 163 MMHG | DIASTOLIC BLOOD PRESSURE: 75 MMHG

## 2020-10-15 DIAGNOSIS — C20 MALIGNANT NEOPLASM OF RECTUM: ICD-10-CM

## 2020-10-15 DIAGNOSIS — M79.661 PAIN IN RIGHT LOWER LEG: ICD-10-CM

## 2020-10-15 DIAGNOSIS — Z95.2 PRESENCE OF PROSTHETIC HEART VALVE: Chronic | ICD-10-CM

## 2020-10-15 DIAGNOSIS — M79.662 PAIN IN RIGHT LOWER LEG: ICD-10-CM

## 2020-10-15 LAB
GLUCOSE BLDC GLUCOMTR-MCNC: 135 MG/DL — HIGH (ref 70–99)
INR BLD: 1.39 RATIO — HIGH (ref 0.88–1.16)
PROTHROM AB SERPL-ACNC: 16.4 SEC — HIGH (ref 10.6–13.6)

## 2020-10-15 PROCEDURE — 88305 TISSUE EXAM BY PATHOLOGIST: CPT

## 2020-10-15 PROCEDURE — 45171 EXC RECT TUM TRANSANAL PART: CPT

## 2020-10-15 PROCEDURE — 88341 IMHCHEM/IMCYTCHM EA ADD ANTB: CPT

## 2020-10-15 PROCEDURE — 88342 IMHCHEM/IMCYTCHM 1ST ANTB: CPT | Mod: 26

## 2020-10-15 PROCEDURE — 85610 PROTHROMBIN TIME: CPT

## 2020-10-15 PROCEDURE — 88305 TISSUE EXAM BY PATHOLOGIST: CPT | Mod: 26

## 2020-10-15 PROCEDURE — 88341 IMHCHEM/IMCYTCHM EA ADD ANTB: CPT | Mod: 26

## 2020-10-15 PROCEDURE — 45172 EXC RECT TUM TRANSANAL FULL: CPT

## 2020-10-15 PROCEDURE — 88342 IMHCHEM/IMCYTCHM 1ST ANTB: CPT

## 2020-10-15 PROCEDURE — 82962 GLUCOSE BLOOD TEST: CPT

## 2020-10-15 RX ORDER — ACETAMINOPHEN 500 MG
1000 TABLET ORAL ONCE
Refills: 0 | Status: COMPLETED | OUTPATIENT
Start: 2020-10-15 | End: 2020-10-15

## 2020-10-15 RX ORDER — OXYCODONE HYDROCHLORIDE 5 MG/1
1 TABLET ORAL
Qty: 6 | Refills: 0
Start: 2020-10-15 | End: 2020-10-16

## 2020-10-15 RX ORDER — FENTANYL CITRATE 50 UG/ML
25 INJECTION INTRAVENOUS
Refills: 0 | Status: DISCONTINUED | OUTPATIENT
Start: 2020-10-15 | End: 2020-10-16

## 2020-10-15 RX ORDER — ONDANSETRON 8 MG/1
4 TABLET, FILM COATED ORAL EVERY 4 HOURS
Refills: 0 | Status: DISCONTINUED | OUTPATIENT
Start: 2020-10-15 | End: 2020-10-16

## 2020-10-15 RX ORDER — SODIUM CHLORIDE 9 MG/ML
1000 INJECTION, SOLUTION INTRAVENOUS
Refills: 0 | Status: DISCONTINUED | OUTPATIENT
Start: 2020-10-15 | End: 2020-10-29

## 2020-10-15 RX ADMIN — Medication 1000 MILLIGRAM(S): at 17:17

## 2020-10-15 RX ADMIN — FENTANYL CITRATE 25 MICROGRAM(S): 50 INJECTION INTRAVENOUS at 17:17

## 2020-10-15 RX ADMIN — FENTANYL CITRATE 25 MICROGRAM(S): 50 INJECTION INTRAVENOUS at 17:02

## 2020-10-15 RX ADMIN — Medication 400 MILLIGRAM(S): at 17:02

## 2020-10-15 NOTE — ASU DISCHARGE PLAN (ADULT/PEDIATRIC) - PROVIDER TOKENS
October 25, 2019      Ignacia Magdaleno DO  [address] [facility] [address]           [address]  [address], [address]  [facility] [address]  Phone: [phone]  Fax: [phone]          Patient: Jill Perez   MR Number: [id number]   YOB: 1959   Date of Visit: 10/25/2019       Dear Dr. Ignacia Magdaleno:    Thank you for referring Jill Perez to me for evaluation. Attached you will find relevant portions of my assessment and plan of care.    If you have questions, please do not hesitate to call me. I look forward to following Jill Perez along with you.    Sincerely,    Damien Lucio,     Enclosure  CC:  No Recipients    If you would like to receive this communication electronically, please contact [email or web address] or [phone] to request more information on [employer] Link access.    For providers and/or their staff who would like to refer a patient to [facility], please contact us through our one-stop-shop provider referral line, [facility], at [phone].    If you feel you have received this communication in error or would no longer like to receive these types of communications, please e-mail [email or web address]          PROVIDER:[TOKEN:[[id number]],FOLLOWUP:[2 weeks]]

## 2020-10-15 NOTE — PRE-ANESTHESIA EVALUATION ADULT - NSRADCARDRESULTSFT_GEN_ALL_CORE
< from: Transthoracic Echocardiogram (09.24.20 @ 17:58) >    Conclusions:  1. Bioprosthetic mitral valve replacement. Peak mitral  valve gradient equals 18 mm Hg, mean transmitral valve  gradient equals 8 mm Hg, which is elevated even in the  setting of a bioprosthetic mitral valve replacement.  2. Calcified aortic valve. Peak transaortic valve gradient  equals 5 mm Hg. Minimal aortic regurgitation.  3. Aortic Root: 4.1 cm.  4. Moderately dilated left atrium.  LA volume index = 44  cc/m2.  5. Mild concentric left ventricular hypertrophy.  6. Normal left ventricular systolic function. No segmental  wall motion abnormalities.  7. Mild diastolic dysfunction (Stage I).  8. Normal right ventricular size and function.  9. Estimated right ventricular systolic pressure equals 35  mm Hg, assuming right atrial pressure equals 8 mm Hg,  consistent with borderline pulmonary hypertension.  10. Normal tricuspid valve. Mild tricuspid regurgitation.  *** No previous Echo exam.    < end of copied text >    < from: Transthoracic Echocardiogram (09.24.20 @ 17:58) >    Conclusions:  1. Bioprosthetic mitral valve replacement. Peak mitral  valve gradient equals 18 mm Hg, mean transmitral valve  gradient equals 8 mm Hg, which is elevated even in the  setting of a bioprosthetic mitral valve replacement.  2. Calcified aortic valve. Peak transaortic valve gradient  equals 5 mm Hg. Minimal aortic regurgitation.  3. Aortic Root: 4.1 cm.  4. Moderately dilated left atrium.  LA volume index = 44  cc/m2.  5. Mild concentric left ventricular hypertrophy.  6. Normal left ventricular systolic function. No segmental  wall motion abnormalities.  7. Mild diastolic dysfunction (Stage I).  8. Normal right ventricular size and function.  9. Estimated right ventricular systolic pressure equals 35  mm Hg, assuming right atrial pressure equals 8 mm Hg,  consistent with borderline pulmonary hypertension.  10. Normal tricuspid valve. Mild tricuspid regurgitation.  *** No previous Echo exam.    < end of copied text >

## 2020-10-15 NOTE — ASU DISCHARGE PLAN (ADULT/PEDIATRIC) - CARE PROVIDER_API CALL
Christian Connelly  COLON/RECTAL SURGERY  [address] [facility], [facility] [address]  [place] [address]  Phone: [phone]  Fax: [phone]  Follow Up Time: 2 weeks

## 2020-10-15 NOTE — BRIEF OPERATIVE NOTE - OPERATION/FINDINGS
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

## 2020-10-19 ENCOUNTER — APPOINTMENT (OUTPATIENT)
Dept: ULTRASOUND IMAGING | Facility: IMAGING CENTER | Age: 75
End: 2020-10-19
Payer: MEDICARE

## 2020-10-19 ENCOUNTER — OUTPATIENT (OUTPATIENT)
Dept: OUTPATIENT SERVICES | Facility: HOSPITAL | Age: 75
LOS: 1 days | End: 2020-10-19

## 2020-10-19 DIAGNOSIS — M79.669 PAIN IN UNSPECIFIED LOWER LEG: ICD-10-CM

## 2020-10-19 DIAGNOSIS — Z95.2 PRESENCE OF PROSTHETIC HEART VALVE: Chronic | ICD-10-CM

## 2020-10-19 PROBLEM — M79.661 BILATERAL CALF PAIN: Status: ACTIVE | Noted: 2020-10-19

## 2020-10-19 PROBLEM — I25.10 ATHEROSCLEROTIC HEART DISEASE OF NATIVE CORONARY ARTERY WITHOUT ANGINA PECTORIS: Chronic | Status: ACTIVE | Noted: 2020-10-08

## 2020-10-19 PROBLEM — R78.81 BACTEREMIA: Chronic | Status: ACTIVE | Noted: 2020-10-08

## 2020-10-19 PROBLEM — H35.30 UNSPECIFIED MACULAR DEGENERATION: Chronic | Status: ACTIVE | Noted: 2020-10-08

## 2020-10-19 PROCEDURE — 93970 EXTREMITY STUDY: CPT | Mod: 26

## 2020-10-20 ENCOUNTER — TRANSCRIPTION ENCOUNTER (OUTPATIENT)
Age: 75
End: 2020-10-20

## 2020-10-21 ENCOUNTER — INPATIENT (INPATIENT)
Facility: HOSPITAL | Age: 75
LOS: 8 days | Discharge: HOME | DRG: 857 | End: 2020-10-30
Attending: COLON & RECTAL SURGERY | Admitting: COLON & RECTAL SURGERY
Payer: MEDICARE

## 2020-10-21 ENCOUNTER — NON-APPOINTMENT (OUTPATIENT)
Age: 75
End: 2020-10-21

## 2020-10-21 VITALS
DIASTOLIC BLOOD PRESSURE: 65 MMHG | WEIGHT: 160.06 LBS | OXYGEN SATURATION: 98 % | HEIGHT: 65 IN | RESPIRATION RATE: 18 BRPM | HEART RATE: 106 BPM | TEMPERATURE: 98 F | SYSTOLIC BLOOD PRESSURE: 176 MMHG

## 2020-10-21 DIAGNOSIS — Z95.2 PRESENCE OF PROSTHETIC HEART VALVE: Chronic | ICD-10-CM

## 2020-10-21 DIAGNOSIS — T81.49XA INFECTION FOLLOWING A PROCEDURE, OTHER SURGICAL SITE, INITIAL ENCOUNTER: ICD-10-CM

## 2020-10-21 LAB
ALBUMIN SERPL ELPH-MCNC: 3.6 G/DL — SIGNIFICANT CHANGE UP (ref 3.3–5)
ALP SERPL-CCNC: 88 U/L — SIGNIFICANT CHANGE UP (ref 40–120)
ALT FLD-CCNC: 13 U/L — SIGNIFICANT CHANGE UP (ref 10–45)
ANION GAP SERPL CALC-SCNC: 13 MMOL/L — SIGNIFICANT CHANGE UP (ref 5–17)
ANION GAP SERPL CALC-SCNC: 16 MMOL/L — SIGNIFICANT CHANGE UP (ref 5–17)
ANISOCYTOSIS BLD QL: SLIGHT — SIGNIFICANT CHANGE UP
APTT BLD: 34.8 SEC — SIGNIFICANT CHANGE UP (ref 27.5–35.5)
APTT BLD: 39.2 SEC — HIGH (ref 27.5–35.5)
AST SERPL-CCNC: 17 U/L — SIGNIFICANT CHANGE UP (ref 10–40)
BASOPHILS # BLD AUTO: 0.36 K/UL — HIGH (ref 0–0.2)
BASOPHILS NFR BLD AUTO: 2 % — SIGNIFICANT CHANGE UP (ref 0–2)
BILIRUB SERPL-MCNC: 0.9 MG/DL — SIGNIFICANT CHANGE UP (ref 0.2–1.2)
BLD GP AB SCN SERPL QL: NEGATIVE — SIGNIFICANT CHANGE UP
BUN SERPL-MCNC: 11 MG/DL — SIGNIFICANT CHANGE UP (ref 7–23)
BUN SERPL-MCNC: 13 MG/DL — SIGNIFICANT CHANGE UP (ref 7–23)
CALCIUM SERPL-MCNC: 7.3 MG/DL — LOW (ref 8.4–10.5)
CALCIUM SERPL-MCNC: 9 MG/DL — SIGNIFICANT CHANGE UP (ref 8.4–10.5)
CHLORIDE SERPL-SCNC: 90 MMOL/L — LOW (ref 96–108)
CHLORIDE SERPL-SCNC: 90 MMOL/L — LOW (ref 96–108)
CO2 SERPL-SCNC: 18 MMOL/L — LOW (ref 22–31)
CO2 SERPL-SCNC: 22 MMOL/L — SIGNIFICANT CHANGE UP (ref 22–31)
CREAT ?TM UR-MCNC: 29 MG/DL — SIGNIFICANT CHANGE UP
CREAT SERPL-MCNC: 0.77 MG/DL — SIGNIFICANT CHANGE UP (ref 0.5–1.3)
CREAT SERPL-MCNC: 0.94 MG/DL — SIGNIFICANT CHANGE UP (ref 0.5–1.3)
ELLIPTOCYTES BLD QL SMEAR: SLIGHT — SIGNIFICANT CHANGE UP
EOSINOPHIL # BLD AUTO: 0 K/UL — SIGNIFICANT CHANGE UP (ref 0–0.5)
EOSINOPHIL NFR BLD AUTO: 0 % — SIGNIFICANT CHANGE UP (ref 0–6)
GAS PNL BLDA: SIGNIFICANT CHANGE UP
GLUCOSE SERPL-MCNC: 129 MG/DL — HIGH (ref 70–99)
GLUCOSE SERPL-MCNC: 147 MG/DL — HIGH (ref 70–99)
HCT VFR BLD CALC: 27.7 % — LOW (ref 39–50)
HCT VFR BLD CALC: 33.7 % — LOW (ref 39–50)
HGB BLD-MCNC: 11.5 G/DL — LOW (ref 13–17)
HGB BLD-MCNC: 9.8 G/DL — LOW (ref 13–17)
HYPOCHROMIA BLD QL: SLIGHT — SIGNIFICANT CHANGE UP
INR BLD: 1.66 RATIO — HIGH (ref 0.88–1.16)
INR BLD: 2.87 RATIO — HIGH (ref 0.88–1.16)
LACTATE BLDV-MCNC: 2.8 MMOL/L — HIGH (ref 0.7–2)
LYMPHOCYTES # BLD AUTO: 0.36 K/UL — LOW (ref 1–3.3)
LYMPHOCYTES # BLD AUTO: 2 % — LOW (ref 13–44)
MACROCYTES BLD QL: SLIGHT — SIGNIFICANT CHANGE UP
MAGNESIUM SERPL-MCNC: 1.3 MG/DL — LOW (ref 1.6–2.6)
MANUAL SMEAR VERIFICATION: SIGNIFICANT CHANGE UP
MCHC RBC-ENTMCNC: 31.6 PG — SIGNIFICANT CHANGE UP (ref 27–34)
MCHC RBC-ENTMCNC: 31.9 PG — SIGNIFICANT CHANGE UP (ref 27–34)
MCHC RBC-ENTMCNC: 34.1 GM/DL — SIGNIFICANT CHANGE UP (ref 32–36)
MCHC RBC-ENTMCNC: 35.4 GM/DL — SIGNIFICANT CHANGE UP (ref 32–36)
MCV RBC AUTO: 90.2 FL — SIGNIFICANT CHANGE UP (ref 80–100)
MCV RBC AUTO: 92.6 FL — SIGNIFICANT CHANGE UP (ref 80–100)
MONOCYTES # BLD AUTO: 0.55 K/UL — SIGNIFICANT CHANGE UP (ref 0–0.9)
MONOCYTES NFR BLD AUTO: 3 % — SIGNIFICANT CHANGE UP (ref 2–14)
NEUTROPHILS # BLD AUTO: 16.04 K/UL — HIGH (ref 1.8–7.4)
NEUTROPHILS NFR BLD AUTO: 85 % — HIGH (ref 43–77)
NEUTS BAND # BLD: 3 % — SIGNIFICANT CHANGE UP (ref 0–8)
NRBC # BLD: 0 /100 WBCS — SIGNIFICANT CHANGE UP (ref 0–0)
NRBC # BLD: 0 /100 — SIGNIFICANT CHANGE UP (ref 0–0)
OVALOCYTES BLD QL SMEAR: SLIGHT — SIGNIFICANT CHANGE UP
PHOSPHATE SERPL-MCNC: 2.5 MG/DL — SIGNIFICANT CHANGE UP (ref 2.5–4.5)
PLAT MORPH BLD: NORMAL — SIGNIFICANT CHANGE UP
PLATELET # BLD AUTO: 158 K/UL — SIGNIFICANT CHANGE UP (ref 150–400)
PLATELET # BLD AUTO: 196 K/UL — SIGNIFICANT CHANGE UP (ref 150–400)
POIKILOCYTOSIS BLD QL AUTO: SLIGHT — SIGNIFICANT CHANGE UP
POLYCHROMASIA BLD QL SMEAR: SLIGHT — SIGNIFICANT CHANGE UP
POTASSIUM SERPL-MCNC: 2.9 MMOL/L — CRITICAL LOW (ref 3.5–5.3)
POTASSIUM SERPL-MCNC: 3.1 MMOL/L — LOW (ref 3.5–5.3)
POTASSIUM SERPL-SCNC: 2.9 MMOL/L — CRITICAL LOW (ref 3.5–5.3)
POTASSIUM SERPL-SCNC: 3.1 MMOL/L — LOW (ref 3.5–5.3)
PROT SERPL-MCNC: 7 G/DL — SIGNIFICANT CHANGE UP (ref 6–8.3)
PROTHROM AB SERPL-ACNC: 19.4 SEC — HIGH (ref 10.6–13.6)
PROTHROM AB SERPL-ACNC: 32.8 SEC — HIGH (ref 10.6–13.6)
RBC # BLD: 3.07 M/UL — LOW (ref 4.2–5.8)
RBC # BLD: 3.64 M/UL — LOW (ref 4.2–5.8)
RBC # FLD: 13.4 % — SIGNIFICANT CHANGE UP (ref 10.3–14.5)
RBC # FLD: 13.6 % — SIGNIFICANT CHANGE UP (ref 10.3–14.5)
RBC BLD AUTO: ABNORMAL
RH IG SCN BLD-IMP: POSITIVE — SIGNIFICANT CHANGE UP
SARS-COV-2 RNA SPEC QL NAA+PROBE: SIGNIFICANT CHANGE UP
SODIUM SERPL-SCNC: 121 MMOL/L — LOW (ref 135–145)
SODIUM SERPL-SCNC: 128 MMOL/L — LOW (ref 135–145)
SODIUM UR-SCNC: 97 MMOL/L — SIGNIFICANT CHANGE UP
VARIANT LYMPHS # BLD: 5 % — SIGNIFICANT CHANGE UP (ref 0–6)
WBC # BLD: 14.59 K/UL — HIGH (ref 3.8–10.5)
WBC # BLD: 18.23 K/UL — HIGH (ref 3.8–10.5)
WBC # FLD AUTO: 14.59 K/UL — HIGH (ref 3.8–10.5)
WBC # FLD AUTO: 18.23 K/UL — HIGH (ref 3.8–10.5)

## 2020-10-21 PROCEDURE — 74176 CT ABD & PELVIS W/O CONTRAST: CPT | Mod: 26

## 2020-10-21 PROCEDURE — 93010 ELECTROCARDIOGRAM REPORT: CPT

## 2020-10-21 PROCEDURE — 99285 EMERGENCY DEPT VISIT HI MDM: CPT | Mod: CS

## 2020-10-21 PROCEDURE — 71045 X-RAY EXAM CHEST 1 VIEW: CPT | Mod: 26

## 2020-10-21 PROCEDURE — 99233 SBSQ HOSP IP/OBS HIGH 50: CPT

## 2020-10-21 PROCEDURE — 99222 1ST HOSP IP/OBS MODERATE 55: CPT

## 2020-10-21 RX ORDER — SODIUM CHLORIDE 9 MG/ML
1000 INJECTION INTRAMUSCULAR; INTRAVENOUS; SUBCUTANEOUS ONCE
Refills: 0 | Status: COMPLETED | OUTPATIENT
Start: 2020-10-21 | End: 2020-10-21

## 2020-10-21 RX ORDER — TAMSULOSIN HYDROCHLORIDE 0.4 MG/1
0.4 CAPSULE ORAL ONCE
Refills: 0 | Status: COMPLETED | OUTPATIENT
Start: 2020-10-21 | End: 2020-10-21

## 2020-10-21 RX ORDER — TAMSULOSIN HYDROCHLORIDE 0.4 MG/1
0.4 CAPSULE ORAL AT BEDTIME
Refills: 0 | Status: DISCONTINUED | OUTPATIENT
Start: 2020-10-22 | End: 2020-10-27

## 2020-10-21 RX ORDER — SODIUM CHLORIDE 9 MG/ML
1000 INJECTION, SOLUTION INTRAVENOUS
Refills: 0 | Status: DISCONTINUED | OUTPATIENT
Start: 2020-10-21 | End: 2020-10-22

## 2020-10-21 RX ORDER — OMEGA-3 ACID ETHYL ESTERS 1 G
2 CAPSULE ORAL AT BEDTIME
Refills: 0 | Status: DISCONTINUED | OUTPATIENT
Start: 2020-10-22 | End: 2020-10-30

## 2020-10-21 RX ORDER — ATORVASTATIN CALCIUM 80 MG/1
40 TABLET, FILM COATED ORAL AT BEDTIME
Refills: 0 | Status: DISCONTINUED | OUTPATIENT
Start: 2020-10-22 | End: 2020-10-30

## 2020-10-21 RX ORDER — LOSARTAN POTASSIUM 100 MG/1
50 TABLET, FILM COATED ORAL DAILY
Refills: 0 | Status: DISCONTINUED | OUTPATIENT
Start: 2020-10-22 | End: 2020-10-30

## 2020-10-21 RX ORDER — PROTHROMBIN COMPLEX CONCENTRATE (HUMAN) 25.5; 16.5; 24; 22; 22; 26 [IU]/ML; [IU]/ML; [IU]/ML; [IU]/ML; [IU]/ML; [IU]/ML
1500 POWDER, FOR SOLUTION INTRAVENOUS ONCE
Refills: 0 | Status: COMPLETED | OUTPATIENT
Start: 2020-10-21 | End: 2020-10-21

## 2020-10-21 RX ORDER — PIPERACILLIN AND TAZOBACTAM 4; .5 G/20ML; G/20ML
3.38 INJECTION, POWDER, LYOPHILIZED, FOR SOLUTION INTRAVENOUS EVERY 8 HOURS
Refills: 0 | Status: DISCONTINUED | OUTPATIENT
Start: 2020-10-22 | End: 2020-10-27

## 2020-10-21 RX ORDER — OXYCODONE HYDROCHLORIDE 5 MG/1
10 TABLET ORAL EVERY 6 HOURS
Refills: 0 | Status: DISCONTINUED | OUTPATIENT
Start: 2020-10-21 | End: 2020-10-28

## 2020-10-21 RX ORDER — OXYCODONE HYDROCHLORIDE 5 MG/1
5 TABLET ORAL EVERY 4 HOURS
Refills: 0 | Status: DISCONTINUED | OUTPATIENT
Start: 2020-10-21 | End: 2020-10-27

## 2020-10-21 RX ORDER — METOPROLOL TARTRATE 50 MG
25 TABLET ORAL EVERY 12 HOURS
Refills: 0 | Status: DISCONTINUED | OUTPATIENT
Start: 2020-10-22 | End: 2020-10-30

## 2020-10-21 RX ORDER — CALCIUM GLUCONATE 100 MG/ML
2 VIAL (ML) INTRAVENOUS ONCE
Refills: 0 | Status: COMPLETED | OUTPATIENT
Start: 2020-10-21 | End: 2020-10-21

## 2020-10-21 RX ORDER — VANCOMYCIN HCL 1 G
1000 VIAL (EA) INTRAVENOUS EVERY 12 HOURS
Refills: 0 | Status: DISCONTINUED | OUTPATIENT
Start: 2020-10-22 | End: 2020-10-22

## 2020-10-21 RX ORDER — PIPERACILLIN AND TAZOBACTAM 4; .5 G/20ML; G/20ML
3.38 INJECTION, POWDER, LYOPHILIZED, FOR SOLUTION INTRAVENOUS ONCE
Refills: 0 | Status: COMPLETED | OUTPATIENT
Start: 2020-10-21 | End: 2020-10-21

## 2020-10-21 RX ORDER — ACETAMINOPHEN 500 MG
975 TABLET ORAL EVERY 6 HOURS
Refills: 0 | Status: DISCONTINUED | OUTPATIENT
Start: 2020-10-21 | End: 2020-10-23

## 2020-10-21 RX ORDER — SENNA PLUS 8.6 MG/1
2 TABLET ORAL AT BEDTIME
Refills: 0 | Status: DISCONTINUED | OUTPATIENT
Start: 2020-10-21 | End: 2020-10-22

## 2020-10-21 RX ORDER — VANCOMYCIN HCL 1 G
1000 VIAL (EA) INTRAVENOUS ONCE
Refills: 0 | Status: COMPLETED | OUTPATIENT
Start: 2020-10-21 | End: 2020-10-21

## 2020-10-21 RX ORDER — ENOXAPARIN SODIUM 100 MG/ML
40 INJECTION SUBCUTANEOUS DAILY
Refills: 0 | Status: DISCONTINUED | OUTPATIENT
Start: 2020-10-21 | End: 2020-10-23

## 2020-10-21 RX ORDER — HYDROMORPHONE HYDROCHLORIDE 2 MG/ML
0.5 INJECTION INTRAMUSCULAR; INTRAVENOUS; SUBCUTANEOUS EVERY 4 HOURS
Refills: 0 | Status: DISCONTINUED | OUTPATIENT
Start: 2020-10-21 | End: 2020-10-23

## 2020-10-21 RX ORDER — MAGNESIUM SULFATE 500 MG/ML
2 VIAL (ML) INJECTION
Refills: 0 | Status: COMPLETED | OUTPATIENT
Start: 2020-10-21 | End: 2020-10-22

## 2020-10-21 RX ORDER — INSULIN LISPRO 100/ML
VIAL (ML) SUBCUTANEOUS EVERY 6 HOURS
Refills: 0 | Status: DISCONTINUED | OUTPATIENT
Start: 2020-10-21 | End: 2020-10-23

## 2020-10-21 RX ORDER — POTASSIUM CHLORIDE 20 MEQ
40 PACKET (EA) ORAL EVERY 4 HOURS
Refills: 0 | Status: COMPLETED | OUTPATIENT
Start: 2020-10-21 | End: 2020-10-22

## 2020-10-21 RX ORDER — ACETAMINOPHEN 500 MG
975 TABLET ORAL ONCE
Refills: 0 | Status: DISCONTINUED | OUTPATIENT
Start: 2020-10-21 | End: 2020-10-21

## 2020-10-21 RX ORDER — METOPROLOL TARTRATE 50 MG
25 TABLET ORAL ONCE
Refills: 0 | Status: COMPLETED | OUTPATIENT
Start: 2020-10-21 | End: 2020-10-21

## 2020-10-21 RX ORDER — POLYETHYLENE GLYCOL 3350 17 G/17G
17 POWDER, FOR SOLUTION ORAL DAILY
Refills: 0 | Status: DISCONTINUED | OUTPATIENT
Start: 2020-10-21 | End: 2020-10-22

## 2020-10-21 RX ORDER — CHLORHEXIDINE GLUCONATE 213 G/1000ML
1 SOLUTION TOPICAL
Refills: 0 | Status: DISCONTINUED | OUTPATIENT
Start: 2020-10-22 | End: 2020-10-23

## 2020-10-21 RX ADMIN — Medication 200 GRAM(S): at 23:16

## 2020-10-21 RX ADMIN — Medication 25 MILLIGRAM(S): at 23:16

## 2020-10-21 RX ADMIN — Medication 50 GRAM(S): at 23:16

## 2020-10-21 RX ADMIN — TAMSULOSIN HYDROCHLORIDE 0.4 MILLIGRAM(S): 0.4 CAPSULE ORAL at 23:16

## 2020-10-21 RX ADMIN — SODIUM CHLORIDE 1000 MILLILITER(S): 9 INJECTION INTRAMUSCULAR; INTRAVENOUS; SUBCUTANEOUS at 17:57

## 2020-10-21 RX ADMIN — PROTHROMBIN COMPLEX CONCENTRATE (HUMAN) 400 INTERNATIONAL UNIT(S): 25.5; 16.5; 24; 22; 22; 26 POWDER, FOR SOLUTION INTRAVENOUS at 19:13

## 2020-10-21 RX ADMIN — PIPERACILLIN AND TAZOBACTAM 3.38 GRAM(S): 4; .5 INJECTION, POWDER, LYOPHILIZED, FOR SOLUTION INTRAVENOUS at 19:06

## 2020-10-21 RX ADMIN — Medication 100 MILLIGRAM(S): at 18:41

## 2020-10-21 RX ADMIN — Medication 250 MILLIGRAM(S): at 19:07

## 2020-10-21 RX ADMIN — Medication 40 MILLIEQUIVALENT(S): at 22:29

## 2020-10-21 RX ADMIN — PIPERACILLIN AND TAZOBACTAM 200 GRAM(S): 4; .5 INJECTION, POWDER, LYOPHILIZED, FOR SOLUTION INTRAVENOUS at 17:56

## 2020-10-21 NOTE — ED ADULT NURSE NOTE - OBJECTIVE STATEMENT
75 y/o male coming in from home complaining of fever/chills. AOx4, ambulates with cane, PMH recent hemorrhoidectomy 10/15/2020, HTN, CAD, BPH, afib, HLD, mitral valve replacement 2015. Pt. states he recently had a bloody bowel movement and had a biopsy done on 9/30/2020. Pt. reports he was in the hospital here from 9/23/2020 to 9/30/2020 and discharged with upper left arm PICC line on IV antibiotics. Pt. had a rectal mass resected on 10/15/2020 by Dr. Connelly at [facility]. Since then, patient has been having chills and worsening fevers since last night with highest temp of 101 F. Pt. presenting now with chills, reports he has had something leaking from his rectum since the surgery. Tachy to 120s. Oral temp 99.4 F, no rectal temp can be obtained as per Dr. Connelly due to incision. Blood cultures x2, anitbiotics initiated. Pt. mentating well. Placed on cardiac monitor, EKG done at bedside. Will continue to reassess.

## 2020-10-21 NOTE — ED PROVIDER NOTE - ATTENDING CONTRIBUTION TO CARE
attending Dangelo: 74yM sp resection of rectal mass 4 days ago here with fever, concern for site infection/abscess. Surgery at bedside shortly after arrival. Will obtain labs, CT, broad spectrum abx, likely admission to surgery

## 2020-10-21 NOTE — ED ADULT NURSE REASSESSMENT NOTE - NS ED NURSE REASSESS COMMENT FT1
Valuables secured in envelope with security. Two RN present to witness sealing of envelope. All other belongings secured in ED.

## 2020-10-21 NOTE — H&P ADULT - ATTENDING COMMENTS
I have seen and examined the patient. I agree with the above surgery resident's note.  a/p- perineal sepsis 1 week s/p trans anal excision for distal rectal cancer- to OR for emergent incision, drainage, debridement  r/b/c explained to pt, wife and dtr

## 2020-10-21 NOTE — PROGRESS NOTE ADULT - ASSESSMENT
74M hx DM(a1c=6.2%), CAD, AFib, prosthetic mitral valve (2015), HTN, macular degeneration, hemorrhoids, localized rectal cancer (dx 9/2020), presenting with chills for 10 days.   S mitis oralis bacteremia-high grade  No abd symptoms  Clinically stable  Repeat Cx so far negative  abd Ct no abscess  PCn VANCE 0.03  REEMA no vegetation  On home IV ceftriaxone X 6 week course-was doing well    s/p Rectal mass resection 4 days ago  Now with   1) fever to 101.6 yesterday night  2) rectal pain and discharge  3) Leucocytosis    Rec:  A) Fever leucocytosis  ? post op complication/occult abd source  ? PICC related infection  ? Other occult source-no clinical signs    Rec:  1) check blood Cx  2) CT abd pelvis, rectal wound exam and surgical eval  3) CXR  4) check labs  5) empiric Vanco + zosyn  adjust doses per renal function and monitor vanco trough, renal function  6) If any hemodynamic instability remove PICC    B) S mitis oralis bacteremia  likely as from rectal mass  REEMA no PVE  check blood Cx  If positive will need echo  abx as above     Patient will likely need admission and workup    Will tailor plan for ID issues  per course,results.Will defer to primary team on management of other issues.  Assessment, plan and recommendations as detailed above were discussed with the surgical  team.  Will Follow.  Beeper [phone] MARCIA [id number].   Wknd/afterhours/No response-[phone] or Fellow on call

## 2020-10-21 NOTE — PROGRESS NOTE ADULT - SUBJECTIVE AND OBJECTIVE BOX
Patient is a 74y old  Male who presents with a chief complaint of   Being followed by ID for S mitis/oralis bacteremia, h/o CR ca-s/p recent ant resection 4 days ago  Now with fever,leucocytosis    Interval history:Patient on IV ceftriaxone for 6 week course.  had surgical resction 4 days ago  Febrile last night  complaining of discharge from wound  WBC yesterday was 18(in HIE)  asked to come to ER         ROS:  No cough,SOB,CP  No N/V/D.  + severe rectal pain  some discharge  No rhinorrhea or sore throat  + limb lower swelling  NO PICC site tenderenss  No other complaints      Antimicrobials:  ceftriaxone 2 gm IV q 24 (DID NOT TAKRE TODAYS DOSE)    Other medications reviewed    Vital Signs Last 24 Hrs  T(C): 36.9 (10-21-20 @ 16:26), Max: 36.9 (10-21-20 @ 16:26)  T(F): 98.4 (10-21-20 @ 16:26), Max: 98.4 (10-21-20 @ 16:26)  HR: 106 (10-21-20 @ 16:26) (106 - 106)  BP: 176/65 (10-21-20 @ 16:26) (176/65 - 176/65)  BP(mean): --  RR: 18 (10-21-20 @ 16:26) (18 - 18)  SpO2: 98% (10-21-20 @ 16:26) (98% - 98%)    Physical Exam:        HEENT PERRLA EOMI    No oral exudate or erythema    Chest Good AE,CTA    CVS RRR S1 S2 WNl PSM no  rub or gallop    Abd soft BS normal No tenderness no masses  rectal area-limited exam -pt still in triage- + feculent discharge       PICC  site no erythema tenderness or discharge    + LE edema     CNS AAO X 3 no focal    Lab Data:      COVID-19 PCR: NotDetec: Testing is performed using polymerase chain reaction (PCR) or       WBC Count: 6.02 K/uL (10.08.20 @ 12:45)     10/20 WBC 18  N 80%    Culture - Blood (09.27.20 @ 09:58)   Specimen Source: .Blood Blood-Venous   Culture Results:   No Growth Final Culture - Blood (09.23.20 @ 16:35)   - Streptococcus sp. (Not Grp A, B or S pneumoniae): Detec   Gram Stain:   Growth in aerobic bottle: Gram Positive Cocci in Pairs and Chains   Growth in anaerobic bottle: Gram Positive Cocci in Pairs and Chains   - Ceftriaxone: S 0.032   - Clindamycin: S   - Erythromycin: I   - Levofloxacin: S   - Penicillin: S 0.032   - Vancomycin: S   Specimen Source: .Blood Blood-Peripheral   Organism: Blood Culture PCR   Organism: Streptococcus mitis/oralis group   Organism: Streptococcus mitis/oralis group   Culture Results:   Growth in aerobic and anaerobic bottles: Streptococcus mitis/oralis group   < from: Transesophageal Echocardiogram w/o TTE (09.28.20 @ 10:54) >  Conclusions:  Normal left ventricular systolic function. No segmental  wall motion abnormalities.  Bioprosthetic mitral valve with normal function. Mild  mitral regurgitation.  No evidence of endocarditits. If clinical index of  suspicion is high, consider repeat REEMA in one week.  ------------------------------------------------------------------------  Confirmed on  9/28/2020 - 13:09:55 by MD Sloane,    < end of copied text >    < from: US Duplex Venous Lower Ext Complete, Bilateral (10.19.20 @ 18:14) >    IMPRESSION:  No evidence of deep venous thrombosis in either lower extremity.    < end of copied text >  < from: CT Abdomen and Pelvis w/ IV Cont (09.26.20 @ 13:13) >    IMPRESSION:  No evidence of infectious source in the abdomen or pelvis.              < end of copied text >

## 2020-10-21 NOTE — BRIEF OPERATIVE NOTE - OPERATION/FINDINGS
Crepitus around perineal area incised and drained (~3cm incision)  Rectal incision noted to be broken down and fistulized into perineal area  skin left open with penrose drain through fistula tract into rectum

## 2020-10-21 NOTE — BRIEF OPERATIVE NOTE - NSICDXBRIEFPROCEDURE_GEN_ALL_CORE_FT
PROCEDURES:  Incision and drainage of vulva or perineal abscess 21-Oct-2020 21:16:21  Peyton Srivastava

## 2020-10-21 NOTE — ED PROVIDER NOTE - PSH
Hx of coronary angioplasty  stent to LAD 5/30/1997  Retina disorder, 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  S/P ablation of atrial fibrillation  3/2010  S/P left inguinal hernia repair  2002  S/P MVR (mitral valve replacement)  bovine valve 2015, CABG x 2 v 2015

## 2020-10-21 NOTE — ED PROVIDER NOTE - OBJECTIVE STATEMENT
74 y.o. male coming in with fever yesterday after having a rectal mass resected 4 days ago by Dr Loyola.  Pt was doing well, being followed  by ID for bacteremia told to come in today for admission.  Pt was doing well at home after the procedure.  Was having pain in the rectal area responding well to Motrin.  Yesterday started with some shaking and was found to be febrile of 101.  No other complaints.  Some drainage from the rectal area but has remained unchanged since the procedure.  Normal appetitive, no urinary complaints or cough.

## 2020-10-21 NOTE — H&P ADULT - NSHPPHYSICALEXAM_GEN_ALL_CORE
Ill appearing, tachycardic and tachypnic   Abd soft, NT, ND   Periunum with stool draining from rectum. Large scrotal edema.  Crepitus in the perinum between the anus and the scrotum

## 2020-10-21 NOTE — CONSULT NOTE ADULT - ASSESSMENT
ASSESSMENT:  JOSEPHINE DODD is a 74y Male with history of DM, CAD, AFib, bioprosthetic mitral valve (2015), HTN, macular degeneration, hemorrhoids, recently diagnosed localized rectal cancer (dx 9/2020) c/b bacteremia, s/p 10/15 Transanal excision of distal rectal carcinoma. Taken to OR urgently 10/21 for r/o necrotizing fasciitis, found to have breakdown of rectal incision with fistulization into perineum, s/p debridement and penrose drain placement.    PLAN:    NEURO: acute post-operative pain control  - Tylenol ATC  - Oxycodone PRN  - Dilaudid for breakthrough pain    RESPIRATORY: no active issues  - Monitor pulse oximetry, maintain SaO2>92%    CARDIOVASCULAR: hx CAD, AFib, bioprosthetic mitral valve (2015), HTN  - Monitor vital signs, maintain MAP>65  - Lactate cleared to 1.4  - Home ASA 81 held  - Continue home lipitor, lopressor, losartan with hold parameters    GI/NUTRITION: recently diagnosed localized rectal cancer (dx 9/2020) s/p 10/15 Transanal excision of distal rectal carcinoma c/b wound dehiscence and recto-cutaneous fistula s/p debridement and penrose drain placement 10/21  - NPO    GENITOURINARY/RENAL: hyponatremia, hypokalemia  - Continue home flomax  - Continue nelson catheter for urinary diversion and urine output monitoring  - NS @ 100  - Monitor electrolytes q4h BMP, replete PRN    HEMATOLOGIC: s/p Kcentra 1500 IU in ED for INR 2.87; on coumadin at home for bioprosthetic mechanical valve  - Lovenox for VTE ppx  - Home coumadin held  - Trend CBC daily    INFECTIOUS DISEASE: recent S. mitis bacteremia started on CTX 6 week course 9/29/20; perineal sepsis  - Continue Vanc/Zosyn  - F/U Vanc trough prior to 10/23 AM dose  - Appreciate ID input  - Trend WBC daily, monitor for fever    ENDOCRINE: hx DM on metformin, HLD  - Continue home lovaza  - F/U A1C  - q4h mod ISS    LINES/TUBES/DRAINS:  - Nelson  - PIVs  - Perineal penrose drain    DISPO:  - SICU    Discussed with Dr. Green

## 2020-10-21 NOTE — H&P ADULT - NSICDXPASTMEDICALHX_GEN_ALL_CORE_FT
PAST MEDICAL HISTORY:  AF (atrial fibrillation) s/p ablation 2010- on Coumadin    After-cataract of both eyes 1996    AMD (age related macular degeneration) Right eye    Bacteremia due to Streptococcus 9/2020- on Ceftriaxone via PICC line x 6 weeks    BPH (Benign Prostatic Hyperplasia)     CAD (coronary artery disease) stented coronary- LAD x 1 1997    CAD (coronary artery disease)     Diverticulosis     H/O hemorrhoids     HTN (hypertension)     Hyperlipidemia

## 2020-10-21 NOTE — ED PROVIDER NOTE - PMH
AF (atrial fibrillation)  s/p ablation 2010- on Coumadin  After-cataract of both eyes  1996  AMD (age related macular degeneration)  Right eye  Bacteremia due to Streptococcus  9/2020- on Ceftriaxone via PICC line x 6 weeks  BPH (Benign Prostatic Hyperplasia)    CAD (coronary artery disease)  stented coronary- LAD x 1 1997  CAD (coronary artery disease)    Diverticulosis    H/O hemorrhoids    HTN (hypertension)    Hyperlipidemia

## 2020-10-21 NOTE — H&P ADULT - HISTORY OF PRESENT ILLNESS
Pt is 74M hx DM, CAD, AFib, bioprosthetic mitral valve (2015), HTN, macular degeneration, hemorrhoids, recently diagnosed localized rectal cancer (dx 9/2020) s/p 10/15 Transanal excision of distal rectal carcinoma.    Pt had colonoscopy 9/21. 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  Patient had a TTE and REEMA that was negative for endocarditis. CT A/P was negative for any intra-abdominal pathology.  Patient had a LUE PICC placed and he was discharged on 6 weeks of IV Ceftriaxone.     Since ambulatory procedure on 10/15, patient has had difficulty voiding and increasing scrotal edema.  He has also developed initially low grade fevers which progressed to fevers to nearly 102 yesterday.  Also endorsing chills. Still having BM's, last yesterday.  No N/V. No abdominal pain. He is also complaining of foul-smelling rectal discharge.

## 2020-10-21 NOTE — H&P ADULT - ASSESSMENT
74 year old M with necrotizing soft tissue infection of the perineum after 10/15 transanal rectal tumor excision   -Admit to Dr. Connelly   -Patient received clinda and zosyn in the OR.  Still needs Vancomycin   -Will give PCC given INR of nearly 3  -Patient booked emergently for the OR.    -SICU consulted for post-operative monitoring

## 2020-10-21 NOTE — CHART NOTE - NSCHARTNOTEFT_GEN_A_CORE
Patient on IV ceftriaxone for S mitis/oralis bacteremia last month.Has TAVR-no PVE on REEMA  Also colonic adeno ca  Patient underwent colonic mass resection 5 days ago  Febrile last night(101)-did not call-however routine labs drawn uyesterday with WBC 18  Contacted daughter(who is a physician)  Advised to come to ER  To have blood cultures + CT abd pelvis to r/o any post op complication/abscess  To be started on IV vanco + zosyn pending results.  If any s/s hemodynamic compromise remove PICC  ID will follow  patient(pt not in ER till time to writing )  Message left for DR Connelly  Above communicated to daughter- DR Guillaume [phone]

## 2020-10-21 NOTE — ED ADULT TRIAGE NOTE - CHIEF COMPLAINT QUOTE
Patient had rectal mass removed 10/15, complains of drainage, pain at the site. Sent by surgeon for further evaluation. Patient endorses chills, fever at home. Took Ibuprofen at 11am today. Has PICC line for at home abx.

## 2020-10-21 NOTE — CONSULT NOTE ADULT - SUBJECTIVE AND OBJECTIVE BOX
SICU CONSULT NOTE    HPI  JOSEPHINE DODD is a 74y Male hx DM, CAD, AFib, bioprosthetic mitral valve (2015), HTN, macular degeneration, hemorrhoids, recently diagnosed localized rectal cancer (dx 9/2020) s/p 10/15 Transanal excision of distal rectal carcinoma.    Pt had colonoscopy 9/21. 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  Patient had a TTE and REEMA that was negative for endocarditis. CT A/P was negative for any intra-abdominal pathology.  Patient had a LUE PICC placed and he was discharged on 6 weeks of IV Ceftriaxone.     Since ambulatory procedure on 10/15, patient has had difficulty voiding and increasing scrotal edema.  He has also developed initially low grade fevers which progressed to fevers to nearly 102 yesterday.  Also endorsing chills. Still having BM's, last yesterday.  No N/V. No abdominal pain. He is also complaining of foul-smelling rectal discharge. Physical exam and imaging concerning for necrotizing soft tissue infection of perineum. Taken to OR urgently for operative debridement and exploration, found to have breakdown of rectal incision with fistulization into perineum. Area drained and skin left open with penrose drain through fistula tract into rectum. Patient remained hemodynamically stable throughout case and was admitted to SICU post-op extubated and in stable condition.        PAST MEDICAL HISTORY: Bacteremia due to Streptococcus    AMD (age related macular degeneration)    CAD (coronary artery disease)    H/O hemorrhoids    Diverticulosis    BPH (Benign Prostatic Hyperplasia)    After-cataract of both eyes    Hyperlipidemia    HTN (hypertension)    AF (atrial fibrillation)    CAD (coronary artery disease)        PAST SURGICAL HISTORY: S/P MVR (mitral valve replacement)    Retina disorder, left    S/P left inguinal hernia repair    S/P ablation of atrial fibrillation    Hx of coronary angioplasty        FAMILY HISTORY: Noncontributory    SOCIAL HISTORY: Denies tobacco, alcohol, illicit drugs    CODE STATUS: Full code    HOME MEDICATIONS:  aspirin 81 mg oral tablet: 1 tab(s) orally once a day (at bedtime) (21 Oct 2020 18:45)  Centrum oral tablet: 1 tab(s) orally once a day (at bedtime) (21 Oct 2020 18:45)  CoQ10 300 mg oral capsule: 2 cap(s) orally once a day (at bedtime) (21 Oct 2020 18:45)  Edarbyclor 40 mg-12.5 mg oral tablet: 1 tab(s) orally once a day (at bedtime) (21 Oct 2020 18:45)  Flomax 0.4 mg oral capsule: 1 cap(s) orally once a day (at bedtime) (21 Oct 2020 18:45)  Lovaza 1000 mg oral capsule: 2 cap(s) orally once a day (at bedtime) (21 Oct 2020 18:45)  metFORMIN 500 mg oral tablet, extended release: 1 tab(s) orally once a day (at bedtime) (21 Oct 2020 18:45)  metoprolol tartrate 25 mg oral tablet: 1 tab(s) orally 2 times a day (21 Oct 2020 18:45)  rosuvastatin 10 mg oral tablet: 1 tab(s) orally once a day (at bedtime) (21 Oct 2020 18:45)  warfarin 2.5 mg oral tablet: 1 tab(s) orally once a day Monday and Thursday, LD on 10/12/20 (21 Oct 2020 18:45)  warfarin 5 mg oral tablet: 1 tab(s) orally once a day, Tuesday, Wednesday, Friday, Saturday, Sunday (21 Oct 2020 18:45)      ALLERGIES: No Known Allergies      VITAL SIGNS:  ICU Vital Signs Last 24 Hrs  T(C): 36.4 (21 Oct 2020 23:00), Max: 37.2 (21 Oct 2020 18:01)  T(F): 97.5 (21 Oct 2020 23:00), Max: 98.9 (21 Oct 2020 18:01)  HR: 81 (21 Oct 2020 23:00) (81 - 126)  BP: 134/98 (21 Oct 2020 21:00) (134/98 - 176/65)  BP(mean): 112 (21 Oct 2020 21:00) (112 - 112)  ABP: 145/69 (21 Oct 2020 23:00) (141/62 - 166/74)  ABP(mean): 96 (21 Oct 2020 23:00) (89 - 105)  RR: 23 (21 Oct 2020 23:00) (18 - 34)  SpO2: 99% (21 Oct 2020 23:00) (98% - 100%)      NEURO  Exam: A&Ox4, NAD  acetaminophen   Tablet .. 975 milliGRAM(s) Oral every 6 hours PRN Mild Pain (1 - 3)  HYDROmorphone  Injectable 0.5 milliGRAM(s) IV Push every 4 hours PRN Breakthrough Pain  oxyCODONE    IR 5 milliGRAM(s) Oral every 4 hours PRN Moderate Pain (4 - 6)  oxyCODONE    IR 10 milliGRAM(s) Oral every 6 hours PRN Severe Pain (7 - 10)      RESPIRATORY  Mechanical Ventilation: n/a  ABG - ( 21 Oct 2020 21:12 )  pH: 7.46  /  pCO2: 28    /  pO2: 210   / HCO3: 20    / Base Excess: -2.4  /  SaO2: 100     Lactate: x                Exam: unlabored breathing on room air      CARDIOVASCULAR  VBG - ( 21 Oct 2020 17:56 )  pH: x     /  pCO2: x     /  pO2: x     / HCO3: x     / Base Excess: x     /  SaO2: x      Lactate: 2.8        Exam: extremities warm, well perfused  Cardiac Rhythm: normal sinus      GI/NUTRITION  Exam: abd soft, nondistended, nontender, perineal dressing intact  Diet: NPO  polyethylene glycol 3350 17 Gram(s) Oral daily  senna 2 Tablet(s) Oral at bedtime      GENITOURINARY/RENAL  magnesium sulfate  IVPB 2 Gram(s) IV Intermittent every 1 hour  multiple electrolytes Injection Type 1 1000 milliLiter(s) IV Continuous <Continuous>  multivitamin 1 Tablet(s) Oral daily  potassium chloride    Tablet ER 40 milliEquivalent(s) Oral every 4 hours      10-21 @ 07:01  -  10-21 @ 23:39  --------------------------------------------------------  IN:    IV PiggyBack: 100 mL    IV PiggyBack: 50 mL    multiple electrolytes Injection Type 1.: 200 mL    Oral Fluid: 100 mL  Total IN: 450 mL    OUT:    Indwelling Catheter - Urethral (mL): 200 mL  Total OUT: 200 mL    Total NET: 250 mL        Weight (kg): 72.6 (10-21 @ 20:11)  10-21    121<L>  |  90<L>  |  11  ----------------------------<  147<H>  2.9<LL>   |  18<L>  |  0.77    Ca    7.3<L>      21 Oct 2020 21:17  Phos  2.5     10-21  Mg     1.3     10-21    TPro  7.0  /  Alb  3.6  /  TBili  0.9  /  DBili  x   /  AST  17  /  ALT  13  /  AlkPhos  88  10-21    [x] Sanchez catheter, indication: urine output monitoring in critically ill patient    HEMATOLOGIC  [x] VTE Prophylaxis:  enoxaparin Injectable 40 milliGRAM(s) SubCutaneous daily                          9.8    14.59 )-----------( 158      ( 21 Oct 2020 21:17 )             27.7     PT/INR - ( 21 Oct 2020 22:02 )   PT: 19.4 sec;   INR: 1.66 ratio         PTT - ( 21 Oct 2020 22:02 )  PTT:34.8 sec  Transfusion: [ ] PRBC	[ ] Platelets	[ ] FFP	[ ] Cryoprecipitate      INFECTIOUS DISEASES    RECENT CULTURES:      ENDOCRINE  insulin lispro (ADMELOG) corrective regimen sliding scale   SubCutaneous every 6 hours    CAPILLARY BLOOD GLUCOSE          PATIENT CARE ACCESS DEVICES:  [x] Peripheral IV  [ ] Central Venous Line	[ ] R	[ ] L	[ ] IJ	[ ] Fem	[ ] SC	Placed:   [ ] Arterial Line		[ ] R	[ ] L	[ ] Fem	[ ] Rad	[ ] Ax	Placed:   [ ] PICC:					[ ] Mediport  [x] Urinary Catheter, Date Placed: 10/21  [x] Necessity of urinary, arterial, and venous catheters discussed

## 2020-10-22 DIAGNOSIS — R52 PAIN, UNSPECIFIED: ICD-10-CM

## 2020-10-22 DIAGNOSIS — C20 MALIGNANT NEOPLASM OF RECTUM: ICD-10-CM

## 2020-10-22 DIAGNOSIS — Z71.89 OTHER SPECIFIED COUNSELING: ICD-10-CM

## 2020-10-22 DIAGNOSIS — Z51.5 ENCOUNTER FOR PALLIATIVE CARE: ICD-10-CM

## 2020-10-22 LAB
ANION GAP SERPL CALC-SCNC: 10 MMOL/L — SIGNIFICANT CHANGE UP (ref 5–17)
ANION GAP SERPL CALC-SCNC: 14 MMOL/L — SIGNIFICANT CHANGE UP (ref 5–17)
APTT BLD: 36 SEC — HIGH (ref 27.5–35.5)
BUN SERPL-MCNC: 10 MG/DL — SIGNIFICANT CHANGE UP (ref 7–23)
BUN SERPL-MCNC: 15 MG/DL — SIGNIFICANT CHANGE UP (ref 7–23)
CALCIUM SERPL-MCNC: 8.3 MG/DL — LOW (ref 8.4–10.5)
CALCIUM SERPL-MCNC: 8.4 MG/DL — SIGNIFICANT CHANGE UP (ref 8.4–10.5)
CHLORIDE SERPL-SCNC: 91 MMOL/L — LOW (ref 96–108)
CHLORIDE SERPL-SCNC: 97 MMOL/L — SIGNIFICANT CHANGE UP (ref 96–108)
CO2 SERPL-SCNC: 18 MMOL/L — LOW (ref 22–31)
CO2 SERPL-SCNC: 19 MMOL/L — LOW (ref 22–31)
CREAT SERPL-MCNC: 0.74 MG/DL — SIGNIFICANT CHANGE UP (ref 0.5–1.3)
CREAT SERPL-MCNC: 0.87 MG/DL — SIGNIFICANT CHANGE UP (ref 0.5–1.3)
GLUCOSE BLDC GLUCOMTR-MCNC: 150 MG/DL — HIGH (ref 70–99)
GLUCOSE BLDC GLUCOMTR-MCNC: 162 MG/DL — HIGH (ref 70–99)
GLUCOSE BLDC GLUCOMTR-MCNC: 183 MG/DL — HIGH (ref 70–99)
GLUCOSE SERPL-MCNC: 161 MG/DL — HIGH (ref 70–99)
GLUCOSE SERPL-MCNC: 166 MG/DL — HIGH (ref 70–99)
HCT VFR BLD CALC: 28.7 % — LOW (ref 39–50)
HGB BLD-MCNC: 10.3 G/DL — LOW (ref 13–17)
INR BLD: 1.84 RATIO — HIGH (ref 0.88–1.16)
MAGNESIUM SERPL-MCNC: 2.3 MG/DL — SIGNIFICANT CHANGE UP (ref 1.6–2.6)
MAGNESIUM SERPL-MCNC: 2.7 MG/DL — HIGH (ref 1.6–2.6)
MCHC RBC-ENTMCNC: 32.1 PG — SIGNIFICANT CHANGE UP (ref 27–34)
MCHC RBC-ENTMCNC: 35.9 GM/DL — SIGNIFICANT CHANGE UP (ref 32–36)
MCV RBC AUTO: 89.4 FL — SIGNIFICANT CHANGE UP (ref 80–100)
NRBC # BLD: 0 /100 WBCS — SIGNIFICANT CHANGE UP (ref 0–0)
OSMOLALITY UR: 344 MOS/KG — SIGNIFICANT CHANGE UP (ref 300–900)
PHOSPHATE SERPL-MCNC: 2.5 MG/DL — SIGNIFICANT CHANGE UP (ref 2.5–4.5)
PHOSPHATE SERPL-MCNC: 3.5 MG/DL — SIGNIFICANT CHANGE UP (ref 2.5–4.5)
PLATELET # BLD AUTO: 169 K/UL — SIGNIFICANT CHANGE UP (ref 150–400)
POTASSIUM SERPL-MCNC: 3.8 MMOL/L — SIGNIFICANT CHANGE UP (ref 3.5–5.3)
POTASSIUM SERPL-MCNC: 4.3 MMOL/L — SIGNIFICANT CHANGE UP (ref 3.5–5.3)
POTASSIUM SERPL-SCNC: 3.8 MMOL/L — SIGNIFICANT CHANGE UP (ref 3.5–5.3)
POTASSIUM SERPL-SCNC: 4.3 MMOL/L — SIGNIFICANT CHANGE UP (ref 3.5–5.3)
PROTHROM AB SERPL-ACNC: 21.4 SEC — HIGH (ref 10.6–13.6)
RBC # BLD: 3.21 M/UL — LOW (ref 4.2–5.8)
RBC # FLD: 13.4 % — SIGNIFICANT CHANGE UP (ref 10.3–14.5)
SARS-COV-2 IGG SERPL QL IA: NEGATIVE — SIGNIFICANT CHANGE UP
SARS-COV-2 IGM SERPL IA-ACNC: 0.1 INDEX — SIGNIFICANT CHANGE UP
SODIUM SERPL-SCNC: 123 MMOL/L — LOW (ref 135–145)
SODIUM SERPL-SCNC: 126 MMOL/L — LOW (ref 135–145)
WBC # BLD: 19.4 K/UL — HIGH (ref 3.8–10.5)
WBC # FLD AUTO: 19.4 K/UL — HIGH (ref 3.8–10.5)

## 2020-10-22 PROCEDURE — 99497 ADVNCD CARE PLAN 30 MIN: CPT | Mod: 25

## 2020-10-22 PROCEDURE — 99233 SBSQ HOSP IP/OBS HIGH 50: CPT

## 2020-10-22 PROCEDURE — 99223 1ST HOSP IP/OBS HIGH 75: CPT | Mod: GC

## 2020-10-22 PROCEDURE — 99233 SBSQ HOSP IP/OBS HIGH 50: CPT | Mod: GC

## 2020-10-22 RX ORDER — POLYETHYLENE GLYCOL 3350 17 G/17G
17 POWDER, FOR SOLUTION ORAL DAILY
Refills: 0 | Status: DISCONTINUED | OUTPATIENT
Start: 2020-10-22 | End: 2020-10-24

## 2020-10-22 RX ORDER — SODIUM CHLORIDE 9 MG/ML
1 INJECTION INTRAMUSCULAR; INTRAVENOUS; SUBCUTANEOUS EVERY 8 HOURS
Refills: 0 | Status: DISCONTINUED | OUTPATIENT
Start: 2020-10-22 | End: 2020-10-30

## 2020-10-22 RX ORDER — SENNA PLUS 8.6 MG/1
2 TABLET ORAL AT BEDTIME
Refills: 0 | Status: DISCONTINUED | OUTPATIENT
Start: 2020-10-22 | End: 2020-10-27

## 2020-10-22 RX ORDER — VANCOMYCIN HCL 1 G
1000 VIAL (EA) INTRAVENOUS EVERY 12 HOURS
Refills: 0 | Status: DISCONTINUED | OUTPATIENT
Start: 2020-10-22 | End: 2020-10-23

## 2020-10-22 RX ORDER — SODIUM CHLORIDE 9 MG/ML
1000 INJECTION INTRAMUSCULAR; INTRAVENOUS; SUBCUTANEOUS
Refills: 0 | Status: DISCONTINUED | OUTPATIENT
Start: 2020-10-22 | End: 2020-10-25

## 2020-10-22 RX ORDER — POTASSIUM CHLORIDE 20 MEQ
10 PACKET (EA) ORAL
Refills: 0 | Status: COMPLETED | OUTPATIENT
Start: 2020-10-22 | End: 2020-10-22

## 2020-10-22 RX ADMIN — PIPERACILLIN AND TAZOBACTAM 25 GRAM(S): 4; .5 INJECTION, POWDER, LYOPHILIZED, FOR SOLUTION INTRAVENOUS at 17:49

## 2020-10-22 RX ADMIN — Medication 250 MILLIGRAM(S): at 08:13

## 2020-10-22 RX ADMIN — Medication 25 MILLIGRAM(S): at 06:16

## 2020-10-22 RX ADMIN — Medication 250 MILLIGRAM(S): at 20:08

## 2020-10-22 RX ADMIN — Medication 40 MILLIEQUIVALENT(S): at 01:15

## 2020-10-22 RX ADMIN — LOSARTAN POTASSIUM 50 MILLIGRAM(S): 100 TABLET, FILM COATED ORAL at 06:16

## 2020-10-22 RX ADMIN — SODIUM CHLORIDE 1 GRAM(S): 9 INJECTION INTRAMUSCULAR; INTRAVENOUS; SUBCUTANEOUS at 06:16

## 2020-10-22 RX ADMIN — SODIUM CHLORIDE 100 MILLILITER(S): 9 INJECTION INTRAMUSCULAR; INTRAVENOUS; SUBCUTANEOUS at 20:08

## 2020-10-22 RX ADMIN — Medication 1 TABLET(S): at 12:52

## 2020-10-22 RX ADMIN — SODIUM CHLORIDE 100 MILLILITER(S): 9 INJECTION INTRAMUSCULAR; INTRAVENOUS; SUBCUTANEOUS at 00:34

## 2020-10-22 RX ADMIN — SODIUM CHLORIDE 1 GRAM(S): 9 INJECTION INTRAMUSCULAR; INTRAVENOUS; SUBCUTANEOUS at 12:52

## 2020-10-22 RX ADMIN — Medication 100 MILLIEQUIVALENT(S): at 03:05

## 2020-10-22 RX ADMIN — Medication 975 MILLIGRAM(S): at 06:55

## 2020-10-22 RX ADMIN — Medication 100 MILLIEQUIVALENT(S): at 05:30

## 2020-10-22 RX ADMIN — ENOXAPARIN SODIUM 40 MILLIGRAM(S): 100 INJECTION SUBCUTANEOUS at 01:17

## 2020-10-22 RX ADMIN — CHLORHEXIDINE GLUCONATE 1 APPLICATION(S): 213 SOLUTION TOPICAL at 06:16

## 2020-10-22 RX ADMIN — Medication 2: at 12:53

## 2020-10-22 RX ADMIN — Medication 100 MILLIEQUIVALENT(S): at 04:01

## 2020-10-22 RX ADMIN — Medication 2: at 06:22

## 2020-10-22 RX ADMIN — Medication 2 GRAM(S): at 21:17

## 2020-10-22 RX ADMIN — Medication 975 MILLIGRAM(S): at 16:26

## 2020-10-22 RX ADMIN — Medication 975 MILLIGRAM(S): at 17:30

## 2020-10-22 RX ADMIN — Medication 975 MILLIGRAM(S): at 06:25

## 2020-10-22 RX ADMIN — SODIUM CHLORIDE 1 GRAM(S): 9 INJECTION INTRAMUSCULAR; INTRAVENOUS; SUBCUTANEOUS at 21:17

## 2020-10-22 RX ADMIN — Medication 50 GRAM(S): at 00:32

## 2020-10-22 RX ADMIN — PIPERACILLIN AND TAZOBACTAM 25 GRAM(S): 4; .5 INJECTION, POWDER, LYOPHILIZED, FOR SOLUTION INTRAVENOUS at 01:24

## 2020-10-22 RX ADMIN — PIPERACILLIN AND TAZOBACTAM 25 GRAM(S): 4; .5 INJECTION, POWDER, LYOPHILIZED, FOR SOLUTION INTRAVENOUS at 09:36

## 2020-10-22 RX ADMIN — TAMSULOSIN HYDROCHLORIDE 0.4 MILLIGRAM(S): 0.4 CAPSULE ORAL at 21:17

## 2020-10-22 RX ADMIN — ATORVASTATIN CALCIUM 40 MILLIGRAM(S): 80 TABLET, FILM COATED ORAL at 21:17

## 2020-10-22 RX ADMIN — Medication 25 MILLIGRAM(S): at 17:49

## 2020-10-22 RX ADMIN — ENOXAPARIN SODIUM 40 MILLIGRAM(S): 100 INJECTION SUBCUTANEOUS at 12:53

## 2020-10-22 NOTE — PROGRESS NOTE ADULT - ASSESSMENT
ASSESSMENT:  JOSEPHINE DODD is a 74y Male with history of DM, CAD, AFib, bioprosthetic mitral valve (2015), HTN, macular degeneration, hemorrhoids, recently diagnosed localized rectal cancer (dx 9/2020) c/b bacteremia, s/p 10/15 Transanal excision of distal rectal carcinoma. Taken to OR urgently 10/21 for r/o necrotizing fasciitis, found to have breakdown of rectal incision with fistulization into perineum, s/p debridement and penrose drain placement.    PLAN:    NEURO: acute post-operative pain control  - Tylenol ATC  - Oxycodone PRN  - Dilaudid for breakthrough pain    RESPIRATORY: no active issues  - Monitor pulse oximetry, maintain SaO2>92%    CARDIOVASCULAR: hx CAD, AFib, bioprosthetic mitral valve (2015), HTN  - Monitor vital signs, maintain MAP>65  - Lactate cleared to 1.4  - Home ASA 81 held  - Continue home lipitor, lopressor, losartan with hold parameters    GI/NUTRITION: recently diagnosed localized rectal cancer (dx 9/2020) s/p 10/15 Transanal excision of distal rectal carcinoma c/b wound dehiscence and recto-cutaneous fistula s/p debridement and penrose drain placement 10/21  - NPO    GENITOURINARY/RENAL: hyponatremia, hypokalemia  - Continue home flomax  - Continue nelson catheter for urinary diversion and urine output monitoring  - NS @ 100  - Monitor electrolytes q4h BMP, replete PRN    HEMATOLOGIC: s/p Kcentra 1500 IU in ED for INR 2.87; on coumadin at home for bioprosthetic mechanical valve  - Lovenox for VTE ppx  - Home coumadin held  - Trend CBC daily    INFECTIOUS DISEASE: recent S. mitis bacteremia started on CTX 6 week course 9/29/20; perineal sepsis  - Continue Vanc/Zosyn  - F/U Vanc trough prior to 10/23 AM dose  - Appreciate ID input  - Trend WBC daily, monitor for fever    ENDOCRINE: hx DM on metformin, HLD  - Continue home lovaza  - F/U A1C  - q4h mod ISS    LINES/TUBES/DRAINS:  - Nelson  - PIVs  - Perineal penrose drain    DISPO:  - SICU   ASSESSMENT:  JOSEPHINE DODD is a 74y Male with history of DM, CAD, AFib, bioprosthetic mitral valve (2015), HTN, macular degeneration, hemorrhoids, recently diagnosed localized rectal cancer (dx 9/2020) c/b bacteremia, s/p 10/15 Transanal excision of distal rectal carcinoma. Taken to OR urgently 10/21 for r/o necrotizing fasciitis, found to have breakdown of rectal incision with fistulization into perineum, s/p debridement and penrose drain placement.    PLAN:    NEURO: acute post-operative pain control  - Tylenol ATC  - Oxycodone PRN  - Dilaudid for breakthrough pain    RESPIRATORY: no active issues  - Monitor pulse oximetry, maintain SaO2>92%    CARDIOVASCULAR: hx CAD, AFib, bioprosthetic mitral valve (2015), HTN  - Monitor vital signs, maintain MAP>65  - Lactate cleared to 1.4  - Home ASA 81 held  - Continue home lipitor, lopressor, losartan with hold parameters    GI/NUTRITION: recently diagnosed localized rectal cancer (dx 9/2020) s/p 10/15 Transanal excision of distal rectal carcinoma c/b wound dehiscence and recto-cutaneous fistula s/p debridement and penrose drain placement 10/21  - NPO    GENITOURINARY/RENAL: hyponatremia, hypokalemia  - Continue home flomax  - Continue nelson catheter for urinary diversion and urine output monitoring  - NS @ 100  - Monitor electrolytes q4h BMP, replete PRN  - F/U Urine lytes    HEMATOLOGIC: s/p Kcentra 1500 IU in ED for INR 2.87; on coumadin at home for bioprosthetic mechanical valve  - Lovenox for VTE ppx  - Home coumadin held  - Trend CBC daily    INFECTIOUS DISEASE: recent S. mitis bacteremia started on CTX 6 week course 9/29/20; perineal sepsis  - Continue Vanc/Zosyn  - F/U Vanc trough prior to 10/23 AM dose  - Appreciate ID input  - Trend WBC daily, monitor for fever    ENDOCRINE: hx DM on metformin, HLD  - Continue home lovaza  - F/U A1C  - q4h mod ISS    LINES/TUBES/DRAINS:  - Nelson  - PIVs  - Perineal penrose drain    DISPO:  - SICU

## 2020-10-22 NOTE — PROGRESS NOTE ADULT - SUBJECTIVE AND OBJECTIVE BOX
SICU DAILY PROGRESS NOTE    - Admitted to SICU in stable condition  - Hypokalemic to 2.9, K repleted  - Hyponatremic to 121, plasmalyte switched to NS@100    HPI  JOSEPHINE DODD is a 74y Male hx DM, CAD, AFib, bioprosthetic mitral valve (2015), HTN, macular degeneration, hemorrhoids, recently diagnosed localized rectal cancer (dx 9/2020) s/p 10/15 Transanal excision of distal rectal carcinoma.    Pt had colonoscopy 9/21. 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  Patient had a TTE and REEMA that was negative for endocarditis. CT A/P was negative for any intra-abdominal pathology.  Patient had a LUE PICC placed and he was discharged on 6 weeks of IV Ceftriaxone.     Since ambulatory procedure on 10/15, patient has had difficulty voiding and increasing scrotal edema.  He has also developed initially low grade fevers which progressed to fevers to nearly 102 yesterday.  Also endorsing chills. Still having BM's, last yesterday.  No N/V. No abdominal pain. He is also complaining of foul-smelling rectal discharge. Physical exam and imaging concerning for necrotizing soft tissue infection of perineum. Taken to OR urgently for operative debridement and exploration, found to have breakdown of rectal incision with fistulization into perineum. Area drained and skin left open with penrose drain through fistula tract into rectum. Patient remained hemodynamically stable throughout case and was admitted to SICU post-op extubated and in stable condition.      SUBJECTIVE/ROS:  [x] A ten-point review of systems was otherwise negative except as noted.  [ ] Due to altered mental status/intubation, subjective information were not able to be obtained from the patient. History was obtained, to the extent possible, from review of the chart and collateral sources of information.      NEURO  RASS:     GCS: 15    CAM ICU:  Exam: awake, alert, oriented x3, NAD  Meds: acetaminophen   Tablet .. 975 milliGRAM(s) Oral every 6 hours PRN Mild Pain (1 - 3)  HYDROmorphone  Injectable 0.5 milliGRAM(s) IV Push every 4 hours PRN Breakthrough Pain  oxyCODONE    IR 5 milliGRAM(s) Oral every 4 hours PRN Moderate Pain (4 - 6)  oxyCODONE    IR 10 milliGRAM(s) Oral every 6 hours PRN Severe Pain (7 - 10)    [x] Adequacy of sedation and pain control has been assessed and adjusted      RESPIRATORY  RR: 17 (10-22-20 @ 00:00) (17 - 34)  SpO2: 97% (10-22-20 @ 00:00) (97% - 100%)  Wt(kg): --  Exam: unlabored, clear to auscultation bilaterally  Mechanical Ventilation: n/a  ABG - ( 21 Oct 2020 21:12 )  pH: 7.46  /  pCO2: 28    /  pO2: 210   / HCO3: 20    / Base Excess: -2.4  /  SaO2: 100     Lactate: x                [N/A] Extubation Readiness Assessed  Meds:       CARDIOVASCULAR  HR: 81 (10-22-20 @ 00:00) (81 - 126)  BP: 134/98 (10-21-20 @ 21:00) (134/98 - 176/65)  BP(mean): 112 (10-21-20 @ 21:00) (112 - 112)  ABP: 137/65 (10-22-20 @ 00:00) (137/65 - 166/74)  ABP(mean): 91 (10-22-20 @ 00:00) (89 - 105)  Wt(kg): --  CVP(cm H2O): --  VBG - ( 21 Oct 2020 17:56 )  pH: x     /  pCO2: x     /  pO2: x     / HCO3: x     / Base Excess: x     /  SaO2: x      Lactate: 2.8                Exam: regular rate and rhythm  Cardiac Rhythm: sinus  Perfusion     [x]Adequate   [ ]Inadequate  Mentation   [x]Normal       [ ]Reduced  Extremities  [x]Warm         [ ]Cool  Volume Status [ ]Hypervolemic [x]Euvolemic [ ]Hypovolemic  Meds: losartan 50 milliGRAM(s) Oral daily  metoprolol tartrate 25 milliGRAM(s) Oral every 12 hours  tamsulosin 0.4 milliGRAM(s) Oral at bedtime        GI/NUTRITION  Exam: soft, nontender, nondistended, perineal dressing intact  Diet: NPO  Meds: polyethylene glycol 3350 17 Gram(s) Oral daily  senna 2 Tablet(s) Oral at bedtime      GENITOURINARY  I&O's Detail    10-21 @ 07:01  -  10-22 @ 00:20  --------------------------------------------------------  IN:    IV PiggyBack: 100 mL    IV PiggyBack: 50 mL    multiple electrolytes Injection Type 1.: 200 mL    Oral Fluid: 100 mL  Total IN: 450 mL    OUT:    Indwelling Catheter - Urethral (mL): 200 mL  Total OUT: 200 mL    Total NET: 250 mL        Weight (kg): 72.6 (10-21 @ 20:11)  10-21    121<L>  |  90<L>  |  11  ----------------------------<  147<H>  2.9<LL>   |  18<L>  |  0.77    Ca    7.3<L>      21 Oct 2020 21:17  Phos  2.5     10-21  Mg     1.3     10-21    TPro  7.0  /  Alb  3.6  /  TBili  0.9  /  DBili  x   /  AST  17  /  ALT  13  /  AlkPhos  88  10-21    [x] Sanchez catheter, indication: urinary diversion, UOP monitoring in critically ill  Meds: magnesium sulfate  IVPB 2 Gram(s) IV Intermittent every 1 hour  multiple electrolytes Injection Type 1 1000 milliLiter(s) IV Continuous <Continuous>  multivitamin 1 Tablet(s) Oral daily  potassium chloride    Tablet ER 40 milliEquivalent(s) Oral every 4 hours        HEMATOLOGIC  Meds: enoxaparin Injectable 40 milliGRAM(s) SubCutaneous daily    [x] VTE Prophylaxis                        9.8    14.59 )-----------( 158      ( 21 Oct 2020 21:17 )             27.7     PT/INR - ( 21 Oct 2020 22:02 )   PT: 19.4 sec;   INR: 1.66 ratio         PTT - ( 21 Oct 2020 22:02 )  PTT:34.8 sec  Transfusion     [ ] PRBC   [ ] Platelets   [ ] FFP   [ ] Cryoprecipitate      INFECTIOUS DISEASES  WBC Count: 14.59 K/uL (10-21 @ 21:17)  WBC Count: 18.23 K/uL (10-21 @ 17:56)    RECENT CULTURES:    Meds: piperacillin/tazobactam IVPB.. 3.375 Gram(s) IV Intermittent every 8 hours  vancomycin  IVPB 1000 milliGRAM(s) IV Intermittent every 12 hours        ENDOCRINE  CAPILLARY BLOOD GLUCOSE        Meds: atorvastatin 40 milliGRAM(s) Oral at bedtime  insulin lispro (ADMELOG) corrective regimen sliding scale   SubCutaneous every 6 hours        ACCESS DEVICES:  [x] Peripheral IV  [ ] Central Venous Line	[ ] R	[ ] L	[ ] IJ	[ ] Fem	[ ] SC	Placed:   [ ] Arterial Line		[ ] R	[ ] L	[ ] Fem	[ ] Rad	[ ] Ax	Placed:   [x] PICC: LUE					[ ] Mediport  [x] Urinary Catheter, Date Placed: 10/22  [x] Necessity of urinary, arterial, and venous catheters discussed    OTHER MEDICATIONS:  chlorhexidine 2% Cloths 1 Application(s) Topical <User Schedule>  omega-3-Acid Ethyl Esters 2 Gram(s) Oral at bedtime      CODE STATUS: Full code   SICU DAILY PROGRESS NOTE    - Admitted to SICU in stable condition  - Hypokalemic to 2.9, K repleted  - Hyponatremic to 121, plasmalyte switched to NS@100, Ulytes sent    HPI  JOSEPHINE DODD is a 74y Male hx DM, CAD, AFib, bioprosthetic mitral valve (2015), HTN, macular degeneration, hemorrhoids, recently diagnosed localized rectal cancer (dx 9/2020) s/p 10/15 Transanal excision of distal rectal carcinoma.    Pt had colonoscopy 9/21. 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  Patient had a TTE and REEMA that was negative for endocarditis. CT A/P was negative for any intra-abdominal pathology.  Patient had a LUE PICC placed and he was discharged on 6 weeks of IV Ceftriaxone.     Since ambulatory procedure on 10/15, patient has had difficulty voiding and increasing scrotal edema.  He has also developed initially low grade fevers which progressed to fevers to nearly 102 yesterday.  Also endorsing chills. Still having BM's, last yesterday.  No N/V. No abdominal pain. He is also complaining of foul-smelling rectal discharge. Physical exam and imaging concerning for necrotizing soft tissue infection of perineum. Taken to OR urgently for operative debridement and exploration, found to have breakdown of rectal incision with fistulization into perineum. Area drained and skin left open with penrose drain through fistula tract into rectum. Patient remained hemodynamically stable throughout case and was admitted to SICU post-op extubated and in stable condition.      SUBJECTIVE/ROS:  [x] A ten-point review of systems was otherwise negative except as noted.  [ ] Due to altered mental status/intubation, subjective information were not able to be obtained from the patient. History was obtained, to the extent possible, from review of the chart and collateral sources of information.      NEURO  RASS:     GCS: 15    CAM ICU:  Exam: awake, alert, oriented x3, NAD  Meds: acetaminophen   Tablet .. 975 milliGRAM(s) Oral every 6 hours PRN Mild Pain (1 - 3)  HYDROmorphone  Injectable 0.5 milliGRAM(s) IV Push every 4 hours PRN Breakthrough Pain  oxyCODONE    IR 5 milliGRAM(s) Oral every 4 hours PRN Moderate Pain (4 - 6)  oxyCODONE    IR 10 milliGRAM(s) Oral every 6 hours PRN Severe Pain (7 - 10)    [x] Adequacy of sedation and pain control has been assessed and adjusted      RESPIRATORY  RR: 17 (10-22-20 @ 00:00) (17 - 34)  SpO2: 97% (10-22-20 @ 00:00) (97% - 100%)  Wt(kg): --  Exam: unlabored, clear to auscultation bilaterally  Mechanical Ventilation: n/a  ABG - ( 21 Oct 2020 21:12 )  pH: 7.46  /  pCO2: 28    /  pO2: 210   / HCO3: 20    / Base Excess: -2.4  /  SaO2: 100     Lactate: x                [N/A] Extubation Readiness Assessed  Meds:       CARDIOVASCULAR  HR: 81 (10-22-20 @ 00:00) (81 - 126)  BP: 134/98 (10-21-20 @ 21:00) (134/98 - 176/65)  BP(mean): 112 (10-21-20 @ 21:00) (112 - 112)  ABP: 137/65 (10-22-20 @ 00:00) (137/65 - 166/74)  ABP(mean): 91 (10-22-20 @ 00:00) (89 - 105)  Wt(kg): --  CVP(cm H2O): --  VBG - ( 21 Oct 2020 17:56 )  pH: x     /  pCO2: x     /  pO2: x     / HCO3: x     / Base Excess: x     /  SaO2: x      Lactate: 2.8                Exam: regular rate and rhythm  Cardiac Rhythm: sinus  Perfusion     [x]Adequate   [ ]Inadequate  Mentation   [x]Normal       [ ]Reduced  Extremities  [x]Warm         [ ]Cool  Volume Status [ ]Hypervolemic [x]Euvolemic [ ]Hypovolemic  Meds: losartan 50 milliGRAM(s) Oral daily  metoprolol tartrate 25 milliGRAM(s) Oral every 12 hours  tamsulosin 0.4 milliGRAM(s) Oral at bedtime        GI/NUTRITION  Exam: soft, nontender, nondistended, perineal dressing intact  Diet: NPO  Meds: polyethylene glycol 3350 17 Gram(s) Oral daily  senna 2 Tablet(s) Oral at bedtime      GENITOURINARY  I&O's Detail    10-21 @ 07:01  -  10-22 @ 00:20  --------------------------------------------------------  IN:    IV PiggyBack: 100 mL    IV PiggyBack: 50 mL    multiple electrolytes Injection Type 1.: 200 mL    Oral Fluid: 100 mL  Total IN: 450 mL    OUT:    Indwelling Catheter - Urethral (mL): 200 mL  Total OUT: 200 mL    Total NET: 250 mL        Weight (kg): 72.6 (10-21 @ 20:11)  10-21    121<L>  |  90<L>  |  11  ----------------------------<  147<H>  2.9<LL>   |  18<L>  |  0.77    Ca    7.3<L>      21 Oct 2020 21:17  Phos  2.5     10-21  Mg     1.3     10-21    TPro  7.0  /  Alb  3.6  /  TBili  0.9  /  DBili  x   /  AST  17  /  ALT  13  /  AlkPhos  88  10-21    [x] Sanchez catheter, indication: urinary diversion, UOP monitoring in critically ill  Meds: magnesium sulfate  IVPB 2 Gram(s) IV Intermittent every 1 hour  multiple electrolytes Injection Type 1 1000 milliLiter(s) IV Continuous <Continuous>  multivitamin 1 Tablet(s) Oral daily  potassium chloride    Tablet ER 40 milliEquivalent(s) Oral every 4 hours        HEMATOLOGIC  Meds: enoxaparin Injectable 40 milliGRAM(s) SubCutaneous daily    [x] VTE Prophylaxis                        9.8    14.59 )-----------( 158      ( 21 Oct 2020 21:17 )             27.7     PT/INR - ( 21 Oct 2020 22:02 )   PT: 19.4 sec;   INR: 1.66 ratio         PTT - ( 21 Oct 2020 22:02 )  PTT:34.8 sec  Transfusion     [ ] PRBC   [ ] Platelets   [ ] FFP   [ ] Cryoprecipitate      INFECTIOUS DISEASES  WBC Count: 14.59 K/uL (10-21 @ 21:17)  WBC Count: 18.23 K/uL (10-21 @ 17:56)    RECENT CULTURES:    Meds: piperacillin/tazobactam IVPB.. 3.375 Gram(s) IV Intermittent every 8 hours  vancomycin  IVPB 1000 milliGRAM(s) IV Intermittent every 12 hours        ENDOCRINE  CAPILLARY BLOOD GLUCOSE        Meds: atorvastatin 40 milliGRAM(s) Oral at bedtime  insulin lispro (ADMELOG) corrective regimen sliding scale   SubCutaneous every 6 hours        ACCESS DEVICES:  [x] Peripheral IV  [ ] Central Venous Line	[ ] R	[ ] L	[ ] IJ	[ ] Fem	[ ] SC	Placed:   [ ] Arterial Line		[ ] R	[ ] L	[ ] Fem	[ ] Rad	[ ] Ax	Placed:   [x] PICC: LUE					[ ] Mediport  [x] Urinary Catheter, Date Placed: 10/22  [x] Necessity of urinary, arterial, and venous catheters discussed    OTHER MEDICATIONS:  chlorhexidine 2% Cloths 1 Application(s) Topical <User Schedule>  omega-3-Acid Ethyl Esters 2 Gram(s) Oral at bedtime      CODE STATUS: Full code

## 2020-10-22 NOTE — PHYSICAL THERAPY INITIAL EVALUATION ADULT - PERTINENT HX OF CURRENT PROBLEM, REHAB EVAL
Pt is 74 y.o. male with history of DM, CAD, AFib, bioprosthetic mitral valve (2015), HTN, macular degeneration, hemorrhoids, recently diagnosed localized rectal cancer (dx 9/2020) c/b bacteremia, s/p 10/15 Transanal excision of distal rectal carcinoma. Taken to OR urgently 10/21 for r/o necrotizing fasciitis, found to have breakdown of rectal incision with fistulization into perineum, s/p debridement and penrose drain placement. (-) CXR 10/21/2020. Continued below.

## 2020-10-22 NOTE — PROGRESS NOTE ADULT - ASSESSMENT
JOSEPHINE DODD is a 74y Male with history of DM, CAD, AFib, bioprosthetic mitral valve (2015), HTN, macular degeneration, hemorrhoids, recently diagnosed localized rectal cancer (dx 9/2020) c/b bacteremia, s/p 10/15 Transanal excision of distal rectal carcinoma. Taken to OR urgently 10/21 for r/o necrotizing fasciitis, found to have breakdown of rectal incision with fistulization into perineum, s/p debridement and penrose drain placement. Lactate cleared to 1.4  PLAN:  - pain control: Tylenol ATC, Oxycodone PRN, Dilaudid for breakthrough pain  - NPO/IVF  - Continue home flomax  - Continue nelson catheter for urinary diversion and urine output monitoring  - F/U Urine lytes  - Lovenox for VTE ppx; Home coumadin and ASA held  - Trend CBC daily  - C/w Vanc/Zosyn  - F/U Vanc trough prior to 10/23 AM dose    [id number]

## 2020-10-22 NOTE — OCCUPATIONAL THERAPY INITIAL EVALUATION ADULT - LIVES WITH, PROFILE
Pt lives in a house with his wife, +7 steps to enter, +3 steps within house, +walk-in shower, +pt is a .

## 2020-10-22 NOTE — CONSULT NOTE ADULT - ASSESSMENT
This is a 74 year old man with recently diagnosed localized rectal cancer (dx 9/2020, c/b bacteremia post-colonoscopy), s/p 10/15 Transanal excision of distal rectal carcinoma. Taken to OR urgently 10/21 for r/o necrotizing fasciitis, found to have breakdown of rectal incision with fistulization into perineum, now s/p debridement and penrose drain placement.    Patient was introduced to palliative team. Appears to be in good spirits. States pain is well controlled with Tylenol and Advil and is currently rated a 5/10.

## 2020-10-22 NOTE — PROGRESS NOTE ADULT - ASSESSMENT
74M hx DM(a1c=6.2%), CAD, AFib, prosthetic mitral valve (2015), HTN, macular degeneration, hemorrhoids, localized rectal cancer (dx 9/2020), presenting with chills for 10 days.   S mitis oralis bacteremia-high grade  No abd symptoms  Clinically stable  Repeat Cx so far negative  abd CT no abscess  PCn VANCE 0.03  REEMA no vegetation  On home IV ceftriaxone X 6 week course-was doing well  Now with perineal cellulitis/wound infection  s/p OR I&D of perineal abscess  stable       Rec:  A)Perineal abscess  s/p I&D   Fever leucocytosis  Rec:  1) further surgical plan per surgical team  2) Continue observation/SICU care  3) Follow Ora nd blood Cx  4) For now continue Vanco + zosyn  Monitor vanco trough and creatinine  If any s/s worsening broaden to meropenem and add antifungals    B) S mitis oralis bacteremia  likely as from rectal mass  REEMA no PVE  check blood Cx    abx as above       Will tailor plan for ID issues  per course,results.Will defer to primary team on management of other issues.  Assessment, plan and recommendations as detailed above were discussed with the SICU   team.  Will Follow.  Beeper [phone] [facility] [id number].   Wknd/afterhours/No response-[phone] or Fellow on call

## 2020-10-22 NOTE — PROGRESS NOTE ADULT - SUBJECTIVE AND OBJECTIVE BOX
Interval Events:    S: Patient doing OK in SICU, denies fevers, chills, nausea, emesis, chest pain, SOB.    O: Vital Signs  T(C): 36.4 (10-22 @ 03:00), Max: 37.2 (10-21 @ 18:01)  HR: 80 (10-22 @ 04:00) (72 - 126)  BP: 134/98 (10-21 @ 21:00) (134/98 - 176/65)  RR: 17 (10-22 @ 04:00) (15 - 34)  SpO2: 100% (10-22 @ 04:00) (96% - 100%)  10-21-20 @ 07:01  -  10-22-20 @ 05:22  --------------------------------------------------------  IN: 1400 mL / OUT: 1725 mL / NET: -325 mL      General: alert and oriented, NAD  Resp: airway patent, respirations unlabored  CVS: appears well perfused  Abdomen: soft, nontender, nondistended  Extremities: no edema  Skin: warm, dry, appropriate color                          10.3   19.40 )-----------( 169      ( 22 Oct 2020 02:13 )             28.7   10-22    123<L>  |  91<L>  |  10  ----------------------------<  161<H>  3.8   |  18<L>  |  0.74    Ca    8.4      22 Oct 2020 02:13  Phos  3.5     10-22  Mg     2.7     10-22    TPro  7.0  /  Alb  3.6  /  TBili  0.9  /  DBili  x   /  AST  17  /  ALT  13  /  AlkPhos  88  10-21

## 2020-10-22 NOTE — CONSULT NOTE ADULT - ATTENDING COMMENTS
Evaluation for necrotizing fasciitis  patients wound with gas tracking in perineal region  evaluated initially in the operating room - opened and probed by colorectal surgery team and with good effect  brought to the SICU for further monitoring  hyponatremia part of manifestation of fasciitis - using normal saline to resuscitate
Case discussed with Dr. Menezes. Patient with rectal ca, recent admission for bacteremia, now readmitted s/p I&D for abscess/nec fasciitis. Palliative following for ACP and community resources to ensure safe discharge planning when medically stable. HCP completed today. SEBASTIÁN discussed and will review with patient and family on subsequent visit. >16 minutes spent on ACP

## 2020-10-22 NOTE — PATIENT PROFILE ADULT - NSPROHMDIABETMGMTSTRAT_GEN_A_NUR
adequate rest/diet modification/activity/exercise/medication therapy/weight management/routine screenings

## 2020-10-22 NOTE — CONSULT NOTE ADULT - SUBJECTIVE AND OBJECTIVE BOX
HPI:  Pt is 74M hx DM, CAD, AFib, bioprosthetic mitral valve (2015), HTN, macular degeneration, hemorrhoids, recently diagnosed localized rectal cancer (dx 9/2020) s/p 10/15 Transanal excision of distal rectal carcinoma.    Pt had colonoscopy 9/21. 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  Patient had a TTE and REEMA that was negative for endocarditis. CT A/P was negative for any intra-abdominal pathology.  Patient had a LUE PICC placed and he was discharged on 6 weeks of IV Ceftriaxone.     Since ambulatory procedure on 10/15, patient has had difficulty voiding and increasing scrotal edema.  He has also developed initially low grade fevers which progressed to fevers to nearly 102 yesterday.  Also endorsing chills. Still having BM's, last yesterday.  No N/V. No abdominal pain. He is also complaining of foul-smelling rectal discharge.    (21 Oct 2020 18:35)    10/22/20    Introduction to palliative care team was made. Patient is not complaining of severe pain at this time despite recent surgeries - has not used oxycodone or Dilaudid PRNs ("Advil works perfect for me"). Rates current pain at 5/10. Patient established health care proxy. Discussed filling out MOLST form; patient will discuss with his wife later today on how to go about filling it out.    PERTINENT PM/SXH:   Bacteremia due to Streptococcus    AMD (age related macular degeneration)    CAD (coronary artery disease)    H/O hemorrhoids    Diverticulosis    BPH (Benign Prostatic Hyperplasia)    After-cataract of both eyes    Hyperlipidemia    HTN (hypertension)    AF (atrial fibrillation)    CAD (coronary artery disease)      S/P MVR (mitral valve replacement)    Retina disorder, left    S/P left inguinal hernia repair    S/P ablation of atrial fibrillation    Hx of coronary angioplasty      FAMILY HISTORY:    ITEMS NOT CHECKED ARE NOT PRESENT    SOCIAL HISTORY:   Significant other/partner[x ]  Children[ ]  Sikhism/Spirituality: Lutheran  Substance hx:  [ ]   Tobacco hx:  [ ]   Alcohol hx: [ ]   Home Opioid hx:  [ ] I-Stop Reference No:  Living Situation: [x ]Home  [ ]Long term care  [ ]Rehab [ ]Other    ADVANCE DIRECTIVES:    DNR  MOLST  [ ]  Living Will  [ ]   DECISION MAKER(s):  [x ] Health Care Proxy(s)  [ ] Surrogate(s)  [ ] Guardian           Name(s): Phone Number(s):    Lori Guillaume - [phone]    BASELINE (I)ADL(s) (prior to admission):  [place]: [x ]Total  [ ] Moderate [ ]Dependent    Allergies    No Known Allergies    MEDICATIONS  (STANDING):  atorvastatin 40 milliGRAM(s) Oral at bedtime  chlorhexidine 2% Cloths 1 Application(s) Topical <User Schedule>  enoxaparin Injectable 40 milliGRAM(s) SubCutaneous daily  insulin lispro (ADMELOG) corrective regimen sliding scale   SubCutaneous every 6 hours  losartan 50 milliGRAM(s) Oral daily  metoprolol tartrate 25 milliGRAM(s) Oral every 12 hours  multivitamin 1 Tablet(s) Oral daily  omega-3-Acid Ethyl Esters 2 Gram(s) Oral at bedtime  piperacillin/tazobactam IVPB.. 3.375 Gram(s) IV Intermittent every 8 hours  polyethylene glycol 3350 17 Gram(s) Oral daily  senna 2 Tablet(s) Oral at bedtime  sodium chloride 1 Gram(s) Oral every 8 hours  sodium chloride 0.9%. 1000 milliLiter(s) (100 mL/Hr) IV Continuous <Continuous>  tamsulosin 0.4 milliGRAM(s) Oral at bedtime  vancomycin  IVPB 1000 milliGRAM(s) IV Intermittent every 12 hours    MEDICATIONS  (PRN):  acetaminophen   Tablet .. 975 milliGRAM(s) Oral every 6 hours PRN Mild Pain (1 - 3)  HYDROmorphone  Injectable 0.5 milliGRAM(s) IV Push every 4 hours PRN Breakthrough Pain  oxyCODONE    IR 5 milliGRAM(s) Oral every 4 hours PRN Moderate Pain (4 - 6)  oxyCODONE    IR 10 milliGRAM(s) Oral every 6 hours PRN Severe Pain (7 - 10)    PRESENT SYMPTOMS: [ ]Unable to obtain due to poor mentation   Source if other than patient:  [ ]Family   [ ]Team     Pain: [x ]yes [ ]no    Patient did not describe.    QOL impact -   Location -                    Aggravating factors -  Quality -  Radiation -  Timing-  Severity (0-10 scale):  Minimal acceptable level (0-10 scale):     CPOT:  0  https://www.sccm.org/getattachment/[email or web address]/Critical-Care-Pain-Observation-Tool-(CPOT)      PAIN AD Score:     http://[email or web address]/painad.pdf (press ctrl +  left click to view)    Dyspnea:                           [ ]Mild [ ]Moderate [ ]Severe  Anxiety:                             [ ]Mild [ ]Moderate [ ]Severe  Fatigue:                             [ ]Mild [ ]Moderate [ ]Severe  Nausea:                             [ ]Mild [ ]Moderate [ ]Severe  Loss of appetite:              [ ]Mild [ ]Moderate [ ]Severe  Constipation:                    [ ]Mild [ ]Moderate [ ]Severe    Other Symptoms:  [ ]All other review of systems negative     Palliative Performance Status Version 2:         90%    http://npcrc.org/files/news/palliative_performance_scale_ppsv2.pdf  PHYSICAL EXAM:  Vital Signs Last 24 Hrs  T(C): 36.4 (22 Oct 2020 07:00), Max: 37.2 (21 Oct 2020 18:01)  T(F): 97.5 (22 Oct 2020 07:00), Max: 98.9 (21 Oct 2020 18:01)  HR: 80 (22 Oct 2020 10:39) (72 - 126)  BP: 134/98 (21 Oct 2020 21:00) (134/98 - 176/65)  BP(mean): 112 (21 Oct 2020 21:00) (112 - 112)  RR: 29 (22 Oct 2020 10:39) (15 - 34)  SpO2: 98% (22 Oct 2020 10:39) (94% - 100%) I&O's Summary    21 Oct 2020 07:01  -  22 Oct 2020 07:00  --------------------------------------------------------  IN: 1600 mL / OUT: 2075 mL / NET: -475 mL    22 Oct 2020 07:01  -  22 Oct 2020 13:04  --------------------------------------------------------  IN: 100 mL / OUT: 0 mL / NET: 100 mL      GENERAL:  [x ]Alert  [x ]Oriented x3   [ ]Lethargic  [ ]Cachexia  [ ]Unarousable  [x ]Verbal  [ ]Non-Verbal  Behavioral:   [ ] Anxiety  [ ] Delirium [ ] Agitation [ ] Other  HEENT:  [x ]Normal   [ ]Dry mouth   [ ]ET Tube/Trach  [ ]Oral lesions  PULMONARY:   [ ]Clear [ ]Tachypnea  [ ]Audible excessive secretions   [ ]Rhonchi        [ ]Right [ ]Left [ ]Bilateral  [ ]Crackles        [ ]Right [ ]Left [ ]Bilateral  [ ]Wheezing     [ ]Right [ ]Left [ ]Bilateral  [ ]Diminished breath sounds [ ]right [ ]left [ ]bilateral  CARDIOVASCULAR:    [ ]Regular [ ]Irregular [ ]Tachy  [ ]Song [ ]Murmur [ ]Other  GASTROINTESTINAL:  [ ]Soft  [ ]Distended   [ ]+BS  [ ]Non tender [ ]Tender  [ ]PEG [ ]OGT/ NGT  Last BM:     GENITOURINARY:  [ ]Normal [ ] Incontinent   [ ]Oliguria/Anuria   [x ]Sanchez  MUSCULOSKELETAL:   [x ]Normal   [ ]Weakness  [ ]Bed/Wheelchair bound [ ]Edema  NEUROLOGIC:   [ ]No focal deficits  [ ]Cognitive impairment  [ ]Dysphagia [ ]Dysarthria [ ]Paresis [ ]Other   SKIN:   [ ]Normal    [ ]Rash  [ ]Pressure ulcer(s)       Present on admission [ ]y [ ]n    CRITICAL CARE:  [ ] Shock Present  [ ]Septic [ ]Cardiogenic [ ]Neurologic [ ]Hypovolemic  [ ]  Vasopressors [ ]  Inotropes   [ ]Respiratory failure present [ ]Mechanical ventilation [ ]Non-invasive ventilatory support [ ]High flow  [ ]Acute  [ ]Chronic [ ]Hypoxic  [ ]Hypercarbic [ ]Other  [ ]Other organ failure     LABS:                        10.3   19.40 )-----------( 169      ( 22 Oct 2020 02:13 )             28.7   10-22    123<L>  |  91<L>  |  10  ----------------------------<  161<H>  3.8   |  18<L>  |  0.74    Ca    8.4      22 Oct 2020 02:13  Phos  3.5     10-22  Mg     2.7     10-22    TPro  7.0  /  Alb  3.6  /  TBili  0.9  /  DBili  x   /  AST  17  /  ALT  13  /  AlkPhos  88  10-21  PT/INR - ( 22 Oct 2020 02:13 )   PT: 21.4 sec;   INR: 1.84 ratio         PTT - ( 22 Oct 2020 02:13 )  PTT:36.0 sec      RADIOLOGY & ADDITIONAL STUDIES:    PROTEIN CALORIE MALNUTRITION PRESENT: [ ]mild [ ]moderate [ ]severe [ ]underweight [ ]morbid obesity  https://www.andeal.org/vault/2440/web/files/ONC/Table_Clinical%20Characteristics%20to%20Document%20Malnutrition-White%20JV%20et%20al%355767.pdf    Height (cm): 165.1 (10-21-20 @ 21:00), 165.1 (10-15-20 @ 14:22), 165.1 (10-08-20 @ 11:35)  Weight (kg): 72.6 (10-21-20 @ 20:11), 72.6 (10-15-20 @ 14:22), 72.6 (10-08-20 @ 11:35)  BMI (kg/m2): 26.6 (10-21-20 @ 21:00), 26.6 (10-21-20 @ 20:11), 26.6 (10-15-20 @ 14:22)    [ ]PPSV2 < or = to 30% [ ]significant weight loss  [ ]poor nutritional intake  [ ]anasarca     Albumin, Serum: 3.6 g/dL (10-21-20 @ 17:56)   [ ]Artificial Nutrition      REFERRALS:   [ ]Chaplaincy  [ ]Hospice  [ ]Child Life  [ ]Social Work  [ ]Case management [ ]Holistic Therapy     Goals of Care Document:      HPI: Pt is 74M hx DM, CAD, AFib, bioprosthetic mitral valve (2015), HTN, macular degeneration, hemorrhoids, recently diagnosed localized rectal cancer (dx 9/2020) s/p 10/15 Transanal excision of distal rectal carcinoma.    Pt had colonoscopy 9/21. 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  Patient had a TTE and REEMA that was negative for endocarditis. CT A/P was negative for any intra-abdominal pathology.  Patient had a LUE PICC placed and he was discharged on 6 weeks of IV Ceftriaxone.     Since ambulatory procedure on 10/15, patient has had difficulty voiding and increasing scrotal edema.  He has also developed initially low grade fevers which progressed to fevers to nearly 102 yesterday.  Also endorsing chills. Still having BM's, last yesterday.  No N/V. No abdominal pain. He is also complaining of foul-smelling rectal discharge.    (21 Oct 2020 18:35)    10/22/20: Introduction to palliative care team was made. Patient is not complaining of severe pain at this time despite recent surgeries - has not used oxycodone or Dilaudid PRNs ("Advil works perfect for me"). Rates current pain at 5/10. Patient established health care proxy. Discussed filling out MOLST form; patient will discuss with his wife later today on how to go about filling it out.    PERTINENT PM/SXH:   Bacteremia due to Streptococcus    AMD (age related macular degeneration)    CAD (coronary artery disease)    H/O hemorrhoids    Diverticulosis    BPH (Benign Prostatic Hyperplasia)    After-cataract of both eyes    Hyperlipidemia    HTN (hypertension)    AF (atrial fibrillation)    CAD (coronary artery disease)      S/P MVR (mitral valve replacement)    Retina disorder, left    S/P left inguinal hernia repair    S/P ablation of atrial fibrillation    Hx of coronary angioplasty      FAMILY HISTORY:    ITEMS NOT CHECKED ARE NOT PRESENT    SOCIAL HISTORY:   Patients spouse Lori is the patients emergency contact and she  may be reached at [phone]. Patient confirmed his spouse as his caregiver  at this time. Patient reported he resides in a private home with his spouse in  [place], [place]. PTA patient is independent with ADLs and ambulation. Patient  currently has services through [facility] for IV Abx/Blood Draws since  previous DC on 9/30.  - Reported that he is retired from [employer] from [employer]. He has 2 daughters, one of which is a pediatrician at [employer] and one is a .   Significant other/partner[x ]-Lori  Children[x ]- 2 daughters  Zoroastrian/Spirituality: Gnosticism  Substance hx:  [ ] never  Tobacco hx:  [ ]never   Alcohol hx: [ ] never  Home Opioid hx:  [ ] I-Stop Reference No: [id number]  Living Situation: [x ]Home  [ ]Long term care  [ ]Rehab [ ]Other    ADVANCE DIRECTIVES:    DNR  MOLST  [ ]  Living Will  [ ]   DECISION MAKER(s):  [x ] Health Care Proxy(s)  [ ] Surrogate(s)  [ ] Guardian           Name(s): Phone Number(s):  Lori Guillaume (wife)- [phone]  Daughter- Niurka Joseph [phone]  BASELINE (I)ADL(s) (prior to admission):  [place]: [x ]Total  [ ] Moderate [ ]Dependent    Allergies    No Known Allergies    MEDICATIONS  (STANDING):  atorvastatin 40 milliGRAM(s) Oral at bedtime  chlorhexidine 2% Cloths 1 Application(s) Topical <User Schedule>  enoxaparin Injectable 40 milliGRAM(s) SubCutaneous daily  insulin lispro (ADMELOG) corrective regimen sliding scale   SubCutaneous every 6 hours  losartan 50 milliGRAM(s) Oral daily  metoprolol tartrate 25 milliGRAM(s) Oral every 12 hours  multivitamin 1 Tablet(s) Oral daily  omega-3-Acid Ethyl Esters 2 Gram(s) Oral at bedtime  piperacillin/tazobactam IVPB.. 3.375 Gram(s) IV Intermittent every 8 hours  polyethylene glycol 3350 17 Gram(s) Oral daily  senna 2 Tablet(s) Oral at bedtime  sodium chloride 1 Gram(s) Oral every 8 hours  sodium chloride 0.9%. 1000 milliLiter(s) (100 mL/Hr) IV Continuous <Continuous>  tamsulosin 0.4 milliGRAM(s) Oral at bedtime  vancomycin  IVPB 1000 milliGRAM(s) IV Intermittent every 12 hours    MEDICATIONS  (PRN):  acetaminophen   Tablet .. 975 milliGRAM(s) Oral every 6 hours PRN Mild Pain (1 - 3)  HYDROmorphone  Injectable 0.5 milliGRAM(s) IV Push every 4 hours PRN Breakthrough Pain  oxyCODONE    IR 5 milliGRAM(s) Oral every 4 hours PRN Moderate Pain (4 - 6)  oxyCODONE    IR 10 milliGRAM(s) Oral every 6 hours PRN Severe Pain (7 - 10)    PRESENT SYMPTOMS: [ ]Unable to obtain due to poor mentation   Source if other than patient:  [ ]Family   [ ]Team     Pain: [x ]yes [ ]no  QOL impact - not severe  Location -  rectum                  Aggravating factors - none  Quality - did not describe  Radiation - none  Timing- did not describe  Severity (0-10 scale): 5  Minimal acceptable level (0-10 scale): 5    CPOT:  0  https://www.[facility].org/getattachment/[email or web address]/Critical-Care-Pain-Observation-Tool-(CPOT)      PAIN AD Score: 0  http://geriatrictoolkit.[facility]/cog/painad.pdf (press ctrl +  left click to view)    Dyspnea:                           [ ]Mild [ ]Moderate [ ]Severe  Anxiety:                             [ ]Mild [ ]Moderate [ ]Severe  Fatigue:                             [ ]Mild [ ]Moderate [ ]Severe  Nausea:                             [ ]Mild [ ]Moderate [ ]Severe  Loss of appetite:              [ ]Mild [ ]Moderate [ ]Severe  Constipation:                    [ ]Mild [ ]Moderate [ ]Severe    Other Symptoms:  [ ]All other review of systems negative     Palliative Performance Status Version 2:    70%    http://npcrc.org/files/news/palliative_performance_scale_ppsv2.pdf  PHYSICAL EXAM:  Vital Signs Last 24 Hrs  T(C): 36.4 (22 Oct 2020 07:00), Max: 37.2 (21 Oct 2020 18:01)  T(F): 97.5 (22 Oct 2020 07:00), Max: 98.9 (21 Oct 2020 18:01)  HR: 80 (22 Oct 2020 10:39) (72 - 126)  BP: 134/98 (21 Oct 2020 21:00) (134/98 - 176/65)  BP(mean): 112 (21 Oct 2020 21:00) (112 - 112)  RR: 29 (22 Oct 2020 10:39) (15 - 34)  SpO2: 98% (22 Oct 2020 10:39) (94% - 100%)   I&O's Summary    21 Oct 2020 07:01  -  22 Oct 2020 07:00  --------------------------------------------------------  IN: 1600 mL / OUT: 2075 mL / NET: -475 mL    22 Oct 2020 07:01  -  22 Oct 2020 13:04  --------------------------------------------------------  IN: 100 mL / OUT: 0 mL / NET: 100 mL      GENERAL:  [x ]Alert  [x ]Oriented x3   [ ]Lethargic  [ ]Cachexia  [ ]Unarousable  [x ]Verbal  [ ]Non-Verbal  Behavioral:   [ ] Anxiety  [ ] Delirium [ ] Agitation [ ] Other  HEENT:  [x ]Normal   [ ]Dry mouth   [ ]ET Tube/Trach  [ ]Oral lesions  PULMONARY:   [ ]Clear [ ]Tachypnea  [ ]Audible excessive secretions   [ ]Rhonchi        [ ]Right [ ]Left [ ]Bilateral  [ ]Crackles        [ ]Right [ ]Left [ ]Bilateral  [ ]Wheezing     [ ]Right [ ]Left [ ]Bilateral  [ ]Diminished breath sounds [ ]right [ ]left [ ]bilateral  CARDIOVASCULAR:    [ ]Regular [ ]Irregular [ ]Tachy  [ ]Song [ ]Murmur [ ]Other  GASTROINTESTINAL:  [ ]Soft  [ ]Distended   [ ]+BS  [ ]Non tender [ ]Tender  [ ]PEG [ ]OGT/ NGT  Last BM: unknown    GENITOURINARY:  [ ]Normal [ ] Incontinent   [ ]Oliguria/Anuria   [x ]Sanchez  MUSCULOSKELETAL:   [x ]Normal   [ ]Weakness  [ ]Bed/Wheelchair bound [ ]Edema  NEUROLOGIC:   [x ]No focal deficits  [ ]Cognitive impairment  [ ]Dysphagia [ ]Dysarthria [ ]Paresis [ ]Other   SKIN: wound dressings s/p surgery of perineum  [ ]Normal    [ ]Rash  [ ]Pressure ulcer(s)       Present on admission [ x]y [ ]n    CRITICAL CARE:  [ ] Shock Present  [ ]Septic [ ]Cardiogenic [ ]Neurologic [ ]Hypovolemic  [ ]  Vasopressors [ ]  Inotropes   [ ]Respiratory failure present [ ]Mechanical ventilation [ ]Non-invasive ventilatory support [ ]High flow  [ ]Acute  [ ]Chronic [ ]Hypoxic  [ ]Hypercarbic [ ]Other  [ ]Other organ failure     LABS:                        10.3   19.40 )-----------( 169      ( 22 Oct 2020 02:13 )             28.7   10-22    123<L>  |  91<L>  |  10  ----------------------------<  161<H>  3.8   |  18<L>  |  0.74    Ca    8.4      22 Oct 2020 02:13  Phos  3.5     10-22  Mg     2.7     10-22    TPro  7.0  /  Alb  3.6  /  TBili  0.9  /  DBili  x   /  AST  17  /  ALT  13  /  AlkPhos  88  10-21  PT/INR - ( 22 Oct 2020 02:13 )   PT: 21.4 sec;   INR: 1.84 ratio    PTT - ( 22 Oct 2020 02:13 )  PTT:36.0 sec    RADIOLOGY & ADDITIONAL STUDIES:  < from: Xray Chest 1 View- PORTABLE-Urgent (Xray Chest 1 View- PORTABLE-Urgent .) (10.21.20 @ 23:27) >  Frontal expiratory view of the chest shows the heart to be similar in size. Left PICC line reaches the superior vena cava. Mitral valve ring, sternal wires and mediastinal clips are again noted.    The lungs show questionable developing right base infiltrate and there is no evidence of pneumothoraxnor definite pleural effusion.    IMPRESSION:  PICC line as noted. Questionable right infiltrate. Follow-up study is recommended as clinically warranted.    Thank you for the courtesy of this referral.    < end of copied text >  < from: CT Abdomen and Pelvis No Cont (10.21.20 @ 18:14) >  Subcutaneous gas and inflammation tracking within the perianal and perineal soft tissues concerning for gas-forming infection.    Scrotal edema.    These findings were discussed with Dr. Connelly on 10/21/2020 at 6:37 PM by Dr. Gonsales with read back confirmation.    PROTEIN CALORIE MALNUTRITION PRESENT: [ ]mild [ ]moderate [ ]severe [ ]underweight [ ]morbid obesity  https://www.andeal.org/vault/2440/web/files/ONC/Table_Clinical%20Characteristics%20to%20Document%20Malnutrition-White%20JV%20et%20al%202012.pdf    Height (cm): 165.1 (10-21-20 @ 21:00), 165.1 (10-15-20 @ 14:22), 165.1 (10-08-20 @ 11:35)  Weight (kg): 72.6 (10-21-20 @ 20:11), 72.6 (10-15-20 @ 14:22), 72.6 (10-08-20 @ 11:35)  BMI (kg/m2): 26.6 (10-21-20 @ 21:00), 26.6 (10-21-20 @ 20:11), 26.6 (10-15-20 @ 14:22)    [ ]PPSV2 < or = to 30% [ ]significant weight loss  [ ]poor nutritional intake  [ ]anasarca     Albumin, Serum: 3.6 g/dL (10-21-20 @ 17:56)   [ ]Artificial Nutrition      REFERRALS:   [x]Chaplaincy-offered and declined [ ]Hospice  [ ]Child Life  [ x]Social Work  [x ]Case management [ ]Holistic Therapy        HPI: Pt is 74M hx DM, CAD, AFib, bioprosthetic mitral valve (2015), HTN, macular degeneration, hemorrhoids, recently diagnosed localized rectal cancer (dx 9/2020) s/p 10/15 Transanal excision of distal rectal carcinoma.    Pt had colonoscopy 9/21. 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  Patient had a TTE and REEMA that was negative for endocarditis. CT A/P was negative for any intra-abdominal pathology.  Patient had a LUE PICC placed and he was discharged on 6 weeks of IV Ceftriaxone.     Since ambulatory procedure on 10/15, patient has had difficulty voiding and increasing scrotal edema.  He has also developed initially low grade fevers which progressed to fevers to nearly 102 yesterday.  Also endorsing chills. Still having BM's, last yesterday.  No N/V. No abdominal pain. He is also complaining of foul-smelling rectal discharge.    (21 Oct 2020 18:35)    10/22/20: Introduction to palliative care team was made. Patient is not complaining of severe pain at this time despite recent surgeries - has not used oxycodone or Dilaudid PRNs ("Advil works perfect for me"). Rates current pain at 5/10. Patient established health care proxy. Discussed filling out MOLST form; patient will discuss with his wife later today on how to go about filling it out.    PERTINENT PM/SXH:   Bacteremia due to Streptococcus    AMD (age related macular degeneration)    CAD (coronary artery disease)    H/O hemorrhoids    Diverticulosis    BPH (Benign Prostatic Hyperplasia)    After-cataract of both eyes    Hyperlipidemia    HTN (hypertension)    AF (atrial fibrillation)    CAD (coronary artery disease)      S/P MVR (mitral valve replacement)    Retina disorder, left    S/P left inguinal hernia repair    S/P ablation of atrial fibrillation    Hx of coronary angioplasty      FAMILY HISTORY:    ITEMS NOT CHECKED ARE NOT PRESENT    SOCIAL HISTORY:   Patients spouse Lori is the patients emergency contact and she  may be reached at [phone]. Patient confirmed his spouse as his caregiver  at this time. Patient reported he resides in a private home with his spouse in  [place], [place]. PTA patient is independent with ADLs and ambulation. Patient  currently has [facility] for IV Abx/Blood Draws since  previous DC on 9/30.  - Reported that he is retired from [employer] from [employer]. He has 2 daughters, one of which is a pediatrician at [employer] and one is a .   Significant other/partner[x ]-Lori  Children[x ]- 2 daughters  Mormonism/Spirituality: Roman Catholic  Substance hx:  [ ] never  Tobacco hx:  [ ]never   Alcohol hx: [ ] never  Home Opioid hx:  [ ] I-Stop Reference No: [id number]  Living Situation: [x ]Home  [ ]Long term care  [ ]Rehab [ ]Other    ADVANCE DIRECTIVES:    DNR  MOLST  [ ]  Living Will  [ ]   DECISION MAKER(s):  [x ] Health Care Proxy(s)  [ ] Surrogate(s)  [ ] Guardian           Name(s): Phone Number(s):  Lori Guillaume (wife)- [phone]  Daughter- Niurka Joseph [phone]  BASELINE (I)ADL(s) (prior to admission):  [place]: [x ]Total  [ ] Moderate [ ]Dependent    Allergies    No Known Allergies    MEDICATIONS  (STANDING):  atorvastatin 40 milliGRAM(s) Oral at bedtime  chlorhexidine 2% Cloths 1 Application(s) Topical <User Schedule>  enoxaparin Injectable 40 milliGRAM(s) SubCutaneous daily  insulin lispro (ADMELOG) corrective regimen sliding scale   SubCutaneous every 6 hours  losartan 50 milliGRAM(s) Oral daily  metoprolol tartrate 25 milliGRAM(s) Oral every 12 hours  multivitamin 1 Tablet(s) Oral daily  omega-3-Acid Ethyl Esters 2 Gram(s) Oral at bedtime  piperacillin/tazobactam IVPB.. 3.375 Gram(s) IV Intermittent every 8 hours  polyethylene glycol 3350 17 Gram(s) Oral daily  senna 2 Tablet(s) Oral at bedtime  sodium chloride 1 Gram(s) Oral every 8 hours  sodium chloride 0.9%. 1000 milliLiter(s) (100 mL/Hr) IV Continuous <Continuous>  tamsulosin 0.4 milliGRAM(s) Oral at bedtime  vancomycin  IVPB 1000 milliGRAM(s) IV Intermittent every 12 hours    MEDICATIONS  (PRN):  acetaminophen   Tablet .. 975 milliGRAM(s) Oral every 6 hours PRN Mild Pain (1 - 3)  HYDROmorphone  Injectable 0.5 milliGRAM(s) IV Push every 4 hours PRN Breakthrough Pain  oxyCODONE    IR 5 milliGRAM(s) Oral every 4 hours PRN Moderate Pain (4 - 6)  oxyCODONE    IR 10 milliGRAM(s) Oral every 6 hours PRN Severe Pain (7 - 10)    PRESENT SYMPTOMS: [ ]Unable to obtain due to poor mentation   Source if other than patient:  [ ]Family   [ ]Team     Pain: [x ]yes [ ]no  QOL impact - not severe  Location -  rectum                  Aggravating factors - none  Quality - did not describe  Radiation - none  Timing- did not describe  Severity (0-10 scale): 5  Minimal acceptable level (0-10 scale): 5    CPOT:  0  https://www.[facility].org/getattachment/[email or web address]/Critical-Care-Pain-Observation-Tool-(CPOT)      PAIN AD Score: 0  http://[email or web address]/cog/painad.pdf (press ctrl +  left click to view)    Dyspnea:                           [ ]Mild [ ]Moderate [ ]Severe  Anxiety:                             [ ]Mild [ ]Moderate [ ]Severe  Fatigue:                             [ ]Mild [ ]Moderate [ ]Severe  Nausea:                             [ ]Mild [ ]Moderate [ ]Severe  Loss of appetite:              [ ]Mild [ ]Moderate [ ]Severe  Constipation:                    [ ]Mild [ ]Moderate [ ]Severe    Other Symptoms:  [x ]All other review of systems negative     Palliative Performance Status Version 2:    70%    http://npcrc.org/files/news/palliative_performance_scale_ppsv2.pdf  PHYSICAL EXAM:  Vital Signs Last 24 Hrs  T(C): 36.4 (22 Oct 2020 07:00), Max: 37.2 (21 Oct 2020 18:01)  T(F): 97.5 (22 Oct 2020 07:00), Max: 98.9 (21 Oct 2020 18:01)  HR: 80 (22 Oct 2020 10:39) (72 - 126)  BP: 134/98 (21 Oct 2020 21:00) (134/98 - 176/65)  BP(mean): 112 (21 Oct 2020 21:00) (112 - 112)  RR: 29 (22 Oct 2020 10:39) (15 - 34)  SpO2: 98% (22 Oct 2020 10:39) (94% - 100%)   I&O's Summary    21 Oct 2020 07:01  -  22 Oct 2020 07:00  --------------------------------------------------------  IN: 1600 mL / OUT: 2075 mL / NET: -475 mL    22 Oct 2020 07:01  -  22 Oct 2020 13:04  --------------------------------------------------------  IN: 100 mL / OUT: 0 mL / NET: 100 mL      GENERAL:  [x ]Alert  [x ]Oriented x3   [ ]Lethargic  [ ]Cachexia  [ ]Unarousable  [x ]Verbal  [ ]Non-Verbal  Behavioral:   [ ] Anxiety  [ ] Delirium [ ] Agitation [ ] Other  HEENT:  [x ]Normal   [ ]Dry mouth   [ ]ET Tube/Trach  [ ]Oral lesions  PULMONARY:   [x ]Clear [ ]Tachypnea  [ ]Audible excessive secretions   [ ]Rhonchi        [ ]Right [ ]Left [ ]Bilateral  [ ]Crackles        [ ]Right [ ]Left [ ]Bilateral  [ ]Wheezing     [ ]Right [ ]Left [ ]Bilateral  [ ]Diminished breath sounds [ ]right [ ]left [ ]bilateral  CARDIOVASCULAR:    [x ]Regular [ ]Irregular [ ]Tachy  [ ]Song [ ]Murmur [ ]Other  GASTROINTESTINAL:  [x ]Soft  [ ]Distended   [ ]+BS  [x ]Non tender [ ]Tender  [ ]PEG [ ]OGT/ NGT  Last BM: unknown    GENITOURINARY:  [ ]Normal [ ] Incontinent   [ ]Oliguria/Anuria   [x ]Sanchez  MUSCULOSKELETAL:   [x ]Normal   [ ]Weakness  [ ]Bed/Wheelchair bound [ ]Edema  NEUROLOGIC:   [x ]No focal deficits  [ ]Cognitive impairment  [ ]Dysphagia [ ]Dysarthria [ ]Paresis [ ]Other   SKIN: wound dressings s/p surgery of perineum  [ ]Normal    [ ]Rash  [ ]Pressure ulcer(s)       Present on admission [ x]y [ ]n    CRITICAL CARE:  [ ] Shock Present  [ ]Septic [ ]Cardiogenic [ ]Neurologic [ ]Hypovolemic  [ ]  Vasopressors [ ]  Inotropes   [ ]Respiratory failure present [ ]Mechanical ventilation [ ]Non-invasive ventilatory support [ ]High flow  [ ]Acute  [ ]Chronic [ ]Hypoxic  [ ]Hypercarbic [ ]Other  [ ]Other organ failure     LABS:                        10.3   19.40 )-----------( 169      ( 22 Oct 2020 02:13 )             28.7   10-22    123<L>  |  91<L>  |  10  ----------------------------<  161<H>  3.8   |  18<L>  |  0.74    Ca    8.4      22 Oct 2020 02:13  Phos  3.5     10-22  Mg     2.7     10-22    TPro  7.0  /  Alb  3.6  /  TBili  0.9  /  DBili  x   /  AST  17  /  ALT  13  /  AlkPhos  88  10-21  PT/INR - ( 22 Oct 2020 02:13 )   PT: 21.4 sec;   INR: 1.84 ratio    PTT - ( 22 Oct 2020 02:13 )  PTT:36.0 sec    RADIOLOGY & ADDITIONAL STUDIES:  < from: Xray Chest 1 View- PORTABLE-Urgent (Xray Chest 1 View- PORTABLE-Urgent .) (10.21.20 @ 23:27) >  Frontal expiratory view of the chest shows the heart to be similar in size. Left PICC line reaches the superior vena cava. Mitral valve ring, sternal wires and mediastinal clips are again noted.    The lungs show questionable developing right base infiltrate and there is no evidence of pneumothoraxnor definite pleural effusion.    IMPRESSION:  PICC line as noted. Questionable right infiltrate. Follow-up study is recommended as clinically warranted.    Thank you for the courtesy of this referral.    < end of copied text >  < from: CT Abdomen and Pelvis No Cont (10.21.20 @ 18:14) >  Subcutaneous gas and inflammation tracking within the perianal and perineal soft tissues concerning for gas-forming infection.    Scrotal edema.    These findings were discussed with Dr. Connelly on 10/21/2020 at 6:37 PM by Dr. Gonsales with read back confirmation.    PROTEIN CALORIE MALNUTRITION PRESENT: [ ]mild [ ]moderate [ ]severe [ ]underweight [ ]morbid obesity  https://www.andeal.org/vault/2440/web/files/ONC/Table_Clinical%20Characteristics%20to%20Document%20Malnutrition-White%20JV%20et%20al%202012.pdf    Height (cm): 165.1 (10-21-20 @ 21:00), 165.1 (10-15-20 @ 14:22), 165.1 (10-08-20 @ 11:35)  Weight (kg): 72.6 (10-21-20 @ 20:11), 72.6 (10-15-20 @ 14:22), 72.6 (10-08-20 @ 11:35)  BMI (kg/m2): 26.6 (10-21-20 @ 21:00), 26.6 (10-21-20 @ 20:11), 26.6 (10-15-20 @ 14:22)    [ ]PPSV2 < or = to 30% [ ]significant weight loss  [ ]poor nutritional intake  [ ]anasarca     Albumin, Serum: 3.6 g/dL (10-21-20 @ 17:56)   [ ]Artificial Nutrition      REFERRALS:   [x]Chaplaincy-offered and declined [ ]Hospice  [ ]Child Life  [ x]Social Work  [x ]Case management [ ]Holistic Therapy

## 2020-10-22 NOTE — CONSULT NOTE ADULT - PROBLEM SELECTOR RECOMMENDATION 4
Will continue to follow for symptom management and goals of care discussion.   Please page #[phone] for any questions/comments.

## 2020-10-22 NOTE — CHART NOTE - NSCHARTNOTEFT_GEN_A_CORE
POST-OPERATIVE NOTE    Patient is s/p incision and drainage of perineal abscess    Subjective:  Patient reports pain at the incisional site, controlled with medication  Denies chest pain, shortness of breath, nausea, vomiting  Is not yet passing gas or having bowel movements  Sanchez in place, resting in bed    Vital Signs Last 24 Hrs  T(C): 36.4 (22 Oct 2020 03:00), Max: 37.2 (21 Oct 2020 18:01)  T(F): 97.5 (22 Oct 2020 03:00), Max: 98.9 (21 Oct 2020 18:01)  HR: 78 (22 Oct 2020 06:00) (72 - 126)  BP: 134/98 (21 Oct 2020 21:00) (134/98 - 176/65)  BP(mean): 112 (21 Oct 2020 21:00) (112 - 112)  RR: 25 (22 Oct 2020 06:00) (15 - 34)  SpO2: 100% (22 Oct 2020 06:00) (96% - 100%)  I&O's Detail    21 Oct 2020 07:01  -  22 Oct 2020 06:36  --------------------------------------------------------  IN:    IV PiggyBack: 400 mL    IV PiggyBack: 200 mL    multiple electrolytes Injection Type 1.: 200 mL    Oral Fluid: 100 mL    sodium chloride 0.9%: 500 mL  Total IN: 1400 mL    OUT:    Indwelling Catheter - Urethral (mL): 1725 mL  Total OUT: 1725 mL    Total NET: -325 mL        piperacillin/tazobactam IVPB.. 3.375  vancomycin  IVPB 1000  enoxaparin Injectable 40  losartan 50  metoprolol tartrate 25  piperacillin/tazobactam IVPB.. 3.375  tamsulosin 0.4  vancomycin  IVPB 1000    PAST MEDICAL & SURGICAL HISTORY:  Bacteremia due to Streptococcus  9/2020- on Ceftriaxone via PICC line x 6 weeks    AMD (age related macular degeneration)  Right eye    CAD (coronary artery disease)  stented coronary- LAD x 1 1997    H/O hemorrhoids    Diverticulosis    BPH (Benign Prostatic Hyperplasia)    After-cataract of both eyes  1996    Hyperlipidemia    HTN (hypertension)    AF (atrial fibrillation)  s/p ablation 2010- on Coumadin    CAD (coronary artery disease)    S/P MVR (mitral valve replacement)  bovine valve 2015, CABG x 2 v 2015    Retina disorder, 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    S/P left inguinal hernia repair  2002    S/P ablation of atrial fibrillation  3/2010    Hx of coronary angioplasty  stent to LAD 5/30/1997          Physical Exam:  General: NAD, resting comfortably in bed  Pulmonary: Nonlabored breathing, no respiratory distress, CTAB  Cardiovascular: NSR, no murmurs or rubs  Abdominal: soft, mildly distended, nontender  Rectum: Dressing clean, dry, intact with mesh underwear   Extremities: WWP      LABS:                        10.3   19.40 )-----------( 169      ( 22 Oct 2020 02:13 )             28.7     10-22    123<L>  |  91<L>  |  10  ----------------------------<  161<H>  3.8   |  18<L>  |  0.74    Ca    8.4      22 Oct 2020 02:13  Phos  3.5     10-22  Mg     2.7     10-22    TPro  7.0  /  Alb  3.6  /  TBili  0.9  /  DBili  x   /  AST  17  /  ALT  13  /  AlkPhos  88  10-21    PT/INR - ( 22 Oct 2020 02:13 )   PT: 21.4 sec;   INR: 1.84 ratio         PTT - ( 22 Oct 2020 02:13 )  PTT:36.0 sec  CAPILLARY BLOOD GLUCOSE      POCT Blood Glucose.: 162 mg/dL (22 Oct 2020 06:21)      Radiology and Additional Studies:    Assessment:  The patient is a 74y Male who is now several hours post-op from a incision and drainage of perineal abscess    Plan:  - Pain control as needed tylenol, oxycodone, dilaudid  - Continue home meds  - NPO except meds, IVF  - vanc/zosyn  - DVT ppx  - F/u AM labs    Macario, HANHY1

## 2020-10-22 NOTE — PHYSICAL THERAPY INITIAL EVALUATION ADULT - GENERAL OBSERVATIONS, REHAB EVAL
Pt received semisupine in bed with +cardiac monitor, +pulse ox, +a-line, +IV, and +nelson. NAD noted.

## 2020-10-22 NOTE — CONSULT NOTE ADULT - PROBLEM SELECTOR RECOMMENDATION 3
Will continue to follow for symptom management.   Please page #[phone] for any questions/comments. Patient assigned HCP today. His wife, Lori [phone] is HCP.   Discussed MOLST form and code status. Pt to discuss with wife prior to completion. MOLST form left at bedside.  Please refer to GOC above. Patient assigned HCP today. His wife, Lori [phone] is HCP.   Discussed MOLST form and code status. Pt to discuss with wife prior to completion. MOLST form left at bedside.  Please refer to GOC above.  >16 minutes spent on ACP

## 2020-10-22 NOTE — CONSULT NOTE ADULT - CONVERSATION DETAILS
Met with patient today to introduce patient to Palliative Care team. Patient states that he was recently diagnosed with rectal cancer in 9/2020. He proceeded to detail his clinical course to provider demonstrating good understanding of medical diagnosis and prognosis. Patient able to assign HCP today. His wife, Lori Guillaume ([phone]) has been assigned as primary HCP with daughters, Nimo Rodriguez and Niurka Guillaume ([phone]) as alternates. Also, discussed MOLST form with patient as he began to talk about his own code status. States that he would want to "die naturally" without aggressive interventions. Would like to discuss MOLST form with wife before completion. Met with patient today to introduce patient to Palliative Care team. Patient states that he was recently diagnosed with rectal cancer in 9/2020. He proceeded to detail his clinical course to provider demonstrating good understanding of medical diagnosis and clinical status. Patient able to assign HCP today. His wife, Lori Guillaume ([phone]) has been assigned as primary HCP with daughters, Nimo Rodriguez and Niurka Guillaume ([phone]) as alternates. Also, discussed MOLST form with patient as he began to talk about his own code status. States that he would want to "die naturally" without aggressive interventions. Would like to discuss MOLST form with wife before completion.

## 2020-10-22 NOTE — PHYSICAL THERAPY INITIAL EVALUATION ADULT - PLANNED THERAPY INTERVENTIONS, PT EVAL
gait training/strengthening/transfer training/balance training/bed mobility training/GOAL: Stair Negotiation Training: Patient will be able to negotiate up & down 1 flight of stairs with unilateral rail, step to gait pattern, in 2 weeks.

## 2020-10-22 NOTE — PROGRESS NOTE ADULT - SUBJECTIVE AND OBJECTIVE BOX
Patient is a 74y old  Male who presents with a chief complaint of r/o necrotizing fasciitis (21 Oct 2020 23:38)    Being followed by ID for perineal wound infection, fever    Interval history:s/p OR debridement last night  pain much better   No acute events      ROS:  No cough,SOB,CP  No N/V/D.  No PICC site pain   No other complaints      Antimicrobials:    piperacillin/tazobactam IVPB.. 3.375 Gram(s) IV Intermittent every 8 hours  vancomycin  IVPB 1000 milliGRAM(s) IV Intermittent every 12 hours    Other medications reviewed    Vital Signs Last 24 Hrs  T(C): 36.4 (10-22-20 @ 12:00), Max: 37.2 (10-21-20 @ 18:01)  T(F): 97.5 (10-22-20 @ 12:00), Max: 98.9 (10-21-20 @ 18:01)  HR: 77 (10-22-20 @ 14:00) (72 - 126)  BP: 134/98 (10-21-20 @ 21:00) (134/98 - 176/65)  BP(mean): 112 (10-21-20 @ 21:00) (112 - 112)  RR: 22 (10-22-20 @ 14:00) (15 - 34)  SpO2: 100% (10-22-20 @ 14:00) (94% - 100%)    Physical Exam:        HEENT PERRLA EOMI    No oral exudate or erythema    Chest Good AE,CTA    CVS RRR S1 S2 WNl No murmur or rub or gallop    Abd soft BS normal No tenderness no masses  rectal wound packing no discharge     PICC site  site no erythema tenderness or discharge    CNS AAO X 3 no focal    Lab Data:                          10.3   19.40 )-----------( 169      ( 22 Oct 2020 02:13 )             28.7     WBC Count: 19.40 (10-22-20 @ 02:13)  WBC Count: 14.59 (10-21-20 @ 21:17)  WBC Count: 18.23 (10-21-20 @ 17:56)    10-22    123<L>  |  91<L>  |  10  ----------------------------<  161<H>  3.8   |  18<L>  |  0.74    Ca    8.4      22 Oct 2020 02:13  Phos  3.5     10-22  Mg     2.7     10-22    TPro  7.0  /  Alb  3.6  /  TBili  0.9  /  DBili  x   /  AST  17  /  ALT  13  /  AlkPhos  88  10-21        < from: Xray Chest 1 View- PORTABLE-Urgent (Xray Chest 1 View- PORTABLE-Urgent .) (10.21.20 @ 23:27) >    IMPRESSION:  PICC line as noted. Questionable right infiltrate. Follow-up study is recommended as clinically warranted.    Thank you for the courtesy of this referral.      < end of copied text >  < from: CT Abdomen and Pelvis No Cont (10.21.20 @ 18:14) >  IMPRESSION:  Subcutaneous gas and inflammation tracking within the perianal and perineal soft tissues concerning for gas-forming infection.    Scrotal edema.    These findings were discussed with Dr. Connelly on 10/21/2020 at 6:37 PM by Dr. Gonsales with read back confirmation.          < end of copied text >

## 2020-10-22 NOTE — PHYSICAL THERAPY INITIAL EVALUATION ADULT - ADDITIONAL COMMENTS
Pt lives with his wife in a house with 7 steps to enter, +HR and 3 steps inside, +HR. Pt was independent with all ADLs and ambulation PTA. Pt did not use a AD for ambulation PTA. Pt owns RW and cane for ambulation. +eyeglasses. +drives.

## 2020-10-22 NOTE — CONSULT NOTE ADULT - PROBLEM SELECTOR RECOMMENDATION 2
Patient diagnosed with rectal cancer in 09/2020  - Now s/p transanal excision on 10/15  - Patient follow up with Dr. Dietz, medical oncologist at [facility] Patient diagnosed with rectal cancer in 09/2020  - Now s/p transanal excision on 10/15 and I&D of perineal abscess on 10/21  - Patient follow up with Dr. Dietz, medical oncologist at [facility]

## 2020-10-22 NOTE — OCCUPATIONAL THERAPY INITIAL EVALUATION ADULT - PERTINENT HX OF CURRENT PROBLEM, REHAB EVAL
74y Male with history of DM, CAD, AFib, bioprosthetic mitral valve (2015), HTN, macular degeneration, hemorrhoids, recently diagnosed localized rectal cancer (dx 9/2020) c/b bacteremia, s/p 10/15 Transanal excision of distal rectal carcinoma. Taken to OR urgently 10/21 for r/o necrotizing fasciitis, found to have breakdown of rectal incision with fistulization into perineum, s/p debridement and penrose drain placement.

## 2020-10-22 NOTE — CONSULT NOTE ADULT - PROBLEM SELECTOR RECOMMENDATION 9
Patient with 2 surgeries since 10/15. Appears comfortable on interview today and states pain is well controlled without opiates.  - Tylenol and Advil PRN  - Dilaudid and oxycodone for breakthrough pain  - Continue to monitor Patient with 2 surgeries since 10/15. Appears comfortable on interview today and states pain is well controlled without opiates.  - Tylenol and Advil PRN  - Continue Dilaudid 0.5 mg IV q4 PRN breakthrough pain  - Continue Oxycodone 5-10 mg PO PRN moderate-severe pain  - Will evaluate pain med requirements daily  - bowel regimen while on opiates

## 2020-10-22 NOTE — PHYSICAL THERAPY INITIAL EVALUATION ADULT - PRECAUTIONS/LIMITATIONS, REHAB EVAL
+Abdomen and Pelvis CT 10/21/2020: Subcutaneous gas and inflammation tracking within the perianal and perineal soft tissues concerning for gas-forming infection. Scrotal edema./fall precautions

## 2020-10-23 LAB
ANION GAP SERPL CALC-SCNC: 8 MMOL/L — SIGNIFICANT CHANGE UP (ref 5–17)
APTT BLD: 39.3 SEC — HIGH (ref 27.5–35.5)
BUN SERPL-MCNC: 18 MG/DL — SIGNIFICANT CHANGE UP (ref 7–23)
CALCIUM SERPL-MCNC: 8 MG/DL — LOW (ref 8.4–10.5)
CHLORIDE SERPL-SCNC: 100 MMOL/L — SIGNIFICANT CHANGE UP (ref 96–108)
CO2 SERPL-SCNC: 21 MMOL/L — LOW (ref 22–31)
CREAT SERPL-MCNC: 0.91 MG/DL — SIGNIFICANT CHANGE UP (ref 0.5–1.3)
GLUCOSE BLDC GLUCOMTR-MCNC: 103 MG/DL — HIGH (ref 70–99)
GLUCOSE BLDC GLUCOMTR-MCNC: 107 MG/DL — HIGH (ref 70–99)
GLUCOSE BLDC GLUCOMTR-MCNC: 111 MG/DL — HIGH (ref 70–99)
GLUCOSE BLDC GLUCOMTR-MCNC: 124 MG/DL — HIGH (ref 70–99)
GLUCOSE BLDC GLUCOMTR-MCNC: 130 MG/DL — HIGH (ref 70–99)
GLUCOSE BLDC GLUCOMTR-MCNC: 131 MG/DL — HIGH (ref 70–99)
GLUCOSE SERPL-MCNC: 130 MG/DL — HIGH (ref 70–99)
HCT VFR BLD CALC: 28 % — LOW (ref 39–50)
HGB BLD-MCNC: 9.4 G/DL — LOW (ref 13–17)
INR BLD: 2.98 RATIO — HIGH (ref 0.88–1.16)
MAGNESIUM SERPL-MCNC: 2.1 MG/DL — SIGNIFICANT CHANGE UP (ref 1.6–2.6)
MCHC RBC-ENTMCNC: 30.7 PG — SIGNIFICANT CHANGE UP (ref 27–34)
MCHC RBC-ENTMCNC: 33.6 GM/DL — SIGNIFICANT CHANGE UP (ref 32–36)
MCV RBC AUTO: 91.5 FL — SIGNIFICANT CHANGE UP (ref 80–100)
NRBC # BLD: 0 /100 WBCS — SIGNIFICANT CHANGE UP (ref 0–0)
PHOSPHATE SERPL-MCNC: 3.3 MG/DL — SIGNIFICANT CHANGE UP (ref 2.5–4.5)
PLATELET # BLD AUTO: 188 K/UL — SIGNIFICANT CHANGE UP (ref 150–400)
POTASSIUM SERPL-MCNC: 4.1 MMOL/L — SIGNIFICANT CHANGE UP (ref 3.5–5.3)
POTASSIUM SERPL-SCNC: 4.1 MMOL/L — SIGNIFICANT CHANGE UP (ref 3.5–5.3)
PROTHROM AB SERPL-ACNC: 34 SEC — HIGH (ref 10.6–13.6)
RBC # BLD: 3.06 M/UL — LOW (ref 4.2–5.8)
RBC # FLD: 13.8 % — SIGNIFICANT CHANGE UP (ref 10.3–14.5)
SODIUM SERPL-SCNC: 129 MMOL/L — LOW (ref 135–145)
VANCOMYCIN TROUGH SERPL-MCNC: 16.6 UG/ML — SIGNIFICANT CHANGE UP (ref 10–20)
WBC # BLD: 14.47 K/UL — HIGH (ref 3.8–10.5)
WBC # FLD AUTO: 14.47 K/UL — HIGH (ref 3.8–10.5)

## 2020-10-23 PROCEDURE — 99232 SBSQ HOSP IP/OBS MODERATE 35: CPT

## 2020-10-23 PROCEDURE — 71045 X-RAY EXAM CHEST 1 VIEW: CPT | Mod: 26

## 2020-10-23 RX ORDER — CHLORHEXIDINE GLUCONATE 213 G/1000ML
1 SOLUTION TOPICAL
Refills: 0 | Status: DISCONTINUED | OUTPATIENT
Start: 2020-10-23 | End: 2020-10-30

## 2020-10-23 RX ORDER — INSULIN LISPRO 100/ML
VIAL (ML) SUBCUTANEOUS
Refills: 0 | Status: DISCONTINUED | OUTPATIENT
Start: 2020-10-23 | End: 2020-10-30

## 2020-10-23 RX ORDER — HYDROMORPHONE HYDROCHLORIDE 2 MG/ML
0.5 INJECTION INTRAMUSCULAR; INTRAVENOUS; SUBCUTANEOUS ONCE
Refills: 0 | Status: DISCONTINUED | OUTPATIENT
Start: 2020-10-23 | End: 2020-10-23

## 2020-10-23 RX ORDER — INSULIN LISPRO 100/ML
VIAL (ML) SUBCUTANEOUS AT BEDTIME
Refills: 0 | Status: DISCONTINUED | OUTPATIENT
Start: 2020-10-23 | End: 2020-10-30

## 2020-10-23 RX ORDER — ACETAMINOPHEN 500 MG
975 TABLET ORAL EVERY 6 HOURS
Refills: 0 | Status: DISCONTINUED | OUTPATIENT
Start: 2020-10-23 | End: 2020-10-30

## 2020-10-23 RX ORDER — ONDANSETRON 8 MG/1
4 TABLET, FILM COATED ORAL ONCE
Refills: 0 | Status: DISCONTINUED | OUTPATIENT
Start: 2020-10-23 | End: 2020-10-30

## 2020-10-23 RX ORDER — SODIUM CHLORIDE 9 MG/ML
10 INJECTION INTRAMUSCULAR; INTRAVENOUS; SUBCUTANEOUS
Refills: 0 | Status: DISCONTINUED | OUTPATIENT
Start: 2020-10-23 | End: 2020-10-30

## 2020-10-23 RX ADMIN — Medication 2 GRAM(S): at 21:46

## 2020-10-23 RX ADMIN — OXYCODONE HYDROCHLORIDE 10 MILLIGRAM(S): 5 TABLET ORAL at 22:37

## 2020-10-23 RX ADMIN — Medication 1 TABLET(S): at 12:11

## 2020-10-23 RX ADMIN — SODIUM CHLORIDE 1 GRAM(S): 9 INJECTION INTRAMUSCULAR; INTRAVENOUS; SUBCUTANEOUS at 14:17

## 2020-10-23 RX ADMIN — Medication 975 MILLIGRAM(S): at 15:46

## 2020-10-23 RX ADMIN — SODIUM CHLORIDE 100 MILLILITER(S): 9 INJECTION INTRAMUSCULAR; INTRAVENOUS; SUBCUTANEOUS at 14:17

## 2020-10-23 RX ADMIN — Medication 975 MILLIGRAM(S): at 04:21

## 2020-10-23 RX ADMIN — OXYCODONE HYDROCHLORIDE 10 MILLIGRAM(S): 5 TABLET ORAL at 23:32

## 2020-10-23 RX ADMIN — OXYCODONE HYDROCHLORIDE 10 MILLIGRAM(S): 5 TABLET ORAL at 04:57

## 2020-10-23 RX ADMIN — OXYCODONE HYDROCHLORIDE 10 MILLIGRAM(S): 5 TABLET ORAL at 05:36

## 2020-10-23 RX ADMIN — Medication 250 MILLIGRAM(S): at 09:14

## 2020-10-23 RX ADMIN — Medication 25 MILLIGRAM(S): at 04:58

## 2020-10-23 RX ADMIN — Medication 975 MILLIGRAM(S): at 04:59

## 2020-10-23 RX ADMIN — Medication 975 MILLIGRAM(S): at 16:30

## 2020-10-23 RX ADMIN — Medication 25 MILLIGRAM(S): at 18:14

## 2020-10-23 RX ADMIN — PIPERACILLIN AND TAZOBACTAM 25 GRAM(S): 4; .5 INJECTION, POWDER, LYOPHILIZED, FOR SOLUTION INTRAVENOUS at 01:25

## 2020-10-23 RX ADMIN — SODIUM CHLORIDE 1 GRAM(S): 9 INJECTION INTRAMUSCULAR; INTRAVENOUS; SUBCUTANEOUS at 21:46

## 2020-10-23 RX ADMIN — SODIUM CHLORIDE 1 GRAM(S): 9 INJECTION INTRAMUSCULAR; INTRAVENOUS; SUBCUTANEOUS at 05:05

## 2020-10-23 RX ADMIN — PIPERACILLIN AND TAZOBACTAM 25 GRAM(S): 4; .5 INJECTION, POWDER, LYOPHILIZED, FOR SOLUTION INTRAVENOUS at 18:21

## 2020-10-23 RX ADMIN — TAMSULOSIN HYDROCHLORIDE 0.4 MILLIGRAM(S): 0.4 CAPSULE ORAL at 21:46

## 2020-10-23 RX ADMIN — CHLORHEXIDINE GLUCONATE 1 APPLICATION(S): 213 SOLUTION TOPICAL at 12:10

## 2020-10-23 RX ADMIN — SENNA PLUS 2 TABLET(S): 8.6 TABLET ORAL at 21:46

## 2020-10-23 RX ADMIN — PIPERACILLIN AND TAZOBACTAM 25 GRAM(S): 4; .5 INJECTION, POWDER, LYOPHILIZED, FOR SOLUTION INTRAVENOUS at 11:07

## 2020-10-23 RX ADMIN — ENOXAPARIN SODIUM 40 MILLIGRAM(S): 100 INJECTION SUBCUTANEOUS at 12:11

## 2020-10-23 RX ADMIN — ATORVASTATIN CALCIUM 40 MILLIGRAM(S): 80 TABLET, FILM COATED ORAL at 21:46

## 2020-10-23 RX ADMIN — LOSARTAN POTASSIUM 50 MILLIGRAM(S): 100 TABLET, FILM COATED ORAL at 04:58

## 2020-10-23 NOTE — PROGRESS NOTE ADULT - SUBJECTIVE AND OBJECTIVE BOX
Patient is a 74y old  Male who presents with a chief complaint of Fever (23 Oct 2020 08:39)    Being followed by ID for perineal wound infection/abscess  h/o S mitis bacteremia    Interval history:feels better  transferred to floor  still some perineal pain but better   No acute events      ROS:  No cough,SOB,CP  No N/V/D.  No other complaints      Antimicrobials:    piperacillin/tazobactam IVPB.. 3.375 Gram(s) IV Intermittent every 8 hours  vancomycin  IVPB 1000 milliGRAM(s) IV Intermittent every 12 hours    Other medications reviewed    Vital Signs Last 24 Hrs  T(C): 36.4 (10-23-20 @ 10:25), Max: 36.6 (10-22-20 @ 19:00)  T(F): 97.6 (10-23-20 @ 10:25), Max: 97.9 (10-22-20 @ 19:00)  HR: 75 (10-23-20 @ 10:25) (75 - 120)  BP: 116/70 (10-23-20 @ 10:25) (116/70 - 145/77)  BP(mean): 105 (10-22-20 @ 22:00) (105 - 105)  RR: 18 (10-23-20 @ 10:25) (18 - 28)  SpO2: 99% (10-23-20 @ 10:25) (97% - 100%)    Physical Exam:      HEENT PERRLA EOMI    No oral exudate or erythema    Chest Good AE,CTA    CVS RRR S1 S2 WNl No murmur or rub or gallop    Abd soft BS normal No tenderness no masses  rectal wound packing no discharge   drain  PICC site  site no erythema tenderness or discharge    CNS AAO X 3 no focal  Lab Data:                          9.4    14.47 )-----------( 188      ( 23 Oct 2020 06:24 )             28.0     WBC Count: 14.47 (10-23-20 @ 06:24)  WBC Count: 19.40 (10-22-20 @ 02:13)  WBC Count: 14.59 (10-21-20 @ 21:17)  WBC Count: 18.23 (10-21-20 @ 17:56)    10-23    129<L>  |  100  |  18  ----------------------------<  130<H>  4.1   |  21<L>  |  0.91    Ca    8.0<L>      23 Oct 2020 06:24  Phos  3.3     10-23  Mg     2.1     10-23    TPro  7.0  /  Alb  3.6  /  TBili  0.9  /  DBili  x   /  AST  17  /  ALT  13  /  AlkPhos  88  10-21        Culture - Surgical Swab (collected 22 Oct 2020 02:00)  Source: .Surgical Swab 1 perineal wound for culture  Preliminary Report (23 Oct 2020 10:59):    Normal enteric narda isolated    Culture - Blood (collected 21 Oct 2020 22:11)  Source: .Blood Blood-Peripheral  Preliminary Report (22 Oct 2020 23:02):    No growth to date.    Culture - Blood (collected 21 Oct 2020 22:11)  Source: .Blood Blood-Peripheral  Preliminary Report (22 Oct 2020 23:02):    No growth to date.            Vancomycin Level, Trough: 16.6 ug/mL (10-23-20 @ 06:24)        < from: Xray Chest 1 View- PORTABLE-Routine (Xray Chest 1 View- PORTABLE-Routine in AM.) (10.23.20 @ 09:03) >    Impression:    The heart is enlarged. The lungs appear to be clear. No pleural effusion. No pneumothorax. Status post sternotomy.      < end of copied text >    < from: CT Abdomen and Pelvis No Cont (10.21.20 @ 18:14) >  IMPRESSION:  Subcutaneous gas and inflammation tracking within the perianal and perineal soft tissues concerning for gas-forming infection.    Scrotal edema.    These findings were discussed with Dr. Connelly on 10/21/2020 at 6:37 PM by Dr. Gonsales with read back confirmation.            < end of copied text >

## 2020-10-23 NOTE — CONSULT NOTE ADULT - SUBJECTIVE AND OBJECTIVE BOX
CHIEF COMPLAINT: Chest Pain    HPI:  Pt is 74M of [place] descent hx of DM, CAD, AFib, bioprosthetic mitral valve (2015), HTN, macular degeneration, hemorrhoids, recently diagnosed localized rectal cancer (dx 9/2020) s/p 10/15 Transanal excision of distal rectal carcinoma.    Pt had colonoscopy 9/21. 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  Patient had a TTE and REEMA that was negative for endocarditis. CT A/P was negative for any intra-abdominal pathology.  Patient had a LUE PICC placed and he was discharged on 6 weeks of IV Ceftriaxone.     Since ambulatory procedure on 10/15, patient has had difficulty voiding and increasing scrotal edema.  He has also developed initially low grade fevers which progressed to fevers to nearly 102 yesterday.  Also endorsing chills. Still having BM's, last yesterday.  No N/V. No abdominal pain. He is also complaining of foul-smelling rectal discharge.   This morning he feels better. No Cp no SOB. No near syncope no palpitations       PAST MEDICAL & SURGICAL HISTORY:  Bacteremia due to Streptococcus  9/2020- on Ceftriaxone via PICC line x 6 weeks    AMD (age related macular degeneration)  Right eye    CAD (coronary artery disease)  stented coronary- LAD x 1 1997    H/O hemorrhoids    Diverticulosis    BPH (Benign Prostatic Hyperplasia)    After-cataract of both eyes  1996    Hyperlipidemia    HTN (hypertension)    AF (atrial fibrillation)  s/p ablation 2010- on Coumadin    CAD (coronary artery disease)    S/P MVR (mitral valve replacement)  bovine valve 2015, CABG x 2 v 2015    Retina disorder, 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 975 milliGRAM(s) Oral every 6 hours PRN  atorvastatin 40 milliGRAM(s) Oral at bedtime  chlorhexidine 4% Liquid 1 Application(s) Topical <User Schedule>  enoxaparin Injectable 40 milliGRAM(s) SubCutaneous daily  insulin lispro (ADMELOG) corrective regimen sliding scale   SubCutaneous three times a day before meals  insulin lispro (ADMELOG) corrective regimen sliding scale   SubCutaneous at bedtime  losartan 50 milliGRAM(s) Oral daily  metoprolol tartrate 25 milliGRAM(s) Oral every 12 hours  multivitamin 1 Tablet(s) Oral daily  omega-3-Acid Ethyl Esters 2 Gram(s) Oral at bedtime  oxyCODONE    IR 5 milliGRAM(s) Oral every 4 hours PRN  oxyCODONE    IR 10 milliGRAM(s) Oral every 6 hours PRN  piperacillin/tazobactam IVPB.. 3.375 Gram(s) IV Intermittent every 8 hours  polyethylene glycol 3350 17 Gram(s) Oral daily  senna 2 Tablet(s) Oral at bedtime  sodium chloride 1 Gram(s) Oral every 8 hours  sodium chloride 0.9% lock flush 10 milliLiter(s) IV Push every 1 hour PRN  sodium chloride 0.9%. 1000 milliLiter(s) IV Continuous <Continuous>  tamsulosin 0.4 milliGRAM(s) Oral at bedtime  vancomycin  IVPB 1000 milliGRAM(s) IV Intermittent every 12 hours      REVIEW OF SYSTEMS:    CONSTITUTIONAL: No weakness, fevers or chills  EYES/ENT: No visual changes;  No vertigo or throat pain   NECK: No pain or stiffness  RESPIRATORY: No cough, wheezing, hemoptysis; No shortness of breath  CARDIOVASCULAR: No chest pain or palpitations  GASTROINTESTINAL: No abdominal or epigastric pain. No nausea, vomiting, or hematemesis; No diarrhea or constipation. No melena or hematochezia.  GENITOURINARY: No dysuria, frequency or hematuria  NEUROLOGICAL: No numbness or weakness  SKIN: No itching, burning, rashes, or lesions   All other review of systems is negative unless indicated above    Vital Signs Last 24 Hrs  T(C): 36.4 (23 Oct 2020 04:54), Max: 36.6 (22 Oct 2020 19:00)  T(F): 97.6 (23 Oct 2020 04:54), Max: 97.9 (22 Oct 2020 19:00)  HR: 105 (23 Oct 2020 04:54) (75 - 120)  BP: 137/50 (23 Oct 2020 04:54) (127/82 - 145/77)  BP(mean): 105 (22 Oct 2020 22:00) (105 - 105)  RR: 18 (23 Oct 2020 04:54) (18 - 31)  SpO2: 99% (23 Oct 2020 04:54) (94% - 100%)    I&O's Summary    22 Oct 2020 07:01  -  23 Oct 2020 07:00  --------------------------------------------------------  IN: 2950 mL / OUT: 1600 mL / NET: 1350 mL        PHYSICAL EXAM:    Constitutional: NAD, awake and alert, well-developed  HEENT: PERR, EOMI  Neck: soft and supple, No LAD, No JVD  Respiratory: Breath sounds are clear bilaterally, No wheezing, rales or rhonchi  Cardiovascular: Regular rate and rhythm, normal S1 and S2,  no murmurs, gallops or rubs  Gastrointestinal: Bowel Sounds present, soft, nontender.   Extremities: No peripheral edema. No clubbing or cyanosis.  Vascular: 2+ peripheral pulses  Neurological: A/O x 3, no focal deficits  Musculoskeletal: no calf tenderness.  Skin: No rashes.      LABS: All Labs Reviewed:                        9.4    14.47 )-----------( 188      ( 23 Oct 2020 06:24 )             28.0                         10.3   19.40 )-----------( 169      ( 22 Oct 2020 02:13 )             28.7                         9.8    14.59 )-----------( 158      ( 21 Oct 2020 21:17 )             27.7     23 Oct 2020 06:24    129    |  100    |  18     ----------------------------<  130    4.1     |  21     |  0.91   22 Oct 2020 14:31    126    |  97     |  15     ----------------------------<  166    4.3     |  19     |  0.87   22 Oct 2020 02:13    123    |  91     |  10     ----------------------------<  161    3.8     |  18     |  0.74     Ca    8.0        23 Oct 2020 06:24  Ca    8.3        22 Oct 2020 14:31  Ca    8.4        22 Oct 2020 02:13  Phos  3.3       23 Oct 2020 06:24  Phos  2.5       22 Oct 2020 14:31  Phos  3.5       22 Oct 2020 02:13  Mg     2.1       23 Oct 2020 06:24  Mg     2.3       22 Oct 2020 14:31  Mg     2.7       22 Oct 2020 02:13    TPro  7.0    /  Alb  3.6    /  TBili  0.9    /  DBili  x      /  AST  17     /  ALT  13     /  AlkPhos  88     21 Oct 2020 17:56    PT/INR - ( 23 Oct 2020 06:24 )   PT: 34.0 sec;   INR: 2.98 ratio         PTT - ( 23 Oct 2020 06:24 )  PTT:39.3 sec  Blood Culture: Organism --  Gram Stain Blood -- Gram Stain --  Specimen Source .Blood Blood-Peripheral  Culture-Blood --        RADIOLOGY/EKG: AF 96     HPI:  Pt is 74M of  descent hx of DM, CAD, AFib, bioprosthetic mitral valve (2015), HTN, macular degeneration, hemorrhoids, recently diagnosed localized rectal cancer (dx 9/2020) s/p 10/15 Transanal excision of distal rectal carcinoma.    Pt had colonoscopy 9/21. 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  Patient had a TTE and REEMA that was negative for endocarditis. CT A/P was negative for any intra-abdominal pathology.  Patient had a LUE PICC placed and he was discharged on 6 weeks of IV Ceftriaxone.     Since ambulatory procedure on 10/15, patient has had difficulty voiding and increasing scrotal edema.  He has also developed initially low grade fevers which progressed to fevers to nearly 102 yesterday.  Also endorsing chills. Still having BM's, last yesterday.  No N/V. No abdominal pain. He is also complaining of foul-smelling rectal discharge.   This morning he feels better. No Cp no SOB. No near syncope no palpitations       PAST MEDICAL & SURGICAL HISTORY:  Bacteremia due to Streptococcus  9/2020- on Ceftriaxone via PICC line x 6 weeks    AMD (age related macular degeneration)  Right eye    CAD (coronary artery disease)  stented coronary- LAD x 1 1997    H/O hemorrhoids    Diverticulosis    BPH (Benign Prostatic Hyperplasia)    After-cataract of both eyes  1996    Hyperlipidemia    HTN (hypertension)    AF (atrial fibrillation)  s/p ablation 2010- on Coumadin    CAD (coronary artery disease)    S/P MVR (mitral valve replacement)  bovine valve 2015, CABG x 2 v 2015    Retina disorder, 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 975 milliGRAM(s) Oral every 6 hours PRN  atorvastatin 40 milliGRAM(s) Oral at bedtime  chlorhexidine 4% Liquid 1 Application(s) Topical <User Schedule>  enoxaparin Injectable 40 milliGRAM(s) SubCutaneous daily  insulin lispro (ADMELOG) corrective regimen sliding scale   SubCutaneous three times a day before meals  insulin lispro (ADMELOG) corrective regimen sliding scale   SubCutaneous at bedtime  losartan 50 milliGRAM(s) Oral daily  metoprolol tartrate 25 milliGRAM(s) Oral every 12 hours  multivitamin 1 Tablet(s) Oral daily  omega-3-Acid Ethyl Esters 2 Gram(s) Oral at bedtime  oxyCODONE    IR 5 milliGRAM(s) Oral every 4 hours PRN  oxyCODONE    IR 10 milliGRAM(s) Oral every 6 hours PRN  piperacillin/tazobactam IVPB.. 3.375 Gram(s) IV Intermittent every 8 hours  polyethylene glycol 3350 17 Gram(s) Oral daily  senna 2 Tablet(s) Oral at bedtime  sodium chloride 1 Gram(s) Oral every 8 hours  sodium chloride 0.9% lock flush 10 milliLiter(s) IV Push every 1 hour PRN  sodium chloride 0.9%. 1000 milliLiter(s) IV Continuous <Continuous>  tamsulosin 0.4 milliGRAM(s) Oral at bedtime  vancomycin  IVPB 1000 milliGRAM(s) IV Intermittent every 12 hours      REVIEW OF SYSTEMS:    CONSTITUTIONAL: No weakness, fevers or chills  EYES/ENT: No visual changes;  No vertigo or throat pain   NECK: No pain or stiffness  RESPIRATORY: No cough, wheezing, hemoptysis; No shortness of breath  CARDIOVASCULAR: No chest pain or palpitations  GASTROINTESTINAL: No abdominal or epigastric pain. No nausea, vomiting, or hematemesis; No diarrhea or constipation. No melena or hematochezia.  GENITOURINARY: No dysuria, frequency or hematuria  NEUROLOGICAL: No numbness or weakness  SKIN: No itching, burning, rashes, or lesions   All other review of systems is negative unless indicated above    Vital Signs Last 24 Hrs  T(C): 36.4 (23 Oct 2020 04:54), Max: 36.6 (22 Oct 2020 19:00)  T(F): 97.6 (23 Oct 2020 04:54), Max: 97.9 (22 Oct 2020 19:00)  HR: 105 (23 Oct 2020 04:54) (75 - 120)  BP: 137/50 (23 Oct 2020 04:54) (127/82 - 145/77)  BP(mean): 105 (22 Oct 2020 22:00) (105 - 105)  RR: 18 (23 Oct 2020 04:54) (18 - 31)  SpO2: 99% (23 Oct 2020 04:54) (94% - 100%)    I&O's Summary    22 Oct 2020 07:01  -  23 Oct 2020 07:00  --------------------------------------------------------  IN: 2950 mL / OUT: 1600 mL / NET: 1350 mL        PHYSICAL EXAM:    Constitutional: NAD, awake and alert, well-developed  HEENT: PERR, EOMI  Neck: soft and supple, No LAD, No JVD  Respiratory: Breath sounds are clear bilaterally, No wheezing, rales or rhonchi  Cardiovascular: Regular rate and rhythm, normal S1 and S2,  no murmurs, gallops or rubs  Gastrointestinal: Bowel Sounds present, soft, nontender.   Extremities: No peripheral edema. No clubbing or cyanosis.  Vascular: 2+ peripheral pulses  Neurological: A/O x 3, no focal deficits  Musculoskeletal: no calf tenderness.  Skin: No rashes.      LABS: All Labs Reviewed:                        9.4    14.47 )-----------( 188      ( 23 Oct 2020 06:24 )             28.0                         10.3   19.40 )-----------( 169      ( 22 Oct 2020 02:13 )             28.7                         9.8    14.59 )-----------( 158      ( 21 Oct 2020 21:17 )             27.7     23 Oct 2020 06:24    129    |  100    |  18     ----------------------------<  130    4.1     |  21     |  0.91   22 Oct 2020 14:31    126    |  97     |  15     ----------------------------<  166    4.3     |  19     |  0.87   22 Oct 2020 02:13    123    |  91     |  10     ----------------------------<  161    3.8     |  18     |  0.74     Ca    8.0        23 Oct 2020 06:24  Ca    8.3        22 Oct 2020 14:31  Ca    8.4        22 Oct 2020 02:13  Phos  3.3       23 Oct 2020 06:24  Phos  2.5       22 Oct 2020 14:31  Phos  3.5       22 Oct 2020 02:13  Mg     2.1       23 Oct 2020 06:24  Mg     2.3       22 Oct 2020 14:31  Mg     2.7       22 Oct 2020 02:13    TPro  7.0    /  Alb  3.6    /  TBili  0.9    /  DBili  x      /  AST  17     /  ALT  13     /  AlkPhos  88     21 Oct 2020 17:56    PT/INR - ( 23 Oct 2020 06:24 )   PT: 34.0 sec;   INR: 2.98 ratio         PTT - ( 23 Oct 2020 06:24 )  PTT:39.3 sec  Blood Culture: Organism --  Gram Stain Blood -- Gram Stain --  Specimen Source .Blood Blood-Peripheral  Culture-Blood --        RADIOLOGY/EKG: AF 96

## 2020-10-23 NOTE — PROGRESS NOTE ADULT - ASSESSMENT
JOSEPHINE DODD is a 74y Male with history of DM, CAD, AFib, bioprosthetic mitral valve (2015), HTN, macular degeneration, hemorrhoids, recently diagnosed localized rectal cancer (dx 9/2020) c/b bacteremia, s/p 10/15 Transanal excision of distal rectal carcinoma. Taken to OR urgently 10/21 for r/o necrotizing fasciitis, found to have breakdown of rectal incision with fistulization into perineum, s/p debridement and penrose drain placement. Lactate cleared to 1.4.    PLAN:  - pain control: Tylenol ATC, Oxycodone PRN, Dilaudid for breakthrough pain  - NPO/IVF  - Continue home flomax  - Continue nelson catheter for urinary diversion and urine output monitoring  - F/U Urine lytes  - Lovenox for VTE ppx; Home coumadin and ASA held  - Trend CBC daily  - C/w Vanc/Zosyn  - F/U Vanc trough prior to 10/23 AM dose      [facility] [id number]

## 2020-10-23 NOTE — PROGRESS NOTE ADULT - SUBJECTIVE AND OBJECTIVE BOX
Green Team Surgery Daily Progress Note    Subjective:   Patient resting comfortably in bed. He denies fevers, chills, nausea, emesis, chest pain, SOB. Denies any flatus or BM. NPO.    24h Events:   - Overnight, no acute events    Objective:  Vital Signs  T(C): 36.4 (10-23 @ 04:54), Max: 36.6 (10-22 @ 19:00)  HR: 105 (10-23 @ 04:54) (75 - 120)  BP: 137/50 (10-23 @ 04:54) (127/82 - 145/77)  RR: 18 (10-23 @ 04:54) (17 - 31)  SpO2: 99% (10-23 @ 04:54) (94% - 100%)  10-21-20 @ 07:01  -  10-22-20 @ 07:00  --------------------------------------------------------  IN: 1600 mL / OUT: 2075 mL / NET: -475 mL    10-22-20 @ 07:01  -  10-23-20 @ 05:06  --------------------------------------------------------  IN: 2850 mL / OUT: 1600 mL / NET: 1250 mL        Physical Exam:  GEN: resting in bed comfortably in NAD  RESP: no increased WOB  ABD: soft, non-distended, non-tender without rebound tenderness or guarding  EXTR: warm, well-perfused without gross deformities; spontaneous movement in b/l U/L extrem  NEURO: AAOx4    Labs:                        10.3   19.40 )-----------( 169      ( 22 Oct 2020 02:13 )             28.7   10-22    126<L>  |  97  |  15  ----------------------------<  166<H>  4.3   |  19<L>  |  0.87    Ca    8.3<L>      22 Oct 2020 14:31  Phos  2.5     10-22  Mg     2.3     10-22    TPro  7.0  /  Alb  3.6  /  TBili  0.9  /  DBili  x   /  AST  17  /  ALT  13  /  AlkPhos  88  10-21    CAPILLARY BLOOD GLUCOSE      POCT Blood Glucose.: 124 mg/dL (23 Oct 2020 00:10)  POCT Blood Glucose.: 150 mg/dL (22 Oct 2020 17:39)  POCT Blood Glucose.: 183 mg/dL (22 Oct 2020 12:49)  POCT Blood Glucose.: 162 mg/dL (22 Oct 2020 06:21)      Medications:   MEDICATIONS  (STANDING):  atorvastatin 40 milliGRAM(s) Oral at bedtime  chlorhexidine 4% Liquid 1 Application(s) Topical <User Schedule>  enoxaparin Injectable 40 milliGRAM(s) SubCutaneous daily  insulin lispro (ADMELOG) corrective regimen sliding scale   SubCutaneous every 6 hours  losartan 50 milliGRAM(s) Oral daily  metoprolol tartrate 25 milliGRAM(s) Oral every 12 hours  multivitamin 1 Tablet(s) Oral daily  omega-3-Acid Ethyl Esters 2 Gram(s) Oral at bedtime  piperacillin/tazobactam IVPB.. 3.375 Gram(s) IV Intermittent every 8 hours  polyethylene glycol 3350 17 Gram(s) Oral daily  senna 2 Tablet(s) Oral at bedtime  sodium chloride 1 Gram(s) Oral every 8 hours  sodium chloride 0.9%. 1000 milliLiter(s) (100 mL/Hr) IV Continuous <Continuous>  tamsulosin 0.4 milliGRAM(s) Oral at bedtime  vancomycin  IVPB 1000 milliGRAM(s) IV Intermittent every 12 hours    MEDICATIONS  (PRN):  acetaminophen   Tablet .. 975 milliGRAM(s) Oral every 6 hours PRN Mild Pain (1 - 3)  oxyCODONE    IR 5 milliGRAM(s) Oral every 4 hours PRN Moderate Pain (4 - 6)  oxyCODONE    IR 10 milliGRAM(s) Oral every 6 hours PRN Severe Pain (7 - 10)  sodium chloride 0.9% lock flush 10 milliLiter(s) IV Push every 1 hour PRN Pre/post blood products, medications, blood draw, and to maintain line patency      Imaging:

## 2020-10-23 NOTE — CHART NOTE - NSCHARTNOTEFT_GEN_A_CORE
Patient seen at bedside. States that his pain is controlled with current regimen. He is being monitored s/p I&D of perineal abscess. Palliative care team discussed HCP and MOLST form with patient yesterday. Regarding MOLST, his daughter and wife reviewed the form and will return it today. Discussed with patient and primary team that the MOLST form can be validated with licensed provider and two witnesses. If patient wishes to be DNR/DNI, please place order. Will sign off. Please reconsult as needed or page #[phone] for any questions.     Alyssa Menezes, DO  Hospice and Palliative Care Fellow   [facility] Pager [phone]

## 2020-10-23 NOTE — PROGRESS NOTE ADULT - ASSESSMENT
74M hx DM(a1c=6.2%), CAD, AFib, prosthetic mitral valve (2015), HTN, macular degeneration, hemorrhoids, localized rectal cancer (dx 9/2020), presenting with chills for 10 days.   S mitis oralis bacteremia-high grade  No abd symptoms  Clinically stable  Repeat Cx so far negative  abd CT no abscess  PCn VANCE 0.03  REEMA no vegetation  On home IV ceftriaxone X 6 week course-was doing well  Now with perineal cellulitis/wound infection  s/p OR I&D of perineal abscess  'Cx enteric narda  stable       Rec:  A)Perineal abscess  s/p I&D   Fever leucocytosis  Rec:  1) further surgical plan per surgical team  2) Continue observation  3) Follow  blood Cx  4) For now continue zosyn  Dc vanco      B) S mitis oralis bacteremia  likely as from rectal mass  REEMA no PVE  check blood Cx    abx as above       Will tailor plan for ID issues  per course,results.Will defer to primary team on management of other issues.  Assessment, plan and recommendations as detailed above were discussed with the surgery   team.  Infectious Diseases Service will cover over weekend.  Please call [phone] if issues

## 2020-10-23 NOTE — CONSULT NOTE ADULT - ASSESSMENT
74M of [place] descent hx of DM, CAD, AFib, bioprosthetic mitral valve (2015), HTN, macular degeneration, hemorrhoids, recently diagnosed localized rectal cancer (dx 9/2020) s/p 10/15 Transanal excision of distal rectal carcinoma.  with necrotizing soft tissue infection of the perineum after 10/15 transanal rectal tumor excision POD 2 I&D  Last stress 11/9/2018 (-) and last echo done in house no endocarditis   - Cont pain control per surgery  - Lovenox for thromboemoblic prophylaxis, indication = Permanent form of A Fib  - Cont ABX per ID and surgery  - stable at present from CV standpoint. Latest office note placed in physical chart

## 2020-10-23 NOTE — CHART NOTE - NSCHARTNOTEFT_GEN_A_CORE
SICU Transfer Note    Transfer from: SICU  Transfer to: [address], [address]  Accepting physician: Dr. Connelly    SICU COURSE: Transferred to SICU for electrolyte repletion, once electrolytes were normalized again.      ASSESSMENT & PLAN: 74y Male with history of DM, CAD, AFib, bioprosthetic mitral valve (2015), HTN, macular degeneration, hemorrhoids, recently diagnosed localized rectal cancer (dx 9/2020) c/b bacteremia, s/p 10/15 Transanal excision of distal rectal carcinoma. Taken to OR urgently 10/21 for r/o necrotizing fasciitis, found to have breakdown of rectal incision with fistulization into perineum, s/p debridement and penrose drain placement.      For Follow-Up:  keep NPO  f/u AM labs    Vital Signs Last 24 Hrs  T(C): 36.4 (22 Oct 2020 23:28), Max: 36.6 (22 Oct 2020 19:00)  T(F): 97.5 (22 Oct 2020 23:28), Max: 97.9 (22 Oct 2020 19:00)  HR: 88 (22 Oct 2020 23:28) (72 - 120)  BP: 127/82 (22 Oct 2020 23:28) (127/82 - 145/77)  BP(mean): 105 (22 Oct 2020 22:00) (105 - 105)  RR: 20 (22 Oct 2020 23:28) (15 - 31)  SpO2: 97% (22 Oct 2020 23:28) (94% - 100%)  I&O's Summary    21 Oct 2020 07:01  -  22 Oct 2020 07:00  --------------------------------------------------------  IN: 1600 mL / OUT: 2075 mL / NET: -475 mL    22 Oct 2020 07:01  -  23 Oct 2020 02:25  --------------------------------------------------------  IN: 2450 mL / OUT: 1050 mL / NET: 1400 mL          MEDICATIONS  (STANDING):  atorvastatin 40 milliGRAM(s) Oral at bedtime  chlorhexidine 4% Liquid 1 Application(s) Topical <User Schedule>  enoxaparin Injectable 40 milliGRAM(s) SubCutaneous daily  insulin lispro (ADMELOG) corrective regimen sliding scale   SubCutaneous every 6 hours  losartan 50 milliGRAM(s) Oral daily  metoprolol tartrate 25 milliGRAM(s) Oral every 12 hours  multivitamin 1 Tablet(s) Oral daily  omega-3-Acid Ethyl Esters 2 Gram(s) Oral at bedtime  piperacillin/tazobactam IVPB.. 3.375 Gram(s) IV Intermittent every 8 hours  polyethylene glycol 3350 17 Gram(s) Oral daily  senna 2 Tablet(s) Oral at bedtime  sodium chloride 1 Gram(s) Oral every 8 hours  sodium chloride 0.9%. 1000 milliLiter(s) (100 mL/Hr) IV Continuous <Continuous>  tamsulosin 0.4 milliGRAM(s) Oral at bedtime  vancomycin  IVPB 1000 milliGRAM(s) IV Intermittent every 12 hours    MEDICATIONS  (PRN):  acetaminophen   Tablet .. 975 milliGRAM(s) Oral every 6 hours PRN Mild Pain (1 - 3)  HYDROmorphone  Injectable 0.5 milliGRAM(s) IV Push every 4 hours PRN Breakthrough Pain  oxyCODONE    IR 5 milliGRAM(s) Oral every 4 hours PRN Moderate Pain (4 - 6)  oxyCODONE    IR 10 milliGRAM(s) Oral every 6 hours PRN Severe Pain (7 - 10)  sodium chloride 0.9% lock flush 10 milliLiter(s) IV Push every 1 hour PRN Pre/post blood products, medications, blood draw, and to maintain line patency        LABS                              126    |  97     |  15                  Calcium: 8.3   / iCa: x      (10-22 @ 14:31)    ----------------------------<  166       Magnesium: 2.3                              4.3     |  19     |  0.87             Phosphorous: 2.5

## 2020-10-24 LAB
ANION GAP SERPL CALC-SCNC: 8 MMOL/L — SIGNIFICANT CHANGE UP (ref 5–17)
APTT BLD: 40.8 SEC — HIGH (ref 27.5–35.5)
BUN SERPL-MCNC: 17 MG/DL — SIGNIFICANT CHANGE UP (ref 7–23)
CALCIUM SERPL-MCNC: 7.8 MG/DL — LOW (ref 8.4–10.5)
CHLORIDE SERPL-SCNC: 104 MMOL/L — SIGNIFICANT CHANGE UP (ref 96–108)
CO2 SERPL-SCNC: 22 MMOL/L — SIGNIFICANT CHANGE UP (ref 22–31)
CREAT SERPL-MCNC: 0.87 MG/DL — SIGNIFICANT CHANGE UP (ref 0.5–1.3)
CULTURE RESULTS: SIGNIFICANT CHANGE UP
GLUCOSE BLDC GLUCOMTR-MCNC: 117 MG/DL — HIGH (ref 70–99)
GLUCOSE BLDC GLUCOMTR-MCNC: 117 MG/DL — HIGH (ref 70–99)
GLUCOSE BLDC GLUCOMTR-MCNC: 123 MG/DL — HIGH (ref 70–99)
GLUCOSE BLDC GLUCOMTR-MCNC: 83 MG/DL — SIGNIFICANT CHANGE UP (ref 70–99)
GLUCOSE SERPL-MCNC: 70 MG/DL — SIGNIFICANT CHANGE UP (ref 70–99)
HCT VFR BLD CALC: 28 % — LOW (ref 39–50)
HGB BLD-MCNC: 9.6 G/DL — LOW (ref 13–17)
INR BLD: 2.99 RATIO — HIGH (ref 0.88–1.16)
MAGNESIUM SERPL-MCNC: 1.9 MG/DL — SIGNIFICANT CHANGE UP (ref 1.6–2.6)
MCHC RBC-ENTMCNC: 31.9 PG — SIGNIFICANT CHANGE UP (ref 27–34)
MCHC RBC-ENTMCNC: 34.3 GM/DL — SIGNIFICANT CHANGE UP (ref 32–36)
MCV RBC AUTO: 93 FL — SIGNIFICANT CHANGE UP (ref 80–100)
NRBC # BLD: 0 /100 WBCS — SIGNIFICANT CHANGE UP (ref 0–0)
PHOSPHATE SERPL-MCNC: 3 MG/DL — SIGNIFICANT CHANGE UP (ref 2.5–4.5)
PLATELET # BLD AUTO: 216 K/UL — SIGNIFICANT CHANGE UP (ref 150–400)
POTASSIUM SERPL-MCNC: 4.1 MMOL/L — SIGNIFICANT CHANGE UP (ref 3.5–5.3)
POTASSIUM SERPL-SCNC: 4.1 MMOL/L — SIGNIFICANT CHANGE UP (ref 3.5–5.3)
PROTHROM AB SERPL-ACNC: 34.1 SEC — HIGH (ref 10.6–13.6)
RBC # BLD: 3.01 M/UL — LOW (ref 4.2–5.8)
RBC # FLD: 14.3 % — SIGNIFICANT CHANGE UP (ref 10.3–14.5)
SODIUM SERPL-SCNC: 134 MMOL/L — LOW (ref 135–145)
SPECIMEN SOURCE: SIGNIFICANT CHANGE UP
WBC # BLD: 10.34 K/UL — SIGNIFICANT CHANGE UP (ref 3.8–10.5)
WBC # FLD AUTO: 10.34 K/UL — SIGNIFICANT CHANGE UP (ref 3.8–10.5)

## 2020-10-24 RX ORDER — MAGNESIUM SULFATE 500 MG/ML
1 VIAL (ML) INJECTION ONCE
Refills: 0 | Status: COMPLETED | OUTPATIENT
Start: 2020-10-24 | End: 2020-10-24

## 2020-10-24 RX ADMIN — PIPERACILLIN AND TAZOBACTAM 25 GRAM(S): 4; .5 INJECTION, POWDER, LYOPHILIZED, FOR SOLUTION INTRAVENOUS at 18:08

## 2020-10-24 RX ADMIN — Medication 975 MILLIGRAM(S): at 06:00

## 2020-10-24 RX ADMIN — CHLORHEXIDINE GLUCONATE 1 APPLICATION(S): 213 SOLUTION TOPICAL at 09:22

## 2020-10-24 RX ADMIN — Medication 25 MILLIGRAM(S): at 06:00

## 2020-10-24 RX ADMIN — SODIUM CHLORIDE 1 GRAM(S): 9 INJECTION INTRAMUSCULAR; INTRAVENOUS; SUBCUTANEOUS at 22:29

## 2020-10-24 RX ADMIN — Medication 25 MILLIGRAM(S): at 18:09

## 2020-10-24 RX ADMIN — Medication 975 MILLIGRAM(S): at 11:32

## 2020-10-24 RX ADMIN — LOSARTAN POTASSIUM 50 MILLIGRAM(S): 100 TABLET, FILM COATED ORAL at 05:59

## 2020-10-24 RX ADMIN — OXYCODONE HYDROCHLORIDE 10 MILLIGRAM(S): 5 TABLET ORAL at 23:20

## 2020-10-24 RX ADMIN — SODIUM CHLORIDE 1 GRAM(S): 9 INJECTION INTRAMUSCULAR; INTRAVENOUS; SUBCUTANEOUS at 05:59

## 2020-10-24 RX ADMIN — SODIUM CHLORIDE 100 MILLILITER(S): 9 INJECTION INTRAMUSCULAR; INTRAVENOUS; SUBCUTANEOUS at 01:45

## 2020-10-24 RX ADMIN — Medication 1 TABLET(S): at 11:32

## 2020-10-24 RX ADMIN — PIPERACILLIN AND TAZOBACTAM 25 GRAM(S): 4; .5 INJECTION, POWDER, LYOPHILIZED, FOR SOLUTION INTRAVENOUS at 01:45

## 2020-10-24 RX ADMIN — POLYETHYLENE GLYCOL 3350 17 GRAM(S): 17 POWDER, FOR SOLUTION ORAL at 11:32

## 2020-10-24 RX ADMIN — OXYCODONE HYDROCHLORIDE 10 MILLIGRAM(S): 5 TABLET ORAL at 11:32

## 2020-10-24 RX ADMIN — TAMSULOSIN HYDROCHLORIDE 0.4 MILLIGRAM(S): 0.4 CAPSULE ORAL at 22:29

## 2020-10-24 RX ADMIN — OXYCODONE HYDROCHLORIDE 10 MILLIGRAM(S): 5 TABLET ORAL at 22:29

## 2020-10-24 RX ADMIN — Medication 100 GRAM(S): at 11:33

## 2020-10-24 RX ADMIN — OXYCODONE HYDROCHLORIDE 10 MILLIGRAM(S): 5 TABLET ORAL at 12:02

## 2020-10-24 RX ADMIN — Medication 975 MILLIGRAM(S): at 18:09

## 2020-10-24 RX ADMIN — PIPERACILLIN AND TAZOBACTAM 25 GRAM(S): 4; .5 INJECTION, POWDER, LYOPHILIZED, FOR SOLUTION INTRAVENOUS at 09:22

## 2020-10-24 RX ADMIN — SODIUM CHLORIDE 1 GRAM(S): 9 INJECTION INTRAMUSCULAR; INTRAVENOUS; SUBCUTANEOUS at 13:57

## 2020-10-24 RX ADMIN — Medication 2 GRAM(S): at 22:29

## 2020-10-24 RX ADMIN — ATORVASTATIN CALCIUM 40 MILLIGRAM(S): 80 TABLET, FILM COATED ORAL at 22:29

## 2020-10-24 NOTE — PROGRESS NOTE ADULT - ASSESSMENT
74M of [place] descent hx of DM, CAD, AFib, bioprosthetic mitral valve (2015), HTN, macular degeneration, hemorrhoids, recently diagnosed localized rectal cancer (dx 9/2020) s/p 10/15 Transanal excision of distal rectal carcinoma.  with necrotizing soft tissue infection of the perineum after 10/15 transanal rectal tumor excision POD 3 I&D  Pain control as per surgery team  Patient with permanent AF, needs to be anticoagulated. Restart warfarin when OK with surgery to an INR of 2-3.  The patient is stable from a cardiovascular perspective.

## 2020-10-24 NOTE — PROGRESS NOTE ADULT - ASSESSMENT
Patient is a 74y Male with history of DM, CAD, AFib, bioprosthetic mitral valve (2015), HTN, macular degeneration, hemorrhoids, recently diagnosed localized rectal cancer (dx 9/2020) c/b bacteremia, s/p 10/15 Transanal excision of distal rectal carcinoma. Taken to OR urgently 10/21 for r/o necrotizing fasciitis, found to have breakdown of rectal incision with fistulization into perineum, s/p debridement and penrose drain placement.   PLAN:  - pain control: Tylenol ATC, Oxycodone PRN, Dilaudid for breakthrough pain  - NPO/IVF  - C/w flomax  - C/w nelson catheter for urinary diversion and urine output monitoring  - Lovenox for VTE ppx; Home coumadin and ASA held  - C/w Vanc/Zosyn    [id number] Patient is a 74y Male with history of DM, CAD, AFib, bioprosthetic mitral valve (2015), HTN, macular degeneration, hemorrhoids, recently diagnosed localized rectal cancer (dx 9/2020) c/b bacteremia, s/p 10/15 Transanal excision of distal rectal carcinoma. Taken to OR urgently 10/21 for r/o necrotizing fasciitis, found to have breakdown of rectal incision with fistulization into perineum, s/p debridement and penrose drain placement.     PLAN:  - pain control: Tylenol ATC, Oxycodone PRN, Dilaudid for breakthrough pain  - NPO/IVF  - C/w flomax  - DC Sanchez/ TOV  - Lovenox for VTE ppx; Home coumadin and ASA held  - C/w Vanc/Zosyn    [id number]

## 2020-10-24 NOTE — PROGRESS NOTE ADULT - SUBJECTIVE AND OBJECTIVE BOX
SUBJECTIVE: Reports sleeping better last night  	  MEDICATIONS:  losartan 50 milliGRAM(s) Oral daily  metoprolol tartrate 25 milliGRAM(s) Oral every 12 hours  tamsulosin 0.4 milliGRAM(s) Oral at bedtime    piperacillin/tazobactam IVPB.. 3.375 Gram(s) IV Intermittent every 8 hours      acetaminophen   Tablet .. 975 milliGRAM(s) Oral every 6 hours  ondansetron Injectable 4 milliGRAM(s) IV Push once  oxyCODONE    IR 5 milliGRAM(s) Oral every 4 hours PRN  oxyCODONE    IR 10 milliGRAM(s) Oral every 6 hours PRN    polyethylene glycol 3350 17 Gram(s) Oral daily  senna 2 Tablet(s) Oral at bedtime    atorvastatin 40 milliGRAM(s) Oral at bedtime  insulin lispro (ADMELOG) corrective regimen sliding scale   SubCutaneous three times a day before meals  insulin lispro (ADMELOG) corrective regimen sliding scale   SubCutaneous at bedtime    chlorhexidine 4% Liquid 1 Application(s) Topical <User Schedule>  multivitamin 1 Tablet(s) Oral daily  sodium chloride 1 Gram(s) Oral every 8 hours  sodium chloride 0.9% lock flush 10 milliLiter(s) IV Push every 1 hour PRN  sodium chloride 0.9%. 1000 milliLiter(s) IV Continuous <Continuous>    Home Medications:  aspirin 81 mg oral tablet: 1 tab(s) orally once a day (at bedtime) (21 Oct 2020 18:45)  Centrum oral tablet: 1 tab(s) orally once a day (at bedtime) (21 Oct 2020 18:45)  CoQ10 300 mg oral capsule: 2 cap(s) orally once a day (at bedtime) (21 Oct 2020 18:45)  Edarbyclor 40 mg-12.5 mg oral tablet: 1 tab(s) orally once a day (at bedtime) (21 Oct 2020 18:45)  Flomax 0.4 mg oral capsule: 1 cap(s) orally once a day (at bedtime) (21 Oct 2020 18:45)  Lovaza 1000 mg oral capsule: 2 cap(s) orally once a day (at bedtime) (21 Oct 2020 18:45)  metFORMIN 500 mg oral tablet, extended release: 1 tab(s) orally once a day (at bedtime) (21 Oct 2020 18:45)  metoprolol tartrate 25 mg oral tablet: 1 tab(s) orally 2 times a day (21 Oct 2020 18:45)  rosuvastatin 10 mg oral tablet: 1 tab(s) orally once a day (at bedtime) (21 Oct 2020 18:45)  warfarin 2.5 mg oral tablet: 1 tab(s) orally once a day Monday and Thursday, LD on 10/12/20 (21 Oct 2020 18:45)  warfarin 5 mg oral tablet: 1 tab(s) orally once a day, Tuesday, Wednesday, Friday, Saturday, Sunday (21 Oct 2020 18:45)      REVIEW OF SYSTEMS:    CONSTITUTIONAL: No fever, weight loss, or fatigue  EYES: No eye pain, visual disturbances, or discharge  NECK: No pain or stiffness  RESPIRATORY: No cough, wheezing, chills or hemoptysis; No Shortness of Breath  CARDIOVASCULAR: No chest pain, palpitations, dizziness, or leg swelling  GASTROINTESTINAL: No abdominal or epigastric pain. No nausea, vomiting, or hematemesis; No diarrhea or constipation. No melena or hematochezia.  GENITOURINARY: No dysuria, frequency, hematuria, or incontinence  NEUROLOGICAL: No headaches, memory loss, loss of strength, numbness, or tremors  SKIN: No itching, burning, rashes, or lesions   LYMPH Nodes: No enlarged glands  MUSCULOSKELETAL: No joint pain or swelling; No muscle, back, or extremity pain  All other review of systems are negative.  	  [ ] Unable to obtain    PHYSICAL EXAM:  T(C): 36.7 (10-24-20 @ 05:58), Max: 36.7 (10-24-20 @ 05:58)  HR: 80 (10-24-20 @ 05:58) (68 - 83)  BP: 124/76 (10-24-20 @ 05:58) (116/70 - 170/72)  RR: 16 (10-24-20 @ 05:58) (16 - 18)  SpO2: 96% (10-24-20 @ 05:58) (96% - 99%)  Wt(kg): --  I&O's Summary    22 Oct 2020 07:01  -  23 Oct 2020 07:00  --------------------------------------------------------  IN: 2950 mL / OUT: 1600 mL / NET: 1350 mL    23 Oct 2020 07:01  -  24 Oct 2020 06:59  --------------------------------------------------------  IN: 1265 mL / OUT: 1525 mL / NET: -260 mL          PHYSICAL EXAM    Appearance: Normal	  HEENT:   Normal oral mucosa, PERRL, EOMI	  NECK: Soft and supple, No LAD, No JVD  Cardiovascular: Regular Rate and Rhythm, Normal S1 S2, No murmurs, No clicks, gallops or rubs  Respiratory: Lungs clear to auscultation	  Gastrointestinal:  Soft, Non-tender, + BS	  Skin: No rashes, No ecchymoses, No cyanosis  Neurologic: Non-focal  Extremities: No clubbing, cyanosis or edema  Vascular: Peripheral pulses palpable 2+ bilaterally      LABS:	 	                            9.4    14.47 )-----------( 188      ( 23 Oct 2020 06:24 )             28.0     10-23    129<L>  |  100  |  18  ----------------------------<  130<H>  4.1   |  21<L>  |  0.91    Ca    8.0<L>      23 Oct 2020 06:24  Phos  3.3     10-23  Mg     2.1     10-23

## 2020-10-24 NOTE — PROGRESS NOTE ADULT - SUBJECTIVE AND OBJECTIVE BOX
Interval Events: No acute events overnight    S: Patient doing well, states he has not yet passed flatus or had a BM.   Denies fevers, chills, nausea, emesis, chest pain, SOB.    O: Vital Signs  T(C): 36.7 (10-24 @ 05:58), Max: 36.7 (10-24 @ 05:58)  HR: 80 (10-24 @ 05:58) (68 - 83)  BP: 124/76 (10-24 @ 05:58) (116/70 - 170/72)  RR: 16 (10-24 @ 05:58) (16 - 18)  SpO2: 96% (10-24 @ 05:58) (96% - 99%)  10-22-20 @ 07:01  -  10-23-20 @ 07:00  --------------------------------------------------------  IN: 2950 mL / OUT: 1600 mL / NET: 1350 mL    10-23-20 @ 07:01  -  10-24-20 @ 06:07  --------------------------------------------------------  IN: 1265 mL / OUT: 1525 mL / NET: -260 mL      General: alert and oriented, NAD  Resp: airway patent, respirations unlabored  CVS: appears well perfused  Abdomen: soft, nontender, nondistended  Extremities: no edema  Skin: warm, dry, appropriate color                          9.4    14.47 )-----------( 188      ( 23 Oct 2020 06:24 )             28.0   10-23    129<L>  |  100  |  18  ----------------------------<  130<H>  4.1   |  21<L>  |  0.91    Ca    8.0<L>      23 Oct 2020 06:24  Phos  3.3     10-23  Mg     2.1     10-23     Interval Events: No acute events overnight    S: Patient doing well, states he has not yet passed flatus or had a BM. States his scrotal swelling has decreased  Denies fevers, chills, nausea, emesis, chest pain, SOB.    O: Vital Signs  T(C): 36.7 (10-24 @ 05:58), Max: 36.7 (10-24 @ 05:58)  HR: 80 (10-24 @ 05:58) (68 - 83)  BP: 124/76 (10-24 @ 05:58) (116/70 - 170/72)  RR: 16 (10-24 @ 05:58) (16 - 18)  SpO2: 96% (10-24 @ 05:58) (96% - 99%)  10-22-20 @ 07:01  -  10-23-20 @ 07:00  --------------------------------------------------------  IN: 2950 mL / OUT: 1600 mL / NET: 1350 mL    10-23-20 @ 07:01  -  10-24-20 @ 06:07  --------------------------------------------------------  IN: 1265 mL / OUT: 1525 mL / NET: -260 mL      General: alert and oriented, NAD  Resp: airway patent, respirations unlabored  CVS: appears well perfused  Abdomen: soft, nontender, nondistended  : yellow urine in bag  Extremities: no edema  Skin: warm, dry, appropriate color                          9.4    14.47 )-----------( 188      ( 23 Oct 2020 06:24 )             28.0   10-23    129<L>  |  100  |  18  ----------------------------<  130<H>  4.1   |  21<L>  |  0.91    Ca    8.0<L>      23 Oct 2020 06:24  Phos  3.3     10-23  Mg     2.1     10-23

## 2020-10-25 LAB
ANION GAP SERPL CALC-SCNC: 8 MMOL/L — SIGNIFICANT CHANGE UP (ref 5–17)
BUN SERPL-MCNC: 12 MG/DL — SIGNIFICANT CHANGE UP (ref 7–23)
CALCIUM SERPL-MCNC: 8 MG/DL — LOW (ref 8.4–10.5)
CHLORIDE SERPL-SCNC: 101 MMOL/L — SIGNIFICANT CHANGE UP (ref 96–108)
CO2 SERPL-SCNC: 22 MMOL/L — SIGNIFICANT CHANGE UP (ref 22–31)
CREAT SERPL-MCNC: 0.92 MG/DL — SIGNIFICANT CHANGE UP (ref 0.5–1.3)
GLUCOSE BLDC GLUCOMTR-MCNC: 102 MG/DL — HIGH (ref 70–99)
GLUCOSE BLDC GLUCOMTR-MCNC: 145 MG/DL — HIGH (ref 70–99)
GLUCOSE BLDC GLUCOMTR-MCNC: 86 MG/DL — SIGNIFICANT CHANGE UP (ref 70–99)
GLUCOSE BLDC GLUCOMTR-MCNC: 95 MG/DL — SIGNIFICANT CHANGE UP (ref 70–99)
GLUCOSE SERPL-MCNC: 83 MG/DL — SIGNIFICANT CHANGE UP (ref 70–99)
HCT VFR BLD CALC: 28.5 % — LOW (ref 39–50)
HGB BLD-MCNC: 9.6 G/DL — LOW (ref 13–17)
INR BLD: 2.16 RATIO — HIGH (ref 0.88–1.16)
MAGNESIUM SERPL-MCNC: 1.9 MG/DL — SIGNIFICANT CHANGE UP (ref 1.6–2.6)
MCHC RBC-ENTMCNC: 31.3 PG — SIGNIFICANT CHANGE UP (ref 27–34)
MCHC RBC-ENTMCNC: 33.7 GM/DL — SIGNIFICANT CHANGE UP (ref 32–36)
MCV RBC AUTO: 92.8 FL — SIGNIFICANT CHANGE UP (ref 80–100)
NRBC # BLD: 0 /100 WBCS — SIGNIFICANT CHANGE UP (ref 0–0)
PHOSPHATE SERPL-MCNC: 3.1 MG/DL — SIGNIFICANT CHANGE UP (ref 2.5–4.5)
PLATELET # BLD AUTO: 221 K/UL — SIGNIFICANT CHANGE UP (ref 150–400)
POTASSIUM SERPL-MCNC: 3.5 MMOL/L — SIGNIFICANT CHANGE UP (ref 3.5–5.3)
POTASSIUM SERPL-SCNC: 3.5 MMOL/L — SIGNIFICANT CHANGE UP (ref 3.5–5.3)
PROTHROM AB SERPL-ACNC: 25 SEC — HIGH (ref 10.6–13.6)
RBC # BLD: 3.07 M/UL — LOW (ref 4.2–5.8)
RBC # FLD: 14.1 % — SIGNIFICANT CHANGE UP (ref 10.3–14.5)
SODIUM SERPL-SCNC: 131 MMOL/L — LOW (ref 135–145)
WBC # BLD: 8.22 K/UL — SIGNIFICANT CHANGE UP (ref 3.8–10.5)
WBC # FLD AUTO: 8.22 K/UL — SIGNIFICANT CHANGE UP (ref 3.8–10.5)

## 2020-10-25 PROCEDURE — 99232 SBSQ HOSP IP/OBS MODERATE 35: CPT

## 2020-10-25 RX ORDER — POTASSIUM CHLORIDE 20 MEQ
40 PACKET (EA) ORAL ONCE
Refills: 0 | Status: COMPLETED | OUTPATIENT
Start: 2020-10-25 | End: 2020-10-25

## 2020-10-25 RX ORDER — MAGNESIUM SULFATE 500 MG/ML
1 VIAL (ML) INJECTION ONCE
Refills: 0 | Status: COMPLETED | OUTPATIENT
Start: 2020-10-25 | End: 2020-10-25

## 2020-10-25 RX ORDER — TAMSULOSIN HYDROCHLORIDE 0.4 MG/1
0.4 CAPSULE ORAL ONCE
Refills: 0 | Status: COMPLETED | OUTPATIENT
Start: 2020-10-25 | End: 2020-10-25

## 2020-10-25 RX ADMIN — Medication 2 GRAM(S): at 22:33

## 2020-10-25 RX ADMIN — PIPERACILLIN AND TAZOBACTAM 25 GRAM(S): 4; .5 INJECTION, POWDER, LYOPHILIZED, FOR SOLUTION INTRAVENOUS at 18:00

## 2020-10-25 RX ADMIN — Medication 975 MILLIGRAM(S): at 06:16

## 2020-10-25 RX ADMIN — Medication 975 MILLIGRAM(S): at 18:00

## 2020-10-25 RX ADMIN — SODIUM CHLORIDE 1 GRAM(S): 9 INJECTION INTRAMUSCULAR; INTRAVENOUS; SUBCUTANEOUS at 22:33

## 2020-10-25 RX ADMIN — Medication 40 MILLIEQUIVALENT(S): at 09:43

## 2020-10-25 RX ADMIN — LOSARTAN POTASSIUM 50 MILLIGRAM(S): 100 TABLET, FILM COATED ORAL at 06:16

## 2020-10-25 RX ADMIN — Medication 1 TABLET(S): at 12:22

## 2020-10-25 RX ADMIN — SODIUM CHLORIDE 100 MILLILITER(S): 9 INJECTION INTRAMUSCULAR; INTRAVENOUS; SUBCUTANEOUS at 01:59

## 2020-10-25 RX ADMIN — Medication 975 MILLIGRAM(S): at 12:22

## 2020-10-25 RX ADMIN — PIPERACILLIN AND TAZOBACTAM 25 GRAM(S): 4; .5 INJECTION, POWDER, LYOPHILIZED, FOR SOLUTION INTRAVENOUS at 01:59

## 2020-10-25 RX ADMIN — Medication 100 GRAM(S): at 09:45

## 2020-10-25 RX ADMIN — ATORVASTATIN CALCIUM 40 MILLIGRAM(S): 80 TABLET, FILM COATED ORAL at 22:33

## 2020-10-25 RX ADMIN — SODIUM CHLORIDE 1 GRAM(S): 9 INJECTION INTRAMUSCULAR; INTRAVENOUS; SUBCUTANEOUS at 16:11

## 2020-10-25 RX ADMIN — TAMSULOSIN HYDROCHLORIDE 0.4 MILLIGRAM(S): 0.4 CAPSULE ORAL at 22:33

## 2020-10-25 RX ADMIN — SODIUM CHLORIDE 1 GRAM(S): 9 INJECTION INTRAMUSCULAR; INTRAVENOUS; SUBCUTANEOUS at 06:16

## 2020-10-25 RX ADMIN — OXYCODONE HYDROCHLORIDE 5 MILLIGRAM(S): 5 TABLET ORAL at 09:44

## 2020-10-25 RX ADMIN — OXYCODONE HYDROCHLORIDE 5 MILLIGRAM(S): 5 TABLET ORAL at 10:14

## 2020-10-25 RX ADMIN — CHLORHEXIDINE GLUCONATE 1 APPLICATION(S): 213 SOLUTION TOPICAL at 09:44

## 2020-10-25 RX ADMIN — Medication 25 MILLIGRAM(S): at 06:16

## 2020-10-25 RX ADMIN — Medication 25 MILLIGRAM(S): at 18:01

## 2020-10-25 RX ADMIN — PIPERACILLIN AND TAZOBACTAM 25 GRAM(S): 4; .5 INJECTION, POWDER, LYOPHILIZED, FOR SOLUTION INTRAVENOUS at 09:43

## 2020-10-25 RX ADMIN — TAMSULOSIN HYDROCHLORIDE 0.4 MILLIGRAM(S): 0.4 CAPSULE ORAL at 16:11

## 2020-10-25 NOTE — PROGRESS NOTE ADULT - SUBJECTIVE AND OBJECTIVE BOX
INFECTIOUS DISEASES FOLLOW UP--Zeferino Adam MD  Pager [phone]    This is a follow up note for this  74y Male with  Infection following a procedure, other surgical site, initial encounter  i and d for perianal infection.  feels much better.  tolerating zosyn  moving bowels ok  appetite good.    Further ROS:  CONSTITUTIONAL:  No fever, good appetite  CARDIOVASCULAR:  No chest pain or palpitations  RESPIRATORY:  No dyspnea  GASTROINTESTINAL:  No nausea, vomiting, diarrhea, or abdominal pain  GENITOURINARY:  No dysuria  NEUROLOGIC:  No headache,     Allergies  No Known Allergies    ANTIBIOTICS/RELEVANT:  antimicrobials  piperacillin/tazobactam IVPB.. 3.375 Gram(s) IV Intermittent every 8 hours    OTHER:  acetaminophen   Tablet .. 975 milliGRAM(s) Oral every 6 hours  atorvastatin 40 milliGRAM(s) Oral at bedtime  chlorhexidine 4% Liquid 1 Application(s) Topical <User Schedule>  insulin lispro (ADMELOG) corrective regimen sliding scale   SubCutaneous three times a day before meals  insulin lispro (ADMELOG) corrective regimen sliding scale   SubCutaneous at bedtime  losartan 50 milliGRAM(s) Oral daily  metoprolol tartrate 25 milliGRAM(s) Oral every 12 hours  multivitamin 1 Tablet(s) Oral daily  omega-3-Acid Ethyl Esters 2 Gram(s) Oral at bedtime  ondansetron Injectable 4 milliGRAM(s) IV Push once  oxyCODONE    IR 5 milliGRAM(s) Oral every 4 hours PRN  oxyCODONE    IR 10 milliGRAM(s) Oral every 6 hours PRN  senna 2 Tablet(s) Oral at bedtime  sodium chloride 1 Gram(s) Oral every 8 hours  sodium chloride 0.9% lock flush 10 milliLiter(s) IV Push every 1 hour PRN  sodium chloride 0.9%. 1000 milliLiter(s) IV Continuous <Continuous>  tamsulosin 0.4 milliGRAM(s) Oral at bedtime    Objective:  Vital Signs Last 24 Hrs  T(C): 36.7 (25 Oct 2020 06:00), Max: 36.8 (24 Oct 2020 22:10)  T(F): 98.1 (25 Oct 2020 06:00), Max: 98.2 (24 Oct 2020 22:10)  HR: 96 (25 Oct 2020 06:00) (68 - 99)  BP: 130/79 (25 Oct 2020 06:00) (109/71 - 154/88)  BP(mean): --  RR: 16 (25 Oct 2020 06:00) (16 - 18)  SpO2: 98% (25 Oct 2020 06:00) (97% - 99%)    PHYSICAL EXAM:  Constitutional:no acute distress  Eyes:YAKOV, EOMI  Ear/Nose/Throat: no oral lesions, 	  Respiratory: clear BL  Cardiovascular: S1S2  Gastrointestinal:soft, (+) BS, no tenderness  Extremities:no e/e/c  No Lymphadenopathy  IV sites not inflammed.    LABS:                        9.6    10.34 )-----------( 216      ( 24 Oct 2020 07:16 )             28.0     10-24    134<L>  |  104  |  17  ----------------------------<  70  4.1   |  22  |  0.87    Ca    7.8<L>      24 Oct 2020 07:16  Phos  3.0     10-24  Mg     1.9     10-24      PT/INR - ( 24 Oct 2020 07:16 )   PT: 34.1 sec;   INR: 2.99 ratio    PTT - ( 24 Oct 2020 07:16 )  PTT:40.8 sec    MICROBIOLOGY: mixed narda from i and d    RADIOLOGY & ADDITIONAL STUDIES:  no new data    imp/rx:  stable on zosyn  no new complaints.  no new micro  to continue zosyn

## 2020-10-25 NOTE — PROGRESS NOTE ADULT - SUBJECTIVE AND OBJECTIVE BOX
SUBJECTIVE: The patient denies chest pain, shortness of breath, arm pain or jaw pain, dizziness or palpitations.    MEDICATIONS  (STANDING):  acetaminophen   Tablet .. 975 milliGRAM(s) Oral every 6 hours  atorvastatin 40 milliGRAM(s) Oral at bedtime  chlorhexidine 4% Liquid 1 Application(s) Topical <User Schedule>  insulin lispro (ADMELOG) corrective regimen sliding scale   SubCutaneous three times a day before meals  insulin lispro (ADMELOG) corrective regimen sliding scale   SubCutaneous at bedtime  losartan 50 milliGRAM(s) Oral daily  metoprolol tartrate 25 milliGRAM(s) Oral every 12 hours  multivitamin 1 Tablet(s) Oral daily  omega-3-Acid Ethyl Esters 2 Gram(s) Oral at bedtime  ondansetron Injectable 4 milliGRAM(s) IV Push once  piperacillin/tazobactam IVPB.. 3.375 Gram(s) IV Intermittent every 8 hours  senna 2 Tablet(s) Oral at bedtime  sodium chloride 1 Gram(s) Oral every 8 hours  tamsulosin 0.4 milliGRAM(s) Oral at bedtime    MEDICATIONS  (PRN):  oxyCODONE    IR 5 milliGRAM(s) Oral every 4 hours PRN Moderate Pain (4 - 6)  oxyCODONE    IR 10 milliGRAM(s) Oral every 6 hours PRN Severe Pain (7 - 10)  sodium chloride 0.9% lock flush 10 milliLiter(s) IV Push every 1 hour PRN Pre/post blood products, medications, blood draw, and to maintain line patency        REVIEW OF SYSTEMS:    CONSTITUTIONAL: No fever, weight loss, or fatigue  EYES: No eye pain, visual disturbances, or discharge  NECK: No pain or stiffness  RESPIRATORY: No cough, wheezing, chills or hemoptysis; No Shortness of Breath  CARDIOVASCULAR: No chest pain, palpitations, dizziness, or leg swelling  GASTROINTESTINAL: No abdominal or epigastric pain. No nausea, vomiting, or hematemesis; No diarrhea or constipation. No melena or hematochezia.  GENITOURINARY: No dysuria, frequency, hematuria, or incontinence  NEUROLOGICAL: No headaches, memory loss, loss of strength, numbness, or tremors  SKIN: No itching, burning, rashes, or lesions   LYMPH Nodes: No enlarged glands  MUSCULOSKELETAL: No joint pain or swelling; No muscle, back, or extremity pain  All other review of systems are negative.  	  [ ] Unable to obtain    PHYSICAL EXAM:  T(F): 98.1 (10-25-20 @ 06:00), Max: 98.2 (10-24-20 @ 22:10)  HR: 96 (10-25-20 @ 06:00) (68 - 99)  BP: 130/79 (10-25-20 @ 06:00) (109/71 - 154/88)  RR: 16 (10-25-20 @ 06:00) (16 - 18)  SpO2: 98% (10-25-20 @ 06:00) (97% - 99%)  Wt(kg): --  ,   I&O's Summary    24 Oct 2020 07:01  -  25 Oct 2020 07:00  --------------------------------------------------------  IN: 3580 mL / OUT: 2700 mL / NET: 880 mL          PHYSICAL EXAM    Appearance: Normal	  HEENT:   Normal oral mucosa, PERRL, EOMI	  NECK: Soft and supple, No LAD, No JVD  Cardiovascular: Irregular Rate and Rhythm, Normal S1 S2, No murmurs, No clicks, gallops or rubs  Respiratory: Lungs clear to auscultation	  Gastrointestinal:  Soft, Non-tender, + BS	  Skin: No rashes, No ecchymoses, No cyanosis  Neurologic: Non-focal  Extremities: No clubbing, cyanosis or edema  Vascular: Peripheral pulses palpable 2+ bilaterally      LABS:	 	                          9.6    8.22  )-----------( 221      ( 25 Oct 2020 06:51 )             28.5               10-24    134<L>  |  104  |  17  ----------------------------<  70  4.1   |  22  |  0.87    Ca    7.8<L>      24 Oct 2020 07:16  Phos  3.0     10-24  Mg     1.9     10-24      PT/INR - ( 24 Oct 2020 07:16 )   PT: 34.1 sec;   INR: 2.99 ratio         PTT - ( 24 Oct 2020 07:16 )  PTT:40.8 sec

## 2020-10-25 NOTE — PROGRESS NOTE ADULT - SUBJECTIVE AND OBJECTIVE BOX
SUBJECTIVE:   Pt seen and examined at bedside. No acute events overnight. Pt laying in bed comfortably. No nausea, vomiting, chest pain, SOB, fever, chills. +flatus/+BM        Vital Signs Last 24 Hrs  T(C): 36.6 (25 Oct 2020 02:18), Max: 36.8 (24 Oct 2020 22:10)  T(F): 97.9 (25 Oct 2020 02:18), Max: 98.2 (24 Oct 2020 22:10)  HR: 99 (25 Oct 2020 02:18) (68 - 99)  BP: 149/74 (25 Oct 2020 02:18) (109/71 - 154/88)  BP(mean): --  RR: 16 (25 Oct 2020 02:18) (16 - 18)  SpO2: 97% (25 Oct 2020 02:18) (96% - 99%)      PHYSICAL EXAM:  Constitutional: Patient well nourished, well developed  Respiratory: breathing comfortably  Gastrointestinal: Abdomen soft, non distended, nontender      I&O's Summary    23 Oct 2020 07:01  -  24 Oct 2020 07:00  --------------------------------------------------------  IN: 2305 mL / OUT: 1925 mL / NET: 380 mL    24 Oct 2020 07:01  -  25 Oct 2020 05:44  --------------------------------------------------------  IN: 3260 mL / OUT: 2250 mL / NET: 1010 mL      I&O's Detail    23 Oct 2020 07:01  -  24 Oct 2020 07:00  --------------------------------------------------------  IN:    Oral Fluid: 1105 mL    sodium chloride 0.9%: 1200 mL  Total IN: 2305 mL    OUT:    Indwelling Catheter - Urethral (mL): 1925 mL  Total OUT: 1925 mL    Total NET: 380 mL      24 Oct 2020 07:01  -  25 Oct 2020 05:44  --------------------------------------------------------  IN:    IV PiggyBack: 100 mL    Oral Fluid: 960 mL    sodium chloride 0.9%: 2200 mL  Total IN: 3260 mL    OUT:    Indwelling Catheter - Urethral (mL): 700 mL    Intermittent Catheterization - Urethral (mL): 750 mL    Voided (mL): 800 mL  Total OUT: 2250 mL    Total NET: 1010 mL          MEDICATIONS  (STANDING):  acetaminophen   Tablet .. 975 milliGRAM(s) Oral every 6 hours  atorvastatin 40 milliGRAM(s) Oral at bedtime  chlorhexidine 4% Liquid 1 Application(s) Topical <User Schedule>  insulin lispro (ADMELOG) corrective regimen sliding scale   SubCutaneous three times a day before meals  insulin lispro (ADMELOG) corrective regimen sliding scale   SubCutaneous at bedtime  losartan 50 milliGRAM(s) Oral daily  metoprolol tartrate 25 milliGRAM(s) Oral every 12 hours  multivitamin 1 Tablet(s) Oral daily  omega-3-Acid Ethyl Esters 2 Gram(s) Oral at bedtime  ondansetron Injectable 4 milliGRAM(s) IV Push once  piperacillin/tazobactam IVPB.. 3.375 Gram(s) IV Intermittent every 8 hours  senna 2 Tablet(s) Oral at bedtime  sodium chloride 1 Gram(s) Oral every 8 hours  sodium chloride 0.9%. 1000 milliLiter(s) (100 mL/Hr) IV Continuous <Continuous>  tamsulosin 0.4 milliGRAM(s) Oral at bedtime    MEDICATIONS  (PRN):  oxyCODONE    IR 5 milliGRAM(s) Oral every 4 hours PRN Moderate Pain (4 - 6)  oxyCODONE    IR 10 milliGRAM(s) Oral every 6 hours PRN Severe Pain (7 - 10)  sodium chloride 0.9% lock flush 10 milliLiter(s) IV Push every 1 hour PRN Pre/post blood products, medications, blood draw, and to maintain line patency      LABS:                        9.6    10.34 )-----------( 216      ( 24 Oct 2020 07:16 )             28.0     10-24    134<L>  |  104  |  17  ----------------------------<  70  4.1   |  22  |  0.87    Ca    7.8<L>      24 Oct 2020 07:16  Phos  3.0     10-24  Mg     1.9     10-24      PT/INR - ( 24 Oct 2020 07:16 )   PT: 34.1 sec;   INR: 2.99 ratio         PTT - ( 24 Oct 2020 07:16 )  PTT:40.8 sec      RADIOLOGY & ADDITIONAL STUDIES:

## 2020-10-25 NOTE — PROGRESS NOTE ADULT - ASSESSMENT
74M of [place] descent hx of DM, CAD, AFib, bioprosthetic mitral valve (2015), HTN, macular degeneration, hemorrhoids, recently diagnosed localized rectal cancer (dx 9/2020) s/p 10/15 Transanal excision of distal rectal carcinoma.  with necrotizing soft tissue infection of the perineum after 10/15 transanal rectal tumor excision POD 3 I&D  Pain control as per surgery team  Patient with permanent AF, needs to be anticoagulated. Restart warfarin when OK with surgery to an INR of 2-3.  The patient is stable from a cardiovascular perspective.

## 2020-10-25 NOTE — PROGRESS NOTE ADULT - ASSESSMENT
Patient is a 74y Male with history of DM, CAD, AFib, bioprosthetic mitral valve (2015), HTN, macular degeneration, hemorrhoids, recently diagnosed localized rectal cancer (dx 9/2020) c/b bacteremia, s/p 10/15 Transanal excision of distal rectal carcinoma. Taken to OR urgently 10/21 for r/o necrotizing fasciitis, found to have breakdown of rectal incision with fistulization into perineum, s/p debridement and penrose drain placement.     PLAN:  - pain control  - CLD  - Zosyn

## 2020-10-26 LAB
ANION GAP SERPL CALC-SCNC: 9 MMOL/L — SIGNIFICANT CHANGE UP (ref 5–17)
APTT BLD: 37 SEC — HIGH (ref 27.5–35.5)
BUN SERPL-MCNC: 9 MG/DL — SIGNIFICANT CHANGE UP (ref 7–23)
CALCIUM SERPL-MCNC: 8.4 MG/DL — SIGNIFICANT CHANGE UP (ref 8.4–10.5)
CHLORIDE SERPL-SCNC: 102 MMOL/L — SIGNIFICANT CHANGE UP (ref 96–108)
CO2 SERPL-SCNC: 22 MMOL/L — SIGNIFICANT CHANGE UP (ref 22–31)
CREAT SERPL-MCNC: 0.87 MG/DL — SIGNIFICANT CHANGE UP (ref 0.5–1.3)
CULTURE RESULTS: SIGNIFICANT CHANGE UP
CULTURE RESULTS: SIGNIFICANT CHANGE UP
GLUCOSE BLDC GLUCOMTR-MCNC: 122 MG/DL — HIGH (ref 70–99)
GLUCOSE BLDC GLUCOMTR-MCNC: 160 MG/DL — HIGH (ref 70–99)
GLUCOSE BLDC GLUCOMTR-MCNC: 205 MG/DL — HIGH (ref 70–99)
GLUCOSE BLDC GLUCOMTR-MCNC: 82 MG/DL — SIGNIFICANT CHANGE UP (ref 70–99)
GLUCOSE SERPL-MCNC: 82 MG/DL — SIGNIFICANT CHANGE UP (ref 70–99)
HCT VFR BLD CALC: 28.2 % — LOW (ref 39–50)
HGB BLD-MCNC: 9.7 G/DL — LOW (ref 13–17)
INR BLD: 2.08 RATIO — HIGH (ref 0.88–1.16)
MAGNESIUM SERPL-MCNC: 2.1 MG/DL — SIGNIFICANT CHANGE UP (ref 1.6–2.6)
MCHC RBC-ENTMCNC: 31.6 PG — SIGNIFICANT CHANGE UP (ref 27–34)
MCHC RBC-ENTMCNC: 34.4 GM/DL — SIGNIFICANT CHANGE UP (ref 32–36)
MCV RBC AUTO: 91.9 FL — SIGNIFICANT CHANGE UP (ref 80–100)
NRBC # BLD: 0 /100 WBCS — SIGNIFICANT CHANGE UP (ref 0–0)
PHOSPHATE SERPL-MCNC: 3.5 MG/DL — SIGNIFICANT CHANGE UP (ref 2.5–4.5)
PLATELET # BLD AUTO: 235 K/UL — SIGNIFICANT CHANGE UP (ref 150–400)
POTASSIUM SERPL-MCNC: 3.8 MMOL/L — SIGNIFICANT CHANGE UP (ref 3.5–5.3)
POTASSIUM SERPL-SCNC: 3.8 MMOL/L — SIGNIFICANT CHANGE UP (ref 3.5–5.3)
PROTHROM AB SERPL-ACNC: 24.1 SEC — HIGH (ref 10.6–13.6)
RBC # BLD: 3.07 M/UL — LOW (ref 4.2–5.8)
RBC # FLD: 14.3 % — SIGNIFICANT CHANGE UP (ref 10.3–14.5)
SODIUM SERPL-SCNC: 133 MMOL/L — LOW (ref 135–145)
SPECIMEN SOURCE: SIGNIFICANT CHANGE UP
SPECIMEN SOURCE: SIGNIFICANT CHANGE UP
SURGICAL PATHOLOGY STUDY: SIGNIFICANT CHANGE UP
WBC # BLD: 6.22 K/UL — SIGNIFICANT CHANGE UP (ref 3.8–10.5)
WBC # FLD AUTO: 6.22 K/UL — SIGNIFICANT CHANGE UP (ref 3.8–10.5)

## 2020-10-26 PROCEDURE — 99232 SBSQ HOSP IP/OBS MODERATE 35: CPT

## 2020-10-26 RX ORDER — POTASSIUM CHLORIDE 20 MEQ
20 PACKET (EA) ORAL ONCE
Refills: 0 | Status: COMPLETED | OUTPATIENT
Start: 2020-10-26 | End: 2020-10-26

## 2020-10-26 RX ORDER — WARFARIN SODIUM 2.5 MG/1
2.5 TABLET ORAL ONCE
Refills: 0 | Status: DISCONTINUED | OUTPATIENT
Start: 2020-10-26 | End: 2020-10-27

## 2020-10-26 RX ORDER — WARFARIN SODIUM 2.5 MG/1
5 TABLET ORAL
Refills: 0 | Status: DISCONTINUED | OUTPATIENT
Start: 2020-10-26 | End: 2020-10-26

## 2020-10-26 RX ADMIN — CHLORHEXIDINE GLUCONATE 1 APPLICATION(S): 213 SOLUTION TOPICAL at 13:14

## 2020-10-26 RX ADMIN — WARFARIN SODIUM 2.5 MILLIGRAM(S): 2.5 TABLET ORAL at 21:40

## 2020-10-26 RX ADMIN — PIPERACILLIN AND TAZOBACTAM 25 GRAM(S): 4; .5 INJECTION, POWDER, LYOPHILIZED, FOR SOLUTION INTRAVENOUS at 19:05

## 2020-10-26 RX ADMIN — Medication 25 MILLIGRAM(S): at 05:03

## 2020-10-26 RX ADMIN — Medication 20 MILLIEQUIVALENT(S): at 10:46

## 2020-10-26 RX ADMIN — TAMSULOSIN HYDROCHLORIDE 0.4 MILLIGRAM(S): 0.4 CAPSULE ORAL at 21:41

## 2020-10-26 RX ADMIN — OXYCODONE HYDROCHLORIDE 5 MILLIGRAM(S): 5 TABLET ORAL at 22:08

## 2020-10-26 RX ADMIN — Medication 975 MILLIGRAM(S): at 19:05

## 2020-10-26 RX ADMIN — Medication 2: at 18:57

## 2020-10-26 RX ADMIN — Medication 0: at 21:41

## 2020-10-26 RX ADMIN — SODIUM CHLORIDE 1 GRAM(S): 9 INJECTION INTRAMUSCULAR; INTRAVENOUS; SUBCUTANEOUS at 21:40

## 2020-10-26 RX ADMIN — SODIUM CHLORIDE 1 GRAM(S): 9 INJECTION INTRAMUSCULAR; INTRAVENOUS; SUBCUTANEOUS at 13:16

## 2020-10-26 RX ADMIN — OXYCODONE HYDROCHLORIDE 5 MILLIGRAM(S): 5 TABLET ORAL at 23:10

## 2020-10-26 RX ADMIN — OXYCODONE HYDROCHLORIDE 5 MILLIGRAM(S): 5 TABLET ORAL at 16:35

## 2020-10-26 RX ADMIN — Medication 975 MILLIGRAM(S): at 06:18

## 2020-10-26 RX ADMIN — Medication 975 MILLIGRAM(S): at 13:15

## 2020-10-26 RX ADMIN — PIPERACILLIN AND TAZOBACTAM 25 GRAM(S): 4; .5 INJECTION, POWDER, LYOPHILIZED, FOR SOLUTION INTRAVENOUS at 01:04

## 2020-10-26 RX ADMIN — SENNA PLUS 2 TABLET(S): 8.6 TABLET ORAL at 21:40

## 2020-10-26 RX ADMIN — Medication 2 GRAM(S): at 21:40

## 2020-10-26 RX ADMIN — LOSARTAN POTASSIUM 50 MILLIGRAM(S): 100 TABLET, FILM COATED ORAL at 05:02

## 2020-10-26 RX ADMIN — Medication 975 MILLIGRAM(S): at 01:04

## 2020-10-26 RX ADMIN — SODIUM CHLORIDE 1 GRAM(S): 9 INJECTION INTRAMUSCULAR; INTRAVENOUS; SUBCUTANEOUS at 05:02

## 2020-10-26 RX ADMIN — Medication 1 TABLET(S): at 13:16

## 2020-10-26 RX ADMIN — OXYCODONE HYDROCHLORIDE 5 MILLIGRAM(S): 5 TABLET ORAL at 15:56

## 2020-10-26 RX ADMIN — Medication 25 MILLIGRAM(S): at 19:05

## 2020-10-26 RX ADMIN — ATORVASTATIN CALCIUM 40 MILLIGRAM(S): 80 TABLET, FILM COATED ORAL at 21:40

## 2020-10-26 RX ADMIN — PIPERACILLIN AND TAZOBACTAM 25 GRAM(S): 4; .5 INJECTION, POWDER, LYOPHILIZED, FOR SOLUTION INTRAVENOUS at 10:44

## 2020-10-26 NOTE — PROGRESS NOTE ADULT - ASSESSMENT
74M hx DM(a1c=6.2%), CAD, AFib, prosthetic mitral valve (2015), HTN, macular degeneration, hemorrhoids, localized rectal cancer (dx 9/2020), presenting with chills for 10 days.   S mitis oralis bacteremia-high grade  No abd symptoms  Clinically stable  Repeat Cx so far negative  abd CT no abscess  PCn VANCE 0.03  REEMA no vegetation  On home IV ceftriaxone X 6 week course-was doing well  Now with perineal cellulitis/wound infection  s/p OR I&D of perineal abscess  'Cx enteric narda  stable       Rec:  A)Perineal abscess  s/p I&D   Fever leucocytosis-resolved   Rec:  1) further surgical plan per surgical team  2) Continue observation  3) Follow  blood Cx  4) For now continue zosyn        B) S mitis oralis bacteremia  likely as from rectal mass  REEMA no PVE  repeat blood Cx negative  abx as above       Will tailor plan for ID issues  per course,results.Will defer to primary team on management of other issues.  Assessment, plan and recommendations as detailed above were discussed with the surgery   team( Dr Connelly).  Will Follow.  Beeper [phone] [facility] [id number].   Wknd/afterhours/No response-[phone] or Fellow on call

## 2020-10-26 NOTE — PROGRESS NOTE ADULT - SUBJECTIVE AND OBJECTIVE BOX
Patient is a 74y old  Male who presents with a chief complaint of Rectal tumor excision (26 Oct 2020 10:04)    Being followed by ID for infected perineal wound    Interval history:feels better  Pain decreased  possible drain downsizing today  No other complaints   No acute events      ROS:  No cough,SOB,CP  No N/V/D./abd pain  No other complaints      Antimicrobials:    piperacillin/tazobactam IVPB.. 3.375 Gram(s) IV Intermittent every 8 hours    Other medications reviewed  MEDICATIONS  (STANDING):  acetaminophen   Tablet .. 975 milliGRAM(s) Oral every 6 hours  atorvastatin 40 milliGRAM(s) Oral at bedtime  chlorhexidine 4% Liquid 1 Application(s) Topical <User Schedule>  insulin lispro (ADMELOG) corrective regimen sliding scale   SubCutaneous three times a day before meals  insulin lispro (ADMELOG) corrective regimen sliding scale   SubCutaneous at bedtime  losartan 50 milliGRAM(s) Oral daily  metoprolol tartrate 25 milliGRAM(s) Oral every 12 hours  multivitamin 1 Tablet(s) Oral daily  omega-3-Acid Ethyl Esters 2 Gram(s) Oral at bedtime  ondansetron Injectable 4 milliGRAM(s) IV Push once  piperacillin/tazobactam IVPB.. 3.375 Gram(s) IV Intermittent every 8 hours  senna 2 Tablet(s) Oral at bedtime  sodium chloride 1 Gram(s) Oral every 8 hours  tamsulosin 0.4 milliGRAM(s) Oral at bedtime      Vital Signs Last 24 Hrs  T(C): 36.5 (10-26-20 @ 09:03), Max: 36.8 (10-26-20 @ 05:00)  T(F): 97.7 (10-26-20 @ 09:03), Max: 98.2 (10-26-20 @ 05:00)  HR: 72 (10-26-20 @ 09:03) (72 - 87)  BP: 173/93 (10-26-20 @ 09:03) (141/90 - 173/93)  BP(mean): --  RR: 18 (10-26-20 @ 09:03) (16 - 18)  SpO2: 100% (10-26-20 @ 09:03) (97% - 100%)    Physical Exam:      HEENT PERRLA EOMI    No oral exudate or erythema    Chest Good AE,CTA    CVS RRR S1 S2 WNl No murmur or rub or gallop    Abd soft BS normal No tenderness no masses  rectal wound packing no discharge   drain  PICC site  site no erythema tenderness or discharge    CNS AAO X 3 no focal    Lab Data:                          9.7    6.22  )-----------( 235      ( 26 Oct 2020 06:56 )             28.2       10-26    133<L>  |  102  |  9   ----------------------------<  82  3.8   |  22  |  0.87    Ca    8.4      26 Oct 2020 06:57  Phos  3.5     10-26  Mg     2.1     10-26          Culture - Surgical Swab (collected 22 Oct 2020 02:00)  Source: .Surgical Swab 1 perineal wound for culture  Final Report (24 Oct 2020 10:11):    Culture yields >4 types of aerobic and/or anaerobic bacteria    Call client services within 7 days if further workup is clinically    indicated.    Culture - Blood (collected 21 Oct 2020 22:11)  Source: .Blood Blood-Peripheral  Preliminary Report (22 Oct 2020 23:02):    No growth to date.    Culture - Blood (collected 21 Oct 2020 22:11)  Source: .Blood Blood-Peripheral  Preliminary Report (22 Oct 2020 23:02):    No growth to date.      `< from: CT Abdomen and Pelvis No Cont (10.21.20 @ 18:14) >    IMPRESSION:  Subcutaneous gas and inflammation tracking within the perianal and perineal soft tissues concerning for gas-forming infection.    Scrotal edema.    These findings were discussed with Dr. Connelly on 10/21/2020 at 6:37 PM by Dr. Gonsales with read back confirmation.        < end of copied text >        < from: Xray Chest 1 View- PORTABLE-Routine (Xray Chest 1 View- PORTABLE-Routine in AM.) (10.23.20 @ 09:03) >  Impression:    The heart is enlarged. The lungs appear to be clear. No pleural effusion. No pneumothorax. Status post sternotomy.            < end of copied text >

## 2020-10-26 NOTE — PROGRESS NOTE ADULT - ASSESSMENT
Patient is a 74y Male with history of DM, CAD, AFib, bioprosthetic mitral valve (2015), HTN, macular degeneration, hemorrhoids, recently diagnosed localized rectal cancer (dx 9/2020) c/b bacteremia, s/p 10/15 Transanal excision of distal rectal carcinoma. Taken to OR urgently 10/21 for r/o necrotizing fasciitis, found to have breakdown of rectal incision with fistulization into perineum, s/p debridement and penrose drain placement.     PLAN:  - pain control  - CLD  - Zosyn  - f/u INR Patient is a 74y Male with history of DM, CAD, AFib, bioprosthetic mitral valve (2015), HTN, macular degeneration, hemorrhoids, recently diagnosed localized rectal cancer (dx 9/2020) c/b bacteremia, s/p 10/15 Transanal excision of distal rectal carcinoma. Taken to OR urgently 10/21 for r/o necrotizing fasciitis, found to have breakdown of rectal incision with fistulization into perineum, s/p debridement and penrose drain placement.     PLAN:  - pain control  - regular diet  - downsize penrose drain  - Zosyn  - f/u INR

## 2020-10-26 NOTE — PROGRESS NOTE ADULT - SUBJECTIVE AND OBJECTIVE BOX
SUBJECTIVE:   Pt seen and examined at bedside. No acute events overnight. Pt laying in bed comfortably. No nausea, vomiting, chest pain, SOB, fever, chills. +flatus/+BM        Vital Signs Last 24 Hrs  T(C): 36.3 (26 Oct 2020 02:00), Max: 36.7 (25 Oct 2020 06:00)  T(F): 97.4 (26 Oct 2020 02:00), Max: 98.1 (25 Oct 2020 06:00)  HR: 80 (26 Oct 2020 02:00) (74 - 96)  BP: 166/89 (26 Oct 2020 02:00) (130/79 - 166/89)  BP(mean): --  RR: 17 (26 Oct 2020 02:00) (16 - 18)  SpO2: 98% (26 Oct 2020 02:00) (97% - 99%)      PHYSICAL EXAM:  Constitutional: Patient well nourished, well developed  Neuro: AAOx3  Respiratory: breathing comfortably  Gastrointestinal: Abdomen soft, non distended, nontender        I&O's Summary    24 Oct 2020 07:01  -  25 Oct 2020 07:00  --------------------------------------------------------  IN: 3580 mL / OUT: 2700 mL / NET: 880 mL    25 Oct 2020 07:01  -  26 Oct 2020 04:43  --------------------------------------------------------  IN: 840 mL / OUT: 1990 mL / NET: -1150 mL      I&O's Detail    24 Oct 2020 07:01  -  25 Oct 2020 07:00  --------------------------------------------------------  IN:    IV PiggyBack: 100 mL    Oral Fluid: 1080 mL    sodium chloride 0.9%: 2400 mL  Total IN: 3580 mL    OUT:    Indwelling Catheter - Urethral (mL): 700 mL    Intermittent Catheterization - Urethral (mL): 750 mL    Voided (mL): 1250 mL  Total OUT: 2700 mL    Total NET: 880 mL      25 Oct 2020 07:01  -  26 Oct 2020 04:43  --------------------------------------------------------  IN:    Oral Fluid: 840 mL  Total IN: 840 mL    OUT:    Voided (mL): 1990 mL  Total OUT: 1990 mL    Total NET: -1150 mL          MEDICATIONS  (STANDING):  acetaminophen   Tablet .. 975 milliGRAM(s) Oral every 6 hours  atorvastatin 40 milliGRAM(s) Oral at bedtime  chlorhexidine 4% Liquid 1 Application(s) Topical <User Schedule>  insulin lispro (ADMELOG) corrective regimen sliding scale   SubCutaneous three times a day before meals  insulin lispro (ADMELOG) corrective regimen sliding scale   SubCutaneous at bedtime  losartan 50 milliGRAM(s) Oral daily  metoprolol tartrate 25 milliGRAM(s) Oral every 12 hours  multivitamin 1 Tablet(s) Oral daily  omega-3-Acid Ethyl Esters 2 Gram(s) Oral at bedtime  ondansetron Injectable 4 milliGRAM(s) IV Push once  piperacillin/tazobactam IVPB.. 3.375 Gram(s) IV Intermittent every 8 hours  senna 2 Tablet(s) Oral at bedtime  sodium chloride 1 Gram(s) Oral every 8 hours  tamsulosin 0.4 milliGRAM(s) Oral at bedtime    MEDICATIONS  (PRN):  oxyCODONE    IR 5 milliGRAM(s) Oral every 4 hours PRN Moderate Pain (4 - 6)  oxyCODONE    IR 10 milliGRAM(s) Oral every 6 hours PRN Severe Pain (7 - 10)  sodium chloride 0.9% lock flush 10 milliLiter(s) IV Push every 1 hour PRN Pre/post blood products, medications, blood draw, and to maintain line patency      LABS:                        9.6    8.22  )-----------( 221      ( 25 Oct 2020 06:51 )             28.5     10-25    131<L>  |  101  |  12  ----------------------------<  83  3.5   |  22  |  0.92    Ca    8.0<L>      25 Oct 2020 06:51  Phos  3.1     10-25  Mg     1.9     10-25      PT/INR - ( 25 Oct 2020 07:27 )   PT: 25.0 sec;   INR: 2.16 ratio         PTT - ( 24 Oct 2020 07:16 )  PTT:40.8 sec      RADIOLOGY & ADDITIONAL STUDIES:

## 2020-10-26 NOTE — PROGRESS NOTE ADULT - SUBJECTIVE AND OBJECTIVE BOX
SUBJECTIVE: The patient denies chest pain, shortness of breath, arm pain or jaw pain, dizziness or palpitations.    MEDICATIONS  (STANDING):  acetaminophen   Tablet .. 975 milliGRAM(s) Oral every 6 hours  atorvastatin 40 milliGRAM(s) Oral at bedtime  chlorhexidine 4% Liquid 1 Application(s) Topical <User Schedule>  insulin lispro (ADMELOG) corrective regimen sliding scale   SubCutaneous three times a day before meals  insulin lispro (ADMELOG) corrective regimen sliding scale   SubCutaneous at bedtime  losartan 50 milliGRAM(s) Oral daily  metoprolol tartrate 25 milliGRAM(s) Oral every 12 hours  multivitamin 1 Tablet(s) Oral daily  omega-3-Acid Ethyl Esters 2 Gram(s) Oral at bedtime  ondansetron Injectable 4 milliGRAM(s) IV Push once  piperacillin/tazobactam IVPB.. 3.375 Gram(s) IV Intermittent every 8 hours  potassium chloride    Tablet ER 20 milliEquivalent(s) Oral once  senna 2 Tablet(s) Oral at bedtime  sodium chloride 1 Gram(s) Oral every 8 hours  tamsulosin 0.4 milliGRAM(s) Oral at bedtime    MEDICATIONS  (PRN):  oxyCODONE    IR 5 milliGRAM(s) Oral every 4 hours PRN Moderate Pain (4 - 6)  oxyCODONE    IR 10 milliGRAM(s) Oral every 6 hours PRN Severe Pain (7 - 10)  sodium chloride 0.9% lock flush 10 milliLiter(s) IV Push every 1 hour PRN Pre/post blood products, medications, blood draw, and to maintain line patency      REVIEW OF SYSTEMS:    CONSTITUTIONAL: No fever, weight loss, or fatigue  EYES: No eye pain, visual disturbances, or discharge  NECK: No pain or stiffness  RESPIRATORY: No cough, wheezing, chills or hemoptysis; No Shortness of Breath  CARDIOVASCULAR: No chest pain, palpitations, dizziness, or leg swelling  GASTROINTESTINAL: No abdominal or epigastric pain. No nausea, vomiting, or hematemesis; No diarrhea or constipation. No melena or hematochezia.  GENITOURINARY: No dysuria, frequency, hematuria, or incontinence  NEUROLOGICAL: No headaches, memory loss, loss of strength, numbness, or tremors  SKIN: No itching, burning, rashes, or lesions   LYMPH Nodes: No enlarged glands  MUSCULOSKELETAL: No joint pain or swelling; No muscle, back, or extremity pain  All other review of systems are negative.  	  [ ] Unable to obtain    PHYSICAL EXAM:  T(F): 97.7 (10-26-20 @ 09:03), Max: 98.2 (10-26-20 @ 05:00)  HR: 72 (10-26-20 @ 09:03) (72 - 87)  BP: 173/93 (10-26-20 @ 09:03) (141/90 - 173/93)  RR: 18 (10-26-20 @ 09:03) (16 - 18)  SpO2: 100% (10-26-20 @ 09:03) (97% - 100%)  Wt(kg): --  ,   I&O's Summary    25 Oct 2020 07:01  -  26 Oct 2020 07:00  --------------------------------------------------------  IN: 960 mL / OUT: 2810 mL / NET: -1850 mL    26 Oct 2020 07:01  -  26 Oct 2020 10:06  --------------------------------------------------------  IN: 0 mL / OUT: 500 mL / NET: -500 mL            PHYSICAL EXAM    Appearance: Normal	  HEENT:   Normal oral mucosa, PERRL, EOMI	  NECK: Soft and supple, No LAD, No JVD  Cardiovascular: Irregular Rate and Rhythm, Normal S1 S2, No murmurs, No clicks, gallops or rubs  Respiratory: Lungs clear to auscultation	  Gastrointestinal:  Soft, Non-tender, + BS	  Skin: No rashes, No ecchymoses, No cyanosis  Neurologic: Non-focal  Extremities: No clubbing, cyanosis or edema  Vascular: Peripheral pulses palpable 2+ bilaterally      LABS:	 	                          9.7    6.22  )-----------( 235      ( 26 Oct 2020 06:56 )             28.2               10-26    133<L>  |  102  |  9   ----------------------------<  82  3.8   |  22  |  0.87    Ca    8.4      26 Oct 2020 06:57  Phos  3.5     10-26  Mg     2.1     10-26      PT/INR - ( 26 Oct 2020 06:57 )   PT: 24.1 sec;   INR: 2.08 ratio         PTT - ( 26 Oct 2020 06:57 )  PTT:37.0 sec

## 2020-10-26 NOTE — PROGRESS NOTE ADULT - ASSESSMENT
74M of [place] descent hx of DM, CAD, AFib, bioprosthetic mitral valve (2015), HTN, macular degeneration, hemorrhoids, recently diagnosed localized rectal cancer (dx 9/2020) s/p 10/15 Transanal excision of distal rectal carcinoma.  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 Spoke with Dr. Connelly and it is ok to restart coumadin.  Goal INR of 2-3.  The patient is stable from a cardiovascular perspective.

## 2020-10-27 ENCOUNTER — TRANSCRIPTION ENCOUNTER (OUTPATIENT)
Age: 75
End: 2020-10-27

## 2020-10-27 ENCOUNTER — APPOINTMENT (OUTPATIENT)
Dept: SURGERY | Facility: CLINIC | Age: 75
End: 2020-10-27

## 2020-10-27 LAB
ANION GAP SERPL CALC-SCNC: 6 MMOL/L — SIGNIFICANT CHANGE UP (ref 5–17)
APTT BLD: 34.9 SEC — SIGNIFICANT CHANGE UP (ref 27.5–35.5)
BUN SERPL-MCNC: 9 MG/DL — SIGNIFICANT CHANGE UP (ref 7–23)
CALCIUM SERPL-MCNC: 8.2 MG/DL — LOW (ref 8.4–10.5)
CHLORIDE SERPL-SCNC: 102 MMOL/L — SIGNIFICANT CHANGE UP (ref 96–108)
CO2 SERPL-SCNC: 24 MMOL/L — SIGNIFICANT CHANGE UP (ref 22–31)
CREAT SERPL-MCNC: 0.83 MG/DL — SIGNIFICANT CHANGE UP (ref 0.5–1.3)
GLUCOSE BLDC GLUCOMTR-MCNC: 115 MG/DL — HIGH (ref 70–99)
GLUCOSE BLDC GLUCOMTR-MCNC: 118 MG/DL — HIGH (ref 70–99)
GLUCOSE BLDC GLUCOMTR-MCNC: 180 MG/DL — HIGH (ref 70–99)
GLUCOSE BLDC GLUCOMTR-MCNC: 189 MG/DL — HIGH (ref 70–99)
GLUCOSE SERPL-MCNC: 95 MG/DL — SIGNIFICANT CHANGE UP (ref 70–99)
HCT VFR BLD CALC: 27.8 % — LOW (ref 39–50)
HGB BLD-MCNC: 9.4 G/DL — LOW (ref 13–17)
INR BLD: 1.68 RATIO — HIGH (ref 0.88–1.16)
MAGNESIUM SERPL-MCNC: 1.9 MG/DL — SIGNIFICANT CHANGE UP (ref 1.6–2.6)
MCHC RBC-ENTMCNC: 31.8 PG — SIGNIFICANT CHANGE UP (ref 27–34)
MCHC RBC-ENTMCNC: 33.8 GM/DL — SIGNIFICANT CHANGE UP (ref 32–36)
MCV RBC AUTO: 93.9 FL — SIGNIFICANT CHANGE UP (ref 80–100)
NRBC # BLD: 0 /100 WBCS — SIGNIFICANT CHANGE UP (ref 0–0)
PHOSPHATE SERPL-MCNC: 3.1 MG/DL — SIGNIFICANT CHANGE UP (ref 2.5–4.5)
PLATELET # BLD AUTO: 261 K/UL — SIGNIFICANT CHANGE UP (ref 150–400)
POTASSIUM SERPL-MCNC: 4 MMOL/L — SIGNIFICANT CHANGE UP (ref 3.5–5.3)
POTASSIUM SERPL-SCNC: 4 MMOL/L — SIGNIFICANT CHANGE UP (ref 3.5–5.3)
PROTHROM AB SERPL-ACNC: 19.7 SEC — HIGH (ref 10.6–13.6)
RBC # BLD: 2.96 M/UL — LOW (ref 4.2–5.8)
RBC # FLD: 14.3 % — SIGNIFICANT CHANGE UP (ref 10.3–14.5)
SODIUM SERPL-SCNC: 132 MMOL/L — LOW (ref 135–145)
WBC # BLD: 6.92 K/UL — SIGNIFICANT CHANGE UP (ref 3.8–10.5)
WBC # FLD AUTO: 6.92 K/UL — SIGNIFICANT CHANGE UP (ref 3.8–10.5)

## 2020-10-27 PROCEDURE — 99232 SBSQ HOSP IP/OBS MODERATE 35: CPT

## 2020-10-27 RX ORDER — ERTAPENEM SODIUM 1 G/1
1000 INJECTION, POWDER, LYOPHILIZED, FOR SOLUTION INTRAMUSCULAR; INTRAVENOUS EVERY 24 HOURS
Refills: 0 | Status: DISCONTINUED | OUTPATIENT
Start: 2020-10-27 | End: 2020-10-29

## 2020-10-27 RX ORDER — TAMSULOSIN HYDROCHLORIDE 0.4 MG/1
0.4 CAPSULE ORAL
Refills: 0 | Status: DISCONTINUED | OUTPATIENT
Start: 2020-10-27 | End: 2020-10-30

## 2020-10-27 RX ORDER — ERTAPENEM SODIUM 1 G/1
1 INJECTION, POWDER, LYOPHILIZED, FOR SOLUTION INTRAMUSCULAR; INTRAVENOUS
Qty: 14 | Refills: 0
Start: 2020-10-27 | End: 2020-11-09

## 2020-10-27 RX ORDER — WARFARIN SODIUM 2.5 MG/1
5 TABLET ORAL ONCE
Refills: 0 | Status: COMPLETED | OUTPATIENT
Start: 2020-10-27 | End: 2020-10-27

## 2020-10-27 RX ORDER — SENNA PLUS 8.6 MG/1
1 TABLET ORAL AT BEDTIME
Refills: 0 | Status: DISCONTINUED | OUTPATIENT
Start: 2020-10-27 | End: 2020-10-27

## 2020-10-27 RX ADMIN — OXYCODONE HYDROCHLORIDE 5 MILLIGRAM(S): 5 TABLET ORAL at 06:15

## 2020-10-27 RX ADMIN — PIPERACILLIN AND TAZOBACTAM 25 GRAM(S): 4; .5 INJECTION, POWDER, LYOPHILIZED, FOR SOLUTION INTRAVENOUS at 01:12

## 2020-10-27 RX ADMIN — PIPERACILLIN AND TAZOBACTAM 25 GRAM(S): 4; .5 INJECTION, POWDER, LYOPHILIZED, FOR SOLUTION INTRAVENOUS at 09:20

## 2020-10-27 RX ADMIN — Medication 2: at 12:26

## 2020-10-27 RX ADMIN — ATORVASTATIN CALCIUM 40 MILLIGRAM(S): 80 TABLET, FILM COATED ORAL at 21:15

## 2020-10-27 RX ADMIN — TAMSULOSIN HYDROCHLORIDE 0.4 MILLIGRAM(S): 0.4 CAPSULE ORAL at 09:18

## 2020-10-27 RX ADMIN — Medication 25 MILLIGRAM(S): at 18:07

## 2020-10-27 RX ADMIN — LOSARTAN POTASSIUM 50 MILLIGRAM(S): 100 TABLET, FILM COATED ORAL at 06:17

## 2020-10-27 RX ADMIN — TAMSULOSIN HYDROCHLORIDE 0.4 MILLIGRAM(S): 0.4 CAPSULE ORAL at 21:15

## 2020-10-27 RX ADMIN — Medication 1 TABLET(S): at 12:26

## 2020-10-27 RX ADMIN — Medication 975 MILLIGRAM(S): at 12:25

## 2020-10-27 RX ADMIN — CHLORHEXIDINE GLUCONATE 1 APPLICATION(S): 213 SOLUTION TOPICAL at 16:20

## 2020-10-27 RX ADMIN — OXYCODONE HYDROCHLORIDE 5 MILLIGRAM(S): 5 TABLET ORAL at 07:01

## 2020-10-27 RX ADMIN — WARFARIN SODIUM 5 MILLIGRAM(S): 2.5 TABLET ORAL at 21:16

## 2020-10-27 RX ADMIN — SODIUM CHLORIDE 1 GRAM(S): 9 INJECTION INTRAMUSCULAR; INTRAVENOUS; SUBCUTANEOUS at 21:15

## 2020-10-27 RX ADMIN — SODIUM CHLORIDE 1 GRAM(S): 9 INJECTION INTRAMUSCULAR; INTRAVENOUS; SUBCUTANEOUS at 14:57

## 2020-10-27 RX ADMIN — Medication 2 GRAM(S): at 21:16

## 2020-10-27 RX ADMIN — SODIUM CHLORIDE 1 GRAM(S): 9 INJECTION INTRAMUSCULAR; INTRAVENOUS; SUBCUTANEOUS at 06:15

## 2020-10-27 RX ADMIN — ERTAPENEM SODIUM 120 MILLIGRAM(S): 1 INJECTION, POWDER, LYOPHILIZED, FOR SOLUTION INTRAMUSCULAR; INTRAVENOUS at 16:19

## 2020-10-27 RX ADMIN — Medication 975 MILLIGRAM(S): at 18:07

## 2020-10-27 RX ADMIN — Medication 25 MILLIGRAM(S): at 06:15

## 2020-10-27 NOTE — DISCHARGE NOTE PROVIDER - NSDCACTIVITY_GEN_ALL_CORE
Showering allowed Showering allowed/Do not make important decisions/Walking - Indoors allowed/No heavy lifting/straining/Do not drive or operate machinery/Stairs allowed/Walking - Outdoors allowed

## 2020-10-27 NOTE — DISCHARGE NOTE PROVIDER - NSDCMRMEDTOKEN_GEN_ALL_CORE_FT
aspirin 81 mg oral tablet: 1 tab(s) orally once a day (at bedtime)  Centrum oral tablet: 1 tab(s) orally once a day (at bedtime)  CoQ10 300 mg oral capsule: 2 cap(s) orally once a day (at bedtime)  Edarbyclor 40 mg-12.5 mg oral tablet: 1 tab(s) orally once a day (at bedtime)  Flomax 0.4 mg oral capsule: 1 cap(s) orally once a day (at bedtime)  Lovaza 1000 mg oral capsule: 2 cap(s) orally once a day (at bedtime)  metFORMIN 500 mg oral tablet, extended release: 1 tab(s) orally once a day (at bedtime)  metoprolol tartrate 25 mg oral tablet: 1 tab(s) orally 2 times a day  oxyCODONE 5 mg oral tablet: 1 tab(s) orally every 8 hours MDD:6  rosuvastatin 10 mg oral tablet: 1 tab(s) orally once a day (at bedtime)  warfarin 2.5 mg oral tablet: 1 tab(s) orally once a day Monday and Thursday, LD on 10/12/20  warfarin 5 mg oral tablet: 1 tab(s) orally once a day, Tuesday, Wednesday, Friday, Saturday, Sunday   aspirin 81 mg oral tablet: 1 tab(s) orally once a day (at bedtime)  CBC and CMP: Diagnosis: T81.49XA  Please get blood work drawn weekly  Please fax blood work results over to   Dr Barrett. [phone]  cefepime 1 g injection: 1 gram(s) intravenously every 8 hours for 2 weeks  End Date: 11/11/20  Diagnosis: T81.49XA  Centrum oral tablet: 1 tab(s) orally once a day (at bedtime)  CoQ10 300 mg oral capsule: 2 cap(s) orally once a day (at bedtime)  Edarbyclor 40 mg-12.5 mg oral tablet: 1 tab(s) orally once a day (at bedtime)  Flomax 0.4 mg oral capsule: 1 cap(s) orally once a day (at bedtime)  INVanz 1 g injection: 1 gram(s) intravenously every 24 hours for 2 weeks  End date 11/10  Lovaza 1000 mg oral capsule: 2 cap(s) orally once a day (at bedtime)  metFORMIN 500 mg oral tablet, extended release: 1 tab(s) orally once a day (at bedtime)  metoprolol tartrate 25 mg oral tablet: 1 tab(s) orally 2 times a day  oxyCODONE 5 mg oral tablet: 1 tab(s) orally every 8 hours MDD:6  rosuvastatin 10 mg oral tablet: 1 tab(s) orally once a day (at bedtime)  warfarin 2.5 mg oral tablet: 1 tab(s) orally once a day Monday and Thursday, LD on 10/12/20  warfarin 5 mg oral tablet: 1 tab(s) orally once a day, Tuesday, Wednesday, Friday, Saturday, Sunday  Zosyn 3.375gm, IV infuse over 30 minutes q6hr for 2 weeks : End date: 11/11/20  Diagnosis: T81.49XA   acetaminophen 325 mg oral tablet: 3 tab(s) orally every 6 hours  aspirin 81 mg oral tablet: 1 tab(s) orally once a day (at bedtime)  CBC and CMP: Diagnosis: T81.49XA  Please get blood work drawn weekly  Please fax blood work results over to   Dr Barrett. [phone]  cefepime 1 g injection: 1 gram(s) intravenously every 8 hours for 2 weeks  End Date: 11/12/20  Diagnosis: T81.49XA  Centrum oral tablet: 1 tab(s) orally once a day (at bedtime)  CoQ10 300 mg oral capsule: 2 cap(s) orally once a day (at bedtime)  Edarbyclor 40 mg-12.5 mg oral tablet: 1 tab(s) orally once a day (at bedtime)  Flomax 0.4 mg oral capsule: 1 cap(s) orally once a day (at bedtime)  Lovaza 1000 mg oral capsule: 2 cap(s) orally once a day (at bedtime)  metFORMIN 500 mg oral tablet, extended release: 1 tab(s) orally once a day (at bedtime)  metoprolol tartrate 25 mg oral tablet: 1 tab(s) orally 2 times a day  metroNIDAZOLE 500 mg oral tablet: 1 tab(s) orally every 8 hours  end date: 11/12/20  oxyCODONE 5 mg oral tablet: 1 tab(s) orally every 6 hours, As Needed - for moderate pain MDD:4   rosuvastatin 10 mg oral tablet: 1 tab(s) orally once a day (at bedtime)  warfarin 2.5 mg oral tablet: 1 tab(s) orally once a day Monday and Thursday, LD on 10/12/20  warfarin 5 mg oral tablet: 1 tab(s) orally once a day, Tuesday, Wednesday, Friday, Saturday, Sunday

## 2020-10-27 NOTE — DISCHARGE NOTE PROVIDER - NSDCCPCAREPLAN_GEN_ALL_CORE_FT
PRINCIPAL DISCHARGE DIAGNOSIS  Diagnosis: Surgical site infection  Assessment and Plan of Treatment: WOUND CARE: Keep penrose drain in until your office visit with Dr. Connelly. You can cover the area with some gauze for comfort.  BATHING: Please do Sitz baths at least 3x a day or more. Keep the wound dry after the bath and any bowel movements.   ACTIVITY: No heavy lifting or straining. Otherwise, you may return to your usual level of physical activity. If you are taking narcotic pain medication (such as Percocet), do NOT drive a car, operate machinery or make important decisions.  DIET: Return to your usual diet.  NOTIFY YOUR SURGEON IF: You have any bleeding that does not stop, any pus draining from your wound, any fever (over 100.4 F) or chills, persistent nausea/vomiting, persistent diarrhea, or if your pain is not controlled on your discharge pain medications.  FOLLOW-UP:  1. Follow-up with surgeon Dr. Connelly within 1-2 weeks of discharge.  Please call office for appointment  2. Please follow up with infectious disease specialist Dr. Barrett  3. Please continue to follow up with your cardiologist  4. Please follow up with your primary care physician in one week regarding your hospitalization.         PRINCIPAL DISCHARGE DIAGNOSIS  Diagnosis: Surgical site infection  Assessment and Plan of Treatment: ANTIBIOTICS: Continue Cefepime IV 1 gram every 8 hours to be administered via family members and PO flagyl 500 every 8 hours. You will need weekly CBC, CMP blood draws for ID monitoring.   WOUND CARE: Keep penrose drain in until your office visit with Dr. Connelly. You can cover the area with some gauze for comfort.  BATHING: Please do Sitz baths at least 3x a day or more. Keep the wound dry after the bath and any bowel movements.   ACTIVITY: No heavy lifting or straining. Otherwise, you may return to your usual level of physical activity. If you are taking narcotic pain medication (such as Percocet), do NOT drive a car, operate machinery or make important decisions.  DIET: Return to your usual diet.  NOTIFY YOUR SURGEON IF: You have any bleeding that does not stop, any pus draining from your wound, any fever (over 100.4 F) or chills, persistent nausea/vomiting, persistent diarrhea, or if your pain is not controlled on your discharge pain medications.  FOLLOW-UP:  1. Follow-up with surgeon Dr. Connelly within 1- 2 weeks of discharge.  Please call office for appointment  2. Please follow up with infectious disease specialist Dr. Barrett  3. Please continue to follow up with your cardiologist  4. Please follow up with your primary care physician in one week regarding your hospitalization.

## 2020-10-27 NOTE — DISCHARGE NOTE PROVIDER - HOSPITAL COURSE
Pt is 74M hx DM, CAD, AFib, bioprosthetic mitral valve (2015), HTN, macular degeneration, hemorrhoids, recently diagnosed localized rectal cancer (dx 9/2020) s/p 10/15 Transanal excision of distal rectal carcinoma.    Pt had colonoscopy 9/21. 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  Patient had a TTE and REEMA that was negative for endocarditis. CT A/P was negative for any intra-abdominal pathology.  Patient had a LUE PICC placed and he was discharged on 6 weeks of IV Ceftriaxone.     Since ambulatory procedure on 10/15, patient has had difficulty voiding and increasing scrotal edema.  He has also developed initially low grade fevers which progressed to fevers to nearly 102 yesterday.  Also endorsing chills. Still having BM's, last yesterday.  No N/V. No abdominal pain. He is also complaining of foul-smelling rectal discharge. Physical exam and imaging concerning for necrotizing soft tissue infection of perineum. Taken to OR urgently for operative debridement and exploration, found to have breakdown of rectal incision with fistulization into perineum.  Taken to OR urgently 10/21 for r/o necrotizing fasciitis, found to have breakdown of rectal incision with fistulization into perineum, s/p debridement and penrose drain placement. Patient remained hemodynamically stable throughout case and was admitted to SICU post-op extubated and in stable condition.    REEMA showed no vegetations. Infectious disease  consulted and recommended vanc and zosyn antibiotics. Cardiology was consulted and found him to be cardiovascularly stable. Patient remained hemodynamically stable and was transferred to the inpatient floors. His diet was advanced, and his nelson was removed. He was voiding and his dose of Flomax was increased, and he continued to have regular voids without any pain. he was started on a stool softener, and he began having bowel movements. His penrose drain was downsized at bedside and he tolerated the procedure well. His pain continued to be well controlled. He was evaluated by physical therapy an and found to have no skilled PT needs as he continued to ambulate. His home dose of Coumadin was restarted and he continued to remain hemodynamically stable.    At the time of discharge, the patient was hemodynamically stable, was tolerating PO diet, was voiding urine and passing stool, was ambulating, and was comfortable with adequate pain control. The patient was instructed to follow up with Dr. Connelly within 1-2 weeks after discharge from the hospital. The patient felt comfortable with discharge. The patient was discharged to home. The patient had no other issues.

## 2020-10-27 NOTE — PROGRESS NOTE ADULT - SUBJECTIVE AND OBJECTIVE BOX
Patient is a 74y old  Male who presents with a chief complaint of Rectal tumor excision (27 Oct 2020 09:33)    Being followed by ID for rectal wound dehiscence     Interval history:still some wound clots,bleeding from wound   pain better controlled  No acute events      ROS:  No cough,SOB,CP  No N/V/D./abd pain  No other complaints      Antimicrobials:    piperacillin/tazobactam IVPB.. 3.375 Gram(s) IV Intermittent every 8 hours    Other medications reviewed    Vital Signs Last 24 Hrs  T(C): 36.6 (10-27-20 @ 08:42), Max: 36.6 (10-27-20 @ 08:42)  T(F): 97.8 (10-27-20 @ 08:42), Max: 97.8 (10-27-20 @ 08:42)  HR: 75 (10-27-20 @ 08:42) (73 - 88)  BP: 165/92 (10-27-20 @ 08:42) (142/67 - 175/79)  BP(mean): --  RR: 18 (10-27-20 @ 08:42) (18 - 18)  SpO2: 99% (10-27-20 @ 08:42) (96% - 100%)    Physical Exam:      HEENT PERRLA EOMI    No oral exudate or erythema    Chest Good AE,CTA    CVS RRR S1 S2 WNl No murmur or rub or gallop    Abd soft BS normal No tenderness no masses  rectal wound packing no discharge   drain  PICC site  site no erythema tenderness or discharge    CNS AAO X 3 no focal    Lab Data:                          9.4    6.92  )-----------( 261      ( 27 Oct 2020 07:14 )             27.8       10-27    132<L>  |  102  |  9   ----------------------------<  95  4.0   |  24  |  0.83    Ca    8.2<L>      27 Oct 2020 07:14  Phos  3.1     10-27  Mg     1.9     10-27          < from: Xray Chest 1 View- PORTABLE-Routine (Xray Chest 1 View- PORTABLE-Routine in AM.) (10.23.20 @ 09:03) >  Impression:    The heart is enlarged. The lungs appear to be clear. No pleural effusion. No pneumothorax. Status post sternotomy.        < end of copied text >

## 2020-10-27 NOTE — DIETITIAN INITIAL EVALUATION ADULT. - PERTINENT MEDS FT
MEDICATIONS  (STANDING):  acetaminophen   Tablet .. 975 milliGRAM(s) Oral every 6 hours  atorvastatin 40 milliGRAM(s) Oral at bedtime  chlorhexidine 4% Liquid 1 Application(s) Topical <User Schedule>  insulin lispro (ADMELOG) corrective regimen sliding scale   SubCutaneous three times a day before meals  insulin lispro (ADMELOG) corrective regimen sliding scale   SubCutaneous at bedtime  losartan 50 milliGRAM(s) Oral daily  metoprolol tartrate 25 milliGRAM(s) Oral every 12 hours  multivitamin 1 Tablet(s) Oral daily  omega-3-Acid Ethyl Esters 2 Gram(s) Oral at bedtime  ondansetron Injectable 4 milliGRAM(s) IV Push once  piperacillin/tazobactam IVPB.. 3.375 Gram(s) IV Intermittent every 8 hours  senna 1 Tablet(s) Oral at bedtime  sodium chloride 1 Gram(s) Oral every 8 hours  tamsulosin 0.4 milliGRAM(s) Oral two times a day  warfarin 2.5 milliGRAM(s) Oral once    MEDICATIONS  (PRN):  oxyCODONE    IR 5 milliGRAM(s) Oral every 4 hours PRN Moderate Pain (4 - 6)  oxyCODONE    IR 10 milliGRAM(s) Oral every 6 hours PRN Severe Pain (7 - 10)  sodium chloride 0.9% lock flush 10 milliLiter(s) IV Push every 1 hour PRN Pre/post blood products, medications, blood draw, and to maintain line patency

## 2020-10-27 NOTE — DISCHARGE NOTE PROVIDER - NSDCFUADDAPPT_GEN_ALL_CORE_FT
Please make an appointment and follow up outpatient with Dr. Connelly in 1 week  Please make an appointment and follow up outpatient with Dr. Barrett in 3-4 weeks  Please make an appointment and follow up with your Primary Care Physician in 1-2 weeks   Please make an appointment and follow up outpatient with Dr. Connelly in 1 week  Please make an appointment and follow up outpatient with Dr. Barrett in 3-4 weeks  Please make an appointment and follow up with your Primary Care Physician in 1-2 weeks  Please make an appointment and follow up with your cardiologist

## 2020-10-27 NOTE — DISCHARGE NOTE PROVIDER - CARE PROVIDERS DIRECT ADDRESSES
[email or web address]

## 2020-10-27 NOTE — PROGRESS NOTE ADULT - ASSESSMENT
74M hx DM(a1c=6.2%), CAD, AFib, prosthetic mitral valve (2015), HTN, macular degeneration, hemorrhoids, localized rectal cancer (dx 9/2020), presenting with chills for 10 days.   S mitis oralis bacteremia-high grade  No abd symptoms  Clinically stable  Repeat Cx so far negative  abd CT no abscess  PCn VANCE 0.03  REEMA no vegetation  On home IV ceftriaxone X 6 week course-was doing well  Now with perineal cellulitis/wound infection  s/p OR I&D of perineal abscess  'Cx enteric narda  stable       Rec:  A)Perineal abscess  s/p I&D   Fever leucocytosis-resolved   Rec:  1) further surgical plan and wound care per surgical team  2) Continue observation  3) Follow  blood Cx  4) will attempt de-escalation to Invanz( no pseudomonas or MRSA on Cx)  if stable plan 2 more weeks of abx with CBC CMP weekly-once he is ready for discharge OPAT could be setup        B) S mitis oralis bacteremia  likely as from rectal mass  REEMA no PVE  repeat blood Cx negative  abx as above       Will tailor plan for ID issues  per course,results.Will defer to primary team on management of other issues.  Assessment, plan and recommendations as detailed above were discussed with the surgery   team  Will Follow.  Beeper [phone] [facility] [id number].   Wknd/afterhours/No response-[phone] or Fellow on call

## 2020-10-27 NOTE — DISCHARGE NOTE PROVIDER - NSDCFUADDINST_GEN_ALL_CORE_FT
Wound care: Will go home with penrose and will be taken out in the office. Continue gauze over incision site with ABD over the top Wound care: Will go home with penrose and will be taken out in the office. Continue gauze over incision site with ABD over the top     You will be going home on IV antibiotics Invanz 1gm every 24hours for 2 weeks    You will need to go for blood work every week to get a CBC and CMP as per Infectious disease    Wound care: Will go home with penrose drain and will be taken out in the office. Continue gauze over incision site with an abdominal pad over the top     You will be going home on IV antibiotics Invanz 1gm every 24hours for 2 weeks    You will need to go for blood work every week to get a CBC (complete blood count) and CMP (comprehensive metabolic panel) as per Infectious disease    Wound care: Will go home with penrose drain and will be taken out in the office. Continue gauze over incision site with an abdominal pad over the top     You will be going home on IV antibiotics Cefepime 1gram every 8 hours as well as oral Flagyl 500mg every 8 hours.     You will need to go for blood work every week to get a CBC (complete blood count) and CMP (comprehensive metabolic panel) as per Infectious disease     Continue Coumadin and ASA as scheduled.

## 2020-10-27 NOTE — PROGRESS NOTE ADULT - SUBJECTIVE AND OBJECTIVE BOX
Interval Events:    S: Patient doing well, c/o loose watery bowel movements overnight and yesterday during the day. Denies fevers, chills, nausea, emesis, chest pain, SOB.    O: Vital Signs  T(C): 36.3 (10-27 @ 01:09), Max: 36.5 (10-26 @ 09:03)  HR: 86 (10-27 @ 01:09) (72 - 88)  BP: 156/92 (10-27 @ 01:09) (142/67 - 175/79)  RR: 18 (10-27 @ 01:09) (18 - 18)  SpO2: 98% (10-27 @ 01:09) (97% - 100%)  10-25-20 @ 07:01  -  10-26-20 @ 07:00  --------------------------------------------------------  IN: 960 mL / OUT: 2810 mL / NET: -1850 mL    10-26-20 @ 07:01  -  10-27-20 @ 05:38  --------------------------------------------------------  IN: 300 mL / OUT: 1575 mL / NET: -1275 mL      General: alert and oriented, NAD  Resp: airway patent, respirations unlabored  CVS: regular rate and rhythm  Abdomen: soft, nontender, nondistended; incisions c/di  Extremities: no edema  Skin: warm, dry, appropriate color                          9.7    6.22  )-----------( 235      ( 26 Oct 2020 06:56 )             28.2   10-26    133<L>  |  102  |  9   ----------------------------<  82  3.8   |  22  |  0.87    Ca    8.4      26 Oct 2020 06:57  Phos  3.5     10-26  Mg     2.1     10-26

## 2020-10-27 NOTE — DIETITIAN INITIAL EVALUATION ADULT. - OTHER INFO
Patient seen for routine length of stay follow up. Patient found resting in bed.  Somewhat uncomfortable but doing better than yesterday. Preparing to eat breakfast.  No N/V but admits to loose watery stools yesterday and overnight and senna was stopped.  Weight has been stable 158 pounds and would like to lose weight but aware that this is not a good time.  Optimal nutritional intake encouraged, especially protein rich foods in healing and for muscle.  Patient receptive to WOMN shakes with meals.

## 2020-10-27 NOTE — DISCHARGE NOTE PROVIDER - PROVIDER TOKENS
PROVIDER:[TOKEN:[[id number]],FOLLOWUP:[1 week]],PROVIDER:[TOKEN:[[id number]],FOLLOWUP:[1 month]] PROVIDER:[TOKEN:[[id number]],FOLLOWUP:[1 week]],PROVIDER:[TOKEN:[[id number]],FOLLOWUP:[2 weeks]]

## 2020-10-27 NOTE — PROGRESS NOTE ADULT - ASSESSMENT
#DM  #CAD  #AFib: inr 1.68, target 2-3, dosing as per surgical team.  #bioprosthetic mitral valve (2015)  # rectal cancer (dx 9/2020) , necrotizing soft tissue infection of the perineum after 10/15 transanal rectal tumor excision   Stable cv perspective.

## 2020-10-27 NOTE — DIETITIAN INITIAL EVALUATION ADULT. - ORAL INTAKE PTA/DIET HISTORY
Patient reports that he is very careful with his diet.  Follows a lacto-ovo vegetarian meal plan.  Appetite and intake has been good.  Monitors fingersticks and reports to have good readings.  A1c 6.4%  Supplementing with multivitamin and omega 3 fatty acids at home.

## 2020-10-27 NOTE — PROGRESS NOTE ADULT - ASSESSMENT
Patient is a 74y Male with history of DM, CAD, AFib, bioprosthetic mitral valve (2015), HTN, macular degeneration, hemorrhoids, recently diagnosed localized rectal cancer (dx 9/2020) c/b bacteremia, s/p 10/15 Transanal excision of distal rectal carcinoma. Taken to OR urgently 10/21 for r/o necrotizing fasciitis, found to have breakdown of rectal incision with fistulization into perineum, s/p debridement and penrose drain placement.     PLAN:  - pain control  - regular diet  - Zosyn  - f/u INR, dose coumadin accordingly.  - Plan for discharge tomorrow.     [place] Team Surgery Pager #[phone]

## 2020-10-27 NOTE — DIETITIAN INITIAL EVALUATION ADULT. - ADD RECOMMEND
Consistent CHO diet locto-ovo, Add health shakes, Monitor diet tolerance, po intake, GI tolerance, weight trends, labs, and skin integrity, VitC

## 2020-10-27 NOTE — DISCHARGE NOTE PROVIDER - CARE PROVIDER_API CALL
Christian Connelly  COLON/RECTAL SURGERY  [address], [address]  [place] [address]  Phone: [phone]  Fax: [phone]  Follow Up Time: 1 week    Fred Barrett  INFECTIOUS DISEASE  [address] [place] [address]  Phone: [phone]  Fax: [phone]  Follow Up Time: 1 month   Christian Connelly  COLON/RECTAL SURGERY  [address], [address]  [place] [address]  Phone: [phone]  Fax: [phone]  Follow Up Time: 1 week    Fred Barrett  INFECTIOUS DISEASE  [address] [place] [address]  Phone: [phone]  Fax: [phone]  Follow Up Time: 2 weeks

## 2020-10-27 NOTE — DIETITIAN INITIAL EVALUATION ADULT. - PERTINENT LABORATORY DATA
Na 132, K+ 4.0, BUN 9, Cr 0.83, BG 95, Phos 3.1, Alk Phos --, AST --, ALT --, Mg 1.9, Ca 8.2, HbA1c --

## 2020-10-27 NOTE — PROGRESS NOTE ADULT - SUBJECTIVE AND OBJECTIVE BOX
SUBJECTIVE: Noted to have diarrhea overnight.  	  MEDICATIONS:  losartan 50 milliGRAM(s) Oral daily  metoprolol tartrate 25 milliGRAM(s) Oral every 12 hours  tamsulosin 0.4 milliGRAM(s) Oral two times a day  piperacillin/tazobactam IVPB.. 3.375 Gram(s) IV Intermittent every 8 hours  acetaminophen   Tablet .. 975 milliGRAM(s) Oral every 6 hours  ondansetron Injectable 4 milliGRAM(s) IV Push once  oxyCODONE    IR 5 milliGRAM(s) Oral every 4 hours PRN  oxyCODONE    IR 10 milliGRAM(s) Oral every 6 hours PRN  senna 1 Tablet(s) Oral at bedtime  atorvastatin 40 milliGRAM(s) Oral at bedtime  insulin lispro (ADMELOG) corrective regimen sliding scale   SubCutaneous three times a day before meals  insulin lispro (ADMELOG) corrective regimen sliding scale   SubCutaneous at bedtime  chlorhexidine 4% Liquid 1 Application(s) Topical <User Schedule>  multivitamin 1 Tablet(s) Oral daily  sodium chloride 1 Gram(s) Oral every 8 hours  sodium chloride 0.9% lock flush 10 milliLiter(s) IV Push every 1 hour PRN  warfarin 2.5 milliGRAM(s) Oral once      REVIEW OF SYSTEMS:    CONSTITUTIONAL: No fever, weight loss, or fatigue  EYES: No eye pain, visual disturbances, or discharge  NECK: No pain or stiffness  RESPIRATORY: No cough, wheezing, chills or hemoptysis; No Shortness of Breath  CARDIOVASCULAR: No chest pain, palpitations, dizziness, or leg swelling  GASTROINTESTINAL: +diarrhea  GENITOURINARY: No dysuria, frequency, hematuria, or incontinence  NEUROLOGICAL: No headaches, memory loss, loss of strength, numbness, or tremors  SKIN: No itching, burning, rashes, or lesions   LYMPH Nodes: No enlarged glands  MUSCULOSKELETAL: No joint pain or swelling; No muscle, back, or extremity pain  All other review of systems are negative.  	      PHYSICAL EXAM:  T(C): 36.6 (10-27-20 @ 08:42), Max: 36.6 (10-27-20 @ 08:42)  HR: 75 (10-27-20 @ 08:42) (73 - 88)  BP: 165/92 (10-27-20 @ 08:42) (142/67 - 175/79)  RR: 18 (10-27-20 @ 08:42) (18 - 18)  SpO2: 99% (10-27-20 @ 08:42) (96% - 100%)  Wt(kg): --  I&O's Summary    26 Oct 2020 07:01  -  27 Oct 2020 07:00  --------------------------------------------------------  IN: 420 mL / OUT: 1775 mL / NET: -1355 mL    27 Oct 2020 07:01  -  27 Oct 2020 10:33  --------------------------------------------------------  IN: 0 mL / OUT: 100 mL / NET: -100 mL          PHYSICAL EXAM    Appearance: Normal	  HEENT:   Normal oral mucosa, PERRL, EOMI	  NECK: Soft and supple, No LAD, No JVD  Cardiovascular: irregular healed midline scar crisp pv sounds.  Respiratory: Lungs clear to auscultation	  Extremities: No clubbing, cyanosis or edema    LABS:	 	                         9.4    6.92  )-----------( 261      ( 27 Oct 2020 07:14 )             27.8     10-27    132<L>  |  102  |  9   ----------------------------<  95  4.0   |  24  |  0.83    Ca    8.2<L>      27 Oct 2020 07:14  Phos  3.1     10-27  Mg     1.9     10-27

## 2020-10-28 LAB
ANION GAP SERPL CALC-SCNC: 8 MMOL/L — SIGNIFICANT CHANGE UP (ref 5–17)
APTT BLD: 32.9 SEC — SIGNIFICANT CHANGE UP (ref 27.5–35.5)
BUN SERPL-MCNC: 8 MG/DL — SIGNIFICANT CHANGE UP (ref 7–23)
CALCIUM SERPL-MCNC: 8.5 MG/DL — SIGNIFICANT CHANGE UP (ref 8.4–10.5)
CHLORIDE SERPL-SCNC: 101 MMOL/L — SIGNIFICANT CHANGE UP (ref 96–108)
CO2 SERPL-SCNC: 27 MMOL/L — SIGNIFICANT CHANGE UP (ref 22–31)
CREAT SERPL-MCNC: 0.75 MG/DL — SIGNIFICANT CHANGE UP (ref 0.5–1.3)
GLUCOSE BLDC GLUCOMTR-MCNC: 121 MG/DL — HIGH (ref 70–99)
GLUCOSE BLDC GLUCOMTR-MCNC: 122 MG/DL — HIGH (ref 70–99)
GLUCOSE BLDC GLUCOMTR-MCNC: 145 MG/DL — HIGH (ref 70–99)
GLUCOSE BLDC GLUCOMTR-MCNC: 176 MG/DL — HIGH (ref 70–99)
GLUCOSE SERPL-MCNC: 110 MG/DL — HIGH (ref 70–99)
HCT VFR BLD CALC: 27.2 % — LOW (ref 39–50)
HGB BLD-MCNC: 9 G/DL — LOW (ref 13–17)
INR BLD: 1.65 RATIO — HIGH (ref 0.88–1.16)
MAGNESIUM SERPL-MCNC: 1.8 MG/DL — SIGNIFICANT CHANGE UP (ref 1.6–2.6)
MCHC RBC-ENTMCNC: 31.5 PG — SIGNIFICANT CHANGE UP (ref 27–34)
MCHC RBC-ENTMCNC: 33.1 GM/DL — SIGNIFICANT CHANGE UP (ref 32–36)
MCV RBC AUTO: 95.1 FL — SIGNIFICANT CHANGE UP (ref 80–100)
NRBC # BLD: 0 /100 WBCS — SIGNIFICANT CHANGE UP (ref 0–0)
PHOSPHATE SERPL-MCNC: 3.1 MG/DL — SIGNIFICANT CHANGE UP (ref 2.5–4.5)
PLATELET # BLD AUTO: 277 K/UL — SIGNIFICANT CHANGE UP (ref 150–400)
POTASSIUM SERPL-MCNC: 4.1 MMOL/L — SIGNIFICANT CHANGE UP (ref 3.5–5.3)
POTASSIUM SERPL-SCNC: 4.1 MMOL/L — SIGNIFICANT CHANGE UP (ref 3.5–5.3)
PROTHROM AB SERPL-ACNC: 19.3 SEC — HIGH (ref 10.6–13.6)
RBC # BLD: 2.86 M/UL — LOW (ref 4.2–5.8)
RBC # FLD: 14.5 % — SIGNIFICANT CHANGE UP (ref 10.3–14.5)
SARS-COV-2 RNA SPEC QL NAA+PROBE: SIGNIFICANT CHANGE UP
SODIUM SERPL-SCNC: 136 MMOL/L — SIGNIFICANT CHANGE UP (ref 135–145)
WBC # BLD: 6.59 K/UL — SIGNIFICANT CHANGE UP (ref 3.8–10.5)
WBC # FLD AUTO: 6.59 K/UL — SIGNIFICANT CHANGE UP (ref 3.8–10.5)

## 2020-10-28 PROCEDURE — 99232 SBSQ HOSP IP/OBS MODERATE 35: CPT

## 2020-10-28 RX ORDER — CEFEPIME 1 G/1
1 INJECTION, POWDER, FOR SOLUTION INTRAMUSCULAR; INTRAVENOUS
Qty: 42 | Refills: 0
Start: 2020-10-28 | End: 2020-11-10

## 2020-10-28 RX ORDER — MAGNESIUM SULFATE 500 MG/ML
1 VIAL (ML) INJECTION ONCE
Refills: 0 | Status: COMPLETED | OUTPATIENT
Start: 2020-10-28 | End: 2020-10-28

## 2020-10-28 RX ORDER — WARFARIN SODIUM 2.5 MG/1
5 TABLET ORAL ONCE
Refills: 0 | Status: COMPLETED | OUTPATIENT
Start: 2020-10-28 | End: 2020-10-28

## 2020-10-28 RX ADMIN — SODIUM CHLORIDE 1 GRAM(S): 9 INJECTION INTRAMUSCULAR; INTRAVENOUS; SUBCUTANEOUS at 14:26

## 2020-10-28 RX ADMIN — Medication 25 MILLIGRAM(S): at 18:21

## 2020-10-28 RX ADMIN — Medication 2 GRAM(S): at 21:19

## 2020-10-28 RX ADMIN — Medication 975 MILLIGRAM(S): at 19:11

## 2020-10-28 RX ADMIN — SODIUM CHLORIDE 1 GRAM(S): 9 INJECTION INTRAMUSCULAR; INTRAVENOUS; SUBCUTANEOUS at 21:20

## 2020-10-28 RX ADMIN — Medication 975 MILLIGRAM(S): at 13:00

## 2020-10-28 RX ADMIN — WARFARIN SODIUM 5 MILLIGRAM(S): 2.5 TABLET ORAL at 21:24

## 2020-10-28 RX ADMIN — TAMSULOSIN HYDROCHLORIDE 0.4 MILLIGRAM(S): 0.4 CAPSULE ORAL at 21:19

## 2020-10-28 RX ADMIN — SODIUM CHLORIDE 1 GRAM(S): 9 INJECTION INTRAMUSCULAR; INTRAVENOUS; SUBCUTANEOUS at 05:08

## 2020-10-28 RX ADMIN — LOSARTAN POTASSIUM 50 MILLIGRAM(S): 100 TABLET, FILM COATED ORAL at 05:10

## 2020-10-28 RX ADMIN — Medication 1 TABLET(S): at 11:43

## 2020-10-28 RX ADMIN — ERTAPENEM SODIUM 120 MILLIGRAM(S): 1 INJECTION, POWDER, LYOPHILIZED, FOR SOLUTION INTRAMUSCULAR; INTRAVENOUS at 18:22

## 2020-10-28 RX ADMIN — TAMSULOSIN HYDROCHLORIDE 0.4 MILLIGRAM(S): 0.4 CAPSULE ORAL at 10:15

## 2020-10-28 RX ADMIN — ATORVASTATIN CALCIUM 40 MILLIGRAM(S): 80 TABLET, FILM COATED ORAL at 21:19

## 2020-10-28 RX ADMIN — OXYCODONE HYDROCHLORIDE 10 MILLIGRAM(S): 5 TABLET ORAL at 16:32

## 2020-10-28 RX ADMIN — Medication 975 MILLIGRAM(S): at 00:18

## 2020-10-28 RX ADMIN — Medication 975 MILLIGRAM(S): at 11:43

## 2020-10-28 RX ADMIN — Medication 25 MILLIGRAM(S): at 05:08

## 2020-10-28 RX ADMIN — Medication 100 GRAM(S): at 10:15

## 2020-10-28 RX ADMIN — Medication 975 MILLIGRAM(S): at 05:08

## 2020-10-28 RX ADMIN — Medication 975 MILLIGRAM(S): at 18:22

## 2020-10-28 RX ADMIN — CHLORHEXIDINE GLUCONATE 1 APPLICATION(S): 213 SOLUTION TOPICAL at 08:58

## 2020-10-28 RX ADMIN — OXYCODONE HYDROCHLORIDE 10 MILLIGRAM(S): 5 TABLET ORAL at 18:30

## 2020-10-28 NOTE — PROGRESS NOTE ADULT - SUBJECTIVE AND OBJECTIVE BOX
Interval Events:    S: Patient doing well, states his pain is well controlled. Denies fevers, chills, nausea, emesis, chest pain, SOB.    O: Vital Signs  T(C): 36.9 (10-28 @ 05:06), Max: 36.9 (10-28 @ 05:06)  HR: 97 (10-28 @ 05:06) (73 - 97)  BP: 157/81 (10-28 @ 05:06) (134/74 - 165/92)  RR: 18 (10-28 @ 05:06) (18 - 18)  SpO2: 96% (10-28 @ 05:06) (96% - 100%)  10-26-20 @ 07:01  -  10-27-20 @ 07:00  --------------------------------------------------------  IN: 420 mL / OUT: 1775 mL / NET: -1355 mL    10-27-20 @ 07:01  -  10-28-20 @ 05:36  --------------------------------------------------------  IN: 900 mL / OUT: 3290 mL / NET: -2390 mL      General: alert and oriented, NAD  Resp: airway patent, respirations unlabored  CVS: regular rate and rhythm  Abdomen: soft, nontender, nondistended  Extremities: no edema  Skin: warm, dry, appropriate color                          9.4    6.92  )-----------( 261      ( 27 Oct 2020 07:14 )             27.8   10-27    132<L>  |  102  |  9   ----------------------------<  95  4.0   |  24  |  0.83    Ca    8.2<L>      27 Oct 2020 07:14  Phos  3.1     10-27  Mg     1.9     10-27     Interval Events:    S: Patient doing well, states his pain is well controlled. Loose Bm's slowing down. Denies fevers, chills, nausea, emesis, chest pain, SOB.    O: Vital Signs  T(C): 36.9 (10-28 @ 05:06), Max: 36.9 (10-28 @ 05:06)  HR: 97 (10-28 @ 05:06) (73 - 97)  BP: 157/81 (10-28 @ 05:06) (134/74 - 165/92)  RR: 18 (10-28 @ 05:06) (18 - 18)  SpO2: 96% (10-28 @ 05:06) (96% - 100%)  10-26-20 @ 07:01  -  10-27-20 @ 07:00  --------------------------------------------------------  IN: 420 mL / OUT: 1775 mL / NET: -1355 mL    10-27-20 @ 07:01  -  10-28-20 @ 05:36  --------------------------------------------------------  IN: 900 mL / OUT: 3290 mL / NET: -2390 mL      General: alert and oriented, NAD  Resp: airway patent, respirations unlabored  CVS: appears well perfused  Abdomen: soft, nontender, nondistended  Extremities: no edema  Skin: warm, dry, appropriate color  Anus: dressings is soaked with some clotted blood the size of a quarter                          9.4    6.92  )-----------( 261      ( 27 Oct 2020 07:14 )             27.8   10-27    132<L>  |  102  |  9   ----------------------------<  95  4.0   |  24  |  0.83    Ca    8.2<L>      27 Oct 2020 07:14  Phos  3.1     10-27  Mg     1.9     10-27

## 2020-10-28 NOTE — PROGRESS NOTE ADULT - SUBJECTIVE AND OBJECTIVE BOX
Patient is a 74y old  Male who presents with a chief complaint of Rectal tumor excision (27 Oct 2020 09:33)    Being followed by ID for s mitis bacteremia, rectal wound dehioscence    Interval history:having bleeding from wound and clots  pain better   No acute events      ROS:  No cough,SOB,CP  No N/V/D./abd pain  No other complaints      Antimicrobials:    ertapenem  IVPB 1000 milliGRAM(s) IV Intermittent every 24 hours    Other medications reviewed    Vital Signs Last 24 Hrs  T(C): 36.5 (10-28-20 @ 09:12), Max: 36.9 (10-28-20 @ 05:06)  T(F): 97.7 (10-28-20 @ 09:12), Max: 98.4 (10-28-20 @ 05:06)  HR: 80 (10-28-20 @ 09:12) (74 - 97)  BP: 164/81 (10-28-20 @ 09:12) (134/74 - 164/81)  BP(mean): --  RR: 18 (10-28-20 @ 09:12) (18 - 18)  SpO2: 99% (10-28-20 @ 09:12) (96% - 100%)    Physical Exam:        HEENT PERRLA EOMI    No oral exudate or erythema    Chest Good AE,CTA    CVS RRR S1 S2 WNl No murmur or rub or gallop    Abd soft BS normal No tenderness no masses  rectal wound packing no discharge   drain  PICC site  site no erythema tenderness or discharge    CNS AAO X 3 no focal    Lab Data:                          9.0    6.59  )-----------( 277      ( 28 Oct 2020 08:18 )             27.2       10-28    136  |  101  |  8   ----------------------------<  110<H>  4.1   |  27  |  0.75    Ca    8.5      28 Oct 2020 08:18  Phos  3.1     10-28  Mg     1.8     10-28          < from: Xray Chest 1 View- PORTABLE-Routine (Xray Chest 1 View- PORTABLE-Routine in AM.) (10.23.20 @ 09:03) >  Impression:    The heart is enlarged. The lungs appear to be clear. No pleural effusion. No pneumothorax. Status post sternotomy.          < end of copied text >

## 2020-10-28 NOTE — PROGRESS NOTE ADULT - ASSESSMENT
74M hx DM(a1c=6.2%), CAD, AFib, prosthetic mitral valve (2015), HTN, macular degeneration, hemorrhoids, localized rectal cancer (dx 9/2020), presenting with chills for 10 days.   S mitis oralis bacteremia-high grade  No abd symptoms  Clinically stable  Repeat Cx so far negative  abd CT no abscess  PCn VANCE 0.03  REEMA no vegetation  On home IV ceftriaxone X 6 week course-was doing well  Now with perineal cellulitis/wound infection  s/p OR I&D of perineal abscess  'Cx enteric narda  stable       Rec:  A)Perineal abscess  s/p I&D   Fever leucocytosis-resolved   Rec:  1) further surgical plan and wound care per surgical team  2) Continue observation  3) Follow  blood Cx  4)continue Invanz- looking into costs-alternatives limited (?zosyn or cefepime + flagyl-may have same issues)  if stable plan 2 more weeks of abx with CBC CMP weekly-once he is ready for discharge OPAT could be setup        B) S mitis oralis bacteremia  likely as from rectal mass  REEMA no PVE  repeat blood Cx negative  abx as above       Will tailor plan for ID issues  per course,results.Will defer to primary team on management of other issues.  Assessment, plan and recommendations as detailed above were discussed with the surgery   team  Will Follow.  Beeper [phone] [facility] [id number].   Wknd/afterhours/No response-[phone] or Fellow on call

## 2020-10-28 NOTE — PROGRESS NOTE ADULT - SUBJECTIVE AND OBJECTIVE BOX
Patient reports bleeding from the wound site.  No dyspnea  /81 HR 80  Lungs clear  RR 1-2/6 systolic murmur  No edema    INR yesterday 1.68  WBC 6.59  Hgb 9.0  Hct 27.2  Plt 277K  BUN 8  Crt 0.75    Imp:  S/P tissue MVR with PAF   Strep mitis bacteremia  Stable from a CV standpoint  Surgery to decide re: Coumadin dosing given bleeding from the wound site

## 2020-10-28 NOTE — PROGRESS NOTE ADULT - ASSESSMENT
Patient is a 74y Male with history of DM, CAD, AFib, bioprosthetic mitral valve (2015), HTN, macular degeneration, hemorrhoids, recently diagnosed localized rectal cancer (dx 9/2020) c/b bacteremia, s/p 10/15 Transanal excision of distal rectal carcinoma. Taken to OR urgently 10/21 for r/o necrotizing fasciitis, found to have breakdown of rectal incision with fistulization into perineum, s/p debridement and penrose drain placement.     PLAN:  - pain control  - regular diet  - Zosyn  - f/u INR, dose coumadin accordingly.  - Plan for discharge today.     [place] Team Surgery Pager #[phone]

## 2020-10-29 ENCOUNTER — TRANSCRIPTION ENCOUNTER (OUTPATIENT)
Age: 75
End: 2020-10-29

## 2020-10-29 LAB
ANION GAP SERPL CALC-SCNC: 8 MMOL/L — SIGNIFICANT CHANGE UP (ref 5–17)
APTT BLD: 32.6 SEC — SIGNIFICANT CHANGE UP (ref 27.5–35.5)
BUN SERPL-MCNC: 7 MG/DL — SIGNIFICANT CHANGE UP (ref 7–23)
CALCIUM SERPL-MCNC: 8.4 MG/DL — SIGNIFICANT CHANGE UP (ref 8.4–10.5)
CHLORIDE SERPL-SCNC: 102 MMOL/L — SIGNIFICANT CHANGE UP (ref 96–108)
CO2 SERPL-SCNC: 26 MMOL/L — SIGNIFICANT CHANGE UP (ref 22–31)
CREAT SERPL-MCNC: 0.71 MG/DL — SIGNIFICANT CHANGE UP (ref 0.5–1.3)
GLUCOSE BLDC GLUCOMTR-MCNC: 120 MG/DL — HIGH (ref 70–99)
GLUCOSE BLDC GLUCOMTR-MCNC: 121 MG/DL — HIGH (ref 70–99)
GLUCOSE BLDC GLUCOMTR-MCNC: 131 MG/DL — HIGH (ref 70–99)
GLUCOSE BLDC GLUCOMTR-MCNC: 161 MG/DL — HIGH (ref 70–99)
GLUCOSE SERPL-MCNC: 107 MG/DL — HIGH (ref 70–99)
HCT VFR BLD CALC: 23.2 % — LOW (ref 39–50)
HCT VFR BLD CALC: 23.7 % — LOW (ref 39–50)
HGB BLD-MCNC: 7.9 G/DL — LOW (ref 13–17)
HGB BLD-MCNC: 7.9 G/DL — LOW (ref 13–17)
INR BLD: 1.58 RATIO — HIGH (ref 0.88–1.16)
MAGNESIUM SERPL-MCNC: 1.9 MG/DL — SIGNIFICANT CHANGE UP (ref 1.6–2.6)
MCHC RBC-ENTMCNC: 31.5 PG — SIGNIFICANT CHANGE UP (ref 27–34)
MCHC RBC-ENTMCNC: 32.1 PG — SIGNIFICANT CHANGE UP (ref 27–34)
MCHC RBC-ENTMCNC: 33.3 GM/DL — SIGNIFICANT CHANGE UP (ref 32–36)
MCHC RBC-ENTMCNC: 34.1 GM/DL — SIGNIFICANT CHANGE UP (ref 32–36)
MCV RBC AUTO: 94.3 FL — SIGNIFICANT CHANGE UP (ref 80–100)
MCV RBC AUTO: 94.4 FL — SIGNIFICANT CHANGE UP (ref 80–100)
NRBC # BLD: 0 /100 WBCS — SIGNIFICANT CHANGE UP (ref 0–0)
NRBC # BLD: 0 /100 WBCS — SIGNIFICANT CHANGE UP (ref 0–0)
PHOSPHATE SERPL-MCNC: 3.4 MG/DL — SIGNIFICANT CHANGE UP (ref 2.5–4.5)
PLATELET # BLD AUTO: 273 K/UL — SIGNIFICANT CHANGE UP (ref 150–400)
PLATELET # BLD AUTO: 284 K/UL — SIGNIFICANT CHANGE UP (ref 150–400)
POTASSIUM SERPL-MCNC: 3.9 MMOL/L — SIGNIFICANT CHANGE UP (ref 3.5–5.3)
POTASSIUM SERPL-SCNC: 3.9 MMOL/L — SIGNIFICANT CHANGE UP (ref 3.5–5.3)
PROTHROM AB SERPL-ACNC: 18.5 SEC — HIGH (ref 10.6–13.6)
RBC # BLD: 2.46 M/UL — LOW (ref 4.2–5.8)
RBC # BLD: 2.51 M/UL — LOW (ref 4.2–5.8)
RBC # FLD: 14.8 % — HIGH (ref 10.3–14.5)
RBC # FLD: 15.1 % — HIGH (ref 10.3–14.5)
SODIUM SERPL-SCNC: 136 MMOL/L — SIGNIFICANT CHANGE UP (ref 135–145)
WBC # BLD: 3.85 K/UL — SIGNIFICANT CHANGE UP (ref 3.8–10.5)
WBC # BLD: 8.05 K/UL — SIGNIFICANT CHANGE UP (ref 3.8–10.5)
WBC # FLD AUTO: 3.85 K/UL — SIGNIFICANT CHANGE UP (ref 3.8–10.5)
WBC # FLD AUTO: 8.05 K/UL — SIGNIFICANT CHANGE UP (ref 3.8–10.5)

## 2020-10-29 PROCEDURE — 99232 SBSQ HOSP IP/OBS MODERATE 35: CPT

## 2020-10-29 RX ORDER — CEFEPIME 1 G/1
1000 INJECTION, POWDER, FOR SOLUTION INTRAMUSCULAR; INTRAVENOUS EVERY 8 HOURS
Refills: 0 | Status: DISCONTINUED | OUTPATIENT
Start: 2020-10-29 | End: 2020-10-30

## 2020-10-29 RX ORDER — WARFARIN SODIUM 2.5 MG/1
2.5 TABLET ORAL ONCE
Refills: 0 | Status: COMPLETED | OUTPATIENT
Start: 2020-10-29 | End: 2020-10-29

## 2020-10-29 RX ORDER — METRONIDAZOLE 500 MG
500 TABLET ORAL EVERY 8 HOURS
Refills: 0 | Status: DISCONTINUED | OUTPATIENT
Start: 2020-10-29 | End: 2020-10-30

## 2020-10-29 RX ORDER — OXYCODONE HYDROCHLORIDE 5 MG/1
5 TABLET ORAL ONCE
Refills: 0 | Status: DISCONTINUED | OUTPATIENT
Start: 2020-10-29 | End: 2020-10-29

## 2020-10-29 RX ADMIN — Medication 500 MILLIGRAM(S): at 22:47

## 2020-10-29 RX ADMIN — OXYCODONE HYDROCHLORIDE 5 MILLIGRAM(S): 5 TABLET ORAL at 13:31

## 2020-10-29 RX ADMIN — Medication 25 MILLIGRAM(S): at 05:05

## 2020-10-29 RX ADMIN — OXYCODONE HYDROCHLORIDE 5 MILLIGRAM(S): 5 TABLET ORAL at 12:58

## 2020-10-29 RX ADMIN — Medication 975 MILLIGRAM(S): at 12:07

## 2020-10-29 RX ADMIN — ATORVASTATIN CALCIUM 40 MILLIGRAM(S): 80 TABLET, FILM COATED ORAL at 22:45

## 2020-10-29 RX ADMIN — Medication 975 MILLIGRAM(S): at 11:02

## 2020-10-29 RX ADMIN — TAMSULOSIN HYDROCHLORIDE 0.4 MILLIGRAM(S): 0.4 CAPSULE ORAL at 11:02

## 2020-10-29 RX ADMIN — TAMSULOSIN HYDROCHLORIDE 0.4 MILLIGRAM(S): 0.4 CAPSULE ORAL at 22:46

## 2020-10-29 RX ADMIN — SODIUM CHLORIDE 1 GRAM(S): 9 INJECTION INTRAMUSCULAR; INTRAVENOUS; SUBCUTANEOUS at 22:46

## 2020-10-29 RX ADMIN — CEFEPIME 100 MILLIGRAM(S): 1 INJECTION, POWDER, FOR SOLUTION INTRAMUSCULAR; INTRAVENOUS at 22:45

## 2020-10-29 RX ADMIN — Medication 2 GRAM(S): at 22:46

## 2020-10-29 RX ADMIN — Medication 975 MILLIGRAM(S): at 18:31

## 2020-10-29 RX ADMIN — WARFARIN SODIUM 2.5 MILLIGRAM(S): 2.5 TABLET ORAL at 22:47

## 2020-10-29 RX ADMIN — Medication 975 MILLIGRAM(S): at 23:27

## 2020-10-29 RX ADMIN — Medication 975 MILLIGRAM(S): at 05:05

## 2020-10-29 RX ADMIN — LOSARTAN POTASSIUM 50 MILLIGRAM(S): 100 TABLET, FILM COATED ORAL at 05:05

## 2020-10-29 RX ADMIN — Medication 1 TABLET(S): at 11:02

## 2020-10-29 RX ADMIN — SODIUM CHLORIDE 1 GRAM(S): 9 INJECTION INTRAMUSCULAR; INTRAVENOUS; SUBCUTANEOUS at 12:57

## 2020-10-29 RX ADMIN — Medication 975 MILLIGRAM(S): at 17:33

## 2020-10-29 RX ADMIN — SODIUM CHLORIDE 1 GRAM(S): 9 INJECTION INTRAMUSCULAR; INTRAVENOUS; SUBCUTANEOUS at 05:05

## 2020-10-29 NOTE — PROGRESS NOTE ADULT - SUBJECTIVE AND OBJECTIVE BOX
Patient is a 74y old  Male who presents with a chief complaint of Perineal infection (29 Oct 2020 08:32)    Being followed by ID for wound dehiscence, S mitis/oralis bacteremia     Interval history:feels well  wound bleeding much less  D/w care coordination-pt with very high home co pay for invanz-lower for cefepime-to be switched to cefepime + flagyl- d/w daughter as well-aware of q 8 hr dosing   No acute events      ROS:  No cough,SOB,CP  No N/V/D./abd pain  No other complaints      Antimicrobials:    ertapenem  IVPB 1000 milliGRAM(s) IV Intermittent every 24 hours    Other medications reviewed    Vital Signs Last 24 Hrs  T(C): 36.4 (10-29-20 @ 13:38), Max: 36.9 (10-28-20 @ 21:19)  T(F): 97.6 (10-29-20 @ 13:38), Max: 98.4 (10-28-20 @ 21:19)  HR: 85 (10-29-20 @ 13:38) (78 - 99)  BP: 89/50 (10-29-20 @ 13:38) (89/50 - 154/82)  BP(mean): --  RR: 18 (10-29-20 @ 13:38) (18 - 18)  SpO2: 97% (10-29-20 @ 13:38) (97% - 98%)    Physical Exam:      HEENT PERRLA EOMI    No oral exudate or erythema    Chest Good AE,CTA    CVS RRR S1 S2 WNl No murmur or rub or gallop    Abd soft BS normal No tenderness no masses  rectal wound packing no discharge   drain  PICC site  site no erythema tenderness or discharge    CNS AAO X 3 no focal      Lab Data:                          7.9    3.85  )-----------( 273      ( 29 Oct 2020 06:32 )             23.7     WBC Count: 3.85 (10-29-20 @ 06:32)  WBC Count: 6.59 (10-28-20 @ 08:18)  WBC Count: 6.92 (10-27-20 @ 07:14)  WBC Count: 6.22 (10-26-20 @ 06:56)  WBC Count: 8.22 (10-25-20 @ 06:51)  WBC Count: 10.34 (10-24-20 @ 07:16)  WBC Count: 14.47 (10-23-20 @ 06:24)    10-29    136  |  102  |  7   ----------------------------<  107<H>  3.9   |  26  |  0.71    Ca    8.4      29 Oct 2020 06:32  Phos  3.4     10-29  Mg     1.9     10-29        < from: Xray Chest 1 View- PORTABLE-Routine (Xray Chest 1 View- PORTABLE-Routine in AM.) (10.23.20 @ 09:03) >  Impression:    The heart is enlarged. The lungs appear to be clear. No pleural effusion. No pneumothorax. Status post sternotomy.            < end of copied text >

## 2020-10-29 NOTE — PROGRESS NOTE ADULT - SUBJECTIVE AND OBJECTIVE BOX
Interval Events: No acute events overnight    S: Patient doing well, denies fevers, chills, nausea, emesis, chest pain, SOB.    O: Vital Signs  T(C): 36.4 (10-29 @ 01:04), Max: 36.9 (10-28 @ 05:06)  HR: 79 (10-29 @ 01:04) (78 - 99)  BP: 123/69 (10-29 @ 01:04) (110/65 - 164/81)  RR: 18 (10-29 @ 01:04) (18 - 18)  SpO2: 98% (10-29 @ 01:04) (96% - 99%)  10-27-20 @ 07:01  -  10-28-20 @ 07:00  --------------------------------------------------------  IN: 900 mL / OUT: 3290 mL / NET: -2390 mL    10-28-20 @ 07:01  -  10-29-20 @ 05:00  --------------------------------------------------------  IN: 720 mL / OUT: 2050 mL / NET: -1330 mL      General: alert and oriented, NAD  Resp: airway patent, respirations unlabored  CVS: appears well perfused  Abdomen: soft, nontender, nondistended  Extremities: no edema  Skin: warm, dry, appropriate color  Anus: dressings are saturated with blood and were changed                          9.0    6.59  )-----------( 277      ( 28 Oct 2020 08:18 )             27.2   10-28    136  |  101  |  8   ----------------------------<  110<H>  4.1   |  27  |  0.75    Ca    8.5      28 Oct 2020 08:18  Phos  3.1     10-28  Mg     1.8     10-28

## 2020-10-29 NOTE — PROGRESS NOTE ADULT - SUBJECTIVE AND OBJECTIVE BOX
SUBJECTIVE:  NO CP no SOB Feels well today    MEDICATIONS:  losartan 50 milliGRAM(s) Oral daily  metoprolol tartrate 25 milliGRAM(s) Oral every 12 hours  tamsulosin 0.4 milliGRAM(s) Oral two times a day    ertapenem  IVPB 1000 milliGRAM(s) IV Intermittent every 24 hours      acetaminophen   Tablet .. 975 milliGRAM(s) Oral every 6 hours  ondansetron Injectable 4 milliGRAM(s) IV Push once      atorvastatin 40 milliGRAM(s) Oral at bedtime  insulin lispro (ADMELOG) corrective regimen sliding scale   SubCutaneous three times a day before meals  insulin lispro (ADMELOG) corrective regimen sliding scale   SubCutaneous at bedtime    chlorhexidine 4% Liquid 1 Application(s) Topical <User Schedule>  multivitamin 1 Tablet(s) Oral daily  sodium chloride 1 Gram(s) Oral every 8 hours  sodium chloride 0.9% lock flush 10 milliLiter(s) IV Push every 1 hour PRN      REVIEW OF SYSTEMS:    CONSTITUTIONAL: No fever, weight loss, or fatigue  EYES: No eye pain, visual disturbances, or discharge  NECK: No pain or stiffness  RESPIRATORY: No cough, wheezing, chills or hemoptysis; No Shortness of Breath  CARDIOVASCULAR: No chest pain, palpitations, dizziness, or leg swelling  GASTROINTESTINAL: No abdominal or epigastric pain. No nausea, vomiting, or hematemesis; No diarrhea or constipation. No melena or hematochezia.  GENITOURINARY: No dysuria, frequency, hematuria, or incontinence  NEUROLOGICAL: No headaches, memory loss, loss of strength, numbness, or tremors  SKIN: No itching, burning, rashes, or lesions   LYMPH Nodes: No enlarged glands  MUSCULOSKELETAL: No joint pain or swelling; No muscle, back, or extremity pain  All other review of systems are negative.  	  [ ] Unable to obtain    PHYSICAL EXAM:  T(C): 36.2 (10-29-20 @ 05:01), Max: 36.9 (10-28-20 @ 21:19)  HR: 89 (10-29-20 @ 05:01) (78 - 99)  BP: 154/82 (10-29-20 @ 05:01) (110/65 - 164/81)  RR: 18 (10-29-20 @ 05:01) (18 - 18)  SpO2: 98% (10-29-20 @ 05:01) (97% - 99%)  Wt(kg): --  I&O's Summary    28 Oct 2020 07:01  -  29 Oct 2020 07:00  --------------------------------------------------------  IN: 840 mL / OUT: 2650 mL / NET: -1810 mL    29 Oct 2020 07:01  -  29 Oct 2020 08:33  --------------------------------------------------------  IN: 0 mL / OUT: 275 mL / NET: -275 mL          PHYSICAL EXAM    Appearance: Normal	  HEENT:   Normal oral mucosa, PERRL, EOMI	  NECK: Soft and supple, No LAD, No JVD  Cardiovascular: Regular Rate and Rhythm, Normal S1 S2, II/VI SM   Respiratory: Lungs clear to auscultation	  Gastrointestinal:  Soft, Non-tender, + BS	  Skin: No rashes, No ecchymoses, No cyanosis  Neurologic: Non-focal  Extremities: No clubbing, cyanosis or edema  Vascular: Peripheral pulses palpable 2+ bilaterally      LABS:	 	                          7.9    3.85  )-----------( 273      ( 29 Oct 2020 06:32 )             23.7     10-29    136  |  102  |  7   ----------------------------<  107<H>  3.9   |  26  |  0.71    Ca    8.4      29 Oct 2020 06:32  Phos  3.4     10-29  Mg     1.9     10-29

## 2020-10-29 NOTE — DISCHARGE NOTE NURSING/CASE MANAGEMENT/SOCIAL WORK - PATIENT PORTAL LINK FT
You can access the FollowMyHealth Patient Portal offered by [facility] by registering at the following website: [email or web address]. By joining [facility]’s FollowMyHealth portal, you will also be able to view your health information using other applications (apps) compatible with our system.

## 2020-10-29 NOTE — DISCHARGE NOTE NURSING/CASE MANAGEMENT/SOCIAL WORK - NSDCFUADDAPPT_GEN_ALL_CORE_FT
Please make an appointment and follow up outpatient with Dr. Connelly in 1 week  Please make an appointment and follow up outpatient with Dr. Barrett in 3-4 weeks  Please make an appointment and follow up with your Primary Care Physician in 1-2 weeks  Please make an appointment and follow up with your cardiologist

## 2020-10-29 NOTE — PROGRESS NOTE ADULT - ASSESSMENT
74M of [place] descent hx of DM, CAD, AFib, bioprosthetic mitral valve (2015), HTN, macular degeneration, hemorrhoids, recently diagnosed localized rectal cancer (dx 9/2020) s/p 10/15 Transanal excision of distal rectal carcinoma.  with necrotizing soft tissue infection of the perineum after 10/15 transanal rectal tumor excision POD #7 s/p debridement and Penrose drain placement.   - Pain control as per surgery team  - Patient with permanent AF, needs to be anticoagulated. Restarted coumadin , approved by surgery.  Goal INR of 2-3.  - Follow INR - today is 1.58  - follow H/H ? transfuse. Defer to surgery  - The patient is stable from a cardiovascular perspective.

## 2020-10-29 NOTE — PROGRESS NOTE ADULT - ASSESSMENT
74M hx DM(a1c=6.2%), CAD, AFib, prosthetic mitral valve (2015), HTN, macular degeneration, hemorrhoids, localized rectal cancer (dx 9/2020), presenting with chills for 10 days.   S mitis oralis bacteremia-high grade  No abd symptoms  Clinically stable  Repeat Cx so far negative  abd CT no abscess  PCn VANCE 0.03  REEMA no vegetation  On home IV ceftriaxone X 6 week course-was doing well  Now with perineal cellulitis/wound infection  s/p OR I&D of perineal abscess  'Cx enteric narda  stable       Rec:  A)Perineal abscess  s/p I&D   Fever leucocytosis-resolved   Rec:  1) further surgical plan and wound care per surgical team  2) Continue observation  3) Follow  blood Cx  4)D/w care coordination-pt with very high home co pay for invanz-lower for cefepime-to be switched to cefepime + flagyl- d/w daughter as well-aware of q 8 hr dosing   Please change abx to cefepime 1 gm IV q 8 + flagyl 500 mg po q 8  Monitor WBC (slightly lower today)   if stable plan 2 more weeks of abx with CBC CMP weekly-once he is ready for discharge OPAT could be setup        B) S mitis oralis bacteremia  likely as from rectal mass  REEMA no PVE  repeat blood Cx negative  abx as above       Will tailor plan for ID issues  per course,results.Will defer to primary team on management of other issues.  Assessment, plan and recommendations as detailed above were discussed with the surgery   team  I will be away till 11/02/20.  ID service will cover and follow over next 3 days .  Please call [phone] if any issues.  To follow in 2-3 weeks after Dc in ID clinic

## 2020-10-29 NOTE — PROGRESS NOTE ADULT - ATTENDING COMMENTS
I have seen and examined the patient. I agree with the above surgery resident's note.
I have seen and examined the patient. I agree with the above surgery resident's note.  overall improved- no signs of worsening sepsis  cont iv abx per ID  npo
I have seen and examined the patient. I agree with the above surgery resident's note.  overall improving
I have seen and examined the patient. I agree with the above surgery resident's note.  overall improving, bleeding from wound subsided  d/c planing for tomorrow  sitz baths
Patient seen/examined.  Agree w above note and plan and have discussed plan w house staff.  More comfortable, tolerating diet.  Voiding well.  Coumadin    Ravindra Hazel MD
I have seen and examined the patient. I agree with the above surgery resident's note.  large penrose downsized at bedside to 1/4" penrose, wound clean  adv diet
Dr. 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 NPO per Dr. Connelly  Strep bovis bacteremia - on vanc/zosyn awaiting cultures  Afib on coumadin, bioprostetic valve replacement, holding coumadin - restart home metoprolol  Hyponatremic - Sodium 123 on NS will continue to check q12  T2DM - SSI glucose now controlled
Patient seen/examined.  Agree w above note and plan and have discussed plan w house staff.  Comfortable, abdomen soft.  Clears, abx    Ravindra Hazel MD

## 2020-10-29 NOTE — PROGRESS NOTE ADULT - ASSESSMENT
Patient is a 74y Male with history of DM, CAD, AFib, bioprosthetic mitral valve (2015), HTN, macular degeneration, hemorrhoids, recently diagnosed localized rectal cancer (dx 9/2020) c/b bacteremia, s/p 10/15 Transanal excision of distal rectal carcinoma. Taken to OR urgently 10/21 for r/o necrotizing fasciitis, found to have breakdown of rectal incision with fistulization into perineum, s/p debridement and penrose drain placement.     PLAN:  - pain control  - regular diet  - Zosyn  - f/u INR, dose coumadin accordingly.  - Plan for discharge today.     [facility] Pager #[phone]

## 2020-10-29 NOTE — DISCHARGE NOTE NURSING/CASE MANAGEMENT/SOCIAL WORK - NSSCTYPOFSERV_GEN_ALL_CORE
FOR HOME IV INFUSION OF ANTIBIOTICS , PICC CARE , WEEKLY LABS,  VASU MASON, WOUND FOLLOW UP- MAKE SURE YOU ARE SENT HOME WITH DRESSING SUPPLIES FOR YOUR PERINEAL WOUND***

## 2020-10-30 VITALS
SYSTOLIC BLOOD PRESSURE: 115 MMHG | TEMPERATURE: 97 F | RESPIRATION RATE: 18 BRPM | OXYGEN SATURATION: 100 % | HEART RATE: 87 BPM | DIASTOLIC BLOOD PRESSURE: 77 MMHG

## 2020-10-30 LAB
ANION GAP SERPL CALC-SCNC: 7 MMOL/L — SIGNIFICANT CHANGE UP (ref 5–17)
APTT BLD: 34 SEC — SIGNIFICANT CHANGE UP (ref 27.5–35.5)
BUN SERPL-MCNC: 10 MG/DL — SIGNIFICANT CHANGE UP (ref 7–23)
CALCIUM SERPL-MCNC: 8.4 MG/DL — SIGNIFICANT CHANGE UP (ref 8.4–10.5)
CHLORIDE SERPL-SCNC: 101 MMOL/L — SIGNIFICANT CHANGE UP (ref 96–108)
CO2 SERPL-SCNC: 26 MMOL/L — SIGNIFICANT CHANGE UP (ref 22–31)
CREAT SERPL-MCNC: 0.78 MG/DL — SIGNIFICANT CHANGE UP (ref 0.5–1.3)
GLUCOSE BLDC GLUCOMTR-MCNC: 124 MG/DL — HIGH (ref 70–99)
GLUCOSE BLDC GLUCOMTR-MCNC: 130 MG/DL — HIGH (ref 70–99)
GLUCOSE SERPL-MCNC: 106 MG/DL — HIGH (ref 70–99)
HCT VFR BLD CALC: 23.4 % — LOW (ref 39–50)
HGB BLD-MCNC: 7.7 G/DL — LOW (ref 13–17)
INR BLD: 1.99 RATIO — HIGH (ref 0.88–1.16)
MAGNESIUM SERPL-MCNC: 1.9 MG/DL — SIGNIFICANT CHANGE UP (ref 1.6–2.6)
MCHC RBC-ENTMCNC: 31.4 PG — SIGNIFICANT CHANGE UP (ref 27–34)
MCHC RBC-ENTMCNC: 32.9 GM/DL — SIGNIFICANT CHANGE UP (ref 32–36)
MCV RBC AUTO: 95.5 FL — SIGNIFICANT CHANGE UP (ref 80–100)
NRBC # BLD: 0 /100 WBCS — SIGNIFICANT CHANGE UP (ref 0–0)
PHOSPHATE SERPL-MCNC: 3.5 MG/DL — SIGNIFICANT CHANGE UP (ref 2.5–4.5)
PLATELET # BLD AUTO: 268 K/UL — SIGNIFICANT CHANGE UP (ref 150–400)
POTASSIUM SERPL-MCNC: 4 MMOL/L — SIGNIFICANT CHANGE UP (ref 3.5–5.3)
POTASSIUM SERPL-SCNC: 4 MMOL/L — SIGNIFICANT CHANGE UP (ref 3.5–5.3)
PROTHROM AB SERPL-ACNC: 23.1 SEC — HIGH (ref 10.6–13.6)
RBC # BLD: 2.45 M/UL — LOW (ref 4.2–5.8)
RBC # FLD: 15.2 % — HIGH (ref 10.3–14.5)
SODIUM SERPL-SCNC: 134 MMOL/L — LOW (ref 135–145)
WBC # BLD: 4.52 K/UL — SIGNIFICANT CHANGE UP (ref 3.8–10.5)
WBC # FLD AUTO: 4.52 K/UL — SIGNIFICANT CHANGE UP (ref 3.8–10.5)

## 2020-10-30 PROCEDURE — 87040 BLOOD CULTURE FOR BACTERIA: CPT

## 2020-10-30 PROCEDURE — 84100 ASSAY OF PHOSPHORUS: CPT

## 2020-10-30 PROCEDURE — 97161 PT EVAL LOW COMPLEX 20 MIN: CPT

## 2020-10-30 PROCEDURE — 82962 GLUCOSE BLOOD TEST: CPT

## 2020-10-30 PROCEDURE — 83935 ASSAY OF URINE OSMOLALITY: CPT

## 2020-10-30 PROCEDURE — 96374 THER/PROPH/DIAG INJ IV PUSH: CPT

## 2020-10-30 PROCEDURE — 82947 ASSAY GLUCOSE BLOOD QUANT: CPT

## 2020-10-30 PROCEDURE — 99232 SBSQ HOSP IP/OBS MODERATE 35: CPT

## 2020-10-30 PROCEDURE — 93005 ELECTROCARDIOGRAM TRACING: CPT

## 2020-10-30 PROCEDURE — 82435 ASSAY OF BLOOD CHLORIDE: CPT

## 2020-10-30 PROCEDURE — 80202 ASSAY OF VANCOMYCIN: CPT

## 2020-10-30 PROCEDURE — 97165 OT EVAL LOW COMPLEX 30 MIN: CPT

## 2020-10-30 PROCEDURE — 86900 BLOOD TYPING SEROLOGIC ABO: CPT

## 2020-10-30 PROCEDURE — 85027 COMPLETE CBC AUTOMATED: CPT

## 2020-10-30 PROCEDURE — 86769 SARS-COV-2 COVID-19 ANTIBODY: CPT

## 2020-10-30 PROCEDURE — 97116 GAIT TRAINING THERAPY: CPT

## 2020-10-30 PROCEDURE — 82803 BLOOD GASES ANY COMBINATION: CPT

## 2020-10-30 PROCEDURE — 71045 X-RAY EXAM CHEST 1 VIEW: CPT

## 2020-10-30 PROCEDURE — 93970 EXTREMITY STUDY: CPT

## 2020-10-30 PROCEDURE — C1769: CPT

## 2020-10-30 PROCEDURE — 82570 ASSAY OF URINE CREATININE: CPT

## 2020-10-30 PROCEDURE — 87070 CULTURE OTHR SPECIMN AEROBIC: CPT

## 2020-10-30 PROCEDURE — 85018 HEMOGLOBIN: CPT

## 2020-10-30 PROCEDURE — 97530 THERAPEUTIC ACTIVITIES: CPT

## 2020-10-30 PROCEDURE — 84295 ASSAY OF SERUM SODIUM: CPT

## 2020-10-30 PROCEDURE — 84300 ASSAY OF URINE SODIUM: CPT

## 2020-10-30 PROCEDURE — 97535 SELF CARE MNGMENT TRAINING: CPT

## 2020-10-30 PROCEDURE — 80048 BASIC METABOLIC PNL TOTAL CA: CPT

## 2020-10-30 PROCEDURE — C9399: CPT

## 2020-10-30 PROCEDURE — U0003: CPT

## 2020-10-30 PROCEDURE — 85025 COMPLETE CBC W/AUTO DIFF WBC: CPT

## 2020-10-30 PROCEDURE — 86901 BLOOD TYPING SEROLOGIC RH(D): CPT

## 2020-10-30 PROCEDURE — 74176 CT ABD & PELVIS W/O CONTRAST: CPT

## 2020-10-30 PROCEDURE — 82330 ASSAY OF CALCIUM: CPT

## 2020-10-30 PROCEDURE — 80053 COMPREHEN METABOLIC PANEL: CPT

## 2020-10-30 PROCEDURE — 85014 HEMATOCRIT: CPT

## 2020-10-30 PROCEDURE — 82565 ASSAY OF CREATININE: CPT

## 2020-10-30 PROCEDURE — 83735 ASSAY OF MAGNESIUM: CPT

## 2020-10-30 PROCEDURE — 86850 RBC ANTIBODY SCREEN: CPT

## 2020-10-30 PROCEDURE — 97110 THERAPEUTIC EXERCISES: CPT

## 2020-10-30 PROCEDURE — 84132 ASSAY OF SERUM POTASSIUM: CPT

## 2020-10-30 PROCEDURE — 85730 THROMBOPLASTIN TIME PARTIAL: CPT

## 2020-10-30 PROCEDURE — 99285 EMERGENCY DEPT VISIT HI MDM: CPT | Mod: 25

## 2020-10-30 PROCEDURE — 83605 ASSAY OF LACTIC ACID: CPT

## 2020-10-30 PROCEDURE — 85610 PROTHROMBIN TIME: CPT

## 2020-10-30 RX ORDER — CEFEPIME 1 G/1
1 INJECTION, POWDER, FOR SOLUTION INTRAMUSCULAR; INTRAVENOUS
Qty: 42 | Refills: 0
Start: 2020-10-30 | End: 2020-11-12

## 2020-10-30 RX ORDER — ACETAMINOPHEN 500 MG
3 TABLET ORAL
Qty: 0 | Refills: 0 | DISCHARGE
Start: 2020-10-30

## 2020-10-30 RX ORDER — METRONIDAZOLE 500 MG
1 TABLET ORAL
Qty: 42 | Refills: 0
Start: 2020-10-30 | End: 2020-11-12

## 2020-10-30 RX ORDER — OXYCODONE HYDROCHLORIDE 5 MG/1
5 TABLET ORAL ONCE
Refills: 0 | Status: DISCONTINUED | OUTPATIENT
Start: 2020-10-30 | End: 2020-10-30

## 2020-10-30 RX ORDER — OXYCODONE HYDROCHLORIDE 5 MG/1
1 TABLET ORAL
Qty: 8 | Refills: 0
Start: 2020-10-30 | End: 2020-10-31

## 2020-10-30 RX ADMIN — Medication 500 MILLIGRAM(S): at 05:42

## 2020-10-30 RX ADMIN — TAMSULOSIN HYDROCHLORIDE 0.4 MILLIGRAM(S): 0.4 CAPSULE ORAL at 10:42

## 2020-10-30 RX ADMIN — Medication 975 MILLIGRAM(S): at 13:14

## 2020-10-30 RX ADMIN — Medication 975 MILLIGRAM(S): at 11:32

## 2020-10-30 RX ADMIN — Medication 25 MILLIGRAM(S): at 05:42

## 2020-10-30 RX ADMIN — Medication 500 MILLIGRAM(S): at 13:16

## 2020-10-30 RX ADMIN — SODIUM CHLORIDE 1 GRAM(S): 9 INJECTION INTRAMUSCULAR; INTRAVENOUS; SUBCUTANEOUS at 05:42

## 2020-10-30 RX ADMIN — LOSARTAN POTASSIUM 50 MILLIGRAM(S): 100 TABLET, FILM COATED ORAL at 05:41

## 2020-10-30 RX ADMIN — OXYCODONE HYDROCHLORIDE 5 MILLIGRAM(S): 5 TABLET ORAL at 14:37

## 2020-10-30 RX ADMIN — CEFEPIME 100 MILLIGRAM(S): 1 INJECTION, POWDER, FOR SOLUTION INTRAMUSCULAR; INTRAVENOUS at 05:42

## 2020-10-30 RX ADMIN — Medication 1 TABLET(S): at 11:32

## 2020-10-30 RX ADMIN — Medication 975 MILLIGRAM(S): at 05:41

## 2020-10-30 RX ADMIN — SODIUM CHLORIDE 1 GRAM(S): 9 INJECTION INTRAMUSCULAR; INTRAVENOUS; SUBCUTANEOUS at 13:16

## 2020-10-30 RX ADMIN — CEFEPIME 100 MILLIGRAM(S): 1 INJECTION, POWDER, FOR SOLUTION INTRAMUSCULAR; INTRAVENOUS at 12:00

## 2020-10-30 NOTE — PROGRESS NOTE ADULT - SUBJECTIVE AND OBJECTIVE BOX
Feels well  No further bleeding  /67  HR 85  Lungs clear  RR 1/6 systolic murmur  No edema    INR 1.99  WBC 4.52  Hgb 7.7  Hct 23.4  Plt 268K    Imp:  Stable from a CV standpoint for DC to home  Continue Coumadin and home IV antibiotics

## 2020-10-30 NOTE — PROGRESS NOTE ADULT - REASON FOR ADMISSION
necrotizing fasciitis
perineal abscess
Perineal infection
Rectal tumor excision

## 2020-10-30 NOTE — PROGRESS NOTE ADULT - SUBJECTIVE AND OBJECTIVE BOX
CC: F/U for Bacteremia    Saw/spoke to patient. Doing well. No new complaints. States feels well and has not had any changes on current abx.    Allergies  No Known Allergies    ANTIMICROBIALS:  cefepime   IVPB 1000 every 8 hours  metroNIDAZOLE    Tablet 500 every 8 hours    PE:    Vital Signs Last 24 Hrs  T(C): 36.3 (30 Oct 2020 10:28), Max: 36.6 (29 Oct 2020 17:26)  T(F): 97.4 (30 Oct 2020 10:28), Max: 97.8 (29 Oct 2020 17:26)  HR: 92 (30 Oct 2020 10:28) (61 - 96)  BP: 134/82 (30 Oct 2020 10:28) (89/50 - 149/73)  RR: 18 (30 Oct 2020 10:28) (18 - 18)  SpO2: 99% (30 Oct 2020 10:28) (97% - 99%)    Gen: AOx3, NAD, non-toxic  CV: S1+S2 normal, nontachycardic  Resp: Clear bilat, no resp distress, no crackles/wheezes  Abd: Soft, nontender, +BS  Ext: PICC line    LABS:                        7.7    4.52  )-----------( 268      ( 30 Oct 2020 06:57 )             23.4     10-30    134<L>  |  101  |  10  ----------------------------<  106<H>  4.0   |  26  |  0.78    Ca    8.4      30 Oct 2020 06:57  Phos  3.5     10-30  Mg     1.9     10-30    MICROBIOLOGY:    .Surgical Swab 1 perineal wound for culture  10-22-20   Culture yields >4 types of aerobic and/or anaerobic bacteria  Call client services within 7 days if further workup is clinically  indicated.      .Blood Blood-Peripheral  10-21-20   No Growth Final      (otherwise reviewed)    RADIOLOGY:    10/23 XR:    Impression:    The heart is enlarged. The lungs appear to be clear. No pleural effusion. No pneumothorax. Status post sternotomy.

## 2020-10-30 NOTE — PROGRESS NOTE ADULT - ASSESSMENT
Patient is a 74y Male with history of DM, CAD, AFib, bioprosthetic mitral valve (2015), HTN, macular degeneration, hemorrhoids, recently diagnosed localized rectal cancer (dx 9/2020) c/b bacteremia, s/p 10/15 Transanal excision of distal rectal carcinoma. Taken to OR urgently 10/21 for r/o necrotizing fasciitis, found to have breakdown of rectal incision with fistulization into perineum, s/p debridement and penrose drain placement.     PLAN:  - pain control  - regular diet  - Cefepime and Flagyl  - f/u INR, dose coumadin accordingly.  - Plan for discharge today.   - Sitz Baths    [facility] Pager #[phone]

## 2020-10-30 NOTE — PROGRESS NOTE ADULT - SUBJECTIVE AND OBJECTIVE BOX
[facility] Daily Progress Note    Subjective:   Patient seen at bedside this AM. Reports feeling well, without complaints. Denies chest pain, SOB. Tolerating diet without N/V. Passing flatus and BM. Voiding.     24h Events:   - Overnight, no acute events    Objective:  Vital Signs  T(C): 36.4 (10-30 @ 01:26), Max: 36.6 (10-29 @ 17:26)  HR: 96 (10-30 @ 01:26) (61 - 96)  BP: 149/73 (10-30 @ 01:26) (89/50 - 154/82)  RR: 18 (10-30 @ 01:26) (18 - 18)  SpO2: 99% (10-30 @ 01:26) (97% - 99%)  10-28-20 @ 07:01  -  10-29-20 @ 07:00  --------------------------------------------------------  IN: 840 mL / OUT: 2650 mL / NET: -1810 mL    10-29-20 @ 07:01  -  10-30-20 @ 04:59  --------------------------------------------------------  IN: 1650 mL / OUT: 1125 mL / NET: 525 mL        Physical Exam:  GEN: resting in bed comfortably in NAD  RESP: no increased WOB  ABD: soft, non-distended, non-tender without rebound tenderness or guarding  EXTR: warm, well-perfused without gross deformities; spontaneous movement in b/l U/L extrem  : perianal penrose in place  NEURO: AAOx4    Labs:                        7.9    8.05  )-----------( 284      ( 29 Oct 2020 18:01 )             23.2   10-29    136  |  102  |  7   ----------------------------<  107<H>  3.9   |  26  |  0.71    Ca    8.4      29 Oct 2020 06:32  Phos  3.4     10-29  Mg     1.9     10-29      CAPILLARY BLOOD GLUCOSE      POCT Blood Glucose.: 161 mg/dL (29 Oct 2020 21:43)  POCT Blood Glucose.: 120 mg/dL (29 Oct 2020 19:21)  POCT Blood Glucose.: 131 mg/dL (29 Oct 2020 13:30)  POCT Blood Glucose.: 121 mg/dL (29 Oct 2020 08:25)      Medications:   MEDICATIONS  (STANDING):  acetaminophen   Tablet .. 975 milliGRAM(s) Oral every 6 hours  atorvastatin 40 milliGRAM(s) Oral at bedtime  cefepime   IVPB 1000 milliGRAM(s) IV Intermittent every 8 hours  chlorhexidine 4% Liquid 1 Application(s) Topical <User Schedule>  insulin lispro (ADMELOG) corrective regimen sliding scale   SubCutaneous three times a day before meals  insulin lispro (ADMELOG) corrective regimen sliding scale   SubCutaneous at bedtime  losartan 50 milliGRAM(s) Oral daily  metoprolol tartrate 25 milliGRAM(s) Oral every 12 hours  metroNIDAZOLE    Tablet 500 milliGRAM(s) Oral every 8 hours  multivitamin 1 Tablet(s) Oral daily  omega-3-Acid Ethyl Esters 2 Gram(s) Oral at bedtime  ondansetron Injectable 4 milliGRAM(s) IV Push once  sodium chloride 1 Gram(s) Oral every 8 hours  tamsulosin 0.4 milliGRAM(s) Oral two times a day    MEDICATIONS  (PRN):  sodium chloride 0.9% lock flush 10 milliLiter(s) IV Push every 1 hour PRN Pre/post blood products, medications, blood draw, and to maintain line patency      Imaging:

## 2020-10-30 NOTE — PROGRESS NOTE ADULT - ASSESSMENT
74 M hx DM, CAD, AFib, prosthetic mitral valve (2015), HTN, macular degeneration, hemorrhoids, localized rectal cancer (dx 9/2020), presenting with chills for 10 days  S mitis oralis bacteremia-high grade  No abd symptoms  Clinically stable  Repeat Cx so far negative  abd CT no abscess  PCN VANCE 0.03  REEMA no vegetation  On home IV ceftriaxone X 6 week course-was doing well  Now with perineal cellulitis/wound infection  s/p OR I&D of perineal abscess--culture, mixed narda aerobic/anaerobic  Stable  Overall,  A) Perineal abscess  - S/p I&D, culture with mixed narda  - Further surgical plan and wound care per surgical team  - Cefepime 1g q 8 and Flagyl 500mg q 8 for 2 weeks (check CBC, BUN, Cr, LFTs weekly while on home IV abx, fax to [phone])  - F/U pending cultures  B) S mitis oralis bacteremia  - Rectal mass source? REEMA, no PVE  - Abx as planned above  - Follow up BCX    Flash Vaughn MD  Pager [phone]  After 5pm and on weekends call [phone]

## 2020-11-02 ENCOUNTER — NON-APPOINTMENT (OUTPATIENT)
Age: 75
End: 2020-11-02

## 2020-11-02 RX ORDER — BENZONATATE 100 MG/1
100 CAPSULE ORAL TWICE DAILY
Qty: 30 | Refills: 0 | Status: DISCONTINUED | COMMUNITY
Start: 2020-10-01 | End: 2020-11-02

## 2020-11-02 RX ORDER — BENZONATATE 100 MG/1
100 CAPSULE ORAL
Refills: 0 | Status: DISCONTINUED | COMMUNITY
Start: 2020-10-01 | End: 2020-11-02

## 2020-11-02 RX ORDER — PROMETHAZINE HYDROCHLORIDE AND DEXTROMETHORPHAN HYDROBROMIDE ORAL SOLUTION 15; 6.25 MG/5ML; MG/5ML
6.25-15 SOLUTION ORAL
Qty: 240 | Refills: 3 | Status: DISCONTINUED | COMMUNITY
Start: 2020-10-01 | End: 2020-11-02

## 2020-11-05 PROBLEM — K62.9 RECTAL LESION: Status: RESOLVED | Noted: 2020-08-10 | Resolved: 2020-11-05

## 2020-11-06 ENCOUNTER — APPOINTMENT (OUTPATIENT)
Dept: SURGERY | Facility: CLINIC | Age: 75
End: 2020-11-06
Payer: MEDICARE

## 2020-11-06 VITALS
RESPIRATION RATE: 16 BRPM | SYSTOLIC BLOOD PRESSURE: 136 MMHG | OXYGEN SATURATION: 97 % | HEART RATE: 84 BPM | DIASTOLIC BLOOD PRESSURE: 68 MMHG | TEMPERATURE: 98.3 F

## 2020-11-06 DIAGNOSIS — K62.9 DISEASE OF ANUS AND RECTUM, UNSPECIFIED: ICD-10-CM

## 2020-11-06 PROCEDURE — 99024 POSTOP FOLLOW-UP VISIT: CPT

## 2020-11-06 NOTE — ASSESSMENT
[FreeTextEntry1] : In summary the patient is status post transanal excision of a T1 distal rectal cancer.  His postoperative course was complicated by wound breakdown, perineal sepsis requiring emergent incision and drainage of a large perineal abscess.  He has a Penrose drain in place.  He is overall improving however is having incontinence to stool and feculent drainage from the open incision.  I had a long conversation with the patient and his family (wife and daughter) I explained the treatment options include staying the course with local wound care and trying to firm up his stool with fiber and Imodium so that hopefully his fistula drains less.  The other option would be a diverting stoma if local wound care becomes more of a problem.  He will be giving this some thought and I will see him next week for a checkup.  He will remain on antibiotics per infectious disease as well. No

## 2020-11-06 NOTE — PHYSICAL EXAM
[de-identified] : Area anal inspection reveals no evidence of active infection.  His perineal incision is clean and healing appropriately.  There is no evidence of cellulitis or perianal skin breakdown.  His quarter inch Penrose drain is in place.

## 2020-11-06 NOTE — CONSULT LETTER
[Dear  ___] : Dear  [unfilled], [Consult Letter:] : I had the pleasure of evaluating your patient, [unfilled]. [Please see my note below.] : Please see my note below. [Consult Closing:] : Thank you very much for allowing me to participate in the care of this patient.  If you have any questions, please do not hesitate to contact me. [Sincerely,] : Sincerely, [FreeTextEntry3] : Christian Connelly M.D., F.A.C.S, F.A.S.C.R.S [DrDrake  ___] : Dr. HEBERT

## 2020-11-06 NOTE — HISTORY OF PRESENT ILLNESS
[FreeTextEntry1] : The patient is a 74-year-old gentleman with a history of a newly diagnosed lesion at the anorectal junction.  Biopsy demonstrated adenocarcinoma.   The lesion was felt to be either T1 versus early T2 on preoperative imaging.  The lesion was mobile approximately 2 cm in diameter, located in the anterior position just at the level of the dentate line.  There was no evidence of metastatic disease on  preoperative workup, therefore transanal excision was performed on 10/15/20. \par The patient is an elderly gentleman who underwent transanal excision of a distal rectal carcinoma.  His initial postoperative course was  uncomplicated; however, the patient developed worsening pain and fever (10/21/20).  He was instructed to go to the emergency room because of his worsening symptoms.  On examination, the patient had swelling and tenderness of the perineal body and edema of the scrotum.   A stat CT of the abdomen and pelvis demonstrated subcutaneous air in the area of the perineal body which did not appear to obviously extend to scrotum or lower extremities or retroperitoneum.  Therefore, emergent rectal examination under anesthesia was  recommended with the concern for peritoneal sepsis.  The patient also had a fever, rigors, and an elevated white count.  The patient was brought to the operating room emergently for examination under anesthesia and incision, drainage, and debridement  of his perineum.  \par \par Pathology demonstrates invasive moderately to poorly differentiated adenocarcinoma. Margins negative for tumor. pT1. \par \par Patient having episodes of incontinence of soft stool. Cannot differentiate between gas and stool. Having small amount of incontinence of stool when he urinates. Pain still severe. No bleeding.\par \par Scrotum very tender to touch, burning. Urinating frequently, very yellow. Denies pain when urinating. \par \par On IV abx and PO Flagyl

## 2020-11-12 ENCOUNTER — APPOINTMENT (OUTPATIENT)
Dept: INTERNAL MEDICINE | Facility: CLINIC | Age: 75
End: 2020-11-12
Payer: MEDICARE

## 2020-11-12 VITALS
WEIGHT: 152 LBS | HEIGHT: 65.75 IN | BODY MASS INDEX: 24.72 KG/M2 | SYSTOLIC BLOOD PRESSURE: 125 MMHG | OXYGEN SATURATION: 98 % | TEMPERATURE: 97.8 F | HEART RATE: 79 BPM | DIASTOLIC BLOOD PRESSURE: 72 MMHG

## 2020-11-12 DIAGNOSIS — M79.669 PAIN IN UNSPECIFIED LOWER LEG: ICD-10-CM

## 2020-11-12 PROCEDURE — 99495 TRANSJ CARE MGMT MOD F2F 14D: CPT

## 2020-11-12 RX ORDER — CEFEPIME HYDROCHLORIDE 1 G/1
1 INJECTION, POWDER, FOR SOLUTION INTRAMUSCULAR; INTRAVENOUS EVERY 8 HOURS
Refills: 0 | Status: DISCONTINUED | COMMUNITY
Start: 2020-11-02 | End: 2020-11-12

## 2020-11-12 RX ORDER — METRONIDAZOLE 500 MG/1
500 TABLET ORAL EVERY 8 HOURS
Refills: 0 | Status: DISCONTINUED | COMMUNITY
Start: 2020-11-02 | End: 2020-11-12

## 2020-11-12 NOTE — PHYSICAL EXAM
[Normal Rate] : normal rate  [Normal] : soft, non-tender, non-distended, no masses palpated, no HSM and normal bowel sounds [Comprehensive Foot Exam Normal] : Right and left foot were examined and both feet are normal. No ulcers in either foot. Toes are normal and with full ROM.  Normal tactile sensation with monofilament testing throughout both feet [de-identified] : afib [de-identified] : trace edema    mild pain [FreeTextEntry1] : drain in place [33525 - Moderate Complexity requires multiple possible diagnoses and/or the management options, moderate complexity of the medical data (tests, etc.) to be reviewed, and moderate risk of significant complications, morbidity, and/or mortality as well as co] : Moderate Complexity

## 2020-11-12 NOTE — PLAN
[FreeTextEntry1] : Contiue warfarin\par rectal area maaged by Dr Connelly\par Trial of slo mag\par wared about possible change of bowels\par continue 2 tamsulosn

## 2020-11-12 NOTE — HISTORY OF PRESENT ILLNESS
[Post-hospitalization from ___ Hospital] : Post-hospitalization from [unfilled] Hospital [FreeTextEntry2] : Multiple hospitalization\par rectal cancer\par abscess\par sepsis\par urinating good on 2 tamsulosin\par Drain in rectum\par just finished antibiotic\par on warfarin being adjusted by [facility] las tinr 2.9\par calf pain\par swelling less\par elevated most times\par no sob

## 2020-11-12 NOTE — REVIEW OF SYSTEMS
[Muscle Weakness] : muscle weakness [Back Pain] : back pain [Negative] : Respiratory [Chest Pain] : no chest pain [Orthopena] : no orthopnea [Abdominal Pain] : no abdominal pain [Vomiting] : no vomiting [Dysuria] : no dysuria [Hematuria] : no hematuria [Joint Pain] : no joint pain

## 2020-11-17 ENCOUNTER — APPOINTMENT (OUTPATIENT)
Dept: SURGERY | Facility: CLINIC | Age: 75
End: 2020-11-17
Payer: MEDICARE

## 2020-11-17 VITALS
RESPIRATION RATE: 15 BRPM | SYSTOLIC BLOOD PRESSURE: 121 MMHG | OXYGEN SATURATION: 98 % | DIASTOLIC BLOOD PRESSURE: 71 MMHG | HEART RATE: 96 BPM | TEMPERATURE: 97.1 F

## 2020-11-17 PROCEDURE — 99024 POSTOP FOLLOW-UP VISIT: CPT

## 2020-11-17 RX ORDER — OXYCODONE 5 MG/1
5 TABLET ORAL
Qty: 20 | Refills: 0 | Status: COMPLETED | COMMUNITY
Start: 2020-11-17 | End: 2020-11-22

## 2020-11-17 NOTE — HISTORY OF PRESENT ILLNESS
[FreeTextEntry1] : The patient is a 74-year-old gentleman with a history of a newly diagnosed lesion at the anorectal junction. Biopsy demonstrated adenocarcinoma. The lesion was felt to be either T1 versus early T2 on preoperative imaging. The lesion was mobile approximately 2 cm in diameter, located in the anterior position just at the level of the dentate line. There was no evidence of metastatic disease on preoperative workup, therefore transanal excision was performed on 10/15/20. \par \par His initial postoperative course was uncomplicated; however, the patient developed worsening pain and fever (10/21/20). He was instructed to go to the emergency room because of his worsening symptoms. On examination, the patient had swelling and tenderness of the perineal body and edema of the scrotum. A stat CT of the abdomen and pelvis demonstrated subcutaneous air in the area of the perineal body which did not appear to obviously extend to scrotum or lower extremities or retroperitoneum. Therefore, emergent rectal examination under anesthesia was recommended with the concern for peritoneal sepsis. The patient also had a fever, rigors, and an elevated white count. The patient was brought to the operating room emergently for examination under anesthesia and incision, drainage, and debridement of his perineum. 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 Margins negative for tumor. pT1. On IV abx and PO Flagyl. \par \par Last seen on 11/6/20, patient overall improving however was having incontinence of stool and feculent drainage from the open incision. Penrose drain kept in place. Instructed to remain on antibiotics per infectious disease. \par \par Today in the office he has mild discomfort in the area of his incision.  He is off antibiotics.  He is having formed stool mainly via the anus but also has some coming out through his perineal fistula.  He is taking Citrucel and MiraLAX as needed

## 2020-11-17 NOTE — ASSESSMENT
[FreeTextEntry1] : In summary the patient is status post transanal excision of a T1 distal rectal cancer.  His postoperative course was complicated by wound breakdown with perineal abscess requiring drainage.  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  Overall his condition is slowly improving.  At the present time there is no role for adjuvant treatment.  I will see him again in 2 weeks for a checkup.  He will continue local wound care and also follow-up with oncology.

## 2020-11-17 NOTE — PHYSICAL EXAM
[de-identified] : Perianal inspection reveals no evidence of ongoing infection.  The perineal incision is clean and granulating.  There is a Penrose draining seton in place which was exchanged for a large blue vessel loop seton which was secured to itself using silk sutures.  Anal rectal tone is normal.  There is no evidence of ongoing infection

## 2020-11-19 ENCOUNTER — RX RENEWAL (OUTPATIENT)
Age: 75
End: 2020-11-19

## 2020-12-01 ENCOUNTER — APPOINTMENT (OUTPATIENT)
Dept: SURGERY | Facility: CLINIC | Age: 75
End: 2020-12-01
Payer: MEDICARE

## 2020-12-01 VITALS
OXYGEN SATURATION: 98 % | TEMPERATURE: 97 F | HEART RATE: 88 BPM | DIASTOLIC BLOOD PRESSURE: 69 MMHG | SYSTOLIC BLOOD PRESSURE: 154 MMHG | RESPIRATION RATE: 16 BRPM

## 2020-12-01 PROCEDURE — 99024 POSTOP FOLLOW-UP VISIT: CPT

## 2020-12-01 RX ORDER — CEFTRIAXONE 10 G/1
10 INJECTION, POWDER, FOR SOLUTION INTRAVENOUS
Qty: 1 | Refills: 0 | Status: DISCONTINUED | COMMUNITY
Start: 2020-09-29

## 2020-12-01 NOTE — HISTORY OF PRESENT ILLNESS
[FreeTextEntry1] : The patient is a 75-year-old gentleman with a history of a newly diagnosed lesion at the anorectal junction. Biopsy demonstrated adenocarcinoma. The lesion was felt to be either T1 versus early T2 on preoperative imaging. The lesion was mobile approximately 2 cm in diameter, located in the anterior position just at the level of the dentate line. There was no evidence of metastatic disease on preoperative workup, therefore transanal excision was performed on 10/15/20. \par \par His initial postoperative course was uncomplicated; however, the patient developed worsening pain and fever (10/21/20). He was instructed to go to the emergency room because of his worsening symptoms. On examination, the patient had swelling and tenderness of the perineal body and edema of the scrotum. A stat CT of the abdomen and pelvis demonstrated subcutaneous air in the area of the perineal body which did not appear to obviously extend to scrotum or lower extremities or retroperitoneum. Therefore, emergent rectal examination under anesthesia was recommended with the concern for peritoneal sepsis. The patient also had a fever, rigors, and an elevated white count. The patient was brought to the operating room emergently for examination under anesthesia and incision, drainage, and debridement of his perineum. 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 Margins negative for tumor. pT1. On IV abx and PO Flagyl. 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 Penrose draining seton was exchanged for a large blue vessel loop seton which was secured to itself using silk sutures. \par \par In the office today he complains of mild discomfort in the area of his seton.  He is no longer having feculent drainage from the incision and is passing soft stool from the anus.  He does have some bleeding and drainage from the incision.  He denies fevers or severe pain.\par

## 2020-12-01 NOTE — ASSESSMENT
[FreeTextEntry1] : In summary the patient is making slow improvement following transanal excision of a distal rectal cancer.  His postoperative course was complicated by wound breakdown and perineal abscess requiring drainage and seton placement.  His resultant anal fistula is no longer draining stool and the wound is slowly healing.  I will see him in 2 weeks for a checkup.  He will also follow-up with oncology.

## 2020-12-01 NOTE — PHYSICAL EXAM
[de-identified] : Perianal inspection reveals a healing clean granulating perineal incision.  His vessel loop seton remains in place.  There is a small fluctuant area just to the right of the external opening where the seton is.  This was incised and a small amount of pus was evacuated.

## 2020-12-22 ENCOUNTER — OUTPATIENT (OUTPATIENT)
Dept: OUTPATIENT SERVICES | Facility: HOSPITAL | Age: 75
LOS: 1 days | Discharge: HOME | End: 2020-12-22

## 2020-12-22 DIAGNOSIS — Z95.2 PRESENCE OF PROSTHETIC HEART VALVE: Chronic | ICD-10-CM

## 2020-12-22 DIAGNOSIS — C20 MALIGNANT NEOPLASM OF RECTUM: ICD-10-CM

## 2020-12-29 ENCOUNTER — RESULT REVIEW (OUTPATIENT)
Age: 75
End: 2020-12-29

## 2020-12-29 ENCOUNTER — APPOINTMENT (OUTPATIENT)
Dept: HEMATOLOGY ONCOLOGY | Facility: CLINIC | Age: 75
End: 2020-12-29
Payer: MEDICARE

## 2020-12-29 VITALS
OXYGEN SATURATION: 99 % | SYSTOLIC BLOOD PRESSURE: 180 MMHG | TEMPERATURE: 98.2 F | WEIGHT: 156.97 LBS | RESPIRATION RATE: 16 BRPM | DIASTOLIC BLOOD PRESSURE: 89 MMHG | HEIGHT: 65.75 IN | BODY MASS INDEX: 25.53 KG/M2 | HEART RATE: 83 BPM

## 2020-12-29 VITALS — DIASTOLIC BLOOD PRESSURE: 74 MMHG | SYSTOLIC BLOOD PRESSURE: 136 MMHG

## 2020-12-29 LAB
25(OH)D3 SERPL-MCNC: 28.9 NG/ML
ALBUMIN SERPL ELPH-MCNC: 4.1 G/DL
ALP BLD-CCNC: 87 U/L
ALT SERPL-CCNC: 23 U/L
ANION GAP SERPL CALC-SCNC: 10 MMOL/L
AST SERPL-CCNC: 26 U/L
BASOPHILS # BLD AUTO: 0.04 K/UL — SIGNIFICANT CHANGE UP (ref 0–0.2)
BASOPHILS NFR BLD AUTO: 0.7 % — SIGNIFICANT CHANGE UP (ref 0–2)
BILIRUB SERPL-MCNC: 0.6 MG/DL
BUN SERPL-MCNC: 17 MG/DL
CALCIUM SERPL-MCNC: 9.3 MG/DL
CEA SERPL-MCNC: 1.9 NG/ML
CHLORIDE SERPL-SCNC: 100 MMOL/L
CO2 SERPL-SCNC: 25 MMOL/L
CREAT SERPL-MCNC: 1.04 MG/DL
EOSINOPHIL # BLD AUTO: 0.23 K/UL — SIGNIFICANT CHANGE UP (ref 0–0.5)
EOSINOPHIL NFR BLD AUTO: 4 % — SIGNIFICANT CHANGE UP (ref 0–6)
ESTIMATED AVERAGE GLUCOSE: 120 MG/DL
FERRITIN SERPL-MCNC: 53 NG/ML
GLUCOSE SERPL-MCNC: 106 MG/DL
HBA1C MFR BLD HPLC: 5.8 %
HCT VFR BLD CALC: 36.9 % — LOW (ref 39–50)
HGB BLD-MCNC: 12.3 G/DL — LOW (ref 13–17)
IMM GRANULOCYTES NFR BLD AUTO: 0.3 % — SIGNIFICANT CHANGE UP (ref 0–1.5)
LYMPHOCYTES # BLD AUTO: 1.97 K/UL — SIGNIFICANT CHANGE UP (ref 1–3.3)
LYMPHOCYTES # BLD AUTO: 34.1 % — SIGNIFICANT CHANGE UP (ref 13–44)
MCHC RBC-ENTMCNC: 30.8 PG — SIGNIFICANT CHANGE UP (ref 27–34)
MCHC RBC-ENTMCNC: 33.3 G/DL — SIGNIFICANT CHANGE UP (ref 32–36)
MCV RBC AUTO: 92.5 FL — SIGNIFICANT CHANGE UP (ref 80–100)
MONOCYTES # BLD AUTO: 0.59 K/UL — SIGNIFICANT CHANGE UP (ref 0–0.9)
MONOCYTES NFR BLD AUTO: 10.2 % — SIGNIFICANT CHANGE UP (ref 2–14)
NEUTROPHILS # BLD AUTO: 2.93 K/UL — SIGNIFICANT CHANGE UP (ref 1.8–7.4)
NEUTROPHILS NFR BLD AUTO: 50.7 % — SIGNIFICANT CHANGE UP (ref 43–77)
NRBC # BLD: 0 /100 WBCS — SIGNIFICANT CHANGE UP (ref 0–0)
PLATELET # BLD AUTO: 212 K/UL — SIGNIFICANT CHANGE UP (ref 150–400)
POTASSIUM SERPL-SCNC: 4.5 MMOL/L
PROT SERPL-MCNC: 7 G/DL
RBC # BLD: 3.99 M/UL — LOW (ref 4.2–5.8)
RBC # FLD: 17.4 % — HIGH (ref 10.3–14.5)
RETICS #: 52.7 K/UL — SIGNIFICANT CHANGE UP (ref 25–125)
RETICS/RBC NFR: 1.3 % — SIGNIFICANT CHANGE UP (ref 0.5–2.5)
SODIUM SERPL-SCNC: 136 MMOL/L
WBC # BLD: 5.78 K/UL — SIGNIFICANT CHANGE UP (ref 3.8–10.5)
WBC # FLD AUTO: 5.78 K/UL — SIGNIFICANT CHANGE UP (ref 3.8–10.5)

## 2020-12-29 PROCEDURE — 99215 OFFICE O/P EST HI 40 MIN: CPT

## 2020-12-29 RX ORDER — ACETAMINOPHEN 325 MG/1
325 TABLET ORAL EVERY 6 HOURS
Refills: 0 | Status: DISCONTINUED | COMMUNITY
Start: 2020-11-02 | End: 2020-12-29

## 2020-12-29 RX ORDER — OXYCODONE 5 MG/1
5 TABLET ORAL EVERY 6 HOURS
Refills: 0 | Status: DISCONTINUED | COMMUNITY
Start: 2020-11-02 | End: 2020-12-29

## 2020-12-29 NOTE — CONSULT LETTER
[Dear  ___] : Dear  [unfilled], [Consult Letter:] : I had the pleasure of evaluating your patient, [unfilled]. [Please see my note below.] : Please see my note below. [Consult Closing:] : Thank you very much for allowing me to participate in the care of this patient.  If you have any questions, please do not hesitate to contact me. [Sincerely,] : Sincerely, [DrDrake  ___] : Dr. HEBERT [DrDrake ___] : Dr. HEBERT [___] : [unfilled]

## 2020-12-29 NOTE — PHYSICAL EXAM
[Restricted in physically strenuous activity but ambulatory and able to carry out work of a light or sedentary nature] : Status 1- Restricted in physically strenuous activity but ambulatory and able to carry out work of a light or sedentary nature, e.g., light house work, office work [Normal] : affect appropriate [de-identified] : atrial fibrillation; no murmur [FreeTextEntry1] : penrose in place; 1 cm area of healing wound

## 2020-12-29 NOTE — HISTORY OF PRESENT ILLNESS
[Disease: _____________________] : Disease: [unfilled] [T: ___] : T[unfilled] [N: ___] : N[unfilled] [M: ___] : M[unfilled] [AJCC Stage: ____] : AJCC Stage: [unfilled] [Date: ____________] : Patient's last distress assessment performed on [unfilled]. [0 - No Distress] : Distress Level: 0 [Patient given social work contact information and resource sheet] : Patient was given social work contact information and resource sheet [de-identified] : Mr. Guillaume is a 74 year old male with DM2, HTN, AFib presenting to the office for an initial consultation of rectal CA.\par \par Patient reports BRBPR with almost every bowel movement.  He has a history of constipation, main concern is sensation of rectal swelling, and a hard lesion when places finger in rectum.  This is different from prior soft hemorrhoids. He sometimes must manually removes stool from his rectum. He brought this to the attention of his PCP, who palpated a rectal tumor, and referred to surgeon.\par \par Patient was seen by Dr. Christian Connelly (Colorectal Surgery) who performed JAYLIN and anoscopy which revealed a friable mobile firm distal/anal lesion approximately 2 cm in diameter in the left anterior position.  This lesion was biopsied on 9/4/2020. Pathology demonstrated adenocarcinoma. Dr. Connelly plans to do a colonoscopy.\par \par CT chest,abdomen/pelvis on 9/11/2020 demonstrated no evidence of metastatic disease in the chest, abdomen or pelvis\par MRI pelvis scheduled 9/15/2020.\par \par Patient had recent episode of red toe after a long car trip. The right big toe was painful and then it improved over a few days. He never had gout before. This had happened once before July 2020 as well. \par He notes that 36 hours after the CT scan, he had shaking chills (rigors), nausea with vomiting x 1 and then this subsided. He also had a drop in blood pressure to SBP upper 80's\par He had IV contrast and oral contrast only. \par He has no focal localizing symptoms for infection, such as cough, abdominal pain, dysuria, or diarrhea. He has baseline urinary frequency. \par \par 12/29/2020\par Early 9/2020, he developed fever and bacteremia with Strep mitis. He then needed 4 weeks of IV antibiotics. \par On 10/15/2020, patient underwent transrectal excision of the low rectal lesion. Case also reviewed at GI tumor board. This was not an anal canal lesion, but a low lying rectal lesion. \par Pathology: \par - Invasive moderately to poorly differentiated adenocarcinoma\par - Margins are negative for tumor\par - AJCC stage pT1\par - Lymph-Vascular Invasion:  Present\par - Perineural Invasion:  Not identified\par \par MLH1: Intact nuclear expression\par MSH2:  Loss of nuclear expression\par MSH6:  Intact nuclear expression\par PMS2:  Intact nuclear expression\par \par On 10/21 he was readmitted for abscess - needed I&D. He received antibiotics for 2 weeks.\par He is now healing from that and still has a small Penrose drain in the perineal tissue. [de-identified] : adenocarcinoma [de-identified] : Colorectal Surgeon: Christian Connelly  [phone]\par Internist / GI:  Raciel Perkins [phone]\par Cardiology: Jona Draper [phone]\par Opthalmology: Michael Rivera [phone]\par Macular degeneration ophthalmology: Zeferino Raman  [phone]\par Cardiac surgeon at [facility]: Zion Weathers [phone]\par \par Daughter: Dr. Nicole Guillaume [phone]\par Daughter: Nimo Rodriguez\par Quoc - patient [phone]\par Lori - wife home phone [phone]

## 2020-12-30 ENCOUNTER — NON-APPOINTMENT (OUTPATIENT)
Age: 75
End: 2020-12-30

## 2020-12-31 ENCOUNTER — APPOINTMENT (OUTPATIENT)
Dept: RADIATION ONCOLOGY | Facility: CLINIC | Age: 75
End: 2020-12-31
Payer: MEDICARE

## 2020-12-31 ENCOUNTER — OUTPATIENT (OUTPATIENT)
Dept: OUTPATIENT SERVICES | Facility: HOSPITAL | Age: 75
LOS: 1 days | Discharge: HOME | End: 2020-12-31
Payer: MEDICARE

## 2020-12-31 VITALS
DIASTOLIC BLOOD PRESSURE: 87 MMHG | HEART RATE: 53 BPM | TEMPERATURE: 95.3 F | RESPIRATION RATE: 16 BRPM | HEIGHT: 65.5 IN | BODY MASS INDEX: 25.95 KG/M2 | SYSTOLIC BLOOD PRESSURE: 158 MMHG | OXYGEN SATURATION: 98 % | WEIGHT: 157.63 LBS

## 2020-12-31 DIAGNOSIS — Z95.2 PRESENCE OF PROSTHETIC HEART VALVE: Chronic | ICD-10-CM

## 2020-12-31 PROCEDURE — 99204 OFFICE O/P NEW MOD 45 MIN: CPT

## 2020-12-31 PROCEDURE — 77263 THER RADIOLOGY TX PLNG CPLX: CPT

## 2020-12-31 NOTE — REASON FOR VISIT
[Consideration of Curative Therapy] : consideration of curative therapy for [Rectal Cancer] : cancer [Family Member] : family member

## 2020-12-31 NOTE — VITALS
[Maximal Pain Intensity: 3/10] : 3/10 [Least Pain Intensity: 0/10] : 0/10 [Pain Description/Quality: ___] : Pain description/quality: [unfilled] [Pain Duration: ___] : Pain duration: [unfilled] [Pain Location: ___] : Pain Location: [unfilled] [OTC] : OTC [80: Normal activity with effort; some signs or symptoms of disease.] : 80: Normal activity with effort; some signs or symptoms of disease.  [ECOG Performance Status: 1 - Restricted in physically strenuous activity but ambulatory and able to carry out work of a light or sedentary nature] : Performance Status: 1 - Restricted in physically strenuous activity but ambulatory and able to carry out work of a light or sedentary nature, e.g., light house work, office work

## 2021-01-04 NOTE — PHYSICAL EXAM
[Normal] : oriented to person, place and time, the affect was normal, the mood was normal and not anxious [FreeTextEntry1] : perineal incision +, drain +

## 2021-01-04 NOTE — HISTORY OF PRESENT ILLNESS
[FreeTextEntry1] : This is a 74-year-old male with T1Nx rectal cancer with LVI, G2-3 s/p resection here for consideration for adjuvant RT\par \par Patient was seen by Dr. Christian Connelly (Colorectal Surgery) who performed JAYLIN and anoscopy which revealed a friable mobile firm distal/anal lesion approximately 2 cm in diameter in the left anterior position. This lesion was biopsied on 9/4/2020. Pathology demonstrated adenocarcinoma. \par \par CT chest,abdomen/pelvis on 9/11/2020 NEM\par \par 9/11/20 CT Patient's known rectal mass is poorly delineated at CT, chest negative\par \par Today in clinic, c/o minor rectal pain, no bleeding. \par \par 9/15/20 MR\par A 2.1 cm mass entirely contained within the anal canal, 2.4 cm above the anal verge. No regional lymphadenopathy (N0).\par Sphincter involvement (low rectal tumors): Invades internal sphincter (IS) only. As above, tumor superficially involves the internal sphincter anterior right laterally, but is at least 3 mm away from the outer border of the internal sphincter. Intersphincteric space is clear.\par Suspicious extra mesorectal nodes: None\par \par \par On 10/15/2020, patient underwent transrectal excision of the low rectal lesion. Case also reviewed at GI tumor board. This was not an anal canal lesion, but a low lying rectal lesion. \par Pathology: \par - Invasive moderately to poorly differentiated adenocarcinoma\par - Margins are negative for tumor\par - AJCC stage pT1\par - Lymph-Vascular Invasion: Present\par - Perineural Invasion: Not identified\par \par MLH1: Intact nuclear expression\par MSH2: Loss of nuclear expression\par MSH6: Intact nuclear expression\par PMS2: Intact nuclear expression\par Has penrose drain for perineal abscess 10/21\par

## 2021-01-04 NOTE — REVIEW OF SYSTEMS
[Negative] : Allergic/Immunologic [FreeTextEntry5] : HTN, A Fib [FreeTextEntry7] : h/o surgery for rectal cancer [de-identified] : DM 2

## 2021-01-05 ENCOUNTER — APPOINTMENT (OUTPATIENT)
Dept: SURGERY | Facility: CLINIC | Age: 76
End: 2021-01-05
Payer: MEDICARE

## 2021-01-05 VITALS
DIASTOLIC BLOOD PRESSURE: 80 MMHG | SYSTOLIC BLOOD PRESSURE: 166 MMHG | HEART RATE: 81 BPM | OXYGEN SATURATION: 99 % | RESPIRATION RATE: 16 BRPM | TEMPERATURE: 97 F

## 2021-01-05 PROCEDURE — 99024 POSTOP FOLLOW-UP VISIT: CPT

## 2021-01-05 PROCEDURE — 46030 REMOVAL ANAL SETON OTH MRK: CPT | Mod: 58

## 2021-01-05 NOTE — PHYSICAL EXAM
[de-identified] : Perianal inspection reveals a transsphincteric anterior seton in place.  There is no fluctuance tenderness or cellulitis.  His perineal his incision has nearly completely healed except for the external opening through which the seton traverses anal rectal tone is normal.  There is no palpable mass on digital rectal examination.

## 2021-01-05 NOTE — HISTORY OF PRESENT ILLNESS
[FreeTextEntry1] : The patient is a 75-year-old gentleman with a history of a newly diagnosed lesion at the anorectal junction. Biopsy demonstrated adenocarcinoma. The lesion was felt to be either T1 versus early T2 on preoperative imaging. The lesion was mobile approximately 2 cm in diameter, located in the anterior position just at the level of the dentate line. There was no evidence of metastatic disease on preoperative workup, therefore transanal excision was performed on 10/15/20. \par \par His initial postoperative course was uncomplicated; however, the patient developed worsening pain and fever (10/21/20). He was instructed to go to the emergency room because of his worsening symptoms. On examination, the patient had swelling and tenderness of the perineal body and edema of the scrotum. A stat CT of the abdomen and pelvis demonstrated subcutaneous air in the area of the perineal body which did not appear to obviously extend to scrotum or lower extremities or retroperitoneum. Therefore, emergent rectal examination under anesthesia was recommended with the concern for peritoneal sepsis. The patient also had a fever, rigors, and an elevated white count. The patient was brought to the operating room emergently for examination under anesthesia and incision, drainage, and debridement of his perineum. 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 Margins negative for tumor. pT1.\par \par patient last seen on 12/01/20 with a granulating healing perineal incision. His vessel loop seton remains in place. Small fluctuant area to the right of the external opening where seton is. Incised and small amount of pus was evacuated. \par \par Today in the office he feels well.  He has minimal drainage from the seton.  He is moving his bowels normally via the anus with normal continence.  He has no pain or fever

## 2021-01-05 NOTE — ASSESSMENT
[FreeTextEntry1] : In summary the patient is doing remarkably well following transanal excision of a T1 rectal cancer complicated by perineal sepsis postoperatively and controlled anal fistula.  I had a long conversation with the patient and his wife.  I explained that options include leaving the seton in and performing a staged anal fistula repair versus removing the seton to see if the fistula heals on its own.  Because his wound is healed remarkably well I think that removing the seton makes the most sense at the present time.  Therefore was removed without difficulty..  The fistula tract was cauterized with silver nitrate and a sterile dressing was applied.  I will see him in the office in 2 weeks and if his fistula continues to heal appropriately I would be okay with him commencing adjuvant radiation therapy.

## 2021-01-14 ENCOUNTER — APPOINTMENT (OUTPATIENT)
Dept: HEMATOLOGY ONCOLOGY | Facility: CLINIC | Age: 76
End: 2021-01-14

## 2021-01-22 ENCOUNTER — APPOINTMENT (OUTPATIENT)
Dept: SURGERY | Facility: CLINIC | Age: 76
End: 2021-01-22
Payer: MEDICARE

## 2021-01-22 VITALS
DIASTOLIC BLOOD PRESSURE: 63 MMHG | RESPIRATION RATE: 16 BRPM | SYSTOLIC BLOOD PRESSURE: 145 MMHG | HEART RATE: 72 BPM | OXYGEN SATURATION: 100 % | TEMPERATURE: 97.1 F

## 2021-01-22 PROCEDURE — 99212 OFFICE O/P EST SF 10 MIN: CPT

## 2021-01-22 NOTE — PHYSICAL EXAM
[de-identified] : Perianal inspection reveals a healing external opening in the anterior midline at his incision and drainage site.  Anal rectal tone is normal.  The external opening and fistula tract were cauterized with silver nitrate.  There is no underlying abscess or feculent discharge.

## 2021-01-22 NOTE — HISTORY OF PRESENT ILLNESS
[FreeTextEntry1] : The patient is a 75-year-old gentleman with a history of a newly diagnosed lesion at the anorectal junction. Biopsy demonstrated adenocarcinoma. The lesion was felt to be either T1 versus early T2 on preoperative imaging. The lesion was mobile approximately 2 cm in diameter, located in the anterior position just at the level of the dentate line. There was no evidence of metastatic disease on preoperative workup, therefore transanal excision was performed on 10/15/20. \par \par His initial postoperative course was uncomplicated; however, the patient developed worsening pain and fever (10/21/20). He was instructed to go to the emergency room because of his worsening symptoms. On examination, the patient had swelling and tenderness of the perineal body and edema of the scrotum. A stat CT of the abdomen and pelvis demonstrated subcutaneous air in the area of the perineal body which did not appear to obviously extend to scrotum or lower extremities or retroperitoneum. Therefore, emergent rectal examination under anesthesia was recommended with the concern for peritoneal sepsis. The patient also had a fever, rigors, and an elevated white count. The patient was brought to the operating room emergently for examination under anesthesia and incision, drainage, and debridement of his perineum. 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 Margins negative for tumor. pT1.\par \par Last seen on  1/5/21, seton removed without difficulty. The fistula tract was cauterized with silver nitrate and a sterile dressing was applied. Today, patient reports feeling well. Denies pain. Has slight drainage and itchiness.

## 2021-01-22 NOTE — ASSESSMENT
[FreeTextEntry1] : In summary the patient has an anal fistula in the anterior midline which appears to be healing following seton removal.  He will be commencing external beam radiation to the area in the next several weeks.  From my standpoint I want to see him in the office in 3 weeks time for a checkup.  Hopefully his fistula will continue to heal

## 2021-01-22 NOTE — PHYSICAL EXAM
[de-identified] : Perianal inspection reveals a healing external opening in the anterior midline at his incision and drainage site.  Anal rectal tone is normal.  The external opening and fistula tract were cauterized with silver nitrate.  There is no underlying abscess or feculent discharge.

## 2021-01-29 ENCOUNTER — APPOINTMENT (OUTPATIENT)
Dept: RADIATION ONCOLOGY | Facility: CLINIC | Age: 76
End: 2021-01-29
Payer: MEDICARE

## 2021-01-29 ENCOUNTER — NON-APPOINTMENT (OUTPATIENT)
Age: 76
End: 2021-01-29

## 2021-01-29 PROCEDURE — 99212 OFFICE O/P EST SF 10 MIN: CPT | Mod: 25

## 2021-01-29 PROCEDURE — 77332 RADIATION TREATMENT AID(S): CPT | Mod: 26

## 2021-02-02 NOTE — REVIEW OF SYSTEMS
[Negative] : Allergic/Immunologic [Anal Pain: Grade 0] : Anal Pain: Grade 0 [Constipation: Grade 0] : Constipation: Grade 0 [Diarrhea: Grade 0] : Diarrhea: Grade 0 [Dyspepsia: Grade 0] : Dyspepsia: Grade 0 [Dysphagia: Grade 0] : Dysphagia: Grade 0 [Esophagitis: Grade 0] : Esophagitis: Grade 0 [Fecal Incontinence: Grade 0] : Fecal Incontinence: Grade 0 [Gastroparesis: Grade 0] : Gastroparesis: Grade 0 [Nausea: Grade 0] : Nausea: Grade 0 [Proctitis: Grade 0] : Proctitis: Grade 0 [Rectal Pain: Grade 0] : Rectal Pain: Grade 0 [Small Intestinal Obstruction: Grade 0] : Small Intestinal Obstruction: Grade 0 [Vomiting: Grade 0] : Vomiting: Grade 0 [Hematuria: Grade 0] : Hematuria: Grade 0 [Urinary Incontinence: Grade 0] : Urinary Incontinence: Grade 0  [Urinary Retention: Grade 0] : Urinary Retention: Grade 0 [Urinary Tract Pain: Grade 0] : Urinary Tract Pain: Grade 0 [Urinary Urgency: Grade 0] : Urinary Urgency: Grade 0 [Urinary Frequency: Grade 0] : Urinary Frequency: Grade 0 [FreeTextEntry7] : h/o surgery for rectal cancer [FreeTextEntry5] : HTN, A Fib [de-identified] : DM 2

## 2021-02-02 NOTE — HISTORY OF PRESENT ILLNESS
[FreeTextEntry1] : This is a 74-year-old male with T1Nx rectal cancer with LVI, G2-3 s/p resection here for consideration for adjuvant RT\par \par Patient was seen by Dr. Christian Connelly (Colorectal Surgery) who performed JAYLIN and anoscopy which revealed a friable mobile firm distal/anal lesion approximately 2 cm in diameter in the left anterior position. This lesion was biopsied on 9/4/2020. Pathology demonstrated adenocarcinoma. \par \par CT chest,abdomen/pelvis on 9/11/2020 [facility]\par \par 9/11/20 CT Patient's known rectal mass is poorly delineated at CT, chest negative\par \par Today in clinic, c/o minor rectal pain, no bleeding. \par \par 9/15/20 MR\par A 2.1 cm mass entirely contained within the anal canal, 2.4 cm above the anal verge. No regional lymphadenopathy (N0).\par Sphincter involvement (low rectal tumors): Invades internal sphincter (IS) only. As above, tumor superficially involves the internal sphincter anterior right laterally, but is at least 3 mm away from the outer border of the internal sphincter. Intersphincteric space is clear.\par Suspicious extra mesorectal nodes: None\par \par \par On 10/15/2020, patient underwent transrectal excision of the low rectal lesion. Case also reviewed at GI tumor board. This was not an anal canal lesion, but a low lying rectal lesion. \par Pathology: \par - Invasive moderately to poorly differentiated adenocarcinoma\par - Margins are negative for tumor\par - AJCC stage pT1\par - Lymph-Vascular Invasion: Present\par - Perineural Invasion: Not identified\par \par MLH1: Intact nuclear expression\par MSH2: Loss of nuclear expression\par MSH6: Intact nuclear expression\par PMS2: Intact nuclear expression\par Has penrose drain for perineal abscess 10/21\par \par Today in clinic. No abd pain, rectal bleeding, nausea. \par

## 2021-02-11 PROCEDURE — 77338 DESIGN MLC DEVICE FOR IMRT: CPT | Mod: 26

## 2021-02-11 PROCEDURE — 77301 RADIOTHERAPY DOSE PLAN IMRT: CPT | Mod: 26

## 2021-02-11 PROCEDURE — 77300 RADIATION THERAPY DOSE PLAN: CPT | Mod: 26

## 2021-02-12 ENCOUNTER — APPOINTMENT (OUTPATIENT)
Dept: SURGERY | Facility: CLINIC | Age: 76
End: 2021-02-12
Payer: MEDICARE

## 2021-02-12 VITALS
SYSTOLIC BLOOD PRESSURE: 168 MMHG | DIASTOLIC BLOOD PRESSURE: 93 MMHG | OXYGEN SATURATION: 99 % | HEART RATE: 94 BPM | TEMPERATURE: 96 F | RESPIRATION RATE: 15 BRPM

## 2021-02-12 PROCEDURE — 99212 OFFICE O/P EST SF 10 MIN: CPT

## 2021-02-12 NOTE — PHYSICAL EXAM
[de-identified] : Perianal inspection reveals a persistent external opening in the anterior midline.  There is no sign of infection or abscess.  The fistula tract was probed with silver nitrate.

## 2021-02-12 NOTE — ASSESSMENT
[FreeTextEntry1] : In summary the patient has a persistent anterior midline anal fistula status post transanal excision of a distal rectal cancer.  He will be commencing adjuvant external beam radiation next week.  I will see him in 3 weeks for a checkup.

## 2021-02-12 NOTE — HISTORY OF PRESENT ILLNESS
[FreeTextEntry1] : The patient is a 75-year-old gentleman with a history of a newly diagnosed lesion at the anorectal junction. Biopsy demonstrated adenocarcinoma. The lesion was felt to be either T1 versus early T2 on preoperative imaging. The lesion was mobile approximately 2 cm in diameter, located in the anterior position just at the level of the dentate line. There was no evidence of metastatic disease on preoperative workup, therefore transanal excision was performed on 10/15/20. \par \par His initial postoperative course was uncomplicated; however, the patient developed worsening pain and fever (10/21/20). He was instructed to go to the emergency room because of his worsening symptoms. On examination, the patient had swelling and tenderness of the perineal body and edema of the scrotum. A stat CT of the abdomen and pelvis demonstrated subcutaneous air in the area of the perineal body which did not appear to obviously extend to scrotum or lower extremities or retroperitoneum. Therefore, emergent rectal examination under anesthesia was recommended with the concern for peritoneal sepsis. The patient also had a fever, rigors, and an elevated white count. The patient was brought to the operating room emergently for examination under anesthesia and incision, drainage, and debridement of his perineum. 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 Margins negative for tumor. pT1.\par \par Last seen on 1/22/21, patient with an anal fistula in the anterior midline which appears to be healing following seton removal. He will be commencing external beam radiation to the area in the next several weeks. \par \par In the office today he feels well denies pain has minimal drainage from his fistula and has normal continence to both gas and stool.

## 2021-02-22 PROCEDURE — 77427 RADIATION TX MANAGEMENT X5: CPT

## 2021-02-22 PROCEDURE — 77387B: CUSTOM | Mod: 26

## 2021-02-23 PROCEDURE — 77387B: CUSTOM | Mod: 26

## 2021-02-24 ENCOUNTER — NON-APPOINTMENT (OUTPATIENT)
Age: 76
End: 2021-02-24

## 2021-02-24 PROCEDURE — 77014: CPT | Mod: 26

## 2021-02-25 PROCEDURE — 77387B: CUSTOM | Mod: 26

## 2021-02-26 PROCEDURE — 77387B: CUSTOM | Mod: 26

## 2021-03-14 NOTE — HISTORY OF PRESENT ILLNESS
[FreeTextEntry1] : This is a 74-year-old male with T1Nx rectal cancer with LVI, G2-3 s/p resection here for adjuvant RT.\par \par On 10/15/2020, patient underwent transrectal excision of the low rectal lesion. Case also reviewed at GI tumor board. This was not an anal canal lesion, but a low lying rectal lesion. \par Pathology: \par - Invasive moderately to poorly differentiated adenocarcinoma\par - Margins are negative for tumor\par - AJCC stage pT1\par - Lymph-Vascular Invasion: Present\par - Perineural Invasion: Not identified\par \par 3/5 fractions of RT completed. No abd pain, rectal bleeding, nausea.

## 2021-03-14 NOTE — DISEASE MANAGEMENT
[Pathological] : TNM Stage: p [I] : I [TTNM] : 1 [NTNM] : 0 [MTNM] : 0 [de-identified] : 25 Gy [de-identified] : rectum / pelvis

## 2021-03-26 ENCOUNTER — APPOINTMENT (OUTPATIENT)
Dept: SURGERY | Facility: CLINIC | Age: 76
End: 2021-03-26
Payer: MEDICARE

## 2021-03-26 VITALS
RESPIRATION RATE: 16 BRPM | TEMPERATURE: 97.8 F | OXYGEN SATURATION: 97 % | DIASTOLIC BLOOD PRESSURE: 90 MMHG | HEART RATE: 75 BPM | SYSTOLIC BLOOD PRESSURE: 152 MMHG

## 2021-03-26 PROCEDURE — 99212 OFFICE O/P EST SF 10 MIN: CPT

## 2021-03-26 NOTE — HISTORY OF PRESENT ILLNESS
[FreeTextEntry1] : The patient is a 75-year-old gentleman with a history of a newly diagnosed lesion at the anorectal junction. Biopsy demonstrated adenocarcinoma. The lesion was felt to be either T1 versus early T2 on preoperative imaging. The lesion was mobile approximately 2 cm in diameter, located in the anterior position just at the level of the dentate line. There was no evidence of metastatic disease on preoperative workup, therefore transanal excision was performed on 10/15/20. \par \par His initial postoperative course was uncomplicated; however, the patient developed worsening pain and fever (10/21/20). He was instructed to go to the emergency room because of his worsening symptoms. On examination, the patient had swelling and tenderness of the perineal body and edema of the scrotum. A stat CT of the abdomen and pelvis demonstrated subcutaneous air in the area of the perineal body which did not appear to obviously extend to scrotum or lower extremities or retroperitoneum. Therefore, emergent rectal examination under anesthesia was recommended with the concern for peritoneal sepsis. The patient also had a fever, rigors, and an elevated white count. The patient was brought to the operating room emergently for examination under anesthesia and incision, drainage, and debridement of his perineum. 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 Margins negative for tumor. pT1.\par \par Last seen on 2/12/21 with a persistent anterior midline anal fistula status post transanal excision of a distal rectal cancer. \par \par \par 3/5 fractions of RT completed (2/24/21). Planned Dose: 25 Gy. \par 1 week post radiation, had a lot of diarrhea-then constipation with abdominal cramping. Now has 3 loose/soft BMs daily. Takes Citrucel daily. Imodium PRN. Denies incontinence of gas/stool. Denies drainage from fistula. \par \par

## 2021-03-26 NOTE — PHYSICAL EXAM
[de-identified] : Perianal inspection reveals normal anal rectal tone.  The external opening to his anal fistula has completely closed and appears healed.  There is no sign of infection, digital rectal examination reveals anterior scarring with normal tone and no tenderness

## 2021-03-26 NOTE — ASSESSMENT
[FreeTextEntry1] : In summary the patient is doing well.  He has completed adjuvant external beam radiation.  His anal fistula appears to have healed.  He has normal continence to gas and stool.  I will see him in 2 months for a checkup.

## 2021-03-31 ENCOUNTER — APPOINTMENT (OUTPATIENT)
Dept: HEMATOLOGY ONCOLOGY | Facility: CLINIC | Age: 76
End: 2021-03-31

## 2021-04-01 ENCOUNTER — NON-APPOINTMENT (OUTPATIENT)
Age: 76
End: 2021-04-01

## 2021-04-08 ENCOUNTER — OUTPATIENT (OUTPATIENT)
Dept: OUTPATIENT SERVICES | Facility: HOSPITAL | Age: 76
LOS: 1 days | Discharge: HOME | End: 2021-04-08

## 2021-04-08 DIAGNOSIS — C20 MALIGNANT NEOPLASM OF RECTUM: ICD-10-CM

## 2021-04-08 DIAGNOSIS — Z95.2 PRESENCE OF PROSTHETIC HEART VALVE: Chronic | ICD-10-CM

## 2021-04-09 ENCOUNTER — APPOINTMENT (OUTPATIENT)
Dept: RADIATION ONCOLOGY | Facility: CLINIC | Age: 76
End: 2021-04-09
Payer: MEDICARE

## 2021-04-09 PROCEDURE — 99024 POSTOP FOLLOW-UP VISIT: CPT

## 2021-04-09 NOTE — REASON FOR VISIT
[Post-Treatment Evaluation] : post-treatment evaluation for [Rectal Cancer] : cancer [Spouse] : spouse

## 2021-04-13 ENCOUNTER — APPOINTMENT (OUTPATIENT)
Dept: HEMATOLOGY ONCOLOGY | Facility: CLINIC | Age: 76
End: 2021-04-13

## 2021-04-13 NOTE — HISTORY OF PRESENT ILLNESS
[FreeTextEntry1] : This is a 74-year-old male with T1Nx rectal cancer with LVI, G2-3 s/p resection here for adjuvant RT.\par \par On 10/15/2020, patient underwent transrectal excision of the low rectal lesion. Case also reviewed at GI tumor board. This was not an anal canal lesion, but a low lying rectal lesion. \par Pathology: \par - Invasive moderately to poorly differentiated adenocarcinoma\par - Margins are negative for tumor\par - AJCC stage pT1\par - Lymph-Vascular Invasion: Present\par - Perineural Invasion: Not identified\par \par Last OTV note during RT: 3/5 fractions of RT completed. No abd pain, rectal bleeding, nausea. \par \par Interval 4/9/2021: Occasional diarrhea. No abd pain, bloody stool. Followed up with Surgery for anal fistula, which is getting better. Will see Med Onc in 2 weeks.

## 2021-04-29 ENCOUNTER — APPOINTMENT (OUTPATIENT)
Dept: HEMATOLOGY ONCOLOGY | Facility: CLINIC | Age: 76
End: 2021-04-29
Payer: MEDICARE

## 2021-04-29 ENCOUNTER — RESULT REVIEW (OUTPATIENT)
Age: 76
End: 2021-04-29

## 2021-04-29 ENCOUNTER — LABORATORY RESULT (OUTPATIENT)
Age: 76
End: 2021-04-29

## 2021-04-29 VITALS
BODY MASS INDEX: 26.85 KG/M2 | TEMPERATURE: 97 F | SYSTOLIC BLOOD PRESSURE: 155 MMHG | RESPIRATION RATE: 16 BRPM | OXYGEN SATURATION: 100 % | WEIGHT: 163.14 LBS | HEIGHT: 65.51 IN | HEART RATE: 89 BPM | DIASTOLIC BLOOD PRESSURE: 102 MMHG

## 2021-04-29 LAB
ALBUMIN SERPL ELPH-MCNC: 4.3 G/DL
ALP BLD-CCNC: 65 U/L
ALT SERPL-CCNC: 24 U/L
ANION GAP SERPL CALC-SCNC: 6 MMOL/L
APPEARANCE: CLEAR
AST SERPL-CCNC: 25 U/L
BACTERIA: NEGATIVE
BASOPHILS # BLD AUTO: 0.01 K/UL — SIGNIFICANT CHANGE UP (ref 0–0.2)
BASOPHILS NFR BLD AUTO: 0.2 % — SIGNIFICANT CHANGE UP (ref 0–2)
BILIRUB SERPL-MCNC: 1.4 MG/DL
BILIRUBIN URINE: NEGATIVE
BLOOD URINE: NEGATIVE
BUN SERPL-MCNC: 23 MG/DL
CALCIUM SERPL-MCNC: 9.7 MG/DL
CEA SERPL-MCNC: 2.1 NG/ML
CHLORIDE SERPL-SCNC: 103 MMOL/L
CO2 SERPL-SCNC: 26 MMOL/L
COLOR: NORMAL
CREAT SERPL-MCNC: 1.06 MG/DL
EOSINOPHIL # BLD AUTO: 0.11 K/UL — SIGNIFICANT CHANGE UP (ref 0–0.5)
EOSINOPHIL NFR BLD AUTO: 2.2 % — SIGNIFICANT CHANGE UP (ref 0–6)
GLUCOSE QUALITATIVE U: NEGATIVE
GLUCOSE SERPL-MCNC: 116 MG/DL
HCT VFR BLD CALC: 38.8 % — LOW (ref 39–50)
HGB BLD-MCNC: 13.6 G/DL — SIGNIFICANT CHANGE UP (ref 13–17)
HYALINE CASTS: 0 /LPF
IMM GRANULOCYTES NFR BLD AUTO: 0.4 % — SIGNIFICANT CHANGE UP (ref 0–1.5)
KETONES URINE: NEGATIVE
LEUKOCYTE ESTERASE URINE: NEGATIVE
LYMPHOCYTES # BLD AUTO: 1.49 K/UL — SIGNIFICANT CHANGE UP (ref 1–3.3)
LYMPHOCYTES # BLD AUTO: 29.3 % — SIGNIFICANT CHANGE UP (ref 13–44)
MCHC RBC-ENTMCNC: 33.2 PG — SIGNIFICANT CHANGE UP (ref 27–34)
MCHC RBC-ENTMCNC: 35.1 G/DL — SIGNIFICANT CHANGE UP (ref 32–36)
MCV RBC AUTO: 94.6 FL — SIGNIFICANT CHANGE UP (ref 80–100)
MICROSCOPIC-UA: NORMAL
MONOCYTES # BLD AUTO: 0.55 K/UL — SIGNIFICANT CHANGE UP (ref 0–0.9)
MONOCYTES NFR BLD AUTO: 10.8 % — SIGNIFICANT CHANGE UP (ref 2–14)
NEUTROPHILS # BLD AUTO: 2.91 K/UL — SIGNIFICANT CHANGE UP (ref 1.8–7.4)
NEUTROPHILS NFR BLD AUTO: 57.1 % — SIGNIFICANT CHANGE UP (ref 43–77)
NITRITE URINE: NEGATIVE
NRBC # BLD: 0 /100 WBCS — SIGNIFICANT CHANGE UP (ref 0–0)
PH URINE: 6.5
PLATELET # BLD AUTO: 145 K/UL — LOW (ref 150–400)
POTASSIUM SERPL-SCNC: 4.6 MMOL/L
PROT SERPL-MCNC: 6.8 G/DL
PROTEIN URINE: NEGATIVE
PSA FREE FLD-MCNC: 36 %
PSA FREE SERPL-MCNC: 0.48 NG/ML
PSA SERPL-MCNC: 1.33 NG/ML
RBC # BLD: 4.1 M/UL — LOW (ref 4.2–5.8)
RBC # FLD: 14.5 % — SIGNIFICANT CHANGE UP (ref 10.3–14.5)
RED BLOOD CELLS URINE: 2 /HPF
SODIUM SERPL-SCNC: 135 MMOL/L
SPECIFIC GRAVITY URINE: 1.02
SQUAMOUS EPITHELIAL CELLS: 0 /HPF
UROBILINOGEN URINE: NORMAL
WBC # BLD: 5.09 K/UL — SIGNIFICANT CHANGE UP (ref 3.8–10.5)
WBC # FLD AUTO: 5.09 K/UL — SIGNIFICANT CHANGE UP (ref 3.8–10.5)
WHITE BLOOD CELLS URINE: 1 /HPF

## 2021-04-29 PROCEDURE — 99214 OFFICE O/P EST MOD 30 MIN: CPT

## 2021-04-29 NOTE — CONSULT LETTER
[Dear  ___] : Dear  [unfilled], [Please see my note below.] : Please see my note below. [Consult Closing:] : Thank you very much for allowing me to participate in the care of this patient.  If you have any questions, please do not hesitate to contact me. [Sincerely,] : Sincerely, [DrDrake  ___] : Dr. HEBERT [___] : [unfilled] [Courtesy Letter:] : I had the pleasure of seeing your patient, [unfilled], in my office today. [DrDrake ___] : Dr. HEBERT

## 2021-04-29 NOTE — HISTORY OF PRESENT ILLNESS
[Disease: _____________________] : Disease: [unfilled] [T: ___] : T[unfilled] [N: ___] : N[unfilled] [M: ___] : M[unfilled] [AJCC Stage: ____] : AJCC Stage: [unfilled] [de-identified] : Mr. Guillaume is a 74 year old male with DM2, HTN, AFib presenting to the office for an initial consultation of rectal CA.\par \par Patient reports BRBPR with almost every bowel movement.  He has a history of constipation, main concern is sensation of rectal swelling, and a hard lesion when places finger in rectum.  This is different from prior soft hemorrhoids. He sometimes must manually removes stool from his rectum. He brought this to the attention of his PCP, who palpated a rectal tumor, and referred to surgeon.\par \par Patient was seen by Dr. Christian Connelly (Colorectal Surgery) who performed JAYLIN and anoscopy which revealed a friable mobile firm distal/anal lesion approximately 2 cm in diameter in the left anterior position.  This lesion was biopsied on 9/4/2020. Pathology demonstrated adenocarcinoma. Dr. Connelly plans to do a colonoscopy.\par \par CT chest,abdomen/pelvis on 9/11/2020 demonstrated no evidence of metastatic disease in the chest, abdomen or pelvis\par MRI pelvis scheduled 9/15/2020.\par \par Patient had recent episode of red toe after a long car trip. The right big toe was painful and then it improved over a few days. He never had gout before. This had happened once before July 2020 as well. \par He notes that 36 hours after the CT scan, he had shaking chills (rigors), nausea with vomiting x 1 and then this subsided. He also had a drop in blood pressure to SBP upper 80's\par He had IV contrast and oral contrast only. \par He has no focal localizing symptoms for infection, such as cough, abdominal pain, dysuria, or diarrhea. He has baseline urinary frequency. \par \par 12/29/2020\par Early 9/2020, he developed fever and bacteremia with Strep mitis. He then needed 4 weeks of IV antibiotics. \par On 10/15/2020, patient underwent transrectal excision of the low rectal lesion. Case also reviewed at GI tumor board. This was not an anal canal lesion, but a low lying rectal lesion. \par Pathology: \par - Invasive moderately to poorly differentiated adenocarcinoma\par - Margins are negative for tumor\par - AJCC stage pT1\par - Lymph-Vascular Invasion:  Present\par - Perineural Invasion:  Not identified\par \par MLH1: Intact nuclear expression\par MSH2:  Loss of nuclear expression\par MSH6:  Intact nuclear expression\par PMS2:  Intact nuclear expression\par \par On 10/21 he was readmitted for abscess - needed I&D. He received antibiotics for 2 weeks.\par He is now healing from that and still has a small Penrose drain in the perineal tissue.\par \par 4/29/21\par Patient had SCRT 2/22 - 2/26/21\par Had post radiation irritation and this is improving at this time.\par Stool was more loose and increased urination.\par The incision closed recently - seen by Dr. Connelly. \par Had been using Sitz and saline.\par Patient had second vaccine 3/5/21 (Moderna) [de-identified] : adenocarcinoma [de-identified] : Colorectal Surgeon: Christian Connelly  [phone]\par Internist / GI:  Raciel Perkins [phone]\par Cardiology: Jona Draper [phone]\par Opthalmology: Michael Rivera [phone]\par Macular degeneration ophthalmology: Zeferino Raman  [phone]\par Cardiac surgeon at [facility]: Zion Weathers [phone]\par \par Daughter: Dr. Nicole Guillaume [phone]\par Daughter: Nimo Rodriguez\par Quoc - patient [phone]\par Lori - wife home phone [phone]

## 2021-04-29 NOTE — PHYSICAL EXAM
[Restricted in physically strenuous activity but ambulatory and able to carry out work of a light or sedentary nature] : Status 1- Restricted in physically strenuous activity but ambulatory and able to carry out work of a light or sedentary nature, e.g., light house work, office work [Normal] : affect appropriate [de-identified] : atrial fibrillation; no murmur [FreeTextEntry1] : healing wound

## 2021-04-29 NOTE — REVIEW OF SYSTEMS
[Negative] : Allergic/Immunologic [FreeTextEntry2] : appetite is better and weight is up somewhat. [FreeTextEntry7] : no more diarrhea, no blood. He does note incomplete evacuation.  [FreeTextEntry8] : notes pain in the penis when urinates, and the void is not completed and has post-void urination.

## 2021-05-02 ENCOUNTER — FORM ENCOUNTER (OUTPATIENT)
Age: 76
End: 2021-05-02

## 2021-05-02 LAB — BACTERIA UR CULT: NORMAL

## 2021-05-24 ENCOUNTER — APPOINTMENT (OUTPATIENT)
Dept: UROLOGY | Facility: CLINIC | Age: 76
End: 2021-05-24
Payer: MEDICARE

## 2021-05-24 VITALS
HEART RATE: 108 BPM | HEIGHT: 65 IN | TEMPERATURE: 97.6 F | SYSTOLIC BLOOD PRESSURE: 158 MMHG | WEIGHT: 160 LBS | DIASTOLIC BLOOD PRESSURE: 93 MMHG | BODY MASS INDEX: 26.66 KG/M2 | RESPIRATION RATE: 18 BRPM

## 2021-05-24 VITALS
HEIGHT: 65 IN | HEART RATE: 96 BPM | SYSTOLIC BLOOD PRESSURE: 161 MMHG | BODY MASS INDEX: 26.66 KG/M2 | DIASTOLIC BLOOD PRESSURE: 71 MMHG | WEIGHT: 160 LBS | TEMPERATURE: 97.6 F | RESPIRATION RATE: 18 BRPM

## 2021-05-24 DIAGNOSIS — H33.22 SEROUS RETINAL DETACHMENT, LEFT EYE: ICD-10-CM

## 2021-05-24 LAB
ALP BLD-CCNC: 60 U/L
ANION GAP SERPL CALC-SCNC: 11 MMOL/L
APPEARANCE: CLEAR
BACTERIA: NEGATIVE
BILIRUBIN URINE: NEGATIVE
BLOOD URINE: NEGATIVE
BUN SERPL-MCNC: 27 MG/DL
CALCIUM SERPL-MCNC: 9.3 MG/DL
CHLORIDE SERPL-SCNC: 101 MMOL/L
CO2 SERPL-SCNC: 26 MMOL/L
COLOR: NORMAL
CREAT SERPL-MCNC: 1.1 MG/DL
ESTIMATED AVERAGE GLUCOSE: 128 MG/DL
GLUCOSE QUALITATIVE U: NEGATIVE
GLUCOSE SERPL-MCNC: 98 MG/DL
HBA1C MFR BLD HPLC: 6.1 %
HYALINE CASTS: 0 /LPF
KETONES URINE: NEGATIVE
LEUKOCYTE ESTERASE URINE: NEGATIVE
MICROSCOPIC-UA: NORMAL
NITRITE URINE: NEGATIVE
PH URINE: 6.5
POTASSIUM SERPL-SCNC: 4.2 MMOL/L
PROTEIN URINE: NEGATIVE
PSA SERPL-MCNC: 1 NG/ML
RED BLOOD CELLS URINE: 1 /HPF
SODIUM SERPL-SCNC: 137 MMOL/L
SPECIFIC GRAVITY URINE: 1.01
SQUAMOUS EPITHELIAL CELLS: 0 /HPF
UROBILINOGEN URINE: NORMAL
WHITE BLOOD CELLS URINE: 0 /HPF

## 2021-05-24 PROCEDURE — 88112 CYTOPATH CELL ENHANCE TECH: CPT | Mod: 26

## 2021-05-24 PROCEDURE — 99204 OFFICE O/P NEW MOD 45 MIN: CPT

## 2021-05-24 NOTE — PHYSICAL EXAM
[General Appearance - Well Developed] : well developed [Normal Appearance] : normal appearance [General Appearance - In No Acute Distress] : no acute distress [Edema] : no peripheral edema [Respiration, Rhythm And Depth] : normal respiratory rhythm and effort [Exaggerated Use Of Accessory Muscles For Inspiration] : no accessory muscle use [Abdomen Soft] : soft [Abdomen Tenderness] : non-tender [] : no rash [No Focal Deficits] : no focal deficits [Oriented To Time, Place, And Person] : oriented to person, place, and time [Affect] : the affect was normal [Mood] : the mood was normal [Not Anxious] : not anxious [FreeTextEntry1] : Digital rectal exam found no suspicious rectal masses. No external hemorrhoids. Scar on left side anteriorly around 5:00.  No rectal mucosal lesions. Anal tone is normal. The prostate is non tender, with normal texture, and no nodules. Borders a little obscured. Decreased compliance in front anterior portion of rectal wall. It is a 15 gram transurethral resection size. No gross blood on examining fingers.

## 2021-05-24 NOTE — REVIEW OF SYSTEMS
[Eyesight Problems] : eyesight problems [Dry Eyes] : dryness of the eyes [Shortness Of Breath] : shortness of breath [Cough] : cough [see HPI] : see HPI [Urine Infection (bladder/kidney)] : bladder/kidney infection [Pain during urination] : pain during urination [Pain after urination] : pain after urination [Urine retention] : urine retention [Wake up at night to urinate  How many times?  ___] : wakes up to urinate [unfilled] times during the night [Strong urge to urinate] : strong urge to urinate [Bladder pressure] : experiences bladder pressure [Strain or push to urinate] : strain or push to urinate [Leakage of urine with urgency] : leakage of urine with urgency [Negative] : Heme/Lymph

## 2021-05-24 NOTE — LETTER BODY
[Dear  ___] : Dear  [unfilled], [Consult Letter:] : I had the pleasure of evaluating your patient, [unfilled]. [Please see my note below.] : Please see my note below. [Consult Closing:] : Thank you very much for allowing me to participate in the care of this patient.  If you have any questions, please do not hesitate to contact me. [Sincerely,] : Sincerely, [DrDrake  ___] : Dr. HEBETR [FreeTextEntry2] : Dr. Raciel Dietz\par [address] [place] [address]\[place] [address] [FreeTextEntry1] : 05/24/2021: Mr. Guillaume is a 74y/o M presenting today for evaluation of urinary frequency and incomplete emptying PMHx of rectal cancer s/p transanal rectal resection 10/2020 and radiation therapy 2/2021. Has not seen urologist as he was told not to while he had healing surgical scar in rectum. States he is now able to have rectal exam if needed. Notes he has been on Tamsulosin once daily since 2004, but was increased to twice daily for frequent urination after the rectal surgery. Had woken up every 2 hours to urinate with urge, but did not leak. Notes he double voids with small volume the second time. Stream is strong. Takes Tamsulosin in afternoon and night. Denies dysuria. Urinates frequently during the day but is tolerable as he is able to reach the toilet on time. BMs were irregular after radiation, but is now normal and going 2x a day with soft stools. Takes Citrucel. PSHx mitral valve replacement, CABG, cardiac ablation. Takes Warfarin. Two daughters. One daughter is a pediatrician and the other is a teacher. \par \par Digital rectal exam found no suspicious rectal masses. No external hemorrhoids. Scar on left side anteriorly around 5:00.  No rectal mucosal lesions. Anal tone is normal. The prostate is non tender, with normal texture, and no nodules. Borders a little obscured. Decreased compliance in front anterior portion of rectal wall. It is a 15 gram transurethral resection size. No gross blood on examining fingers. \par \par I prescribed the patient Trospium 20 mg, one tablet every morning, for frequent urination. I informed the patient there may be constipation and dry mouth as a side effect. \par \par Patient gave permission to speak to daughter who is a pediatrician about patient's medical history. \par \par RTO in 2 weeks for reassessment.  [FreeTextEntry3] : Daniel Rivera MD, PhD

## 2021-05-24 NOTE — ASSESSMENT
[FreeTextEntry1] : 05/24/2021: Mr. Guillaume is a 74y/o M presenting today for evaluation of urinary frequency and incomplete emptying PMHx of rectal cancer s/p transanal rectal resection 10/2020 and radiation therapy 2/2021. Has not seen urologist as he was told not to while he had healing surgical scar in rectum. States he is now able to have rectal exam if needed. Notes he has been on Tamsulosin once daily since 2004, but was increased to twice daily for frequent urination after the rectal surgery. Had woken up every 2 hours to urinate with urge, but did not leak. Notes he double voids with small volume the second time. Stream is strong. Takes Tamsulosin in afternoon and night. Denies dysuria. Urinates frequently during the day but is tolerable as he is able to reach the toilet on time. BMs were irregular after radiation, but is now normal and going 2x a day with soft stools. Takes Citrucel. PSHx mitral valve replacement, CABG, cardiac ablation. Takes Warfarin. Two daughters. One daughter is a pediatrician and the other is a teacher. \par \par Digital rectal exam found no suspicious rectal masses. No external hemorrhoids. Scar on left side anteriorly around 5:00.  No rectal mucosal lesions. Anal tone is normal. The prostate is non tender, with normal texture, and no nodules. Borders a little obscured. Decreased compliance in front anterior portion of rectal wall. It is a 15 gram transurethral resection size. No gross blood on examining fingers. \par \par I prescribed the patient Trospium 20 mg, one tablet every morning, for frequent urination. I informed the patient there may be constipation and dry mouth as a side effect. \par \par Patient gave permission to speak to daughter who is a pediatrician about patient's medical history. \par \par RTO in 3 weeks for reassessment. \par \par Preparation, in-person office visit, and coordination of care took: 45 minutes

## 2021-05-24 NOTE — HISTORY OF PRESENT ILLNESS
[FreeTextEntry1] : 05/24/2021: Mr. Guillaume is a 74y/o M presenting today for evaluation of urinary frequency and incomplete emptying PMHx of rectal cancer s/p transanal rectal resection 10/2020 and radiation therapy 2/2021. Has not seen urologist as he was told not to while he had healing surgical scar in rectum. States he is now able to have rectal exam if needed. Notes he has been on Tamsulosin once daily since 2004, but was increased to twice daily for frequent urination after the rectal surgery. Had woken up every 2 hours to urinate with urge, but did not leak. Notes he double voids with small volume the second time. Stream is strong. Takes Tamsulosin in afternoon and night. Denies dysuria. Urinates frequently during the day but is tolerable as he is able to reach the toilet on time. BMs were irregular after radiation, but is now normal and going 2x a day with soft stools. Takes Citrucel. PSHx mitral valve replacement, CABG, cardiac ablation. Takes Warfarin. Two daughters. One daughter is a pediatrician and the other is a teacher.

## 2021-05-24 NOTE — LETTER HEADER
[FreeTextEntry3] : Daniel Rivera MD, PhD\par [facility]\par [address]\par [address] [place] [address]\[place] [address]

## 2021-05-24 NOTE — ADDENDUM
[FreeTextEntry1] : I, Iqra Dozierin, acted solely as a scribe for Dr. Daniel Rivera on this date 05/24/2021.\par \par All medical record entries made by the Scribe were at my, Dr. Daniel Rivera, direction and personally dictated by me on 05/24/2021. I have reviewed the chart and agree that the record accurately reflects my personal performance of the history, physical exam, assessment and plan.  I have also personally directed, reviewed and agreed with the chart.

## 2021-05-24 NOTE — H&P ADULT - NSHPPOADEEPVENOUSTHROMB_GEN_A_CORE
Pt arrived to ED via EMS from home. Per EMS pt c/o bilateral leg swelling.      Per EMS   152/78  110  20  97%   97.2 no

## 2021-05-29 LAB
BACTERIA UR CULT: NORMAL
TESTOST BND SERPL-MCNC: 6.2 PG/ML
TESTOST SERPL-MCNC: 398.7 NG/DL
URINE CYTOLOGY: NORMAL

## 2021-06-07 ENCOUNTER — NON-APPOINTMENT (OUTPATIENT)
Age: 76
End: 2021-06-07

## 2021-06-08 ENCOUNTER — APPOINTMENT (OUTPATIENT)
Dept: SURGERY | Facility: CLINIC | Age: 76
End: 2021-06-08
Payer: MEDICARE

## 2021-06-08 VITALS
OXYGEN SATURATION: 96 % | DIASTOLIC BLOOD PRESSURE: 88 MMHG | HEART RATE: 73 BPM | TEMPERATURE: 98 F | RESPIRATION RATE: 17 BRPM | SYSTOLIC BLOOD PRESSURE: 140 MMHG

## 2021-06-08 PROCEDURE — 99024 POSTOP FOLLOW-UP VISIT: CPT

## 2021-06-08 RX ORDER — TROSPIUM CHLORIDE 20 MG/1
20 TABLET, FILM COATED ORAL
Qty: 1 | Refills: 11 | Status: DISCONTINUED | COMMUNITY
Start: 2021-05-24 | End: 2021-06-08

## 2021-06-08 NOTE — CONSULT LETTER
[Dear  ___] : Dear  [unfilled], [Consult Letter:] : I had the pleasure of evaluating your patient, [unfilled]. [Please see my note below.] : Please see my note below. [Consult Closing:] : Thank you very much for allowing me to participate in the care of this patient.  If you have any questions, please do not hesitate to contact me. [Sincerely,] : Sincerely, [FreeTextEntry3] : Christian Connelly M.D., F.A.C.S, F.A.S.C.R.S

## 2021-06-08 NOTE — HISTORY OF PRESENT ILLNESS
[FreeTextEntry1] : lesion at the anorectal junction. Biopsy demonstrated adenocarcinoma. The lesion was felt to be either T1 versus early T2 on preoperative imaging. The lesion was mobile approximately 2 cm in diameter, located in the anterior position just at the level of the dentate line. There was no evidence of metastatic disease on preoperative workup, therefore transanal excision was performed on 10/15/20. \par \par His initial postoperative course was uncomplicated; however, the patient developed worsening pain and fever (10/21/20). He was instructed to go to the emergency room because of his worsening symptoms. On examination, the patient had swelling and tenderness of the perineal body and edema of the scrotum. A stat CT of the abdomen and pelvis demonstrated subcutaneous air in the area of the perineal body which did not appear to obviously extend to scrotum or lower extremities or retroperitoneum. Therefore, emergent rectal examination under anesthesia was recommended with the concern for peritoneal sepsis. The patient also had a fever, rigors, and an elevated white count. The patient was brought to the operating room emergently for examination under anesthesia and incision, drainage, and debridement of his perineum. 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 Margins negative for tumor. pT1.\par When seen on 2/12/21 with a persistent anterior midline anal fistula status post transanal excision of a distal rectal cancer. He had completed adjuvant external beam radiation, anal fissure healed, normal continence - f/u in 2 months.\par \par Today pt reports feeling well. Last radiation treatment was in February. MRI on Friday for s/p 3 month radiation. Pt reports good appetite, no nausea or vomiting. PT reports frequent urination. Pt reports 2 BMs daily, normal formed, no pain or rectal bleeding. No drainage, no abdominal pain.  Recent CEA level is normal.  He has normal continence to gas and stool.\par

## 2021-06-08 NOTE — ASSESSMENT
[FreeTextEntry1] : In summary the patient is doing well.  He should have a surveillance colonoscopy by either myself or Dr. Perkins in September.  I will review his upcoming MRI and he should follow-up with me in 6 months for surveillance anoscopy in the office.

## 2021-06-08 NOTE — PHYSICAL EXAM
[Abdomen Tenderness] : ~T No ~M abdominal tenderness [de-identified] : Perianal inspection reveals a healed scar in the anterior midline.  Anal rectal tone is normal.  There is no palpable mass on digital exam.  Anoscopy reveals a dimple in the anterior midline at the level of the dentate line with no visible residual or recurrent tumor

## 2021-06-08 NOTE — PHYSICAL EXAM
[Abdomen Tenderness] : ~T No ~M abdominal tenderness [de-identified] : Perianal inspection reveals a healed scar in the anterior midline.  Anal rectal tone is normal.  There is no palpable mass on digital exam.  Anoscopy reveals a dimple in the anterior midline at the level of the dentate line with no visible residual or recurrent tumor

## 2021-06-11 ENCOUNTER — APPOINTMENT (OUTPATIENT)
Dept: MRI IMAGING | Facility: IMAGING CENTER | Age: 76
End: 2021-06-11
Payer: MEDICARE

## 2021-06-11 ENCOUNTER — RX RENEWAL (OUTPATIENT)
Age: 76
End: 2021-06-11

## 2021-06-11 ENCOUNTER — OUTPATIENT (OUTPATIENT)
Dept: OUTPATIENT SERVICES | Facility: HOSPITAL | Age: 76
LOS: 1 days | End: 2021-06-11
Payer: MEDICARE

## 2021-06-11 DIAGNOSIS — C20 MALIGNANT NEOPLASM OF RECTUM: ICD-10-CM

## 2021-06-11 DIAGNOSIS — Z95.2 PRESENCE OF PROSTHETIC HEART VALVE: Chronic | ICD-10-CM

## 2021-06-11 PROCEDURE — 72197 MRI PELVIS W/O & W/DYE: CPT

## 2021-06-11 PROCEDURE — 72197 MRI PELVIS W/O & W/DYE: CPT | Mod: 26,MH

## 2021-06-11 PROCEDURE — A9585: CPT

## 2021-06-14 NOTE — CONSULT NOTE ADULT - ASSESSMENT
ASSESSMENT/RECOMMENDATIONS:  Pt is 74M hx DM(a1c=6.2%), CAD, AFib, prosthetic mitral valve (2015), HTN, macular degeneration, hemorrhoids, localized rectal cancer (dx 9/2020), presenting with chills for 10 days. Symptoms worsened over the last several days, which prompted him to present to hospital. Symptoms began 2 days after his cancer staging CT scans. He had one episode of vomiting 10 days ago. Pt has had several days of dry cough but denies any fever until 5 days ago. Denies any nausea or diarrhea. Denies any CP, SOB, abdominal pain. Denies any dysuria, hematuria, or blood in the stool, although he states he usually has difficulty passing bowel movements. 5 days ago, he developed fever that was responsive Tylenol. 2 days ago, pt had a colonoscopy that he tolerated well. The day following his colonoscopy, he developed shaking and severe chills. Pt is planned for surgical resection of his rectal cancer in the upcoming weeks.    Zajx=325.4 (9/23)  Leukocytosis WBC: 7>21>13  Band=5% (9/23)  EUGENE, resolved, now kidney function normal  UA, CXR negative  BCx (9/23): Streptococcus mitis/oralis group  TTE did not report vegetation    #Streptococcus mitis/oralis group bacteremia:  - Ceftriaxone 2g q24h (since 9/24-)  -     #PENDING RECS. PLEASE WAIT FOR FINAL RECS AFTER DISCUSSION WITH ATTENDING#    Javi Duran MD, PGY4  Fellow, Infectious Diseases  Pager: [phone]  If no response, after 5pm and on Weekends: Call [phone] ASSESSMENT/RECOMMENDATIONS:  Pt is 74M hx DM(a1c=6.2%), CAD, AFib, prosthetic mitral valve (2015), HTN, macular degeneration, hemorrhoids, localized rectal cancer (dx 9/2020), presenting with chills for 10 days.     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  Rectal biopsy on 9/4/2020 without abx  Started having severe chills on 9/13  Had colonscopy on 9/21  Went to ED with severe shaking chills on 9/23    Korb=417.4 (9/23)  Leukocytosis WBC: 7>21>13  Band=5% (9/23)  EUGENE, resolved, now kidney function normal  UA, CXR negative  BCx (9/23): Streptococcus mitis/oralis group  TTE did not report vegetation    #Streptococcus mitis/oralis group bacteremia:  - Ceftriaxone 2g q24h (since 9/24-)  -     #PENDING RECS. PLEASE WAIT FOR FINAL RECS AFTER DISCUSSION WITH ATTENDING#    Javi Duran MD, PGY4  Fellow, Infectious Diseases  Pager: [phone]  If no response, after 5pm and on Weekends: Call [phone] ASSESSMENT/RECOMMENDATIONS:  Pt is 74M hx DM(a1c=6.2%), CAD, AFib, prosthetic mitral valve (2015), HTN, macular degeneration, hemorrhoids, localized rectal cancer (dx 9/2020), presenting with chills for 10 days.     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 at [facility]  Rectal biopsy on 9/4/2020 without abx  Started having severe chills on 9/13  Had colonscopy on 9/21  Went to ED with severe shaking chills on 9/23    Ewvg=710.4 (9/23)  Leukocytosis WBC: 7>21>13  Band=5% (9/23)  EUGENE, resolved, now kidney function normal  UA, CXR negative  BCx (9/23): Streptococcus mitis/oralis group  TTE did not report vegetation    #Streptococcus mitis/oralis group bacteremia: source GI vs oral  - Follow up BCx (8/25) for PCN VANCE  - c/w Ceftriaxone 2g q24h (since 9/24-)  - With high grade bacteremia and prophetic bovine mitral valve, will need to treat 4-6 weeks of IV abx, preferentially 6 weeks as if having endocarditis  - Will give one dose of gentamicin 140mg IV today  - Will need REEMA next week to decide if he needs longer duration of gentamicin  - Will repeat BCx on 8/27 Sunday  - Will need dental evaluation    Javi Duran MD, PGY4   ID fellow  Pager: [phone]  After 5pm/weekends call [phone]    d/w Dr. Arnold Starkey conveyed to primary team attending negative...

## 2021-06-18 ENCOUNTER — NON-APPOINTMENT (OUTPATIENT)
Age: 76
End: 2021-06-18

## 2021-08-04 ENCOUNTER — APPOINTMENT (OUTPATIENT)
Dept: UROLOGY | Facility: CLINIC | Age: 76
End: 2021-08-04
Payer: MEDICARE

## 2021-08-04 DIAGNOSIS — N40.0 BENIGN PROSTATIC HYPERPLASIA WITHOUT LOWER URINARY TRACT SYMPMS: ICD-10-CM

## 2021-08-04 DIAGNOSIS — R04.0 EPISTAXIS: ICD-10-CM

## 2021-08-04 PROCEDURE — 99443: CPT | Mod: 95

## 2021-08-04 NOTE — REVIEW OF SYSTEMS
Received call from Denisse Arias at [facility]. Denisse Arias Eyesight Problems] : eyesight problems [Dry Eyes] : dryness of the eyes [Shortness Of Breath] : shortness of breath [Cough] : cough [see HPI] : see HPI [Urine Infection (bladder/kidney)] : bladder/kidney infection [Pain during urination] : pain during urination [Pain after urination] : pain after urination [Urine retention] : urine retention [Wake up at night to urinate  How many times?  ___] : wakes up to urinate [unfilled] times during the night [Strong urge to urinate] : strong urge to urinate [Bladder pressure] : experiences bladder pressure [Strain or push to urinate] : strain or push to urinate [Leakage of urine with urgency] : leakage of urine with urgency [Negative] : Heme/Lymph

## 2021-08-08 PROBLEM — R04.0 BLEEDING FROM THE NOSE: Status: ACTIVE | Noted: 2021-08-08

## 2021-08-08 NOTE — ADDENDUM
[FreeTextEntry1] : This note was authored by Stefanie Seaman working as a scribe for Dr.Gary Rivera. I, Dr. Daniel Rivera have reviewed the content of this note and confirm it is true and accurate. I personally performed the history and physical examination and made all the decisions 08/04/2021.

## 2021-08-08 NOTE — ASSESSMENT
[FreeTextEntry1] : 05/24/2021: Mr. Guillaume is a 74y/o M presenting today for evaluation of urinary frequency and incomplete emptying PMHx of rectal cancer s/p transanal rectal resection 10/2020 and radiation therapy 2/2021. Has not seen urologist as he was told not to while he had healing surgical scar in rectum. States he is now able to have rectal exam if needed. Notes he has been on Tamsulosin once daily since 2004, but was increased to twice daily for frequent urination after the rectal surgery. Had woken up every 2 hours to urinate with urge, but did not leak. Notes he double voids with small volume the second time. Stream is strong. Takes Tamsulosin in afternoon and night. Denies dysuria. Urinates frequently during the day but is tolerable as he is able to reach the toilet on time. BMs were irregular after radiation, but is now normal and going 2x a day with soft stools. Takes Citrucel. PSHx mitral valve replacement, CABG, cardiac ablation. Takes Warfarin. Two daughters. One daughter is a pediatrician and the other is a teacher. \par \par Digital rectal exam found no suspicious rectal masses. No external hemorrhoids. Scar on left side anteriorly around 5:00.  No rectal mucosal lesions. Anal tone is normal. The prostate is non tender, with normal texture, and no nodules. Borders a little obscured. Decreased compliance in front anterior portion of rectal wall. It is a 15 gram transurethral resection size. No gross blood on examining fingers. \par \par I prescribed the patient Trospium 20 mg, one tablet every morning, for frequent urination. I informed the patient there may be constipation and dry mouth as a side effect. \par Patient gave permission to speak to daughter who is a pediatrician about patient's medical history. \par RTO in 3 weeks for reassessment. \par \par 08/04/2021: The patient presents today for a follow up telephone visit for which he gave permission for. The patient has been taking Oxybutynin ER 10 mg once daily for urinary frequency in addition to tamsulosin 0.4 mg BID. The medication has shown significant improvement reducing his urinary frequency from once every 2 hours to once every 3 hours. He reports today that there is blood coming out of his left nostril repeatedly when he bends over to brush his teeth.  \par \par I informed the patient that with oxybutynin, the membranes in his nasal passage ways can become dry. I instructed him to come off it for two weeks. If there is no improvement, I suggested he see an ENT. If there is improvement and it stops being off oxybutynin, he should discontinue the medication and I will consider giving Tolterodine.\par \par Preparation, telephone visit, and coordination of care took : 21 minutes.

## 2021-08-08 NOTE — HISTORY OF PRESENT ILLNESS
[FreeTextEntry1] : 05/24/2021: Mr. Guillaume is a 74 y/o M presenting today for evaluation of urinary frequency and incomplete emptying PMHx of rectal cancer s/p transanal rectal resection 10/2020 and radiation therapy 2/2021. Has not seen urologist as he was told not to while he had healing surgical scar in rectum. States he is now able to have rectal exam if needed. Notes he has been on Tamsulosin once daily since 2004, but was increased to twice daily for frequent urination after the rectal surgery. Had woken up every 2 hours to urinate with urge, but did not leak. Notes he double voids with small volume the second time. Stream is strong. Takes Tamsulosin in afternoon and night. Denies dysuria. Urinates frequently during the day but is tolerable as he is able to reach the toilet on time. BMs were irregular after radiation, but is now normal and going 2x a day with soft stools. Takes Citrucel. PSHx mitral valve replacement, CABG, cardiac ablation. Takes Warfarin. Two daughters. One daughter is a pediatrician and the other is a teacher. \par \par 08/04/2021: The patient presents today for a follow up telephone visit for which he gave permission for. The patient has been taking Oxybutynin ER 10 mg once daily for urinary frequency in addition to tamsulosin 0.4 mg BID. The medication has shown significant improvement reducing his urinary frequency from once every 2 hours to once every 3 hours. He reports today that there is blood coming out of his left nostril repeatedly when he bends over to brush his teeth.

## 2021-08-09 ENCOUNTER — APPOINTMENT (OUTPATIENT)
Dept: UROLOGY | Facility: CLINIC | Age: 76
End: 2021-08-09

## 2021-08-10 ENCOUNTER — NON-APPOINTMENT (OUTPATIENT)
Age: 76
End: 2021-08-10

## 2021-09-01 ENCOUNTER — APPOINTMENT (OUTPATIENT)
Dept: HEMATOLOGY ONCOLOGY | Facility: CLINIC | Age: 76
End: 2021-09-01

## 2021-09-01 ENCOUNTER — RESULT REVIEW (OUTPATIENT)
Age: 76
End: 2021-09-01

## 2021-09-01 ENCOUNTER — OUTPATIENT (OUTPATIENT)
Dept: OUTPATIENT SERVICES | Facility: HOSPITAL | Age: 76
LOS: 1 days | Discharge: HOME | End: 2021-09-01

## 2021-09-01 DIAGNOSIS — Z95.2 PRESENCE OF PROSTHETIC HEART VALVE: Chronic | ICD-10-CM

## 2021-09-01 DIAGNOSIS — C20 MALIGNANT NEOPLASM OF RECTUM: ICD-10-CM

## 2021-09-01 LAB
BASOPHILS # BLD AUTO: 0.02 K/UL — SIGNIFICANT CHANGE UP (ref 0–0.2)
BASOPHILS NFR BLD AUTO: 0.4 % — SIGNIFICANT CHANGE UP (ref 0–2)
EOSINOPHIL # BLD AUTO: 0.08 K/UL — SIGNIFICANT CHANGE UP (ref 0–0.5)
EOSINOPHIL NFR BLD AUTO: 1.5 % — SIGNIFICANT CHANGE UP (ref 0–6)
HCT VFR BLD CALC: 38.1 % — LOW (ref 39–50)
HGB BLD-MCNC: 13.3 G/DL — SIGNIFICANT CHANGE UP (ref 13–17)
IMM GRANULOCYTES NFR BLD AUTO: 0.4 % — SIGNIFICANT CHANGE UP (ref 0–1.5)
LYMPHOCYTES # BLD AUTO: 1.31 K/UL — SIGNIFICANT CHANGE UP (ref 1–3.3)
LYMPHOCYTES # BLD AUTO: 24.5 % — SIGNIFICANT CHANGE UP (ref 13–44)
MCHC RBC-ENTMCNC: 33.6 PG — SIGNIFICANT CHANGE UP (ref 27–34)
MCHC RBC-ENTMCNC: 34.9 G/DL — SIGNIFICANT CHANGE UP (ref 32–36)
MCV RBC AUTO: 96.2 FL — SIGNIFICANT CHANGE UP (ref 80–100)
MONOCYTES # BLD AUTO: 0.51 K/UL — SIGNIFICANT CHANGE UP (ref 0–0.9)
MONOCYTES NFR BLD AUTO: 9.6 % — SIGNIFICANT CHANGE UP (ref 2–14)
NEUTROPHILS # BLD AUTO: 3.4 K/UL — SIGNIFICANT CHANGE UP (ref 1.8–7.4)
NEUTROPHILS NFR BLD AUTO: 63.6 % — SIGNIFICANT CHANGE UP (ref 43–77)
NRBC # BLD: 0 /100 WBCS — SIGNIFICANT CHANGE UP (ref 0–0)
PLATELET # BLD AUTO: 150 K/UL — SIGNIFICANT CHANGE UP (ref 150–400)
RBC # BLD: 3.96 M/UL — LOW (ref 4.2–5.8)
RBC # FLD: 12.5 % — SIGNIFICANT CHANGE UP (ref 10.3–14.5)
WBC # BLD: 5.34 K/UL — SIGNIFICANT CHANGE UP (ref 3.8–10.5)
WBC # FLD AUTO: 5.34 K/UL — SIGNIFICANT CHANGE UP (ref 3.8–10.5)

## 2021-09-02 LAB
COVID-19 NUCLEOCAPSID  GAM ANTIBODY INTERPRETATION: NEGATIVE
COVID-19 SPIKE DOMAIN ANTIBODY INTERPRETATION: POSITIVE
SARS-COV-2 AB SERPL IA-ACNC: >250 U/ML
SARS-COV-2 AB SERPL QL IA: 0.08 INDEX

## 2021-09-17 DIAGNOSIS — R42 DIZZINESS AND GIDDINESS: ICD-10-CM

## 2021-10-21 ENCOUNTER — APPOINTMENT (OUTPATIENT)
Dept: INTERNAL MEDICINE | Facility: CLINIC | Age: 76
End: 2021-10-21
Payer: MEDICARE

## 2021-10-21 PROCEDURE — 90662 IIV NO PRSV INCREASED AG IM: CPT

## 2021-10-21 PROCEDURE — G0008: CPT

## 2021-11-02 NOTE — PHYSICAL THERAPY INITIAL EVALUATION ADULT - LIGHT TOUCH SENSATION, LUE, REHAB EVAL
Orthostatic vitals, NP notified.      11/02/21 0615 11/02/21 0623   Heart Rate   Heart Rate 72 (!) 51   Heart Rate Source Monitor Monitor   Heart Rhythm NSR  --    Blood Pressure   /74 93/56   MAP (mmHg) 91 (!) 69   BP Location RUE - Right upper extremity RUE - Right upper extremity   BP Method Automatic Automatic   Patient Position Supine Sitting   Activity Response Excessive pain Excessive pain;Dizziness       Orders received, see MAR.    within normal limits

## 2021-11-04 LAB
25(OH)D3 SERPL-MCNC: 29.6 NG/ML
ALBUMIN SERPL ELPH-MCNC: 4.2 G/DL
ALP BLD-CCNC: 67 U/L
ALT SERPL-CCNC: 23 U/L
ANION GAP SERPL CALC-SCNC: 15 MMOL/L
APPEARANCE: CLEAR
AST SERPL-CCNC: 24 U/L
BASOPHILS # BLD AUTO: 0.02 K/UL
BASOPHILS NFR BLD AUTO: 0.4 %
BILIRUB SERPL-MCNC: 1 MG/DL
BILIRUBIN URINE: NEGATIVE
BLOOD URINE: NEGATIVE
BUN SERPL-MCNC: 18 MG/DL
CALCIUM SERPL-MCNC: 9.2 MG/DL
CHLORIDE SERPL-SCNC: 100 MMOL/L
CHOLEST SERPL-MCNC: 168 MG/DL
CO2 SERPL-SCNC: 22 MMOL/L
COLOR: NORMAL
COVID-19 SPIKE DOMAIN ANTIBODY INTERPRETATION: POSITIVE
CREAT SERPL-MCNC: 1.03 MG/DL
CREAT SPEC-SCNC: 74 MG/DL
EOSINOPHIL # BLD AUTO: 0.07 K/UL
EOSINOPHIL NFR BLD AUTO: 1.3 %
ESTIMATED AVERAGE GLUCOSE: 126 MG/DL
GLUCOSE QUALITATIVE U: NEGATIVE
GLUCOSE SERPL-MCNC: 114 MG/DL
HBA1C MFR BLD HPLC: 6 %
HCT VFR BLD CALC: 40.8 %
HDLC SERPL-MCNC: 45 MG/DL
HGB BLD-MCNC: 14.1 G/DL
IMM GRANULOCYTES NFR BLD AUTO: 0.4 %
KETONES URINE: NEGATIVE
LDLC SERPL CALC-MCNC: 78 MG/DL
LEUKOCYTE ESTERASE URINE: NEGATIVE
LYMPHOCYTES # BLD AUTO: 1.39 K/UL
LYMPHOCYTES NFR BLD AUTO: 26.7 %
MAN DIFF?: NORMAL
MCHC RBC-ENTMCNC: 33.7 PG
MCHC RBC-ENTMCNC: 34.6 GM/DL
MCV RBC AUTO: 97.4 FL
MICROALBUMIN 24H UR DL<=1MG/L-MCNC: 2 MG/DL
MICROALBUMIN/CREAT 24H UR-RTO: 27 MG/G
MONOCYTES # BLD AUTO: 0.64 K/UL
MONOCYTES NFR BLD AUTO: 12.3 %
NEUTROPHILS # BLD AUTO: 3.07 K/UL
NEUTROPHILS NFR BLD AUTO: 58.9 %
NITRITE URINE: NEGATIVE
NONHDLC SERPL-MCNC: 124 MG/DL
PH URINE: 7
PLATELET # BLD AUTO: 170 K/UL
POTASSIUM SERPL-SCNC: 4.5 MMOL/L
PROT SERPL-MCNC: 6.7 G/DL
PROTEIN URINE: NEGATIVE
PSA SERPL-MCNC: 1.12 NG/ML
RBC # BLD: 4.19 M/UL
RBC # FLD: 13.2 %
SARS-COV-2 AB SERPL IA-ACNC: >250 U/ML
SODIUM SERPL-SCNC: 136 MMOL/L
SPECIFIC GRAVITY URINE: 1.01
T4 FREE SERPL-MCNC: 1.4 NG/DL
TRIGL SERPL-MCNC: 229 MG/DL
TSH SERPL-ACNC: 3.08 UIU/ML
UROBILINOGEN URINE: NORMAL
WBC # FLD AUTO: 5.21 K/UL

## 2021-11-05 NOTE — ED ADULT NURSE NOTE - CAS EDN DISCHARGE ASSESSMENT
Dano Kennedy was seen and treated in our emergency department on 11/5/2021.  He may return to work on 11/08/2021.  Seen in the emergency department.  May return on Monday 11/8 if he feels improved.     If you have any questions or concerns, please don't hesitate to call.      Cynthia Edwards MD
Alert and oriented to person, place and time

## 2021-11-06 ENCOUNTER — APPOINTMENT (OUTPATIENT)
Dept: DISASTER EMERGENCY | Facility: CLINIC | Age: 76
End: 2021-11-06

## 2021-11-06 ENCOUNTER — LABORATORY RESULT (OUTPATIENT)
Age: 76
End: 2021-11-06

## 2021-11-08 ENCOUNTER — APPOINTMENT (OUTPATIENT)
Dept: INTERNAL MEDICINE | Facility: CLINIC | Age: 76
End: 2021-11-08
Payer: MEDICARE

## 2021-11-08 VITALS
WEIGHT: 164 LBS | OXYGEN SATURATION: 98 % | HEIGHT: 65 IN | TEMPERATURE: 98.2 F | SYSTOLIC BLOOD PRESSURE: 155 MMHG | BODY MASS INDEX: 27.32 KG/M2 | HEART RATE: 67 BPM | RESPIRATION RATE: 17 BRPM | DIASTOLIC BLOOD PRESSURE: 85 MMHG

## 2021-11-08 DIAGNOSIS — S66.811A STRAIN OF OTHER SPECIFIED MUSCLES, FASCIA AND TENDONS AT WRIST AND HAND LEVEL, RIGHT HAND, INITIAL ENCOUNTER: ICD-10-CM

## 2021-11-08 PROCEDURE — G0439: CPT

## 2021-11-08 RX ORDER — OXYBUTYNIN CHLORIDE 10 MG/1
10 TABLET, EXTENDED RELEASE ORAL
Qty: 90 | Refills: 0 | Status: DISCONTINUED | COMMUNITY
Start: 2021-06-08 | End: 2021-11-08

## 2021-11-08 RX ORDER — MECLIZINE HYDROCHLORIDE 12.5 MG/1
12.5 TABLET ORAL
Qty: 60 | Refills: 0 | Status: DISCONTINUED | COMMUNITY
Start: 2021-09-17 | End: 2021-11-08

## 2021-11-08 NOTE — PLAN
[FreeTextEntry1] : Annual exam\par had all vaccine\par recent colon/ cst and echo\par \par s/p valve replacement\par diabetes under good control\par med discussed and issue\par urine good\par hold edarbi day of procedure\par amoxil 4 cap 1 hour prior

## 2021-11-08 NOTE — HEALTH RISK ASSESSMENT
[Very Good] : ~his/her~  mood as very good [No] : No [Never (0 pts)] : Never (0 points) [No falls in past year] : Patient reported no falls in the past year [0] : 2) Feeling down, depressed, or hopeless: Not at all (0) [PHQ-2 Negative - No further assessment needed] : PHQ-2 Negative - No further assessment needed [None] : None [With Family] : lives with family [Retired] : retired [College] : College [Fully functional (bathing, dressing, toileting, transferring, walking, feeding)] : Fully functional (bathing, dressing, toileting, transferring, walking, feeding) [Fully functional (using the telephone, shopping, preparing meals, housekeeping, doing laundry, using] : Fully functional and needs no help or supervision to perform IADLs (using the telephone, shopping, preparing meals, housekeeping, doing laundry, using transportation, managing medications and managing finances) [Smoke Detector] : smoke detector [Carbon Monoxide Detector] : carbon monoxide detector [Seat Belt] :  uses seat belt [] : No [[id number]] : 0 [Change in mental status noted] : No change in mental status noted [Language] : denies difficulty with language [Behavior] : denies difficulty with behavior [Learning/Retaining New Information] : denies difficulty learning/retaining new information [Handling Complex Tasks] : denies difficulty handling complex tasks [Reasoning] : denies difficulty with reasoning [Spatial Ability and Orientation] : denies difficulty with spatial ability and orientation [Reports changes in hearing] : Reports no changes in hearing [Reports changes in vision] : Reports no changes in vision [Reports changes in dental health] : Reports no changes in dental health [Guns at Home] : no guns at home

## 2021-11-08 NOTE — HISTORY OF PRESENT ILLNESS
Patient presents with:  Sore Throat  Headache      HPI:     Tito Ceballos is a 9year old male who presents for evaluation of a chief complaint of sore throat, headache, and fever as high as 100.6 that started yesterday evening.   No difficulty swallowing Running Out of Food in the Last Year: Not on file      Ran Out of Food in the Last Year: Not on file  Transportation Needs:       Lack of Transportation (Medical): Not on file      Lack of Transportation (Non-Medical):  Not on file  Physical Activity:       wheezes    MDM/Assessment/Plan:   Orders for this encounter:    Orders Placed This Encounter      POCT Rapid Strep      Rapid SARS-CoV-2 by PCR          Order Specific Question: Release to patient          Answer: Immediate      POCT Rapid Strep      Amoxi [de-identified] : Annua exam\par ahd flu/ pneumonia\par had 3 covid\par \par sp rectal ca\par had rt\par urination good on 2 tamsulosin\par dizziness resolved\par had fall injured left hand pink finger unable to straighten\par slight sob when bend down\par steps wind\par

## 2021-11-11 ENCOUNTER — APPOINTMENT (OUTPATIENT)
Dept: INTERNAL MEDICINE | Facility: CLINIC | Age: 76
End: 2021-11-11
Payer: MEDICARE

## 2021-11-11 DIAGNOSIS — K57.30 DIVERTICULOSIS OF LARGE INTESTINE W/OUT PERFORATION OR ABSCESS W/OUT BLEEDING: ICD-10-CM

## 2021-11-11 PROCEDURE — 45378 DIAGNOSTIC COLONOSCOPY: CPT

## 2022-03-02 ENCOUNTER — OUTPATIENT (OUTPATIENT)
Dept: OUTPATIENT SERVICES | Facility: HOSPITAL | Age: 77
LOS: 1 days | Discharge: HOME | End: 2022-03-02

## 2022-03-02 DIAGNOSIS — Z95.2 PRESENCE OF PROSTHETIC HEART VALVE: Chronic | ICD-10-CM

## 2022-03-02 DIAGNOSIS — C20 MALIGNANT NEOPLASM OF RECTUM: ICD-10-CM

## 2022-03-02 NOTE — CONSULT LETTER
[Dear  ___] : Dear  [unfilled], [Courtesy Letter:] : I had the pleasure of seeing your patient, [unfilled], in my office today. [Please see my note below.] : Please see my note below. [Consult Closing:] : Thank you very much for allowing me to participate in the care of this patient.  If you have any questions, please do not hesitate to contact me. [Sincerely,] : Sincerely, [DrDrake  ___] : Dr. HEBERT [DrDrake ___] : Dr. HEBERT [___] : [unfilled]

## 2022-03-04 ENCOUNTER — RESULT REVIEW (OUTPATIENT)
Age: 77
End: 2022-03-04

## 2022-03-04 ENCOUNTER — APPOINTMENT (OUTPATIENT)
Dept: HEMATOLOGY ONCOLOGY | Facility: CLINIC | Age: 77
End: 2022-03-04
Payer: MEDICARE

## 2022-03-04 VITALS
HEART RATE: 70 BPM | OXYGEN SATURATION: 98 % | TEMPERATURE: 96.4 F | WEIGHT: 167.77 LBS | DIASTOLIC BLOOD PRESSURE: 80 MMHG | SYSTOLIC BLOOD PRESSURE: 147 MMHG | RESPIRATION RATE: 17 BRPM | BODY MASS INDEX: 27.62 KG/M2 | HEIGHT: 65.5 IN

## 2022-03-04 LAB
ALBUMIN SERPL ELPH-MCNC: 4.4 G/DL
ALP BLD-CCNC: 73 U/L
ALT SERPL-CCNC: 27 U/L
ANION GAP SERPL CALC-SCNC: 11 MMOL/L
AST SERPL-CCNC: 27 U/L
BASOPHILS # BLD AUTO: 0.02 K/UL — SIGNIFICANT CHANGE UP (ref 0–0.2)
BASOPHILS NFR BLD AUTO: 0.4 % — SIGNIFICANT CHANGE UP (ref 0–2)
BILIRUB SERPL-MCNC: 0.9 MG/DL
BUN SERPL-MCNC: 20 MG/DL
CALCIUM SERPL-MCNC: 9.2 MG/DL
CEA SERPL-MCNC: 1.5 NG/ML
CHLORIDE SERPL-SCNC: 101 MMOL/L
CO2 SERPL-SCNC: 25 MMOL/L
CREAT SERPL-MCNC: 1.06 MG/DL
EGFR: 72 ML/MIN/1.73M2
EOSINOPHIL # BLD AUTO: 0.07 K/UL — SIGNIFICANT CHANGE UP (ref 0–0.5)
EOSINOPHIL NFR BLD AUTO: 1.3 % — SIGNIFICANT CHANGE UP (ref 0–6)
GLUCOSE SERPL-MCNC: 119 MG/DL
HCT VFR BLD CALC: 41.6 % — SIGNIFICANT CHANGE UP (ref 39–50)
HGB BLD-MCNC: 14.3 G/DL — SIGNIFICANT CHANGE UP (ref 13–17)
IMM GRANULOCYTES NFR BLD AUTO: 0.2 % — SIGNIFICANT CHANGE UP (ref 0–1.5)
LYMPHOCYTES # BLD AUTO: 1.17 K/UL — SIGNIFICANT CHANGE UP (ref 1–3.3)
LYMPHOCYTES # BLD AUTO: 22 % — SIGNIFICANT CHANGE UP (ref 13–44)
MCHC RBC-ENTMCNC: 33.9 PG — SIGNIFICANT CHANGE UP (ref 27–34)
MCHC RBC-ENTMCNC: 34.4 G/DL — SIGNIFICANT CHANGE UP (ref 32–36)
MCV RBC AUTO: 98.6 FL — SIGNIFICANT CHANGE UP (ref 80–100)
MONOCYTES # BLD AUTO: 0.6 K/UL — SIGNIFICANT CHANGE UP (ref 0–0.9)
MONOCYTES NFR BLD AUTO: 11.3 % — SIGNIFICANT CHANGE UP (ref 2–14)
NEUTROPHILS # BLD AUTO: 3.45 K/UL — SIGNIFICANT CHANGE UP (ref 1.8–7.4)
NEUTROPHILS NFR BLD AUTO: 64.8 % — SIGNIFICANT CHANGE UP (ref 43–77)
NRBC # BLD: 0 /100 WBCS — SIGNIFICANT CHANGE UP (ref 0–0)
PLATELET # BLD AUTO: 168 K/UL — SIGNIFICANT CHANGE UP (ref 150–400)
POTASSIUM SERPL-SCNC: 5.2 MMOL/L
PROT SERPL-MCNC: 7 G/DL
RBC # BLD: 4.22 M/UL — SIGNIFICANT CHANGE UP (ref 4.2–5.8)
RBC # FLD: 13.1 % — SIGNIFICANT CHANGE UP (ref 10.3–14.5)
SODIUM SERPL-SCNC: 137 MMOL/L
WBC # BLD: 5.32 K/UL — SIGNIFICANT CHANGE UP (ref 3.8–10.5)
WBC # FLD AUTO: 5.32 K/UL — SIGNIFICANT CHANGE UP (ref 3.8–10.5)

## 2022-03-04 PROCEDURE — 99214 OFFICE O/P EST MOD 30 MIN: CPT

## 2022-03-04 NOTE — HISTORY OF PRESENT ILLNESS
[Disease: _____________________] : Disease: [unfilled] [T: ___] : T[unfilled] [N: ___] : N[unfilled] [M: ___] : M[unfilled] [AJCC Stage: ____] : AJCC Stage: [unfilled] [de-identified] : Mr. Guillaume is a 74 year old male with DM2, HTN, AFib presenting to the office for an initial consultation of rectal CA.\par \par Patient reports BRBPR with almost every bowel movement.  He has a history of constipation, main concern is sensation of rectal swelling, and a hard lesion when places finger in rectum.  This is different from prior soft hemorrhoids. He sometimes must manually removes stool from his rectum. He brought this to the attention of his PCP, who palpated a rectal tumor, and referred to surgeon.\par \par Patient was seen by Dr. Christian Connelly (Colorectal Surgery) who performed JAYLIN and anoscopy which revealed a friable mobile firm distal/anal lesion approximately 2 cm in diameter in the left anterior position.  This lesion was biopsied on 9/4/2020. Pathology demonstrated adenocarcinoma. Dr. Connelly plans to do a colonoscopy.\par \par CT chest,abdomen/pelvis on 9/11/2020 demonstrated no evidence of metastatic disease in the chest, abdomen or pelvis\par MRI pelvis scheduled 9/15/2020.\par \par Patient had recent episode of red toe after a long car trip. The right big toe was painful and then it improved over a few days. He never had gout before. This had happened once before July 2020 as well. \par He notes that 36 hours after the CT scan, he had shaking chills (rigors), nausea with vomiting x 1 and then this subsided. He also had a drop in blood pressure to SBP upper 80's\par He had IV contrast and oral contrast only. \par He has no focal localizing symptoms for infection, such as cough, abdominal pain, dysuria, or diarrhea. He has baseline urinary frequency. \par \par 12/29/2020\par Early 9/2020, he developed fever and bacteremia with Strep mitis. He then needed 4 weeks of IV antibiotics. \par On 10/15/2020, patient underwent transrectal excision of the low rectal lesion. Case also reviewed at GI tumor board. This was not an anal canal lesion, but a low lying rectal lesion. \par Pathology: \par - Invasive moderately to poorly differentiated adenocarcinoma\par - Margins are negative for tumor\par - AJCC stage pT1\par - Lymph-Vascular Invasion:  Present\par - Perineural Invasion:  Not identified\par \par MLH1: Intact nuclear expression\par MSH2:  Loss of nuclear expression\par MSH6:  Intact nuclear expression\par PMS2:  Intact nuclear expression\par \par On 10/21/20 he was readmitted for abscess - needed I&D. He received antibiotics for 2 weeks.\par He is now healing from that and still has a small Penrose drain in the perineal tissue.\par \par 3/4/22\par Patient had SCRT 2/22 - 2/26/21\par Patient had second vaccine 3/5/21 (Moderna)\par Last MRI 6/2021 showed resolving post-surgical changes.\par Colonoscopy 11/2021 with Dr Perkins was negative with 2 year follow-up recommended.\par Last visit with Dr Connelly was 6/2021, and is due to return.\par Genetic counseling and testing was negative for germline mutations.\par \par He does have intermittent SOB exertion that is related to valves and Afib.\par He notes cough that is intermittent every day.\par It is better with sip of water.\par He notes a lot of nasal discharge.\par COVID Booster 9/4/21 (Moderna) [de-identified] : adenocarcinoma [de-identified] : Colorectal Surgeon: Christian Connelly  [phone]\par Internist / GI:  Raciel Perkins [phone]\par Cardiology: Jona Draper [phone]\par Opthalmology: Michael Rivera [phone]\par Macular degeneration ophthalmology: Zeferino Raman  [phone]\par Cardiac surgeon at [facility]: Zion Weathers [phone]\par \par Daughter: Dr. Nicole Guillaume [phone]\par Daughter: Nimo Rodriguez\par Quoc - patient [phone]\par Lori - wife home phone [phone]

## 2022-03-04 NOTE — PHYSICAL EXAM
[Restricted in physically strenuous activity but ambulatory and able to carry out work of a light or sedentary nature] : Status 1- Restricted in physically strenuous activity but ambulatory and able to carry out work of a light or sedentary nature, e.g., light house work, office work [Normal] : affect appropriate [de-identified] : dry septum left > right; + PND [de-identified] : atrial fibrillation; no murmur

## 2022-03-16 ENCOUNTER — APPOINTMENT (OUTPATIENT)
Dept: MRI IMAGING | Facility: IMAGING CENTER | Age: 77
End: 2022-03-16
Payer: MEDICARE

## 2022-03-16 ENCOUNTER — OUTPATIENT (OUTPATIENT)
Dept: OUTPATIENT SERVICES | Facility: HOSPITAL | Age: 77
LOS: 1 days | End: 2022-03-16
Payer: MEDICARE

## 2022-03-16 ENCOUNTER — NON-APPOINTMENT (OUTPATIENT)
Age: 77
End: 2022-03-16

## 2022-03-16 DIAGNOSIS — Z95.2 PRESENCE OF PROSTHETIC HEART VALVE: Chronic | ICD-10-CM

## 2022-03-16 DIAGNOSIS — C20 MALIGNANT NEOPLASM OF RECTUM: ICD-10-CM

## 2022-03-16 PROCEDURE — 72197 MRI PELVIS W/O & W/DYE: CPT

## 2022-03-16 PROCEDURE — A9585: CPT

## 2022-03-16 PROCEDURE — 72197 MRI PELVIS W/O & W/DYE: CPT | Mod: 26,MH

## 2022-03-29 ENCOUNTER — APPOINTMENT (OUTPATIENT)
Dept: SURGERY | Facility: CLINIC | Age: 77
End: 2022-03-29
Payer: MEDICARE

## 2022-03-29 VITALS
OXYGEN SATURATION: 97 % | TEMPERATURE: 97.3 F | SYSTOLIC BLOOD PRESSURE: 145 MMHG | HEART RATE: 82 BPM | BODY MASS INDEX: 27.82 KG/M2 | RESPIRATION RATE: 16 BRPM | HEIGHT: 65 IN | WEIGHT: 167 LBS | DIASTOLIC BLOOD PRESSURE: 86 MMHG

## 2022-03-29 PROCEDURE — 99213 OFFICE O/P EST LOW 20 MIN: CPT

## 2022-03-29 NOTE — ASSESSMENT
[FreeTextEntry1] : In summary the patient has a history of T1  distal rectal carcinoma treated with transanal excision and postoperative radiation.  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  On surveillance imaging there is evidence of possible local recurrence.  I recommended transanal excision/biopsy.  The alternative would be EUS guided biopsy however upon discussion with GI it was felt that this lesion is likely too small for EUS guided biopsy.  I explained that if local recurrence is confirmed then options will include observation if negative margins were achieved versus an abdominal perineal resection.  I explained the risks benefits and alternatives of surgery including the risks of bleeding infection recurrence possible need for additional treatment.

## 2022-03-29 NOTE — HISTORY OF PRESENT ILLNESS
[FreeTextEntry1] : Quoc is a 76 year old male here for follow up visit. \par \par Lesion at the anorectal junction. Biopsy demonstrated adenocarcinoma. The lesion was felt to be either T1 versus early T2 on preoperative imaging. The lesion was mobile approximately 2 cm in diameter, located in the anterior position just at the level of the dentate line. There was no evidence of metastatic disease on preoperative workup, therefore transanal excision was performed on 10/15/20. \par \par His initial postoperative course was uncomplicated; however, the patient developed worsening pain and fever (10/21/20). He was instructed to go to the emergency room because of his worsening symptoms. On examination, the patient had swelling and tenderness of the perineal body and edema of the scrotum. A stat CT of the abdomen and pelvis demonstrated subcutaneous air in the area of the perineal body which did not appear to obviously extend to scrotum or lower extremities or retroperitoneum. Therefore, emergent rectal examination under anesthesia was recommended with the concern for peritoneal sepsis. The patient also had a fever, rigors, and an elevated white count. The patient was brought to the operating room emergently for examination under anesthesia and incision, drainage, and debridement of his perineum. 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 Margins negative for tumor. pT1.\par When seen on 2/12/21 with a persistent anterior midline anal fistula status post transanal excision of a distal rectal cancer. He had completed adjuvant external beam radiation, anal fissure healed, normal continence - f/u in 2 months.\par \par Last seen 6/8/21 - In summary the patient is doing well. He should have a surveillance colonoscopy by either myself or Dr. Perkins in September. I will review his upcoming MRI and he should follow-up with me in 6 months for surveillance anoscopy in the office.\par \par 6/15/21 MR Pelvis/Rectal/Anal - Post treatment changes in the anal canal. No evidence of recurrent disease.\par \par 3/16/22 MR Pelvis/Rectal/Anal - Since the prior examination 6/11/2021, the primary tumor shows: Incomplete response (likely residual tumor). Suspicious mesorectal lymph nodes: No. Suspicious extra mesorectal lymph nodes: No\par \par Today patient reports feeling well. Reports having 2-3 hard BMs daily, currrently taking Citrucell with some relief of symptoms. Denies bleeding or pain. Denies nausea, vomiting, fever or chills.

## 2022-03-29 NOTE — PHYSICAL EXAM
[Wheezing] : no wheezing was heard [No Rash or Lesion] : No rash or lesion [Alert] : alert [Calm] : calm [de-identified] : Soft, nontender, no palpable masses or hernias.  No inguinal adenopathy. [de-identified] : nad [de-identified] : Anal inspection digital rectal examination and anoscopy as well as flexible sigmoidoscopy reveal scarring in the distal rectum in the anterior position at the site of his previous transanal excision.  There is submucosal nodularity in the left anterior position corresponding to the possible site of recurrence seen on recent pelvic MRI.  Anal rectal tone is normal.  There is no ulceration or obvious mucosal abnormality other than scar. [de-identified] : nl

## 2022-04-01 ENCOUNTER — RESULT REVIEW (OUTPATIENT)
Age: 77
End: 2022-04-01

## 2022-04-01 ENCOUNTER — APPOINTMENT (OUTPATIENT)
Dept: HEMATOLOGY ONCOLOGY | Facility: CLINIC | Age: 77
End: 2022-04-01
Payer: MEDICARE

## 2022-04-01 ENCOUNTER — LABORATORY RESULT (OUTPATIENT)
Age: 77
End: 2022-04-01

## 2022-04-01 VITALS
TEMPERATURE: 97.2 F | RESPIRATION RATE: 16 BRPM | SYSTOLIC BLOOD PRESSURE: 167 MMHG | HEART RATE: 46 BPM | DIASTOLIC BLOOD PRESSURE: 90 MMHG | HEIGHT: 65 IN | WEIGHT: 163.89 LBS | OXYGEN SATURATION: 99 % | BODY MASS INDEX: 27.31 KG/M2

## 2022-04-01 LAB
BASOPHILS # BLD AUTO: 0.02 K/UL — SIGNIFICANT CHANGE UP (ref 0–0.2)
BASOPHILS NFR BLD AUTO: 0.5 % — SIGNIFICANT CHANGE UP (ref 0–2)
EOSINOPHIL # BLD AUTO: 0.04 K/UL — SIGNIFICANT CHANGE UP (ref 0–0.5)
EOSINOPHIL NFR BLD AUTO: 0.9 % — SIGNIFICANT CHANGE UP (ref 0–6)
HCT VFR BLD CALC: 40.6 % — SIGNIFICANT CHANGE UP (ref 39–50)
HGB BLD-MCNC: 14.1 G/DL — SIGNIFICANT CHANGE UP (ref 13–17)
IMM GRANULOCYTES NFR BLD AUTO: 0.2 % — SIGNIFICANT CHANGE UP (ref 0–1.5)
LYMPHOCYTES # BLD AUTO: 1.15 K/UL — SIGNIFICANT CHANGE UP (ref 1–3.3)
LYMPHOCYTES # BLD AUTO: 26 % — SIGNIFICANT CHANGE UP (ref 13–44)
MCHC RBC-ENTMCNC: 33.8 PG — SIGNIFICANT CHANGE UP (ref 27–34)
MCHC RBC-ENTMCNC: 34.7 G/DL — SIGNIFICANT CHANGE UP (ref 32–36)
MCV RBC AUTO: 97.4 FL — SIGNIFICANT CHANGE UP (ref 80–100)
MONOCYTES # BLD AUTO: 0.45 K/UL — SIGNIFICANT CHANGE UP (ref 0–0.9)
MONOCYTES NFR BLD AUTO: 10.2 % — SIGNIFICANT CHANGE UP (ref 2–14)
NEUTROPHILS # BLD AUTO: 2.76 K/UL — SIGNIFICANT CHANGE UP (ref 1.8–7.4)
NEUTROPHILS NFR BLD AUTO: 62.2 % — SIGNIFICANT CHANGE UP (ref 43–77)
NRBC # BLD: 0 /100 WBCS — SIGNIFICANT CHANGE UP (ref 0–0)
PLATELET # BLD AUTO: 150 K/UL — SIGNIFICANT CHANGE UP (ref 150–400)
RBC # BLD: 4.17 M/UL — LOW (ref 4.2–5.8)
RBC # FLD: 13.1 % — SIGNIFICANT CHANGE UP (ref 10.3–14.5)
WBC # BLD: 4.43 K/UL — SIGNIFICANT CHANGE UP (ref 3.8–10.5)
WBC # FLD AUTO: 4.43 K/UL — SIGNIFICANT CHANGE UP (ref 3.8–10.5)

## 2022-04-01 PROCEDURE — 99213 OFFICE O/P EST LOW 20 MIN: CPT

## 2022-04-01 NOTE — PHYSICAL EXAM
[Restricted in physically strenuous activity but ambulatory and able to carry out work of a light or sedentary nature] : Status 1- Restricted in physically strenuous activity but ambulatory and able to carry out work of a light or sedentary nature, e.g., light house work, office work [Normal] : affect appropriate [de-identified] : dry septum left > right; + PND [de-identified] : atrial fibrillation; no murmur

## 2022-04-01 NOTE — HISTORY OF PRESENT ILLNESS
[Disease: _____________________] : Disease: [unfilled] [T: ___] : T[unfilled] [N: ___] : N[unfilled] [M: ___] : M[unfilled] [AJCC Stage: ____] : AJCC Stage: [unfilled] [de-identified] : Mr. Guillaume is a 74 year old male with DM2, HTN, AFib presenting to the office for an initial consultation of rectal CA.\par \par Patient reports BRBPR with almost every bowel movement.  He has a history of constipation, main concern is sensation of rectal swelling, and a hard lesion when places finger in rectum.  This is different from prior soft hemorrhoids. He sometimes must manually removes stool from his rectum. He brought this to the attention of his PCP, who palpated a rectal tumor, and referred to surgeon.\par \par Patient was seen by Dr. Christian Connelly (Colorectal Surgery) who performed JAYLIN and anoscopy which revealed a friable mobile firm distal/anal lesion approximately 2 cm in diameter in the left anterior position.  This lesion was biopsied on 9/4/2020. Pathology demonstrated adenocarcinoma. Dr. Connelly plans to do a colonoscopy.\par \par CT chest,abdomen/pelvis on 9/11/2020 demonstrated no evidence of metastatic disease in the chest, abdomen or pelvis\par MRI pelvis scheduled 9/15/2020.\par \par Patient had recent episode of red toe after a long car trip. The right big toe was painful and then it improved over a few days. He never had gout before. This had happened once before July 2020 as well. \par He notes that 36 hours after the CT scan, he had shaking chills (rigors), nausea with vomiting x 1 and then this subsided. He also had a drop in blood pressure to SBP upper 80's\par He had IV contrast and oral contrast only. \par He has no focal localizing symptoms for infection, such as cough, abdominal pain, dysuria, or diarrhea. He has baseline urinary frequency. \par \par 12/29/2020\par Early 9/2020, he developed fever and bacteremia with Strep mitis. He then needed 4 weeks of IV antibiotics. \par On 10/15/2020, patient underwent transrectal excision of the low rectal lesion. Case also reviewed at GI tumor board. This was not an anal canal lesion, but a low lying rectal lesion. \par Pathology: \par - Invasive moderately to poorly differentiated adenocarcinoma\par - Margins are negative for tumor\par - AJCC stage pT1\par - Lymph-Vascular Invasion:  Present\par - Perineural Invasion:  Not identified\par \par MLH1: Intact nuclear expression\par MSH2:  Loss of nuclear expression\par MSH6:  Intact nuclear expression\par PMS2:  Intact nuclear expression\par \par On 10/21/20 he was readmitted for abscess - needed I&D. He received antibiotics for 2 weeks.\par He is now healing from that and still has a small Penrose drain in the perineal tissue.\par \par 3/4/22\par Patient had SCRT 2/22 - 2/26/21\par Patient had second vaccine 3/5/21 (Moderna)\par Last MRI 6/2021 showed resolving post-surgical changes.\par Colonoscopy 11/2021 with Dr Perkins was negative with 2 year follow-up recommended.\par Last visit with Dr Connelly was 6/2021, and is due to return.\par Genetic counseling and testing was negative for germline mutations.\par \par He does have intermittent SOB exertion that is related to valves and Afib.\par He notes cough that is intermittent every day.\par It is better with sip of water.\par He notes a lot of nasal discharge.\par COVID Booster 9/4/21 (Moderna)\par \par 4/1/2022:\par Rectal CA: 3/16/22 MR Pelvis/Rectal/Anal - Since the prior examination 6/11/2021, the primary tumor shows: Incomplete response (likely residual tumor). Suspicious mesorectal lymph nodes: No. Suspicious extra mesorectal lymph nodes: No\par On surveillance imaging there is evidence of possible local recurrence.  He will require transanal excision/biopsy. If local recurrence is confirmed patient will require APR.\par If negative margins can monitor area.\par Patient will require ctDNA (Bijan) testing performed today as baseline prior to his surgery with Dr. Connelly.\par \par \par \par  [de-identified] : adenocarcinoma [de-identified] : Colorectal Surgeon: Christian Connelly  [phone]\par Internist / GI:  Raciel Perkins [phone]\par Cardiology: Jona Draper [phone]\par Opthalmology: Michael Rivera [phone]\par Macular degeneration ophthalmology: Zeferino Raman  [phone]\par Cardiac surgeon at [facility]: Zion Weathers [phone]\par \par Daughter: Dr. Nicole Guillaume [phone]\par Daughter: Nimo Rodriguez\par Quoc - patient [phone]\par Lori - wife home phone [phone]

## 2022-04-06 ENCOUNTER — APPOINTMENT (OUTPATIENT)
Dept: ORTHOPEDIC SURGERY | Facility: CLINIC | Age: 77
End: 2022-04-06
Payer: MEDICARE

## 2022-04-06 VITALS
SYSTOLIC BLOOD PRESSURE: 169 MMHG | HEIGHT: 65 IN | WEIGHT: 163 LBS | BODY MASS INDEX: 27.16 KG/M2 | HEART RATE: 93 BPM | DIASTOLIC BLOOD PRESSURE: 71 MMHG

## 2022-04-06 DIAGNOSIS — M20.011 MALLET FINGER OF RIGHT FINGER(S): ICD-10-CM

## 2022-04-06 PROCEDURE — 99203 OFFICE O/P NEW LOW 30 MIN: CPT

## 2022-04-06 NOTE — DISCUSSION/SUMMARY
[FreeTextEntry1] : The patient has extensor lag right little finger DIP joint.  Injury approximately 7 months ago.  The patient has not had treatment.  There is some tenderness across the dorsum of DIP and middle phalanx where the tendon is hypertrophic and indurated.\par Patient would like a splint to support the finger.  Patient is referred to hand therapist for splint.  The intention of the splint is for comfort only.  Too much time has transpired to expect spontaneous healing of the extensor tendon rupture for the splint treatment to be effective.  The patient would require excision of a portion of the tissue and advancement as a dermotenodesis but this is excessive and the patient is wife would not like this.\par Furthermore patient has upcoming surgery because of a history of rectal cancer.\par Hand therapy prescription and instructions provided.\par Prognosis limited.\par Return as needed\par A lengthy and detailed discussion was held with the patient and his wife regarding analysis, treatment, and recommendations. All questions have been answered. At the conclusion the patient and his wife expressed acceptance and understanding.

## 2022-04-06 NOTE — HISTORY OF PRESENT ILLNESS
[FreeTextEntry1] : The patient is a 75 yo RHD male who presents for hand evaluation.\par Pt fell Sept 2021, injured right little finger DIP joint.\par Extensor lag DIP5\par mild tenderness

## 2022-04-06 NOTE — PHYSICAL EXAM
[de-identified] : Right hand:\par Little finger dorsal swelling middle segment\par induration, mild tenderness.\par Extensor lag 22 degrees fully passively correctable\par Full active flexion\par No compensatory PIP hyperextension.\par Remaining digits full range of motion.\par Heberden's node DIP 2, 5.\par Nasal joint slight crepitus, 3+ laxity\par \par Left hand\par Basal joint slight crepitus no pain\par No pertinent MP, PIP, or DIP joint contributory findings, except some Heberden's nodes; none are clinically painful.\par No evidence of extensor lag.\par \par Neurologic: Median, ulnar, and radial motor and sensory are intact. \par Skin: No cyanosis, clubbing, or rashes.\par Vascular: Radial pulses intact.\par Lymphatic: No streaking or epitrochlear adenopathy.\par The patient is awake, alert, and oriented. Affect appropriate. Cooperative.\par \par

## 2022-04-11 PROBLEM — Z11.59 SCREENING FOR VIRAL DISEASE: Status: ACTIVE | Noted: 2020-07-31

## 2022-04-15 ENCOUNTER — OUTPATIENT (OUTPATIENT)
Dept: OUTPATIENT SERVICES | Facility: HOSPITAL | Age: 77
LOS: 1 days | End: 2022-04-15
Payer: MEDICARE

## 2022-04-15 VITALS
HEART RATE: 71 BPM | SYSTOLIC BLOOD PRESSURE: 106 MMHG | WEIGHT: 162.92 LBS | RESPIRATION RATE: 18 BRPM | OXYGEN SATURATION: 99 % | HEIGHT: 65.05 IN | TEMPERATURE: 98 F | DIASTOLIC BLOOD PRESSURE: 72 MMHG

## 2022-04-15 DIAGNOSIS — Z01.818 ENCOUNTER FOR OTHER PREPROCEDURAL EXAMINATION: ICD-10-CM

## 2022-04-15 DIAGNOSIS — I48.91 UNSPECIFIED ATRIAL FIBRILLATION: ICD-10-CM

## 2022-04-15 DIAGNOSIS — Z11.52 ENCOUNTER FOR SCREENING FOR COVID-19: ICD-10-CM

## 2022-04-15 DIAGNOSIS — C20 MALIGNANT NEOPLASM OF RECTUM: ICD-10-CM

## 2022-04-15 DIAGNOSIS — E11.9 TYPE 2 DIABETES MELLITUS WITHOUT COMPLICATIONS: ICD-10-CM

## 2022-04-15 DIAGNOSIS — I25.10 ATHEROSCLEROTIC HEART DISEASE OF NATIVE CORONARY ARTERY WITHOUT ANGINA PECTORIS: ICD-10-CM

## 2022-04-15 DIAGNOSIS — Z95.2 PRESENCE OF PROSTHETIC HEART VALVE: Chronic | ICD-10-CM

## 2022-04-15 LAB
ANION GAP SERPL CALC-SCNC: 14 MMOL/L — SIGNIFICANT CHANGE UP (ref 5–17)
BUN SERPL-MCNC: 20 MG/DL — SIGNIFICANT CHANGE UP (ref 7–23)
CALCIUM SERPL-MCNC: 8.9 MG/DL — SIGNIFICANT CHANGE UP (ref 8.4–10.5)
CHLORIDE SERPL-SCNC: 101 MMOL/L — SIGNIFICANT CHANGE UP (ref 96–108)
CO2 SERPL-SCNC: 21 MMOL/L — LOW (ref 22–31)
CREAT SERPL-MCNC: 1.03 MG/DL — SIGNIFICANT CHANGE UP (ref 0.5–1.3)
EGFR: 74 ML/MIN/1.73M2 — SIGNIFICANT CHANGE UP
GLUCOSE SERPL-MCNC: 120 MG/DL — HIGH (ref 70–99)
POTASSIUM SERPL-MCNC: 4.7 MMOL/L — SIGNIFICANT CHANGE UP (ref 3.5–5.3)
POTASSIUM SERPL-SCNC: 4.7 MMOL/L — SIGNIFICANT CHANGE UP (ref 3.5–5.3)
SODIUM SERPL-SCNC: 136 MMOL/L — SIGNIFICANT CHANGE UP (ref 135–145)

## 2022-04-15 PROCEDURE — 83036 HEMOGLOBIN GLYCOSYLATED A1C: CPT

## 2022-04-15 PROCEDURE — 80048 BASIC METABOLIC PNL TOTAL CA: CPT

## 2022-04-15 PROCEDURE — G0463: CPT

## 2022-04-15 RX ORDER — WARFARIN SODIUM 2.5 MG/1
1 TABLET ORAL
Qty: 0 | Refills: 0 | DISCHARGE

## 2022-04-15 RX ORDER — TAMSULOSIN HYDROCHLORIDE 0.4 MG/1
1 CAPSULE ORAL
Qty: 0 | Refills: 0 | DISCHARGE

## 2022-04-15 RX ORDER — METFORMIN HYDROCHLORIDE 850 MG/1
1 TABLET ORAL
Qty: 0 | Refills: 0 | DISCHARGE

## 2022-04-15 RX ORDER — OMEGA-3 ACID ETHYL ESTERS 1 G
2 CAPSULE ORAL
Qty: 0 | Refills: 0 | DISCHARGE

## 2022-04-15 NOTE — H&P PST ADULT - HISTORY OF PRESENT ILLNESS
This is a 77 y/o male with rectal cancer 2020 This is a 75 y/o male with rectal  adenocarcinoma 2020, a routine MRI revealed + rectal mass. he presents today for  transanal excision/biopsy of rectal mass 4/27/22  covid swab to be done 4/24 at [facility]  denies contact or have covid symptoms This is a 75 y/o male with PMH ofDM, afib, aflutter, HTN, HLD, CAD with QUE and CABG, mitral valve replacement (bovine), severe aortic stenosis echo 4/14/22,  rectal  adenocarcinoma 2020, a routine MRI revealed + rectal mass. he presents today for  transanal excision/biopsy of rectal mass 4/27/22  covid swab to be done 4/24 at [facility]  denies contact or have covid symptoms

## 2022-04-15 NOTE — H&P PST ADULT - NSICDXPASTMEDICALHX_GEN_ALL_CORE_FT
PAST MEDICAL HISTORY:  AF (atrial fibrillation) s/p ablation 2010- on Coumadin    After-cataract of both eyes 1996    AMD (age related macular degeneration) Right eye    Bacteremia due to Streptococcus 9/2020- on Ceftriaxone via PICC line x 6 weeks    BPH (Benign Prostatic Hyperplasia)     CAD (coronary artery disease) stented coronary- LAD x 1 1997    CAD (coronary artery disease)     Diverticulosis     H/O hemorrhoids     HTN (hypertension)     Hyperlipidemia     Rectal cancer 2020 had radiation 2021     PAST MEDICAL HISTORY:  AF (atrial fibrillation) s/p ablation 2010- on Coumadin    After-cataract of both eyes 1996    AMD (age related macular degeneration) Right eye    Bacteremia due to Streptococcus 9/2020- on Ceftriaxone via PICC line x 6 weeks    BPH (Benign Prostatic Hyperplasia)     CAD (coronary artery disease) stented coronary- LAD x 1 1997    CAD (coronary artery disease)     Diverticulosis     H/O hemorrhoids     HTN (hypertension)     Hyperlipidemia     Rectal cancer 2020 had radiation 2021    Severe aortic stenosis echo 4/14/22

## 2022-04-15 NOTE — H&P PST ADULT - NSANTHOBSERVEDRD_ENT_A_CORE
Problem: Pain:  Goal: Pain level will decrease  Description  Pain level will decrease  Outcome: Ongoing  Note:   Pt encouraged to use call light to notify staff when pain rises beyond tolerable. Pt educated on pain scale and was using call light appropriately. Pt has been rating pain at 5-7/10, given pain meds per orders, see MAR, pt tolerates well with no side effects, will continue to monitor. Problem: Activity:  Goal: Ability to maintain or regain function will improve  Description  Ability to maintain or regain function will improve  Outcome: Ongoing  Note:   Pt has been up ad verenice and tolerates activity well. Pt has been avoiding using arms to lift/pull/etc as per post op instructions. Pt tolerating well. Will continue to monitor. No

## 2022-04-16 LAB
A1C WITH ESTIMATED AVERAGE GLUCOSE RESULT: 6.3 % — HIGH (ref 4–5.6)
ESTIMATED AVERAGE GLUCOSE: 134 MG/DL — HIGH (ref 68–114)

## 2022-04-20 ENCOUNTER — RESULT CHARGE (OUTPATIENT)
Age: 77
End: 2022-04-20

## 2022-04-21 ENCOUNTER — NON-APPOINTMENT (OUTPATIENT)
Age: 77
End: 2022-04-21

## 2022-04-21 ENCOUNTER — APPOINTMENT (OUTPATIENT)
Dept: CARDIOLOGY | Facility: CLINIC | Age: 77
End: 2022-04-21
Payer: MEDICARE

## 2022-04-21 ENCOUNTER — APPOINTMENT (OUTPATIENT)
Dept: INTERNAL MEDICINE | Facility: CLINIC | Age: 77
End: 2022-04-21
Payer: MEDICARE

## 2022-04-21 VITALS
SYSTOLIC BLOOD PRESSURE: 160 MMHG | HEIGHT: 65 IN | WEIGHT: 165 LBS | DIASTOLIC BLOOD PRESSURE: 81 MMHG | BODY MASS INDEX: 27.49 KG/M2 | TEMPERATURE: 96.6 F | OXYGEN SATURATION: 98 % | HEART RATE: 67 BPM

## 2022-04-21 VITALS
BODY MASS INDEX: 26.99 KG/M2 | SYSTOLIC BLOOD PRESSURE: 142 MMHG | WEIGHT: 162 LBS | HEIGHT: 65 IN | RESPIRATION RATE: 16 BRPM | OXYGEN SATURATION: 98 % | HEART RATE: 62 BPM | DIASTOLIC BLOOD PRESSURE: 78 MMHG

## 2022-04-21 DIAGNOSIS — Z01.818 ENCOUNTER FOR OTHER PREPROCEDURAL EXAMINATION: ICD-10-CM

## 2022-04-21 DIAGNOSIS — I35.0 NONRHEUMATIC AORTIC (VALVE) STENOSIS: ICD-10-CM

## 2022-04-21 PROBLEM — C20 MALIGNANT NEOPLASM OF RECTUM: Chronic | Status: ACTIVE | Noted: 2022-04-15

## 2022-04-21 PROCEDURE — 99214 OFFICE O/P EST MOD 30 MIN: CPT

## 2022-04-21 PROCEDURE — 93000 ELECTROCARDIOGRAM COMPLETE: CPT

## 2022-04-21 RX ORDER — ONDANSETRON 8 MG/1
8 TABLET, ORALLY DISINTEGRATING ORAL
Qty: 5 | Refills: 0 | Status: DISCONTINUED | COMMUNITY
Start: 2021-11-08 | End: 2022-04-21

## 2022-04-22 PROBLEM — Z01.818 PRE-OP EXAMINATION: Status: ACTIVE | Noted: 2020-10-09

## 2022-04-22 NOTE — PHYSICAL EXAM
[Normal Rate] : normal rate  [No Edema] : there was no peripheral edema [Normal] : soft, non-tender, non-distended, no masses palpated, no HSM and normal bowel sounds [de-identified] : syst murmur

## 2022-04-22 NOTE — PLAN
[FreeTextEntry1] : cbc, cmp good\par diabetes good control\par \par Critical Aortic stenosis\par separate Cardiac clearance \par avoid low bp\par \par s/p mitral valve replacement\par 2 gm Amoxil 1 hour prior or IV equivalent\par \par Stop warfarin 5 days prior\par \par continue Aspirin with history of cabg\par \par Stop lovaza 1 week prior\par \par Hold edarbi  Am of procedure\par \par Hold metformin 2 day prior procedure\par \par Take metoprolol with sip of water AM of procedure\par \par Medically cleared for surgery\par \par \par \par \par

## 2022-04-22 NOTE — HISTORY OF PRESENT ILLNESS
[Aortic Stenosis] : aortic stenosis [Coronary Artery Disease] : coronary artery disease [No Adverse Anesthesia Reaction] : no adverse anesthesia reaction in self or family member [Chronic Anticoagulation] : chronic anticoagulation [Diabetes] : diabetes [(Patient denies any chest pain, claudication, dyspnea on exertion, orthopnea, palpitations or syncope)] : Patient denies any chest pain, claudication, dyspnea on exertion, orthopnea, palpitations or syncope [Atrial Fibrillation] : no atrial fibrillation [Recent Myocardial Infarction] : no recent myocardial infarction [Implantable Device/Pacemaker] : no implantable device/pacemaker [Asthma] : no asthma [COPD] : no COPD [Sleep Apnea] : no sleep apnea [Smoker] : not a smoker [Chronic Kidney Disease] : no chronic kidney disease [FreeTextEntry1] : Transrectal fulgeration [FreeTextEntry2] : 4/27 [FreeTextEntry3] : Dr Connelly [FreeTextEntry4] : 76 year old recurrent rectal ca for excision\par s/p previous surgery and radiation and previous infection\par s/p mitral valve replacement\par Critical Aortic Stenosis\par s/p cabg 2015\par afib normally on warfarin

## 2022-04-24 ENCOUNTER — OUTPATIENT (OUTPATIENT)
Dept: OUTPATIENT SERVICES | Facility: HOSPITAL | Age: 77
LOS: 1 days | End: 2022-04-24
Payer: MEDICARE

## 2022-04-24 DIAGNOSIS — Z95.2 PRESENCE OF PROSTHETIC HEART VALVE: Chronic | ICD-10-CM

## 2022-04-24 DIAGNOSIS — Z11.52 ENCOUNTER FOR SCREENING FOR COVID-19: ICD-10-CM

## 2022-04-24 LAB — SARS-COV-2 RNA SPEC QL NAA+PROBE: SIGNIFICANT CHANGE UP

## 2022-04-24 PROCEDURE — U0003: CPT

## 2022-04-24 PROCEDURE — C9803: CPT

## 2022-04-24 PROCEDURE — U0005: CPT

## 2022-04-26 ENCOUNTER — TRANSCRIPTION ENCOUNTER (OUTPATIENT)
Age: 77
End: 2022-04-26

## 2022-04-27 ENCOUNTER — APPOINTMENT (OUTPATIENT)
Dept: SURGERY | Facility: HOSPITAL | Age: 77
End: 2022-04-27
Payer: MEDICARE

## 2022-04-27 ENCOUNTER — OUTPATIENT (OUTPATIENT)
Dept: INPATIENT UNIT | Facility: HOSPITAL | Age: 77
LOS: 1 days | End: 2022-04-27
Payer: MEDICARE

## 2022-04-27 ENCOUNTER — RESULT REVIEW (OUTPATIENT)
Age: 77
End: 2022-04-27

## 2022-04-27 ENCOUNTER — TRANSCRIPTION ENCOUNTER (OUTPATIENT)
Age: 77
End: 2022-04-27

## 2022-04-27 VITALS
RESPIRATION RATE: 16 BRPM | TEMPERATURE: 98 F | DIASTOLIC BLOOD PRESSURE: 64 MMHG | HEART RATE: 80 BPM | OXYGEN SATURATION: 98 % | SYSTOLIC BLOOD PRESSURE: 161 MMHG

## 2022-04-27 VITALS
HEART RATE: 87 BPM | HEIGHT: 65.05 IN | WEIGHT: 162.92 LBS | DIASTOLIC BLOOD PRESSURE: 89 MMHG | TEMPERATURE: 98 F | RESPIRATION RATE: 16 BRPM | OXYGEN SATURATION: 97 % | SYSTOLIC BLOOD PRESSURE: 159 MMHG

## 2022-04-27 DIAGNOSIS — C20 MALIGNANT NEOPLASM OF RECTUM: ICD-10-CM

## 2022-04-27 DIAGNOSIS — Z95.2 PRESENCE OF PROSTHETIC HEART VALVE: Chronic | ICD-10-CM

## 2022-04-27 LAB
GLUCOSE BLDC GLUCOMTR-MCNC: 129 MG/DL — HIGH (ref 70–99)
GLUCOSE BLDC GLUCOMTR-MCNC: 131 MG/DL — HIGH (ref 70–99)
INR BLD: 1.22 RATIO — HIGH (ref 0.88–1.16)
PROTHROM AB SERPL-ACNC: 14.2 SEC — HIGH (ref 10.5–13.4)

## 2022-04-27 PROCEDURE — 36415 COLL VENOUS BLD VENIPUNCTURE: CPT

## 2022-04-27 PROCEDURE — 86850 RBC ANTIBODY SCREEN: CPT

## 2022-04-27 PROCEDURE — 86900 BLOOD TYPING SEROLOGIC ABO: CPT

## 2022-04-27 PROCEDURE — 45172 EXC RECT TUM TRANSANAL FULL: CPT

## 2022-04-27 PROCEDURE — 86901 BLOOD TYPING SEROLOGIC RH(D): CPT

## 2022-04-27 PROCEDURE — 88307 TISSUE EXAM BY PATHOLOGIST: CPT | Mod: 26

## 2022-04-27 PROCEDURE — 45171 EXC RECT TUM TRANSANAL PART: CPT

## 2022-04-27 PROCEDURE — C9399: CPT

## 2022-04-27 PROCEDURE — 82962 GLUCOSE BLOOD TEST: CPT

## 2022-04-27 PROCEDURE — 88307 TISSUE EXAM BY PATHOLOGIST: CPT

## 2022-04-27 PROCEDURE — 85610 PROTHROMBIN TIME: CPT

## 2022-04-27 PROCEDURE — C1889: CPT

## 2022-04-27 DEVICE — SURGICEL 2 X 14": Type: IMPLANTABLE DEVICE | Status: FUNCTIONAL

## 2022-04-27 RX ORDER — LABETALOL HCL 100 MG
5 TABLET ORAL ONCE
Refills: 0 | Status: COMPLETED | OUTPATIENT
Start: 2022-04-27 | End: 2022-04-27

## 2022-04-27 RX ORDER — HYDROMORPHONE HYDROCHLORIDE 2 MG/ML
0.2 INJECTION INTRAMUSCULAR; INTRAVENOUS; SUBCUTANEOUS
Refills: 0 | Status: DISCONTINUED | OUTPATIENT
Start: 2022-04-27 | End: 2022-04-27

## 2022-04-27 RX ORDER — SODIUM CHLORIDE 9 MG/ML
1000 INJECTION, SOLUTION INTRAVENOUS
Refills: 0 | Status: DISCONTINUED | OUTPATIENT
Start: 2022-04-27 | End: 2022-04-27

## 2022-04-27 RX ORDER — BENZOCAINE AND MENTHOL 5; 1 G/100ML; G/100ML
1 LIQUID ORAL ONCE
Refills: 0 | Status: COMPLETED | OUTPATIENT
Start: 2022-04-27 | End: 2022-04-27

## 2022-04-27 RX ORDER — LIDOCAINE HCL 20 MG/ML
0.2 VIAL (ML) INJECTION ONCE
Refills: 0 | Status: DISCONTINUED | OUTPATIENT
Start: 2022-04-27 | End: 2022-04-27

## 2022-04-27 RX ORDER — LABETALOL HCL 100 MG
10 TABLET ORAL ONCE
Refills: 0 | Status: COMPLETED | OUTPATIENT
Start: 2022-04-27 | End: 2022-04-27

## 2022-04-27 RX ORDER — SODIUM CHLORIDE 9 MG/ML
3 INJECTION INTRAMUSCULAR; INTRAVENOUS; SUBCUTANEOUS EVERY 8 HOURS
Refills: 0 | Status: DISCONTINUED | OUTPATIENT
Start: 2022-04-27 | End: 2022-04-27

## 2022-04-27 RX ORDER — OXYCODONE HYDROCHLORIDE 5 MG/1
1 TABLET ORAL
Qty: 6 | Refills: 0
Start: 2022-04-27

## 2022-04-27 RX ORDER — ONDANSETRON 8 MG/1
4 TABLET, FILM COATED ORAL ONCE
Refills: 0 | Status: DISCONTINUED | OUTPATIENT
Start: 2022-04-27 | End: 2022-04-27

## 2022-04-27 RX ADMIN — Medication 10 MILLIGRAM(S): at 13:47

## 2022-04-27 RX ADMIN — Medication 10 MILLIGRAM(S): at 14:15

## 2022-04-27 RX ADMIN — BENZOCAINE AND MENTHOL 1 LOZENGE: 5; 1 LIQUID ORAL at 14:23

## 2022-04-27 RX ADMIN — Medication 5 MILLIGRAM(S): at 13:15

## 2022-04-27 NOTE — BRIEF OPERATIVE NOTE - OPERATION/FINDINGS
Old site of biopsy identified and mass palpated. Excised with electrocautery. Hemostasis achieved with 2-0 vicryl suture, surgicel, monsel's solution. Packed with additional surgicel, dressed with 4x4, ABD pad, silk tape.

## 2022-04-27 NOTE — ASU DISCHARGE PLAN (ADULT/PEDIATRIC) - CARE PROVIDER_API CALL
Christian Connelly)  ColonRectal Surgery; Surgery  [address], [address]  [place] [address]  Phone: [phone]  Fax: [phone]  Follow Up Time: 2 weeks

## 2022-04-27 NOTE — ASU PATIENT PROFILE, ADULT - NSICDXPASTMEDICALHX_GEN_ALL_CORE_FT
PAST MEDICAL HISTORY:  AF (atrial fibrillation) s/p ablation 2010- on Coumadin    After-cataract of both eyes 1996    AMD (age related macular degeneration) Right eye    Bacteremia due to Streptococcus 9/2020- on Ceftriaxone via PICC line x 6 weeks    BPH (Benign Prostatic Hyperplasia)     CAD (coronary artery disease) stented coronary- LAD x 1 1997    CAD (coronary artery disease)     Diverticulosis     H/O hemorrhoids     HTN (hypertension)     Hyperlipidemia     Rectal cancer 2020 had radiation 2021    Severe aortic stenosis echo 4/14/22

## 2022-04-27 NOTE — ASU DISCHARGE PLAN (ADULT/PEDIATRIC) - NS MD DC FALL RISK RISK
For information on Fall & Injury Prevention, visit: [email or web address] OR  [email or web address] OR  https://www.cdc.gov/steadi/patient.html

## 2022-04-27 NOTE — ASU PATIENT PROFILE, ADULT - FALL HARM RISK - UNIVERSAL INTERVENTIONS
Bed in lowest position, wheels locked, appropriate side rails in place/Call bell, personal items and telephone in reach/Instruct patient to call for assistance before getting out of bed or chair/Non-slip footwear when patient is out of bed/[place] to call system/Physically safe environment - no spills, clutter or unnecessary equipment/Purposeful Proactive Rounding/Room/bathroom lighting operational, light cord in reach

## 2022-04-27 NOTE — ASU DISCHARGE PLAN (ADULT/PEDIATRIC) - ASU DC SPECIAL INSTRUCTIONSFT
Please do not take your coumadin until 48 hours after surgery on 4/29. Please follow up with Dr. Connelly in the office in two weeks. You may take oxycodone as needed if pain not controlled by over the counter medications. Refer to the provided instructions for further information.

## 2022-05-02 DIAGNOSIS — K62.89 OTHER SPECIFIED DISEASES OF ANUS AND RECTUM: ICD-10-CM

## 2022-05-02 LAB — SURGICAL PATHOLOGY STUDY: SIGNIFICANT CHANGE UP

## 2022-05-03 ENCOUNTER — NON-APPOINTMENT (OUTPATIENT)
Age: 77
End: 2022-05-03

## 2022-05-03 ENCOUNTER — APPOINTMENT (OUTPATIENT)
Dept: SURGERY | Facility: CLINIC | Age: 77
End: 2022-05-03
Payer: MEDICARE

## 2022-05-03 ENCOUNTER — INPATIENT (INPATIENT)
Facility: HOSPITAL | Age: 77
LOS: 5 days | Discharge: HOME | DRG: 920 | End: 2022-05-09
Attending: COLON & RECTAL SURGERY | Admitting: COLON & RECTAL SURGERY
Payer: MEDICARE

## 2022-05-03 VITALS
RESPIRATION RATE: 18 BRPM | SYSTOLIC BLOOD PRESSURE: 130 MMHG | HEART RATE: 97 BPM | WEIGHT: 162.04 LBS | DIASTOLIC BLOOD PRESSURE: 84 MMHG | HEIGHT: 65.05 IN | TEMPERATURE: 98 F | OXYGEN SATURATION: 98 %

## 2022-05-03 VITALS
HEART RATE: 92 BPM | TEMPERATURE: 97.6 F | OXYGEN SATURATION: 99 % | RESPIRATION RATE: 17 BRPM | BODY MASS INDEX: 27.49 KG/M2 | HEIGHT: 65 IN | WEIGHT: 165 LBS | DIASTOLIC BLOOD PRESSURE: 87 MMHG | SYSTOLIC BLOOD PRESSURE: 151 MMHG

## 2022-05-03 DIAGNOSIS — Z95.2 PRESENCE OF PROSTHETIC HEART VALVE: Chronic | ICD-10-CM

## 2022-05-03 DIAGNOSIS — T14.8XXA OTHER INJURY OF UNSPECIFIED BODY REGION, INITIAL ENCOUNTER: ICD-10-CM

## 2022-05-03 LAB
ALBUMIN SERPL ELPH-MCNC: 3.8 G/DL — SIGNIFICANT CHANGE UP (ref 3.3–5)
ALP SERPL-CCNC: 63 U/L — SIGNIFICANT CHANGE UP (ref 40–120)
ALT FLD-CCNC: 22 U/L — SIGNIFICANT CHANGE UP (ref 10–45)
ANION GAP SERPL CALC-SCNC: 13 MMOL/L — SIGNIFICANT CHANGE UP (ref 5–17)
APPEARANCE UR: CLEAR — SIGNIFICANT CHANGE UP
APTT BLD: 30.7 SEC — SIGNIFICANT CHANGE UP (ref 27.5–35.5)
AST SERPL-CCNC: 29 U/L — SIGNIFICANT CHANGE UP (ref 10–40)
BACTERIA # UR AUTO: NEGATIVE — SIGNIFICANT CHANGE UP
BASE EXCESS BLDV CALC-SCNC: 3.6 MMOL/L — HIGH (ref -2–2)
BASOPHILS # BLD AUTO: 0.01 K/UL — SIGNIFICANT CHANGE UP (ref 0–0.2)
BASOPHILS NFR BLD AUTO: 0.1 % — SIGNIFICANT CHANGE UP (ref 0–2)
BILIRUB SERPL-MCNC: 1.2 MG/DL — SIGNIFICANT CHANGE UP (ref 0.2–1.2)
BILIRUB UR-MCNC: NEGATIVE — SIGNIFICANT CHANGE UP
BLD GP AB SCN SERPL QL: NEGATIVE — SIGNIFICANT CHANGE UP
BUN SERPL-MCNC: 12 MG/DL — SIGNIFICANT CHANGE UP (ref 7–23)
CA-I SERPL-SCNC: 1.16 MMOL/L — SIGNIFICANT CHANGE UP (ref 1.15–1.33)
CALCIUM SERPL-MCNC: 8.9 MG/DL — SIGNIFICANT CHANGE UP (ref 8.4–10.5)
CHLORIDE BLDV-SCNC: 91 MMOL/L — LOW (ref 96–108)
CHLORIDE SERPL-SCNC: 91 MMOL/L — LOW (ref 96–108)
CO2 BLDV-SCNC: 31 MMOL/L — HIGH (ref 22–26)
CO2 SERPL-SCNC: 24 MMOL/L — SIGNIFICANT CHANGE UP (ref 22–31)
COLOR SPEC: SIGNIFICANT CHANGE UP
CREAT SERPL-MCNC: 0.97 MG/DL — SIGNIFICANT CHANGE UP (ref 0.5–1.3)
DIFF PNL FLD: NEGATIVE — SIGNIFICANT CHANGE UP
EGFR: 80 ML/MIN/1.73M2 — SIGNIFICANT CHANGE UP
EOSINOPHIL # BLD AUTO: 0.03 K/UL — SIGNIFICANT CHANGE UP (ref 0–0.5)
EOSINOPHIL NFR BLD AUTO: 0.4 % — SIGNIFICANT CHANGE UP (ref 0–6)
EPI CELLS # UR: 0 /HPF — SIGNIFICANT CHANGE UP
FLUAV AG NPH QL: SIGNIFICANT CHANGE UP
FLUBV AG NPH QL: SIGNIFICANT CHANGE UP
GAS PNL BLDV: 122 MMOL/L — LOW (ref 136–145)
GAS PNL BLDV: SIGNIFICANT CHANGE UP
GAS PNL BLDV: SIGNIFICANT CHANGE UP
GLUCOSE BLDC GLUCOMTR-MCNC: 152 MG/DL — HIGH (ref 70–99)
GLUCOSE BLDV-MCNC: 139 MG/DL — HIGH (ref 70–99)
GLUCOSE SERPL-MCNC: 132 MG/DL — HIGH (ref 70–99)
GLUCOSE UR QL: NEGATIVE — SIGNIFICANT CHANGE UP
HCO3 BLDV-SCNC: 29 MMOL/L — SIGNIFICANT CHANGE UP (ref 22–29)
HCT VFR BLD CALC: 34 % — LOW (ref 39–50)
HCT VFR BLDA CALC: 50 % — SIGNIFICANT CHANGE UP (ref 39–51)
HGB BLD CALC-MCNC: 16.7 G/DL — SIGNIFICANT CHANGE UP (ref 12.6–17.4)
HGB BLD-MCNC: 12 G/DL — LOW (ref 13–17)
HYALINE CASTS # UR AUTO: 0 /LPF — SIGNIFICANT CHANGE UP (ref 0–2)
IMM GRANULOCYTES NFR BLD AUTO: 0.5 % — SIGNIFICANT CHANGE UP (ref 0–1.5)
INR BLD: 1.27 RATIO — HIGH (ref 0.88–1.16)
KETONES UR-MCNC: NEGATIVE — SIGNIFICANT CHANGE UP
LACTATE BLDV-MCNC: 1.6 MMOL/L — SIGNIFICANT CHANGE UP (ref 0.7–2)
LEUKOCYTE ESTERASE UR-ACNC: NEGATIVE — SIGNIFICANT CHANGE UP
LYMPHOCYTES # BLD AUTO: 0.96 K/UL — LOW (ref 1–3.3)
LYMPHOCYTES # BLD AUTO: 12.2 % — LOW (ref 13–44)
MAGNESIUM SERPL-MCNC: 1.5 MG/DL — LOW (ref 1.6–2.6)
MCHC RBC-ENTMCNC: 33.4 PG — SIGNIFICANT CHANGE UP (ref 27–34)
MCHC RBC-ENTMCNC: 35.3 GM/DL — SIGNIFICANT CHANGE UP (ref 32–36)
MCV RBC AUTO: 94.7 FL — SIGNIFICANT CHANGE UP (ref 80–100)
MONOCYTES # BLD AUTO: 0.95 K/UL — HIGH (ref 0–0.9)
MONOCYTES NFR BLD AUTO: 12.1 % — SIGNIFICANT CHANGE UP (ref 2–14)
NEUTROPHILS # BLD AUTO: 5.88 K/UL — SIGNIFICANT CHANGE UP (ref 1.8–7.4)
NEUTROPHILS NFR BLD AUTO: 74.7 % — SIGNIFICANT CHANGE UP (ref 43–77)
NITRITE UR-MCNC: NEGATIVE — SIGNIFICANT CHANGE UP
NRBC # BLD: 0 /100 WBCS — SIGNIFICANT CHANGE UP (ref 0–0)
OB PNL STL: NEGATIVE — SIGNIFICANT CHANGE UP
PCO2 BLDV: 46 MMHG — SIGNIFICANT CHANGE UP (ref 42–55)
PH BLDV: 7.41 — SIGNIFICANT CHANGE UP (ref 7.32–7.43)
PH UR: 7 — SIGNIFICANT CHANGE UP (ref 5–8)
PLATELET # BLD AUTO: 179 K/UL — SIGNIFICANT CHANGE UP (ref 150–400)
PO2 BLDV: 25 MMHG — SIGNIFICANT CHANGE UP (ref 25–45)
POTASSIUM BLDV-SCNC: 3.4 MMOL/L — LOW (ref 3.5–5.1)
POTASSIUM SERPL-MCNC: 3.5 MMOL/L — SIGNIFICANT CHANGE UP (ref 3.5–5.3)
POTASSIUM SERPL-SCNC: 3.5 MMOL/L — SIGNIFICANT CHANGE UP (ref 3.5–5.3)
PROT SERPL-MCNC: 6.7 G/DL — SIGNIFICANT CHANGE UP (ref 6–8.3)
PROT UR-MCNC: NEGATIVE — SIGNIFICANT CHANGE UP
PROTHROM AB SERPL-ACNC: 14.8 SEC — HIGH (ref 10.5–13.4)
RBC # BLD: 3.59 M/UL — LOW (ref 4.2–5.8)
RBC # FLD: 12.6 % — SIGNIFICANT CHANGE UP (ref 10.3–14.5)
RBC CASTS # UR COMP ASSIST: 2 /HPF — SIGNIFICANT CHANGE UP (ref 0–4)
RH IG SCN BLD-IMP: POSITIVE — SIGNIFICANT CHANGE UP
RSV RNA NPH QL NAA+NON-PROBE: SIGNIFICANT CHANGE UP
SAO2 % BLDV: 38.7 % — LOW (ref 67–88)
SARS-COV-2 RNA SPEC QL NAA+PROBE: SIGNIFICANT CHANGE UP
SODIUM SERPL-SCNC: 128 MMOL/L — LOW (ref 135–145)
SP GR SPEC: 1.01 — LOW (ref 1.01–1.02)
UROBILINOGEN FLD QL: NEGATIVE — SIGNIFICANT CHANGE UP
WBC # BLD: 7.87 K/UL — SIGNIFICANT CHANGE UP (ref 3.8–10.5)
WBC # FLD AUTO: 7.87 K/UL — SIGNIFICANT CHANGE UP (ref 3.8–10.5)
WBC UR QL: 0 /HPF — SIGNIFICANT CHANGE UP (ref 0–5)

## 2022-05-03 PROCEDURE — 93010 ELECTROCARDIOGRAM REPORT: CPT

## 2022-05-03 PROCEDURE — 93970 EXTREMITY STUDY: CPT | Mod: 26

## 2022-05-03 PROCEDURE — 99284 EMERGENCY DEPT VISIT MOD MDM: CPT | Mod: FS

## 2022-05-03 PROCEDURE — 71045 X-RAY EXAM CHEST 1 VIEW: CPT | Mod: 26

## 2022-05-03 PROCEDURE — 74177 CT ABD & PELVIS W/CONTRAST: CPT | Mod: 26,MA

## 2022-05-03 PROCEDURE — 99024 POSTOP FOLLOW-UP VISIT: CPT | Mod: PD

## 2022-05-03 RX ORDER — SODIUM CHLORIDE 9 MG/ML
1000 INJECTION, SOLUTION INTRAVENOUS
Refills: 0 | Status: DISCONTINUED | OUTPATIENT
Start: 2022-05-03 | End: 2022-05-09

## 2022-05-03 RX ORDER — DEXTROSE 50 % IN WATER 50 %
25 SYRINGE (ML) INTRAVENOUS ONCE
Refills: 0 | Status: DISCONTINUED | OUTPATIENT
Start: 2022-05-03 | End: 2022-05-09

## 2022-05-03 RX ORDER — OXYCODONE HYDROCHLORIDE 5 MG/1
5 TABLET ORAL EVERY 4 HOURS
Refills: 0 | Status: DISCONTINUED | OUTPATIENT
Start: 2022-05-03 | End: 2022-05-09

## 2022-05-03 RX ORDER — GLUCAGON INJECTION, SOLUTION 0.5 MG/.1ML
1 INJECTION, SOLUTION SUBCUTANEOUS ONCE
Refills: 0 | Status: DISCONTINUED | OUTPATIENT
Start: 2022-05-03 | End: 2022-05-09

## 2022-05-03 RX ORDER — ASPIRIN/CALCIUM CARB/MAGNESIUM 324 MG
81 TABLET ORAL DAILY
Refills: 0 | Status: DISCONTINUED | OUTPATIENT
Start: 2022-05-03 | End: 2022-05-09

## 2022-05-03 RX ORDER — PIPERACILLIN AND TAZOBACTAM 4; .5 G/20ML; G/20ML
3.38 INJECTION, POWDER, LYOPHILIZED, FOR SOLUTION INTRAVENOUS EVERY 8 HOURS
Refills: 0 | Status: DISCONTINUED | OUTPATIENT
Start: 2022-05-03 | End: 2022-05-07

## 2022-05-03 RX ORDER — PIPERACILLIN AND TAZOBACTAM 4; .5 G/20ML; G/20ML
3.38 INJECTION, POWDER, LYOPHILIZED, FOR SOLUTION INTRAVENOUS ONCE
Refills: 0 | Status: COMPLETED | OUTPATIENT
Start: 2022-05-03 | End: 2022-05-03

## 2022-05-03 RX ORDER — DEXTROSE 50 % IN WATER 50 %
15 SYRINGE (ML) INTRAVENOUS ONCE
Refills: 0 | Status: DISCONTINUED | OUTPATIENT
Start: 2022-05-03 | End: 2022-05-09

## 2022-05-03 RX ORDER — INSULIN LISPRO 100/ML
VIAL (ML) SUBCUTANEOUS
Refills: 0 | Status: DISCONTINUED | OUTPATIENT
Start: 2022-05-03 | End: 2022-05-09

## 2022-05-03 RX ORDER — ACETAMINOPHEN 500 MG
650 TABLET ORAL EVERY 6 HOURS
Refills: 0 | Status: DISCONTINUED | OUTPATIENT
Start: 2022-05-03 | End: 2022-05-09

## 2022-05-03 RX ORDER — LIDOCAINE 4 G/100G
1 CREAM TOPICAL
Refills: 0 | Status: DISCONTINUED | OUTPATIENT
Start: 2022-05-03 | End: 2022-05-09

## 2022-05-03 RX ORDER — INSULIN LISPRO 100/ML
VIAL (ML) SUBCUTANEOUS AT BEDTIME
Refills: 0 | Status: DISCONTINUED | OUTPATIENT
Start: 2022-05-03 | End: 2022-05-09

## 2022-05-03 RX ORDER — TAMSULOSIN HYDROCHLORIDE 0.4 MG/1
0.4 CAPSULE ORAL EVERY 12 HOURS
Refills: 0 | Status: DISCONTINUED | OUTPATIENT
Start: 2022-05-03 | End: 2022-05-09

## 2022-05-03 RX ORDER — ATORVASTATIN CALCIUM 80 MG/1
20 TABLET, FILM COATED ORAL AT BEDTIME
Refills: 0 | Status: DISCONTINUED | OUTPATIENT
Start: 2022-05-03 | End: 2022-05-09

## 2022-05-03 RX ORDER — DEXTROSE 50 % IN WATER 50 %
12.5 SYRINGE (ML) INTRAVENOUS ONCE
Refills: 0 | Status: DISCONTINUED | OUTPATIENT
Start: 2022-05-03 | End: 2022-05-09

## 2022-05-03 RX ORDER — SODIUM CHLORIDE 9 MG/ML
500 INJECTION INTRAMUSCULAR; INTRAVENOUS; SUBCUTANEOUS ONCE
Refills: 0 | Status: COMPLETED | OUTPATIENT
Start: 2022-05-03 | End: 2022-05-03

## 2022-05-03 RX ORDER — METOPROLOL TARTRATE 50 MG
25 TABLET ORAL
Refills: 0 | Status: DISCONTINUED | OUTPATIENT
Start: 2022-05-03 | End: 2022-05-09

## 2022-05-03 RX ORDER — LOSARTAN POTASSIUM 100 MG/1
25 TABLET, FILM COATED ORAL DAILY
Refills: 0 | Status: DISCONTINUED | OUTPATIENT
Start: 2022-05-03 | End: 2022-05-09

## 2022-05-03 RX ORDER — WARFARIN SODIUM 2.5 MG/1
5 TABLET ORAL AT BEDTIME
Refills: 0 | Status: COMPLETED | OUTPATIENT
Start: 2022-05-03 | End: 2022-05-03

## 2022-05-03 RX ADMIN — PIPERACILLIN AND TAZOBACTAM 25 GRAM(S): 4; .5 INJECTION, POWDER, LYOPHILIZED, FOR SOLUTION INTRAVENOUS at 23:49

## 2022-05-03 RX ADMIN — OXYCODONE HYDROCHLORIDE 5 MILLIGRAM(S): 5 TABLET ORAL at 23:51

## 2022-05-03 RX ADMIN — TAMSULOSIN HYDROCHLORIDE 0.4 MILLIGRAM(S): 0.4 CAPSULE ORAL at 17:17

## 2022-05-03 RX ADMIN — WARFARIN SODIUM 5 MILLIGRAM(S): 2.5 TABLET ORAL at 21:03

## 2022-05-03 RX ADMIN — Medication 1 TABLET(S): at 21:03

## 2022-05-03 RX ADMIN — OXYCODONE HYDROCHLORIDE 5 MILLIGRAM(S): 5 TABLET ORAL at 18:46

## 2022-05-03 RX ADMIN — Medication 81 MILLIGRAM(S): at 21:03

## 2022-05-03 RX ADMIN — ATORVASTATIN CALCIUM 20 MILLIGRAM(S): 80 TABLET, FILM COATED ORAL at 21:03

## 2022-05-03 RX ADMIN — SODIUM CHLORIDE 500 MILLILITER(S): 9 INJECTION INTRAMUSCULAR; INTRAVENOUS; SUBCUTANEOUS at 12:17

## 2022-05-03 RX ADMIN — PIPERACILLIN AND TAZOBACTAM 200 GRAM(S): 4; .5 INJECTION, POWDER, LYOPHILIZED, FOR SOLUTION INTRAVENOUS at 15:09

## 2022-05-03 RX ADMIN — Medication 25 MILLIGRAM(S): at 17:17

## 2022-05-03 NOTE — HISTORY OF PRESENT ILLNESS
[FreeTextEntry1] : Quoc is a 76 year old male here for post op visit. S/p transanal excision of distal rectal mass on 4/27/22. Pathology pending. \par \par Lesion at the anorectal junction. Biopsy demonstrated adenocarcinoma. The lesion was felt to be either T1 versus early T2 on preoperative imaging. The lesion was mobile approximately 2 cm in diameter, located in the anterior position just at the level of the dentate line. There was no evidence of metastatic disease on preoperative workup, therefore transanal excision was performed on 10/15/20. \par \par 6/15/21 MR Pelvis/Rectal/Anal - Post treatment changes in the anal canal. No evidence of recurrent disease.\par \par 3/16/22 MR Pelvis/Rectal/Anal - Since the prior examination 6/11/2021, the primary tumor shows: Incomplete response (likely residual tumor). Suspicious mesorectal lymph nodes: No. Suspicious extra mesorectal lymph nodes: No\par \par Last seen 3/29/22 -. In summary the patient has a history of T1 distal rectal carcinoma treated with transanal excision and postoperative radiation. 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 On surveillance imaging there is evidence of possible local recurrence. I recommended transanal excision/biopsy. The alternative would be EUS guided biopsy however upon discussion with GI it was felt that this lesion is likely too small for EUS guided biopsy. I explained that if local recurrence is confirmed then options will include observation if negative margins were achieved versus an abdominal perineal resection. I explained the risks benefits and alternatives of surgery including the risks of bleeding infection recurrence possible need for additional treatment.\par \par Today patient reports having 5/10 pain. Took Tylenol and Oxycodone post operatively with little relief. Endorses good wound healing, denies drainage. 1-2 formed/hard BMs daily. Denies fever or chills. Good appetite, no nausea or vomiting.

## 2022-05-03 NOTE — ED PROVIDER NOTE - ATTENDING APP SHARED VISIT CONTRIBUTION OF CARE
aching MD Mccabe:  patient seen and evaluated with the resident.  I was present for key portions of the History & Physical, and I agree with the Impression & Plan.  MD Mccabe:  77 yo M, sent to the ED by Dr. Connelly for CT evaluation of post-op rectal pain.   Duration:  1wk dark stools,   Quality:  nonbloody, painless loose stools  Associated Sx: no f/c, no n/v/d  Modifiers:  time seems to be making it worse.  Context: transanal excision/biopsy of rectal mass 4/27/22  Physical Exam:  adult M, fatigued-appearing, NCAT, PERRL, EOMI, neck supple, CTA B, no edema, AAOx3 ambulates w/o difficulty.    Abdomen:  abdomen soft, ND, NT  Impression/Plan:  Adult M with minimal abd pain, but post-op dark stools that together are significant enough to warrant imaging to r/o acute intraabdominal pathology.  Patient will be evaluated with labs, ua, CT abdomen, and treated with APAP and IVF (currently refusing stronger pain meds).  Gen Surgery at bedside.  Likely dispo per Gen Surg/Dr. Connelly's recs.

## 2022-05-03 NOTE — ED ADULT TRIAGE NOTE - CHIEF COMPLAINT QUOTE
Pt c/o of anal, and groin pain s/p colorectal procedure last week. pain sent to ED by colorectal surgeon for further workup.

## 2022-05-03 NOTE — ED PROVIDER NOTE - CPE EDP GASTRO NORM
Spoke with [facility], states they haven't received this from but they will double check and contact us.   - - -

## 2022-05-03 NOTE — H&P ADULT - HISTORY OF PRESENT ILLNESS
77 y/o male with PMH ofDM, afib/aflutter, HTN, HLD, CAD with QUE and CABG, mitral valve replacement (bovine) on coumadin (last dose yesterday night), severe aortic stenosis echo 4/14/22,  rectal  adenocarcinoma 2020 s/p transanal excision/biopsy of rectal mass 4/27/22 sent to ED by Dr Connelly for persistent post op rectal pain. Of note patient had hx of transanal excision of distal rectal carcinoma 10/15/2020 was taken to OR urgently 10/21 for r/o necrotizing fasciitis, found to have breakdown of rectal incision with fistulization into perineum, s/p debridement and penrose drain placement. Patient reported b/l lower extremity pain starting at the groin and extending to calves. Patient did not take coumadin for five days prior to procedure and started coumadin 4/27/22. Patient has no prior h/o DVTs. Denied fever chills, CP, SOB, N/V, back pain, dysuria, confusion, headache

## 2022-05-03 NOTE — ED CLERICAL - NS ED CLERK NOTE PRE-ARRIVAL INFORMATION; ADDITIONAL PRE-ARRIVAL INFORMATION
CC/Reason For referral: Rectal pain; Post op; CT of abdomen & pelvis  Preferred Consultant(if applicable):  Who admits for you (if needed):  Do you have documents you would like to fax over?  Would you still like to speak to an ED attending? NO

## 2022-05-03 NOTE — PHYSICAL EXAM
[Abdomen Tenderness] : ~T No ~M abdominal tenderness [de-identified] : Perianal inspection reveals no crepitus cellulitis or fluctuance.  There is focal tenderness in the anterior midline which precludes anoscopy or digital rectal examination.

## 2022-05-03 NOTE — ED PROVIDER NOTE - OBJECTIVE STATEMENT
75 y/o male with PMH ofDM, afib, aflutter, HTN, HLD, CAD with QUE and CABG, mitral valve replacement (bovine), severe aortic stenosis echo 4/14/22,  rectal  adenocarcinoma 2020 s/p transanal excision/biopsy of rectal mass 4/27/22 77 y/o male with PMH ofDM, afib/aflutter, HTN, HLD, CAD with QUE and CABG, mitral valve replacement (bovine) on coumadin (last dose yesterday night), severe aortic stenosis echo 4/14/22,  rectal  adenocarcinoma 2020 s/p transanal excision/biopsy of rectal mass 4/27/22 now directed to the ED by colorectal surgeon Dr. Connelly for CT AP as pt c/o rectal pain since the procedure. Patient reported he has had loose stools over last week now slightly formed and rectal but does not experience pain with defecation. Patient noted stools are darker but not melanotic. Of note patient had Transanal excision of distal rectal carcinoma 10/15/2020 was taken to OR urgently 10/21 for r/o necrotizing fasciitis, found to have breakdown of rectal incision with fistulization into perineum, s/p debridement and penrose drain placement (per chart review). Last drank at 8am and did not eat solid food today. In addition patient reported difficulty with voiding and dribbling. Patient reported b/l lower extremity pain starting at the groin and extending to calves. Patient did not take coumadin for five days prior to procedure and started coumadin 4/27/22. Patient has no prior h/o DVTs. Denied fever chills, CP, SOB, N/V, back pain, dysuria, confusion, headache

## 2022-05-03 NOTE — PATIENT PROFILE ADULT - FALL HARM RISK - UNIVERSAL INTERVENTIONS
Bed in lowest position, wheels locked, appropriate side rails in place/Call bell, personal items and telephone in reach/Instruct patient to call for assistance before getting out of bed or chair/Non-slip footwear when patient is out of bed/[place] to call system/Physically safe environment - no spills, clutter or unnecessary equipment/Purposeful Proactive Rounding/Room/bathroom lighting operational, light cord in reach

## 2022-05-03 NOTE — ASSESSMENT
[FreeTextEntry1] : In summary the patient is 1 week status post transanal excision of a distal rectal scar.  He has a history of rectal cancer treated with transanal excision.  Postoperatively he developed a wound infection.  He is having more pain than expected and adequate examination is precluded in the office.  Therefore I sent him to the ER for CT and likely admission and possible examination under anesthesia.

## 2022-05-03 NOTE — ED ADULT NURSE NOTE - NS ED NURSE LEVEL OF CONSCIOUSNESS ORIENTATION
respirations non-labored/diminished breath sounds, L/good air movement/diminished breath sounds, R/airway patent/breath sounds equal/normal/clear to auscultation bilaterally Oriented - self; Oriented - place; Oriented - time

## 2022-05-03 NOTE — ED ADULT NURSE NOTE - NSIMPLEMENTINTERV_GEN_ALL_ED
Implemented All Universal Safety Interventions:  [place] to call system. Call bell, personal items and telephone within reach. Instruct patient to call for assistance. Room bathroom lighting operational. Non-slip footwear when patient is off stretcher. Physically safe environment: no spills, clutter or unnecessary equipment. Stretcher in lowest position, wheels locked, appropriate side rails in place.

## 2022-05-03 NOTE — ED ADULT NURSE NOTE - OBJECTIVE STATEMENT
77 yo male with history of afib, on warfarin, s/p rectal mass biopsy 04/27, presents to ED for worsening rectal pain since procedure. In no distress, respirations even and unlabored on room air, denies CP or SOB, + suprapubic pain, radiating down into his groin, upon light palpation associated with trouble urinating. Patient states hes been having the urge but has been experiencing dribbling of urine. Denies dysuria or hematuria. Today he voided normally. Patient with history of enlarged prostate and on flomax. Denies pain while having a BM, denies seeing any gross blood in stool, denies n/v/d. Denies fevers/chills.

## 2022-05-03 NOTE — ED ADULT NURSE REASSESSMENT NOTE - NS ED NURSE REASSESS COMMENT FT1
Patient bladder scan showed >475ccs. Patient expressing need to void, voided 350cc of clear yellow urine. PVR showed 275ccs after. PA aware.

## 2022-05-03 NOTE — H&P ADULT - ASSESSMENT
75 y/o male with PMH ofDM, afib/aflutter, HTN, HLD, CAD with QUE and CABG, mitral valve replacement (bovine) on coumadin (last dose yesterday night), severe aortic stenosis echo 4/14/22,  rectal  adenocarcinoma s/p transanal excision in 2020 with post op infectious complication requiring debridements, now patient has recurrent mass s/p transanal excision on 4/27, path showing fibrotic changes. Now with post op persistent rectal pain.    Plan:  - admit to surgery under Dr Connelly  - regular diet  - IV zosyn  - continue home dose warfarin  - trend INR  - ambulate as tolerated  - pain control as needed  - [facility]

## 2022-05-03 NOTE — H&P ADULT - NSHPPHYSICALEXAM_GEN_ALL_CORE
T(C): 36.7 (05-03-22 @ 10:44), Max: 36.7 (05-03-22 @ 10:44)  HR: 97 (05-03-22 @ 10:44) (97 - 97)  BP: 130/84 (05-03-22 @ 10:44) (130/84 - 130/84)  RR: 18 (05-03-22 @ 10:44) (18 - 18)  SpO2: 98% (05-03-22 @ 10:44) (98% - 98%)    Physical Exam:  General: NAD, alert  HEENT: normocephalic, PERRLA  CVS: RRR, no murmur  Chest: CTABL  Abdomen: soft, NTND, no rebound tenderness  Extremities: peripheral pulses palpable

## 2022-05-03 NOTE — ED PROVIDER NOTE - CLINICAL SUMMARY MEDICAL DECISION MAKING FREE TEXT BOX
Impression/Plan:  Adult M with minimal abd pain, but post-op dark stools that together are significant enough to warrant imaging to r/o acute intraabdominal pathology.  Patient will be evaluated with labs, ua, CT abdomen, and treated with APAP and IVF (currently refusing stronger pain meds).  Gen Surgery at bedside.  Likely dispo per Gen Surg/Dr. Connelly's recs.

## 2022-05-03 NOTE — ED PROVIDER NOTE - NS ED ATTENDING STATEMENT MOD
This was a shared visit with the LYDIA. I reviewed and verified the documentation and independently performed the documented:

## 2022-05-03 NOTE — ED ADULT NURSE NOTE - CCCP TRG CHIEF CMPLNT
How Many Skin Cancers Have You Had?: more than one What Is The Reason For Today's Visit?: Surveillance against skin cancer recurrences When Was Your Last Cancer Diagnosed?: 05/17\\n05/18 rectal pain

## 2022-05-03 NOTE — H&P ADULT - NSHPLABSRESULTS_GEN_ALL_CORE
Labs:  CAPILLARY BLOOD GLUCOSE  POCT Blood Glucose.: 152 mg/dL (03 May 2022 11:40)                          12.0   7.87  )-----------( 179      ( 03 May 2022 11:56 )             34.0       Auto Neutrophil %: 74.7 % (22 @ 11:56)  Auto Immature Granulocyte %: 0.5 % (22 @ 11:56)        128<L>  |  91<L>  |  12  ----------------------------<  132<H>  3.5   |  24  |  0.97      Calcium, Total Serum: 8.9 mg/dL (22 @ 11:56)      LFTs:             6.7  | 1.2  | 29       ------------------[63      ( 03 May 2022 11:56 )  3.8  | x    | 22          Lipase:x      Amylase:x         Blood Gas Venous - Lactate: 1.6 mmol/L (22 @ 11:31)    Coags:     14.8   ----< 1.27    ( 03 May 2022 11:56 )     30.7     Urinalysis Basic - ( 03 May 2022 11:56 )    Color: Light Yellow / Appearance: Clear / S.009 / pH: x  Gluc: x / Ketone: Negative  / Bili: Negative / Urobili: Negative   Blood: x / Protein: Negative / Nitrite: Negative   Leuk Esterase: Negative / RBC: 2 /hpf / WBC 0 /HPF   Sq Epi: x / Non Sq Epi: 0 /hpf / Bacteria: Negative    Imaging:  < from: CT Abdomen and Pelvis w/ IV Cont (22 @ 12:04) >    IMPRESSION:  Mild rectal wall thickening.  No discrete abscess.    < end of copied text     < from: [facility] Duplex Lower Ext Vein Scan, Bilat (22 @ 13:25) >    IMPRESSION:  No evidence of deep venous thrombosis in either lower extremity.    < end of copied text >

## 2022-05-04 LAB
ANION GAP SERPL CALC-SCNC: 15 MMOL/L — SIGNIFICANT CHANGE UP (ref 5–17)
APTT BLD: 114.6 SEC — HIGH (ref 27.5–35.5)
APTT BLD: 30.7 SEC — SIGNIFICANT CHANGE UP (ref 27.5–35.5)
BASOPHILS # BLD AUTO: 0.02 K/UL — SIGNIFICANT CHANGE UP (ref 0–0.2)
BASOPHILS NFR BLD AUTO: 0.3 % — SIGNIFICANT CHANGE UP (ref 0–2)
BUN SERPL-MCNC: 11 MG/DL — SIGNIFICANT CHANGE UP (ref 7–23)
CALCIUM SERPL-MCNC: 9.3 MG/DL — SIGNIFICANT CHANGE UP (ref 8.4–10.5)
CHLORIDE SERPL-SCNC: 88 MMOL/L — LOW (ref 96–108)
CO2 SERPL-SCNC: 23 MMOL/L — SIGNIFICANT CHANGE UP (ref 22–31)
CREAT SERPL-MCNC: 0.94 MG/DL — SIGNIFICANT CHANGE UP (ref 0.5–1.3)
CULTURE RESULTS: SIGNIFICANT CHANGE UP
EGFR: 83 ML/MIN/1.73M2 — SIGNIFICANT CHANGE UP
EOSINOPHIL # BLD AUTO: 0.08 K/UL — SIGNIFICANT CHANGE UP (ref 0–0.5)
EOSINOPHIL NFR BLD AUTO: 1.1 % — SIGNIFICANT CHANGE UP (ref 0–6)
GLUCOSE BLDC GLUCOMTR-MCNC: 111 MG/DL — HIGH (ref 70–99)
GLUCOSE BLDC GLUCOMTR-MCNC: 139 MG/DL — HIGH (ref 70–99)
GLUCOSE BLDC GLUCOMTR-MCNC: 148 MG/DL — HIGH (ref 70–99)
GLUCOSE BLDC GLUCOMTR-MCNC: 154 MG/DL — HIGH (ref 70–99)
GLUCOSE BLDC GLUCOMTR-MCNC: 219 MG/DL — HIGH (ref 70–99)
GLUCOSE SERPL-MCNC: 107 MG/DL — HIGH (ref 70–99)
HCT VFR BLD CALC: 36.1 % — LOW (ref 39–50)
HCT VFR BLD CALC: 38 % — LOW (ref 39–50)
HGB BLD-MCNC: 12.7 G/DL — LOW (ref 13–17)
HGB BLD-MCNC: 13.2 G/DL — SIGNIFICANT CHANGE UP (ref 13–17)
IMM GRANULOCYTES NFR BLD AUTO: 0.7 % — SIGNIFICANT CHANGE UP (ref 0–1.5)
INR BLD: 1.27 RATIO — HIGH (ref 0.88–1.16)
LYMPHOCYTES # BLD AUTO: 1.02 K/UL — SIGNIFICANT CHANGE UP (ref 1–3.3)
LYMPHOCYTES # BLD AUTO: 13.4 % — SIGNIFICANT CHANGE UP (ref 13–44)
MAGNESIUM SERPL-MCNC: 1.6 MG/DL — SIGNIFICANT CHANGE UP (ref 1.6–2.6)
MCHC RBC-ENTMCNC: 33.2 PG — SIGNIFICANT CHANGE UP (ref 27–34)
MCHC RBC-ENTMCNC: 33.5 PG — SIGNIFICANT CHANGE UP (ref 27–34)
MCHC RBC-ENTMCNC: 34.7 GM/DL — SIGNIFICANT CHANGE UP (ref 32–36)
MCHC RBC-ENTMCNC: 35.2 GM/DL — SIGNIFICANT CHANGE UP (ref 32–36)
MCV RBC AUTO: 95.3 FL — SIGNIFICANT CHANGE UP (ref 80–100)
MCV RBC AUTO: 95.5 FL — SIGNIFICANT CHANGE UP (ref 80–100)
MONOCYTES # BLD AUTO: 1.02 K/UL — HIGH (ref 0–0.9)
MONOCYTES NFR BLD AUTO: 13.4 % — SIGNIFICANT CHANGE UP (ref 2–14)
NEUTROPHILS # BLD AUTO: 5.42 K/UL — SIGNIFICANT CHANGE UP (ref 1.8–7.4)
NEUTROPHILS NFR BLD AUTO: 71.1 % — SIGNIFICANT CHANGE UP (ref 43–77)
NRBC # BLD: 0 /100 WBCS — SIGNIFICANT CHANGE UP (ref 0–0)
NRBC # BLD: 0 /100 WBCS — SIGNIFICANT CHANGE UP (ref 0–0)
PHOSPHATE SERPL-MCNC: 3.3 MG/DL — SIGNIFICANT CHANGE UP (ref 2.5–4.5)
PLATELET # BLD AUTO: 184 K/UL — SIGNIFICANT CHANGE UP (ref 150–400)
PLATELET # BLD AUTO: 201 K/UL — SIGNIFICANT CHANGE UP (ref 150–400)
POTASSIUM SERPL-MCNC: 4.2 MMOL/L — SIGNIFICANT CHANGE UP (ref 3.5–5.3)
POTASSIUM SERPL-SCNC: 4.2 MMOL/L — SIGNIFICANT CHANGE UP (ref 3.5–5.3)
PROTHROM AB SERPL-ACNC: 14.8 SEC — HIGH (ref 10.5–13.4)
RBC # BLD: 3.79 M/UL — LOW (ref 4.2–5.8)
RBC # BLD: 3.98 M/UL — LOW (ref 4.2–5.8)
RBC # FLD: 12.9 % — SIGNIFICANT CHANGE UP (ref 10.3–14.5)
RBC # FLD: 12.9 % — SIGNIFICANT CHANGE UP (ref 10.3–14.5)
SODIUM SERPL-SCNC: 126 MMOL/L — LOW (ref 135–145)
SPECIMEN SOURCE: SIGNIFICANT CHANGE UP
WBC # BLD: 7.61 K/UL — SIGNIFICANT CHANGE UP (ref 3.8–10.5)
WBC # BLD: 8.26 K/UL — SIGNIFICANT CHANGE UP (ref 3.8–10.5)
WBC # FLD AUTO: 7.61 K/UL — SIGNIFICANT CHANGE UP (ref 3.8–10.5)
WBC # FLD AUTO: 8.26 K/UL — SIGNIFICANT CHANGE UP (ref 3.8–10.5)

## 2022-05-04 RX ORDER — MAGNESIUM SULFATE 500 MG/ML
2 VIAL (ML) INJECTION ONCE
Refills: 0 | Status: COMPLETED | OUTPATIENT
Start: 2022-05-04 | End: 2022-05-04

## 2022-05-04 RX ORDER — HEPARIN SODIUM 5000 [USP'U]/ML
1300 INJECTION INTRAVENOUS; SUBCUTANEOUS
Qty: 25000 | Refills: 0 | Status: DISCONTINUED | OUTPATIENT
Start: 2022-05-04 | End: 2022-05-05

## 2022-05-04 RX ORDER — WARFARIN SODIUM 2.5 MG/1
5 TABLET ORAL AT BEDTIME
Refills: 0 | Status: COMPLETED | OUTPATIENT
Start: 2022-05-04 | End: 2022-05-04

## 2022-05-04 RX ADMIN — LIDOCAINE 1 APPLICATION(S): 4 CREAM TOPICAL at 17:26

## 2022-05-04 RX ADMIN — OXYCODONE HYDROCHLORIDE 5 MILLIGRAM(S): 5 TABLET ORAL at 22:33

## 2022-05-04 RX ADMIN — PIPERACILLIN AND TAZOBACTAM 25 GRAM(S): 4; .5 INJECTION, POWDER, LYOPHILIZED, FOR SOLUTION INTRAVENOUS at 17:04

## 2022-05-04 RX ADMIN — HEPARIN SODIUM 11 UNIT(S)/HR: 5000 INJECTION INTRAVENOUS; SUBCUTANEOUS at 21:45

## 2022-05-04 RX ADMIN — HEPARIN SODIUM 13 UNIT(S)/HR: 5000 INJECTION INTRAVENOUS; SUBCUTANEOUS at 19:24

## 2022-05-04 RX ADMIN — Medication 25 MILLIGRAM(S): at 05:56

## 2022-05-04 RX ADMIN — OXYCODONE HYDROCHLORIDE 5 MILLIGRAM(S): 5 TABLET ORAL at 09:09

## 2022-05-04 RX ADMIN — PIPERACILLIN AND TAZOBACTAM 25 GRAM(S): 4; .5 INJECTION, POWDER, LYOPHILIZED, FOR SOLUTION INTRAVENOUS at 08:59

## 2022-05-04 RX ADMIN — Medication 81 MILLIGRAM(S): at 12:29

## 2022-05-04 RX ADMIN — LOSARTAN POTASSIUM 25 MILLIGRAM(S): 100 TABLET, FILM COATED ORAL at 05:55

## 2022-05-04 RX ADMIN — TAMSULOSIN HYDROCHLORIDE 0.4 MILLIGRAM(S): 0.4 CAPSULE ORAL at 17:34

## 2022-05-04 RX ADMIN — Medication 1 TABLET(S): at 21:13

## 2022-05-04 RX ADMIN — Medication 1: at 17:26

## 2022-05-04 RX ADMIN — Medication 25 GRAM(S): at 10:21

## 2022-05-04 RX ADMIN — OXYCODONE HYDROCHLORIDE 5 MILLIGRAM(S): 5 TABLET ORAL at 00:21

## 2022-05-04 RX ADMIN — LIDOCAINE 1 APPLICATION(S): 4 CREAM TOPICAL at 05:55

## 2022-05-04 RX ADMIN — ATORVASTATIN CALCIUM 20 MILLIGRAM(S): 80 TABLET, FILM COATED ORAL at 21:13

## 2022-05-04 RX ADMIN — TAMSULOSIN HYDROCHLORIDE 0.4 MILLIGRAM(S): 0.4 CAPSULE ORAL at 05:56

## 2022-05-04 RX ADMIN — PIPERACILLIN AND TAZOBACTAM 25 GRAM(S): 4; .5 INJECTION, POWDER, LYOPHILIZED, FOR SOLUTION INTRAVENOUS at 23:15

## 2022-05-04 RX ADMIN — Medication 25 MILLIGRAM(S): at 17:27

## 2022-05-04 RX ADMIN — HEPARIN SODIUM 13 UNIT(S)/HR: 5000 INJECTION INTRAVENOUS; SUBCUTANEOUS at 14:00

## 2022-05-04 RX ADMIN — WARFARIN SODIUM 5 MILLIGRAM(S): 2.5 TABLET ORAL at 21:12

## 2022-05-04 NOTE — PROGRESS NOTE ADULT - SUBJECTIVE AND OBJECTIVE BOX
Surgery Progress Note     Subjective/24hour Events:   Patient seen and examined.       Vital Signs:  Vital Signs Last 24 Hrs  T(C): 36.8 (03 May 2022 20:53), Max: 36.8 (03 May 2022 15:50)  T(F): 98.2 (03 May 2022 20:53), Max: 98.3 (03 May 2022 15:50)  HR: 82 (03 May 2022 20:53) (81 - 97)  BP: 144/70 (03 May 2022 20:53) (130/84 - 147/76)  BP(mean): --  RR: 18 (03 May 2022 20:53) (18 - 18)  SpO2: 99% (03 May 2022 20:53) (98% - 100%)    CAPILLARY BLOOD GLUCOSE      POCT Blood Glucose.: 152 mg/dL (03 May 2022 21:02)  POCT Blood Glucose.: 152 mg/dL (03 May 2022 11:40)      I&O's Detail      MEDICATIONS  (STANDING):  aspirin enteric coated 81 milliGRAM(s) Oral daily  atorvastatin 20 milliGRAM(s) Oral at bedtime  dextrose 5%. 1000 milliLiter(s) (50 mL/Hr) IV Continuous <Continuous>  dextrose 5%. 1000 milliLiter(s) (100 mL/Hr) IV Continuous <Continuous>  dextrose 50% Injectable 25 Gram(s) IV Push once  dextrose 50% Injectable 12.5 Gram(s) IV Push once  dextrose 50% Injectable 25 Gram(s) IV Push once  glucagon  Injectable 1 milliGRAM(s) IntraMuscular once  insulin lispro (ADMELOG) corrective regimen sliding scale   SubCutaneous three times a day before meals  insulin lispro (ADMELOG) corrective regimen sliding scale   SubCutaneous at bedtime  lidocaine 2% Gel 1 Application(s) Topical two times a day  losartan 25 milliGRAM(s) Oral daily  metoprolol tartrate 25 milliGRAM(s) Oral two times a day  multivitamin 1 Tablet(s) Oral at bedtime  piperacillin/tazobactam IVPB.. 3.375 Gram(s) IV Intermittent every 8 hours  tamsulosin 0.4 milliGRAM(s) Oral every 12 hours    MEDICATIONS  (PRN):  acetaminophen     Tablet .. 650 milliGRAM(s) Oral every 6 hours PRN Mild Pain (1 - 3), Moderate Pain (4 - 6)  dextrose Oral Gel 15 Gram(s) Oral once PRN Blood Glucose LESS THAN 70 milliGRAM(s)/deciliter  oxyCODONE    IR 5 milliGRAM(s) Oral every 4 hours PRN Severe Pain (7 - 10)      Physical Exam:  General: NAD, alert  HEENT: normocephalic, PERRLA  CVS: RRR, no murmur  Chest: CTABL  Abdomen: soft, NTND, no rebound tenderness  Extremities: peripheral pulses palpable      Labs:    05-03    128<L>  |  91<L>  |  12  ----------------------------<  132<H>  3.5   |  24  |  0.97    Ca    8.9      03 May 2022 11:56  Mg     1.5     05-03    TPro  6.7  /  Alb  3.8  /  TBili  1.2  /  DBili  x   /  AST  29  /  ALT  22  /  AlkPhos  63  05-03    LIVER FUNCTIONS - ( 03 May 2022 11:56 )  Alb: 3.8 g/dL / Pro: 6.7 g/dL / ALK PHOS: 63 U/L / ALT: 22 U/L / AST: 29 U/L / GGT: x                                 12.0   7.87  )-----------( 179      ( 03 May 2022 11:56 )             34.0     PT/INR - ( 03 May 2022 11:56 )   PT: 14.8 sec;   INR: 1.27 ratio         PTT - ( 03 May 2022 11:56 )  PTT:30.7 sec     Surgery Progress Note     Subjective/24hour Events:   Patient seen and examined. Pt denies any CP, SOB, n/v/d, or chills. Pain is well controlled.       Vital Signs:   Vital Signs Last 24 Hrs  T(C): 37.2 (04 May 2022 08:33), Max: 37.2 (04 May 2022 08:33)  T(F): 98.9 (04 May 2022 08:33), Max: 98.9 (04 May 2022 08:33)  HR: 72 (04 May 2022 08:33) (64 - 97)  BP: 145/82 (04 May 2022 08:33) (130/84 - 160/77)  BP(mean): --  RR: 18 (04 May 2022 08:33) (18 - 18)  SpO2: 98% (04 May 2022 08:33) (98% - 100%)     I&O's Detail    04 May 2022 07:01  -  04 May 2022 08:36  --------------------------------------------------------  IN:  Total IN: 0 mL    OUT:    Voided (mL): 100 mL  Total OUT: 100 mL    Total NET: -100 mL    MEDICATIONS  (STANDING):  aspirin enteric coated 81 milliGRAM(s) Oral daily  atorvastatin 20 milliGRAM(s) Oral at bedtime  dextrose 5%. 1000 milliLiter(s) (50 mL/Hr) IV Continuous <Continuous>  dextrose 5%. 1000 milliLiter(s) (100 mL/Hr) IV Continuous <Continuous>  dextrose 50% Injectable 25 Gram(s) IV Push once  dextrose 50% Injectable 12.5 Gram(s) IV Push once  dextrose 50% Injectable 25 Gram(s) IV Push once  glucagon  Injectable 1 milliGRAM(s) IntraMuscular once  insulin lispro (ADMELOG) corrective regimen sliding scale   SubCutaneous three times a day before meals  insulin lispro (ADMELOG) corrective regimen sliding scale   SubCutaneous at bedtime  lidocaine 2% Gel 1 Application(s) Topical two times a day  losartan 25 milliGRAM(s) Oral daily  metoprolol tartrate 25 milliGRAM(s) Oral two times a day  multivitamin 1 Tablet(s) Oral at bedtime  piperacillin/tazobactam IVPB.. 3.375 Gram(s) IV Intermittent every 8 hours  tamsulosin 0.4 milliGRAM(s) Oral every 12 hours    MEDICATIONS  (PRN):  acetaminophen     Tablet .. 650 milliGRAM(s) Oral every 6 hours PRN Mild Pain (1 - 3), Moderate Pain (4 - 6)  dextrose Oral Gel 15 Gram(s) Oral once PRN Blood Glucose LESS THAN 70 milliGRAM(s)/deciliter  oxyCODONE    IR 5 milliGRAM(s) Oral every 4 hours PRN Severe Pain (7 - 10)      Physical Exam:  General: NAD, alert  Chest: Nonlabored breathing.  Abdomen: soft, NT,ND, no rebound tenderness  Extremities: peripheral pulses palpable         LABS:                            12.7   7.61  )-----------( 184      ( 04 May 2022 07:14 )             36.1     05-04    126<L>  |  88<L>  |  11  ----------------------------<  107<H>  4.2   |  23  |  0.94    Ca    9.3      04 May 2022 07:11  Phos  3.3     05-04  Mg     1.6     05-04    TPro  6.7  /  Alb  3.8  /  TBili  1.2  /  DBili  x   /  AST  29  /  ALT  22  /  AlkPhos  63  05-03    PT/INR - ( 04 May 2022 07:18 )   PT: 14.8 sec;   INR: 1.27 ratio         PTT - ( 04 May 2022 07:18 )  PTT:30.7 sec

## 2022-05-04 NOTE — PROGRESS NOTE ADULT - ASSESSMENT
77 y/o male with PMH ofDM, afib/aflutter, HTN, HLD, CAD with QUE and CABG, mitral valve replacement (bovine) on coumadin (last dose yesterday night), severe aortic stenosis echo 4/14/22,  rectal  adenocarcinoma s/p transanal excision in 2020 with post op infectious complication requiring debridements, now patient has recurrent mass s/p transanal excision on 4/27, path showing fibrotic changes. Now with post op persistent rectal pain.    Plan:  - regular diet - consistent carb  - IV zosyn  - Insulin sliding scale  - continue home dose warfarin  - trend INR  - ambulate as tolerated  - pain control as needed      [facility]  [id number] 77 y/o male with PMH ofDM, afib/aflutter, HTN, HLD, CAD with QUE and CABG, mitral valve replacement (bovine) on coumadin (last dose yesterday night), severe aortic stenosis echo 4/14/22,  rectal  adenocarcinoma s/p transanal excision in 2020 with post op infectious complication requiring debridements, now patient has recurrent mass s/p transanal excision on 4/27, path showing fibrotic changes. Now with post op persistent rectal pain.    Plan:  - regular diet - consistent carb  - IV zosyn  - Insulin sliding scale  - continue home dose warfarin  - f/u AM labs  - ambulate as tolerated  - pain control as needed  - PT consult      [facility]  [id number]

## 2022-05-04 NOTE — CONSULT NOTE ADULT - REASON FOR ADMISSION
----- Message from Colleen Cuellar MD sent at 3/19/2020  4:54 PM CDT -----  Vitamin D levels low supplement sent to the pharmacy please inform patient to be adherent to vitamin D review in office in 2 to 3 months    
Patient informed Vitamin D levels low supplement sent to the pharmacy  patient to be adherent to vitamin D review in office in 2 to 3 months   
rectal pain

## 2022-05-04 NOTE — CONSULT NOTE ADULT - SUBJECTIVE AND OBJECTIVE BOX
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  Also has a past history of HTN, HLD, DM, Afib/flutter now presents with rectal pain after resection of an anal adeno CA in 2020 and then resection of a recurrent mass on 4/27.  He has no CV complaints    Meds:  ASA 81 mg qd             Warfarin             Zosyn             Atorvastatin 20 mg qd             Losartan 25 mg qd             Metoprolol 25 mg bid             Flomax 0.4 mg qd    /82  HR 72   Lungs clear  Irregular rhythm 3/6 systolic murmur  No edema    EKG:  AF otherwise normal    WBC 7.61  Hgb 12.7  Hct 36.1  Plt 184K  BUN 11  Crt 0.94  Na 126  INR 1.27    Imp:  Stable from a CV stand point  No CHF or coronary ischemia.  The AF rate is stable  Would start IV Heparin unless there is a surgical contra indication while Warfarin is dose to achieve an INR of > 2.0

## 2022-05-05 LAB
ANION GAP SERPL CALC-SCNC: 14 MMOL/L — SIGNIFICANT CHANGE UP (ref 5–17)
APTT BLD: 115.6 SEC — HIGH (ref 27.5–35.5)
APTT BLD: 31.4 SEC — SIGNIFICANT CHANGE UP (ref 27.5–35.5)
APTT BLD: 63.5 SEC — HIGH (ref 27.5–35.5)
BUN SERPL-MCNC: 15 MG/DL — SIGNIFICANT CHANGE UP (ref 7–23)
CALCIUM SERPL-MCNC: 9.3 MG/DL — SIGNIFICANT CHANGE UP (ref 8.4–10.5)
CHLORIDE SERPL-SCNC: 91 MMOL/L — LOW (ref 96–108)
CO2 SERPL-SCNC: 24 MMOL/L — SIGNIFICANT CHANGE UP (ref 22–31)
CREAT SERPL-MCNC: 0.92 MG/DL — SIGNIFICANT CHANGE UP (ref 0.5–1.3)
EGFR: 85 ML/MIN/1.73M2 — SIGNIFICANT CHANGE UP
GLUCOSE BLDC GLUCOMTR-MCNC: 124 MG/DL — HIGH (ref 70–99)
GLUCOSE BLDC GLUCOMTR-MCNC: 127 MG/DL — HIGH (ref 70–99)
GLUCOSE BLDC GLUCOMTR-MCNC: 159 MG/DL — HIGH (ref 70–99)
GLUCOSE BLDC GLUCOMTR-MCNC: 166 MG/DL — HIGH (ref 70–99)
GLUCOSE SERPL-MCNC: 106 MG/DL — HIGH (ref 70–99)
HCT VFR BLD CALC: 37 % — LOW (ref 39–50)
HGB BLD-MCNC: 13.2 G/DL — SIGNIFICANT CHANGE UP (ref 13–17)
INR BLD: 1.51 RATIO — HIGH (ref 0.88–1.16)
MAGNESIUM SERPL-MCNC: 1.8 MG/DL — SIGNIFICANT CHANGE UP (ref 1.6–2.6)
MCHC RBC-ENTMCNC: 33.4 PG — SIGNIFICANT CHANGE UP (ref 27–34)
MCHC RBC-ENTMCNC: 35.7 GM/DL — SIGNIFICANT CHANGE UP (ref 32–36)
MCV RBC AUTO: 93.7 FL — SIGNIFICANT CHANGE UP (ref 80–100)
NRBC # BLD: 0 /100 WBCS — SIGNIFICANT CHANGE UP (ref 0–0)
PHOSPHATE SERPL-MCNC: 3.1 MG/DL — SIGNIFICANT CHANGE UP (ref 2.5–4.5)
PLATELET # BLD AUTO: 188 K/UL — SIGNIFICANT CHANGE UP (ref 150–400)
POTASSIUM SERPL-MCNC: 3.7 MMOL/L — SIGNIFICANT CHANGE UP (ref 3.5–5.3)
POTASSIUM SERPL-SCNC: 3.7 MMOL/L — SIGNIFICANT CHANGE UP (ref 3.5–5.3)
PROTHROM AB SERPL-ACNC: 17.6 SEC — HIGH (ref 10.5–13.4)
RBC # BLD: 3.95 M/UL — LOW (ref 4.2–5.8)
RBC # FLD: 12.8 % — SIGNIFICANT CHANGE UP (ref 10.3–14.5)
SODIUM SERPL-SCNC: 129 MMOL/L — LOW (ref 135–145)
WBC # BLD: 6.76 K/UL — SIGNIFICANT CHANGE UP (ref 3.8–10.5)
WBC # FLD AUTO: 6.76 K/UL — SIGNIFICANT CHANGE UP (ref 3.8–10.5)

## 2022-05-05 PROCEDURE — 72158 MRI LUMBAR SPINE W/O & W/DYE: CPT | Mod: 26

## 2022-05-05 RX ORDER — POLYETHYLENE GLYCOL 3350 17 G/17G
17 POWDER, FOR SOLUTION ORAL DAILY
Refills: 0 | Status: DISCONTINUED | OUTPATIENT
Start: 2022-05-05 | End: 2022-05-09

## 2022-05-05 RX ORDER — WARFARIN SODIUM 2.5 MG/1
5 TABLET ORAL ONCE
Refills: 0 | Status: COMPLETED | OUTPATIENT
Start: 2022-05-05 | End: 2022-05-05

## 2022-05-05 RX ORDER — IBUPROFEN 200 MG
400 TABLET ORAL EVERY 6 HOURS
Refills: 0 | Status: DISCONTINUED | OUTPATIENT
Start: 2022-05-05 | End: 2022-05-09

## 2022-05-05 RX ORDER — HEPARIN SODIUM 5000 [USP'U]/ML
900 INJECTION INTRAVENOUS; SUBCUTANEOUS
Qty: 25000 | Refills: 0 | Status: DISCONTINUED | OUTPATIENT
Start: 2022-05-05 | End: 2022-05-07

## 2022-05-05 RX ORDER — POTASSIUM CHLORIDE 20 MEQ
40 PACKET (EA) ORAL ONCE
Refills: 0 | Status: COMPLETED | OUTPATIENT
Start: 2022-05-05 | End: 2022-05-05

## 2022-05-05 RX ADMIN — HEPARIN SODIUM 9 UNIT(S)/HR: 5000 INJECTION INTRAVENOUS; SUBCUTANEOUS at 03:43

## 2022-05-05 RX ADMIN — Medication 1: at 13:15

## 2022-05-05 RX ADMIN — PIPERACILLIN AND TAZOBACTAM 25 GRAM(S): 4; .5 INJECTION, POWDER, LYOPHILIZED, FOR SOLUTION INTRAVENOUS at 08:58

## 2022-05-05 RX ADMIN — Medication 400 MILLIGRAM(S): at 13:53

## 2022-05-05 RX ADMIN — PIPERACILLIN AND TAZOBACTAM 25 GRAM(S): 4; .5 INJECTION, POWDER, LYOPHILIZED, FOR SOLUTION INTRAVENOUS at 17:37

## 2022-05-05 RX ADMIN — Medication 400 MILLIGRAM(S): at 14:20

## 2022-05-05 RX ADMIN — TAMSULOSIN HYDROCHLORIDE 0.4 MILLIGRAM(S): 0.4 CAPSULE ORAL at 05:44

## 2022-05-05 RX ADMIN — Medication 81 MILLIGRAM(S): at 13:14

## 2022-05-05 RX ADMIN — LIDOCAINE 1 APPLICATION(S): 4 CREAM TOPICAL at 18:24

## 2022-05-05 RX ADMIN — LIDOCAINE 1 APPLICATION(S): 4 CREAM TOPICAL at 05:43

## 2022-05-05 RX ADMIN — LOSARTAN POTASSIUM 25 MILLIGRAM(S): 100 TABLET, FILM COATED ORAL at 05:44

## 2022-05-05 RX ADMIN — Medication 25 MILLIGRAM(S): at 05:45

## 2022-05-05 RX ADMIN — Medication 1 TABLET(S): at 21:45

## 2022-05-05 RX ADMIN — PIPERACILLIN AND TAZOBACTAM 25 GRAM(S): 4; .5 INJECTION, POWDER, LYOPHILIZED, FOR SOLUTION INTRAVENOUS at 23:58

## 2022-05-05 RX ADMIN — WARFARIN SODIUM 5 MILLIGRAM(S): 2.5 TABLET ORAL at 21:45

## 2022-05-05 RX ADMIN — OXYCODONE HYDROCHLORIDE 5 MILLIGRAM(S): 5 TABLET ORAL at 03:48

## 2022-05-05 RX ADMIN — HEPARIN SODIUM 9 UNIT(S)/HR: 5000 INJECTION INTRAVENOUS; SUBCUTANEOUS at 11:15

## 2022-05-05 RX ADMIN — Medication 40 MILLIEQUIVALENT(S): at 13:15

## 2022-05-05 RX ADMIN — HEPARIN SODIUM 9 UNIT(S)/HR: 5000 INJECTION INTRAVENOUS; SUBCUTANEOUS at 18:19

## 2022-05-05 RX ADMIN — TAMSULOSIN HYDROCHLORIDE 0.4 MILLIGRAM(S): 0.4 CAPSULE ORAL at 18:22

## 2022-05-05 RX ADMIN — OXYCODONE HYDROCHLORIDE 5 MILLIGRAM(S): 5 TABLET ORAL at 04:18

## 2022-05-05 RX ADMIN — Medication 650 MILLIGRAM(S): at 22:15

## 2022-05-05 RX ADMIN — Medication 650 MILLIGRAM(S): at 21:45

## 2022-05-05 RX ADMIN — HEPARIN SODIUM 11 UNIT(S)/HR: 5000 INJECTION INTRAVENOUS; SUBCUTANEOUS at 19:17

## 2022-05-05 RX ADMIN — ATORVASTATIN CALCIUM 20 MILLIGRAM(S): 80 TABLET, FILM COATED ORAL at 21:45

## 2022-05-05 RX ADMIN — HEPARIN SODIUM 9 UNIT(S)/HR: 5000 INJECTION INTRAVENOUS; SUBCUTANEOUS at 07:14

## 2022-05-05 NOTE — PROGRESS NOTE ADULT - ASSESSMENT
77 y/o male with PMH ofDM, afib/aflutter, HTN, HLD, CAD with QUE and CABG, mitral valve replacement (bovine) on coumadin (last dose yesterday night), severe aortic stenosis echo 4/14/22,  rectal  adenocarcinoma s/p transanal excision in 2020 with post op infectious complication requiring debridements, now patient has recurrent mass s/p transanal excision on 4/27, path showing fibrotic changes. Now with post op persistent rectal pain.    Plan:  - regular diet - consistent carb  - IV zosyn  - Insulin sliding scale  - continue home dose warfarin  - f/u AM labs  - ambulate as tolerated  - pain control as needed  - PT consult      [facility]  [id number]

## 2022-05-05 NOTE — PROGRESS NOTE ADULT - SUBJECTIVE AND OBJECTIVE BOX
Surgery Progress Note     Subjective/24hour Events:   Patient seen and examined.       Vital Signs:  Vital Signs Last 24 Hrs  T(C): 37 (05 May 2022 00:46), Max: 37.2 (04 May 2022 08:33)  T(F): 98.6 (05 May 2022 00:46), Max: 98.9 (04 May 2022 08:33)  HR: 81 (05 May 2022 00:46) (64 - 89)  BP: 125/85 (05 May 2022 00:46) (105/66 - 160/77)  BP(mean): --  RR: 18 (05 May 2022 00:46) (18 - 18)  SpO2: 100% (05 May 2022 00:46) (97% - 100%)    CAPILLARY BLOOD GLUCOSE      POCT Blood Glucose.: 219 mg/dL (04 May 2022 21:33)  POCT Blood Glucose.: 139 mg/dL (04 May 2022 18:34)  POCT Blood Glucose.: 154 mg/dL (04 May 2022 17:18)  POCT Blood Glucose.: 148 mg/dL (04 May 2022 11:57)  POCT Blood Glucose.: 111 mg/dL (04 May 2022 08:19)      I&O's Detail    04 May 2022 07:01  -  05 May 2022 00:47  --------------------------------------------------------  IN:    Heparin: 13 mL    IV PiggyBack: 300 mL    IV PiggyBack: 65 mL    Oral Fluid: 1000 mL  Total IN: 1378 mL    OUT:    Voided (mL): 350 mL  Total OUT: 350 mL    Total NET: 1028 mL          MEDICATIONS  (STANDING):  aspirin enteric coated 81 milliGRAM(s) Oral daily  atorvastatin 20 milliGRAM(s) Oral at bedtime  dextrose 5%. 1000 milliLiter(s) (50 mL/Hr) IV Continuous <Continuous>  dextrose 5%. 1000 milliLiter(s) (100 mL/Hr) IV Continuous <Continuous>  dextrose 50% Injectable 25 Gram(s) IV Push once  dextrose 50% Injectable 12.5 Gram(s) IV Push once  dextrose 50% Injectable 25 Gram(s) IV Push once  glucagon  Injectable 1 milliGRAM(s) IntraMuscular once  heparin  Infusion 1300 Unit(s)/Hr (11 mL/Hr) IV Continuous <Continuous>  insulin lispro (ADMELOG) corrective regimen sliding scale   SubCutaneous three times a day before meals  insulin lispro (ADMELOG) corrective regimen sliding scale   SubCutaneous at bedtime  lidocaine 2% Gel 1 Application(s) Topical two times a day  losartan 25 milliGRAM(s) Oral daily  metoprolol tartrate 25 milliGRAM(s) Oral two times a day  multivitamin 1 Tablet(s) Oral at bedtime  piperacillin/tazobactam IVPB.. 3.375 Gram(s) IV Intermittent every 8 hours  tamsulosin 0.4 milliGRAM(s) Oral every 12 hours    MEDICATIONS  (PRN):  acetaminophen     Tablet .. 650 milliGRAM(s) Oral every 6 hours PRN Mild Pain (1 - 3), Moderate Pain (4 - 6)  dextrose Oral Gel 15 Gram(s) Oral once PRN Blood Glucose LESS THAN 70 milliGRAM(s)/deciliter  oxyCODONE    IR 5 milliGRAM(s) Oral every 4 hours PRN Severe Pain (7 - 10)      Physical Exam:  General: NAD, alert  Chest: Nonlabored breathing.  Abdomen: soft, NT,ND, no rebound tenderness  Extremities: peripheral pulses palpable      Labs:    05-04    126<L>  |  88<L>  |  11  ----------------------------<  107<H>  4.2   |  23  |  0.94    Ca    9.3      04 May 2022 07:11  Phos  3.3     05-04  Mg     1.6     05-04    TPro  6.7  /  Alb  3.8  /  TBili  1.2  /  DBili  x   /  AST  29  /  ALT  22  /  AlkPhos  63  05-03    LIVER FUNCTIONS - ( 03 May 2022 11:56 )  Alb: 3.8 g/dL / Pro: 6.7 g/dL / ALK PHOS: 63 U/L / ALT: 22 U/L / AST: 29 U/L / GGT: x                                 13.2   8.26  )-----------( 201      ( 04 May 2022 20:34 )             38.0     PT/INR - ( 04 May 2022 07:18 )   PT: 14.8 sec;   INR: 1.27 ratio         PTT - ( 04 May 2022 20:34 )  PTT:114.6 sec

## 2022-05-05 NOTE — PROGRESS NOTE ADULT - ASSESSMENT
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  Also has a past history of HTN, HLD, DM, Afib/flutter now presents with rectal pain after resection of an anal adeno CA in 2020 and then resection of a recurrent mass on 4/27.   - no evidence for CHF nor coronary ischemia  - Heparin --> Coumadin goal INR > 2  - ambulation/pain control / IS  - ABX  - Per pt to get MRI for sciatica work up   - stable from CV standpoint. Plan per Surgery

## 2022-05-05 NOTE — PROGRESS NOTE ADULT - SUBJECTIVE AND OBJECTIVE BOX
SUBJECTIVE:  Still with some rectal discomfort and sciatica pain. no Cp no SOB     MEDICATIONS:  losartan 25 milliGRAM(s) Oral daily  metoprolol tartrate 25 milliGRAM(s) Oral two times a day  tamsulosin 0.4 milliGRAM(s) Oral every 12 hours    piperacillin/tazobactam IVPB.. 3.375 Gram(s) IV Intermittent every 8 hours      acetaminophen     Tablet .. 650 milliGRAM(s) Oral every 6 hours PRN  oxyCODONE    IR 5 milliGRAM(s) Oral every 4 hours PRN      atorvastatin 20 milliGRAM(s) Oral at bedtime  dextrose 50% Injectable 25 Gram(s) IV Push once  dextrose 50% Injectable 12.5 Gram(s) IV Push once  dextrose 50% Injectable 25 Gram(s) IV Push once  dextrose Oral Gel 15 Gram(s) Oral once PRN  glucagon  Injectable 1 milliGRAM(s) IntraMuscular once  insulin lispro (ADMELOG) corrective regimen sliding scale   SubCutaneous three times a day before meals  insulin lispro (ADMELOG) corrective regimen sliding scale   SubCutaneous at bedtime    aspirin enteric coated 81 milliGRAM(s) Oral daily  dextrose 5%. 1000 milliLiter(s) IV Continuous <Continuous>  dextrose 5%. 1000 milliLiter(s) IV Continuous <Continuous>  heparin  Infusion 1300 Unit(s)/Hr IV Continuous <Continuous>  lidocaine 2% Gel 1 Application(s) Topical two times a day  multivitamin 1 Tablet(s) Oral at bedtime      REVIEW OF SYSTEMS:    CONSTITUTIONAL: No fever, weight loss, or fatigue  EYES: No eye pain, visual disturbances, or discharge  NECK: No pain or stiffness  RESPIRATORY: No cough, wheezing, chills or hemoptysis; No Shortness of Breath  CARDIOVASCULAR: No chest pain, palpitations, dizziness, or leg swelling  GASTROINTESTINAL: No abdominal or epigastric pain. No nausea, vomiting, or hematemesis;Rectal pain  GENITOURINARY: No dysuria, frequency, hematuria, or incontinence  NEUROLOGICAL: No headaches, memory loss, loss of strength, numbness, or tremors  SKIN: No itching, burning, rashes, or lesions   LYMPH Nodes: No enlarged glands  MUSCULOSKELETAL: No joint pain or swelling; + Sciatica pain  All other review of systems are negative.  	  [ ] Unable to obtain    PHYSICAL EXAM:  T(C): 36.8 (05-05-22 @ 09:41), Max: 37 (05-04-22 @ 16:46)  HR: 76 (05-05-22 @ 10:13) (74 - 89)  BP: 106/62 (05-05-22 @ 10:13) (91/57 - 142/94)  RR: 18 (05-05-22 @ 09:41) (18 - 18)  SpO2: 97% (05-05-22 @ 09:41) (97% - 100%)  Wt(kg): --  I&O's Summary    04 May 2022 07:01  -  05 May 2022 07:00  --------------------------------------------------------  IN: 1711 mL / OUT: 1550 mL / NET: 161 mL    05 May 2022 07:01  -  05 May 2022 10:22  --------------------------------------------------------  IN: 360 mL / OUT: 0 mL / NET: 360 mL          PHYSICAL EXAM    Appearance: Normal	  HEENT:   Normal oral mucosa, PERRL, EOMI	  NECK: Soft and supple, No LAD, No JVD  Cardiovascular: Regular Rate and Rhythm, Normal S1 S2, II/VI PEG   Respiratory: Lungs clear to auscultation	  Gastrointestinal:  Soft, Non-tender, + BS	  Skin: No rashes, No ecchymoses, No cyanosis  Neurologic: Non-focal  Extremities: No clubbing, cyanosis or edema  Vascular: Peripheral pulses palpable 2+ bilaterally    LABS:	 	                            13.2   6.76  )-----------( 188      ( 05 May 2022 07:37 )             37.0     05-05    129<L>  |  91<L>  |  15  ----------------------------<  106<H>  3.7   |  24  |  0.92    Ca    9.3      05 May 2022 07:36  Phos  3.1     05-05  Mg     1.8     05-05    TPro  6.7  /  Alb  3.8  /  TBili  1.2  /  DBili  x   /  AST  29  /  ALT  22  /  AlkPhos  63  05-03

## 2022-05-05 NOTE — CONSULT NOTE ADULT - SUBJECTIVE AND OBJECTIVE BOX
p (1480)     HPI:  76M, s/p L3-4 lami/disc by [facility]in 2012. P/w worsening localized Left thigh pain x1-2days, doesn’t correlate with a dermatome. Admitted for persistent rectal pain s/p rectal mass transanal excision on 4/27. PMH MVR (bovine), currently on hep gtt transitioning to coumadin. CT shows L5-S1 retrolisthesis, L1-2, L3-5 vacuum discs. Exam: AOx3, CALIXTO 5/5      --Anticoagulation:  aspirin enteric coated 81 milliGRAM(s) Oral daily  heparin  Infusion 1300 Unit(s)/Hr IV Continuous <Continuous>    =====================  PAST MEDICAL HISTORY   CAD (coronary artery disease)    AF (atrial fibrillation)    HTN (hypertension)    Hyperlipidemia    After-cataract of both eyes    BPH (Benign Prostatic Hyperplasia)    Diverticulosis    H/O hemorrhoids    CAD (coronary artery disease)    AMD (age related macular degeneration)    Bacteremia due to Streptococcus    Rectal cancer    Severe aortic stenosis      PAST SURGICAL HISTORY   Hx of coronary angioplasty    S/P ablation of atrial fibrillation    S/P left inguinal hernia repair    Retina disorder, left    S/P MVR (mitral valve replacement)          MEDICATIONS:  Antibiotics:  piperacillin/tazobactam IVPB.. 3.375 Gram(s) IV Intermittent every 8 hours    Neuro:  acetaminophen     Tablet .. 650 milliGRAM(s) Oral every 6 hours PRN  oxyCODONE    IR 5 milliGRAM(s) Oral every 4 hours PRN    Other:  atorvastatin 20 milliGRAM(s) Oral at bedtime  dextrose 5%. 1000 milliLiter(s) IV Continuous <Continuous>  dextrose 5%. 1000 milliLiter(s) IV Continuous <Continuous>  dextrose 50% Injectable 25 Gram(s) IV Push once  dextrose 50% Injectable 12.5 Gram(s) IV Push once  dextrose 50% Injectable 25 Gram(s) IV Push once  dextrose Oral Gel 15 Gram(s) Oral once PRN  glucagon  Injectable 1 milliGRAM(s) IntraMuscular once  insulin lispro (ADMELOG) corrective regimen sliding scale   SubCutaneous three times a day before meals  insulin lispro (ADMELOG) corrective regimen sliding scale   SubCutaneous at bedtime  losartan 25 milliGRAM(s) Oral daily  metoprolol tartrate 25 milliGRAM(s) Oral two times a day  multivitamin 1 Tablet(s) Oral at bedtime  tamsulosin 0.4 milliGRAM(s) Oral every 12 hours      SOCIAL HISTORY:   Occupation:   Marital Status:     FAMILY HISTORY:      ROS: Negative except per HPI    LABS:  PT/INR - ( 04 May 2022 07:18 )   PT: 14.8 sec;   INR: 1.27 ratio         PTT - ( 05 May 2022 02:34 )  PTT:115.6 sec                        13.2   6.76  )-----------( 188      ( 05 May 2022 07:37 )             37.0     05-05    129<L>  |  91<L>  |  15  ----------------------------<  106<H>  3.7   |  24  |  0.92    Ca    9.3      05 May 2022 07:36  Phos  3.1     05-05  Mg     1.8     05-05    TPro  6.7  /  Alb  3.8  /  TBili  1.2  /  DBili  x   /  AST  29  /  ALT  22  /  AlkPhos  63  05-03

## 2022-05-06 DIAGNOSIS — E87.1 HYPO-OSMOLALITY AND HYPONATREMIA: ICD-10-CM

## 2022-05-06 LAB
ANION GAP SERPL CALC-SCNC: 11 MMOL/L — SIGNIFICANT CHANGE UP (ref 5–17)
ANION GAP SERPL CALC-SCNC: 14 MMOL/L — SIGNIFICANT CHANGE UP (ref 5–17)
APTT BLD: 76.8 SEC — HIGH (ref 27.5–35.5)
APTT BLD: 81.7 SEC — HIGH (ref 27.5–35.5)
BUN SERPL-MCNC: 18 MG/DL — SIGNIFICANT CHANGE UP (ref 7–23)
BUN SERPL-MCNC: 18 MG/DL — SIGNIFICANT CHANGE UP (ref 7–23)
CALCIUM SERPL-MCNC: 9.1 MG/DL — SIGNIFICANT CHANGE UP (ref 8.4–10.5)
CALCIUM SERPL-MCNC: 9.3 MG/DL — SIGNIFICANT CHANGE UP (ref 8.4–10.5)
CHLORIDE SERPL-SCNC: 86 MMOL/L — LOW (ref 96–108)
CHLORIDE SERPL-SCNC: 87 MMOL/L — LOW (ref 96–108)
CHLORIDE UR-SCNC: 39 MMOL/L — SIGNIFICANT CHANGE UP
CO2 SERPL-SCNC: 21 MMOL/L — LOW (ref 22–31)
CO2 SERPL-SCNC: 24 MMOL/L — SIGNIFICANT CHANGE UP (ref 22–31)
CREAT SERPL-MCNC: 1.02 MG/DL — SIGNIFICANT CHANGE UP (ref 0.5–1.3)
CREAT SERPL-MCNC: 1.04 MG/DL — SIGNIFICANT CHANGE UP (ref 0.5–1.3)
EGFR: 74 ML/MIN/1.73M2 — SIGNIFICANT CHANGE UP
EGFR: 75 ML/MIN/1.73M2 — SIGNIFICANT CHANGE UP
GLUCOSE BLDC GLUCOMTR-MCNC: 112 MG/DL — HIGH (ref 70–99)
GLUCOSE BLDC GLUCOMTR-MCNC: 141 MG/DL — HIGH (ref 70–99)
GLUCOSE BLDC GLUCOMTR-MCNC: 155 MG/DL — HIGH (ref 70–99)
GLUCOSE BLDC GLUCOMTR-MCNC: 204 MG/DL — HIGH (ref 70–99)
GLUCOSE SERPL-MCNC: 151 MG/DL — HIGH (ref 70–99)
GLUCOSE SERPL-MCNC: 160 MG/DL — HIGH (ref 70–99)
HCT VFR BLD CALC: 35.4 % — LOW (ref 39–50)
HCT VFR BLD CALC: 37.9 % — LOW (ref 39–50)
HGB BLD-MCNC: 12.5 G/DL — LOW (ref 13–17)
HGB BLD-MCNC: 13.4 G/DL — SIGNIFICANT CHANGE UP (ref 13–17)
INR BLD: 1.6 RATIO — HIGH (ref 0.88–1.16)
MAGNESIUM SERPL-MCNC: 1.6 MG/DL — SIGNIFICANT CHANGE UP (ref 1.6–2.6)
MAGNESIUM SERPL-MCNC: 1.7 MG/DL — SIGNIFICANT CHANGE UP (ref 1.6–2.6)
MCHC RBC-ENTMCNC: 33.2 PG — SIGNIFICANT CHANGE UP (ref 27–34)
MCHC RBC-ENTMCNC: 33.2 PG — SIGNIFICANT CHANGE UP (ref 27–34)
MCHC RBC-ENTMCNC: 35.3 GM/DL — SIGNIFICANT CHANGE UP (ref 32–36)
MCHC RBC-ENTMCNC: 35.4 GM/DL — SIGNIFICANT CHANGE UP (ref 32–36)
MCV RBC AUTO: 93.8 FL — SIGNIFICANT CHANGE UP (ref 80–100)
MCV RBC AUTO: 94.1 FL — SIGNIFICANT CHANGE UP (ref 80–100)
NRBC # BLD: 0 /100 WBCS — SIGNIFICANT CHANGE UP (ref 0–0)
NRBC # BLD: 0 /100 WBCS — SIGNIFICANT CHANGE UP (ref 0–0)
OSMOLALITY UR: 342 MOS/KG — SIGNIFICANT CHANGE UP (ref 300–900)
PHOSPHATE SERPL-MCNC: 3.2 MG/DL — SIGNIFICANT CHANGE UP (ref 2.5–4.5)
PHOSPHATE SERPL-MCNC: 3.3 MG/DL — SIGNIFICANT CHANGE UP (ref 2.5–4.5)
PLATELET # BLD AUTO: 184 K/UL — SIGNIFICANT CHANGE UP (ref 150–400)
PLATELET # BLD AUTO: 194 K/UL — SIGNIFICANT CHANGE UP (ref 150–400)
POTASSIUM SERPL-MCNC: 4.1 MMOL/L — SIGNIFICANT CHANGE UP (ref 3.5–5.3)
POTASSIUM SERPL-MCNC: 4.3 MMOL/L — SIGNIFICANT CHANGE UP (ref 3.5–5.3)
POTASSIUM SERPL-SCNC: 4.1 MMOL/L — SIGNIFICANT CHANGE UP (ref 3.5–5.3)
POTASSIUM SERPL-SCNC: 4.3 MMOL/L — SIGNIFICANT CHANGE UP (ref 3.5–5.3)
POTASSIUM UR-SCNC: 40 MMOL/L — SIGNIFICANT CHANGE UP
PROTHROM AB SERPL-ACNC: 18.5 SEC — HIGH (ref 10.5–13.4)
RBC # BLD: 3.76 M/UL — LOW (ref 4.2–5.8)
RBC # BLD: 4.04 M/UL — LOW (ref 4.2–5.8)
RBC # FLD: 12.6 % — SIGNIFICANT CHANGE UP (ref 10.3–14.5)
RBC # FLD: 12.8 % — SIGNIFICANT CHANGE UP (ref 10.3–14.5)
SODIUM SERPL-SCNC: 121 MMOL/L — LOW (ref 135–145)
SODIUM SERPL-SCNC: 122 MMOL/L — LOW (ref 135–145)
SODIUM UR-SCNC: 48 MMOL/L — SIGNIFICANT CHANGE UP
WBC # BLD: 11.38 K/UL — HIGH (ref 3.8–10.5)
WBC # BLD: 13.15 K/UL — HIGH (ref 3.8–10.5)
WBC # FLD AUTO: 11.38 K/UL — HIGH (ref 3.8–10.5)
WBC # FLD AUTO: 13.15 K/UL — HIGH (ref 3.8–10.5)

## 2022-05-06 PROCEDURE — 99222 1ST HOSP IP/OBS MODERATE 55: CPT

## 2022-05-06 RX ORDER — SODIUM CHLORIDE 9 MG/ML
1 INJECTION INTRAMUSCULAR; INTRAVENOUS; SUBCUTANEOUS EVERY 8 HOURS
Refills: 0 | Status: DISCONTINUED | OUTPATIENT
Start: 2022-05-06 | End: 2022-05-09

## 2022-05-06 RX ORDER — WARFARIN SODIUM 2.5 MG/1
5 TABLET ORAL ONCE
Refills: 0 | Status: COMPLETED | OUTPATIENT
Start: 2022-05-06 | End: 2022-05-06

## 2022-05-06 RX ORDER — MAGNESIUM SULFATE 500 MG/ML
2 VIAL (ML) INJECTION ONCE
Refills: 0 | Status: COMPLETED | OUTPATIENT
Start: 2022-05-06 | End: 2022-05-06

## 2022-05-06 RX ADMIN — Medication 650 MILLIGRAM(S): at 20:52

## 2022-05-06 RX ADMIN — LIDOCAINE 1 APPLICATION(S): 4 CREAM TOPICAL at 05:45

## 2022-05-06 RX ADMIN — Medication 650 MILLIGRAM(S): at 12:33

## 2022-05-06 RX ADMIN — OXYCODONE HYDROCHLORIDE 5 MILLIGRAM(S): 5 TABLET ORAL at 01:28

## 2022-05-06 RX ADMIN — Medication 650 MILLIGRAM(S): at 13:15

## 2022-05-06 RX ADMIN — SODIUM CHLORIDE 1 GRAM(S): 9 INJECTION INTRAMUSCULAR; INTRAVENOUS; SUBCUTANEOUS at 18:57

## 2022-05-06 RX ADMIN — OXYCODONE HYDROCHLORIDE 5 MILLIGRAM(S): 5 TABLET ORAL at 00:58

## 2022-05-06 RX ADMIN — Medication 1 TABLET(S): at 22:15

## 2022-05-06 RX ADMIN — Medication 25 MILLIGRAM(S): at 05:45

## 2022-05-06 RX ADMIN — PIPERACILLIN AND TAZOBACTAM 25 GRAM(S): 4; .5 INJECTION, POWDER, LYOPHILIZED, FOR SOLUTION INTRAVENOUS at 18:59

## 2022-05-06 RX ADMIN — ATORVASTATIN CALCIUM 20 MILLIGRAM(S): 80 TABLET, FILM COATED ORAL at 22:16

## 2022-05-06 RX ADMIN — Medication 400 MILLIGRAM(S): at 22:15

## 2022-05-06 RX ADMIN — Medication 1: at 13:39

## 2022-05-06 RX ADMIN — HEPARIN SODIUM 11 UNIT(S)/HR: 5000 INJECTION INTRAVENOUS; SUBCUTANEOUS at 07:41

## 2022-05-06 RX ADMIN — Medication 81 MILLIGRAM(S): at 12:33

## 2022-05-06 RX ADMIN — Medication 650 MILLIGRAM(S): at 22:00

## 2022-05-06 RX ADMIN — LIDOCAINE 1 APPLICATION(S): 4 CREAM TOPICAL at 18:20

## 2022-05-06 RX ADMIN — HEPARIN SODIUM 11 UNIT(S)/HR: 5000 INJECTION INTRAVENOUS; SUBCUTANEOUS at 09:28

## 2022-05-06 RX ADMIN — LOSARTAN POTASSIUM 25 MILLIGRAM(S): 100 TABLET, FILM COATED ORAL at 05:45

## 2022-05-06 RX ADMIN — POLYETHYLENE GLYCOL 3350 17 GRAM(S): 17 POWDER, FOR SOLUTION ORAL at 12:35

## 2022-05-06 RX ADMIN — Medication 25 GRAM(S): at 09:28

## 2022-05-06 RX ADMIN — TAMSULOSIN HYDROCHLORIDE 0.4 MILLIGRAM(S): 0.4 CAPSULE ORAL at 05:45

## 2022-05-06 RX ADMIN — HEPARIN SODIUM 11 UNIT(S)/HR: 5000 INJECTION INTRAVENOUS; SUBCUTANEOUS at 19:42

## 2022-05-06 RX ADMIN — WARFARIN SODIUM 5 MILLIGRAM(S): 2.5 TABLET ORAL at 22:15

## 2022-05-06 RX ADMIN — PIPERACILLIN AND TAZOBACTAM 25 GRAM(S): 4; .5 INJECTION, POWDER, LYOPHILIZED, FOR SOLUTION INTRAVENOUS at 09:28

## 2022-05-06 RX ADMIN — TAMSULOSIN HYDROCHLORIDE 0.4 MILLIGRAM(S): 0.4 CAPSULE ORAL at 18:59

## 2022-05-06 NOTE — PROGRESS NOTE ADULT - ASSESSMENT
77 y/o male with PMH ofDM, afib/aflutter, HTN, HLD, CAD with QUE and CABG, mitral valve replacement (bovine) on coumadin (last dose yesterday night), severe aortic stenosis echo 4/14/22,  rectal  adenocarcinoma s/p transanal excision in 2020 with post op infectious complication requiring debridements, now patient has recurrent mass s/p transanal excision on 4/27, path showing fibrotic changes. Now with post op persistent rectal pain.    Plan:  - regular diet - consistent carb  - IV zosyn  - Insulin sliding scale  - continue home dose warfarin  - f/u AM labs  - ambulate as tolerated  - pain control as needed  - PT consult      [facility]  [id number] 75 y/o male with PMH ofDM, afib/aflutter, HTN, HLD, CAD with QUE and CABG, mitral valve replacement (bovine) on coumadin (last dose yesterday night), severe aortic stenosis echo 4/14/22,  rectal  adenocarcinoma s/p transanal excision in 2020 with post op infectious complication requiring debridements, now patient has recurrent mass s/p transanal excision on 4/27, path showing fibrotic changes. Now with post op persistent rectal pain.    Plan:  - regular diet - consistent carb  - f/u with NSG  - IV zosyn  - Insulin sliding scale  - continue home dose warfarin w heparin drip  - f/u AM labs  - ambulate as tolerated  - pain control as needed  - Anticipate discharge later today with 1w Augmentin and Oxycodone      [facility]  [id number]

## 2022-05-06 NOTE — CONSULT NOTE ADULT - ATTENDING COMMENTS
Pt seen and examined. Pt has hx of left L3-4 discectomy and recent rectal surgery, now with several day history of left anterior - superior thigh pain. Motor / sensory intact. CT and MRI demonstrate post-op changes, no evidence of recurrent or residual HNP, multilevel DDD with neural foraminal narrowing.    No acute Neurosurgical intervention anticipated. Recommend PT. Pt to contact office for follow-up.
I have seen this patient with the fellow and agree with their assessment and plan. I was physically present for significant portions of the evaluation and management (E/M) service provided.  I agree with the above history, physical, and plan which I have reviewed and edited where appropriate.    Hyponatremia- unclear cause for now as pending urine studies  For now, can start Na tabs and await urine results  FWR 1l for now and avoid D5W in zosyn  Check Na q8 hours  Goal Na tomorrow 128-129     Adolfo Robison MD  Pager   Office     Contact me directly via [employer] Teams     (After 5 pm or on weekends please page the on-call fellow/attending, can check [email or web address] for schedule. Login is jonathan solis, schedule under [facility] medicine, psych, derm)

## 2022-05-06 NOTE — CONSULT NOTE ADULT - PROBLEM SELECTOR RECOMMENDATION 9
Pt. with acute hyponatremia. Uncertain etiology. Could be from SIADH from pain, and have component of urinary retention. SNa was 136 prior to admission on 4/15/22, SNa was 128 on admission on 5/3/22, received IV fluids, SNa decreased to 121 today. Patient was seen and examined at bedside. He endorse good oral intake. Does not drink so much water. Also endorse difficulty emptying bladder b/c of BPH issues. Also on heparin drip in D5W.      Recommend to send urine Na, urine Osm and serum Osm. Start on salt tabs 1g PO TID. Post void bladder scan to ensure no urinary retention, place nelson if urinary retention. Avoid hypotonic fluids (anything that is in D5W). Monitor SNa daily. SNa should not correct by more than 6-8 meq over 24h period.     If any questions, please feel free to contact me     Alayna Rocha  Nephrology Fellow  [facility] Pager: [phone]  [facility] Pager: [phone]

## 2022-05-06 NOTE — PROGRESS NOTE ADULT - SUBJECTIVE AND OBJECTIVE BOX
No chest pain or dyspnea  /91  HR 83 ( AF )  O2 Sat 98%  Lungs clear  Irregular rhythm  No edema    PTT 76.8  INR 1.60  Na 122  BUN 18  Crt 1.02  WBC 13.15  Hgb 13.4  Hct 37.9  Plt 184K    Imp:  S/P MVR  CAD  chronic AF - Stable from a CV stand point  Na is down to 122  ?? etiology  Rec:  Trial of IV NS @  cc/hr X 6 hours and then repeat  If still low then Renal evaluation           Urine electrolytes ( Na  Osm )

## 2022-05-06 NOTE — PROGRESS NOTE ADULT - SUBJECTIVE AND OBJECTIVE BOX
Surgery Progress Note     Subjective/24hour Events:   Patient seen and examined.       Vital Signs:  Vital Signs Last 24 Hrs  T(C): 36.9 (06 May 2022 00:56), Max: 37 (05 May 2022 05:28)  T(F): 98.4 (06 May 2022 00:56), Max: 98.6 (05 May 2022 05:28)  HR: 79 (06 May 2022 00:56) (73 - 84)  BP: 158/86 (06 May 2022 00:56) (91/57 - 158/86)  BP(mean): --  RR: 18 (06 May 2022 00:56) (18 - 18)  SpO2: 100% (06 May 2022 00:56) (97% - 100%)    CAPILLARY BLOOD GLUCOSE      POCT Blood Glucose.: 166 mg/dL (05 May 2022 21:11)  POCT Blood Glucose.: 127 mg/dL (05 May 2022 17:38)  POCT Blood Glucose.: 159 mg/dL (05 May 2022 13:11)  POCT Blood Glucose.: 124 mg/dL (05 May 2022 08:30)      I&O's Detail    04 May 2022 07:01  -  05 May 2022 07:00  --------------------------------------------------------  IN:    Heparin: 106 mL    IV PiggyBack: 65 mL    IV PiggyBack: 300 mL    Oral Fluid: 1240 mL  Total IN: 1711 mL    OUT:    Voided (mL): 1550 mL  Total OUT: 1550 mL    Total NET: 161 mL      05 May 2022 07:01  -  06 May 2022 01:05  --------------------------------------------------------  IN:    IV PiggyBack: 99 mL    IV PiggyBack: 300 mL    Oral Fluid: 1040 mL  Total IN: 1439 mL    OUT:    Stool (mL): 0 mL    Voided (mL): 600 mL  Total OUT: 600 mL    Total NET: 839 mL          MEDICATIONS  (STANDING):  aspirin enteric coated 81 milliGRAM(s) Oral daily  atorvastatin 20 milliGRAM(s) Oral at bedtime  dextrose 5%. 1000 milliLiter(s) (50 mL/Hr) IV Continuous <Continuous>  dextrose 5%. 1000 milliLiter(s) (100 mL/Hr) IV Continuous <Continuous>  dextrose 50% Injectable 25 Gram(s) IV Push once  dextrose 50% Injectable 12.5 Gram(s) IV Push once  dextrose 50% Injectable 25 Gram(s) IV Push once  glucagon  Injectable 1 milliGRAM(s) IntraMuscular once  heparin  Infusion 900 Unit(s)/Hr (11 mL/Hr) IV Continuous <Continuous>  insulin lispro (ADMELOG) corrective regimen sliding scale   SubCutaneous three times a day before meals  insulin lispro (ADMELOG) corrective regimen sliding scale   SubCutaneous at bedtime  lidocaine 2% Gel 1 Application(s) Topical two times a day  losartan 25 milliGRAM(s) Oral daily  metoprolol tartrate 25 milliGRAM(s) Oral two times a day  multivitamin 1 Tablet(s) Oral at bedtime  piperacillin/tazobactam IVPB.. 3.375 Gram(s) IV Intermittent every 8 hours  polyethylene glycol 3350 17 Gram(s) Oral daily  tamsulosin 0.4 milliGRAM(s) Oral every 12 hours    MEDICATIONS  (PRN):  acetaminophen     Tablet .. 650 milliGRAM(s) Oral every 6 hours PRN Mild Pain (1 - 3), Moderate Pain (4 - 6)  dextrose Oral Gel 15 Gram(s) Oral once PRN Blood Glucose LESS THAN 70 milliGRAM(s)/deciliter  ibuprofen  Tablet. 400 milliGRAM(s) Oral every 6 hours PRN Moderate Pain (4 - 6)  oxyCODONE    IR 5 milliGRAM(s) Oral every 4 hours PRN Severe Pain (7 - 10)      Physical Exam:  General: NAD, alert  Chest: Nonlabored breathing.  Abdomen: soft, NT,ND, no rebound tenderness  Extremities: peripheral pulses palpable        Labs:    05-05    129<L>  |  91<L>  |  15  ----------------------------<  106<H>  3.7   |  24  |  0.92    Ca    9.3      05 May 2022 07:36  Phos  3.1     05-05  Mg     1.8     05-05                              13.2   6.76  )-----------( 188      ( 05 May 2022 07:37 )             37.0     PT/INR - ( 05 May 2022 10:13 )   PT: 17.6 sec;   INR: 1.51 ratio         PTT - ( 06 May 2022 00:18 )  PTT:81.7 sec     Surgery Progress Note     Subjective/24hour Events:   Patient seen and examined. Rectal pain improved. Pt denies any CP, SOB, n/v/d, or chills.        Vital Signs Last 24 Hrs  T(C): 37 (06 May 2022 08:53), Max: 37 (06 May 2022 08:53)  T(F): 98.6 (06 May 2022 08:53), Max: 98.6 (06 May 2022 08:53)  HR: 83 (06 May 2022 08:53) (73 - 88)  BP: 135/91 (06 May 2022 08:53) (102/59 - 158/86)  BP(mean): --  RR: 18 (06 May 2022 08:53) (18 - 18)  SpO2: 98% (06 May 2022 08:53) (98% - 100%)    I&O's Detail    05 May 2022 07:01  -  06 May 2022 07:00  --------------------------------------------------------  IN:    Heparin: 122 mL    IV PiggyBack: 99 mL    IV PiggyBack: 300 mL    Oral Fluid: 1040 mL  Total IN: 1561 mL    OUT:    Stool (mL): 0 mL    Voided (mL): 600 mL  Total OUT: 600 mL    Total NET: 961 mL              MEDICATIONS  (STANDING):  aspirin enteric coated 81 milliGRAM(s) Oral daily  atorvastatin 20 milliGRAM(s) Oral at bedtime  dextrose 5%. 1000 milliLiter(s) (50 mL/Hr) IV Continuous <Continuous>  dextrose 5%. 1000 milliLiter(s) (100 mL/Hr) IV Continuous <Continuous>  dextrose 50% Injectable 25 Gram(s) IV Push once  dextrose 50% Injectable 12.5 Gram(s) IV Push once  dextrose 50% Injectable 25 Gram(s) IV Push once  glucagon  Injectable 1 milliGRAM(s) IntraMuscular once  heparin  Infusion 900 Unit(s)/Hr (11 mL/Hr) IV Continuous <Continuous>  insulin lispro (ADMELOG) corrective regimen sliding scale   SubCutaneous three times a day before meals  insulin lispro (ADMELOG) corrective regimen sliding scale   SubCutaneous at bedtime  lidocaine 2% Gel 1 Application(s) Topical two times a day  losartan 25 milliGRAM(s) Oral daily  metoprolol tartrate 25 milliGRAM(s) Oral two times a day  multivitamin 1 Tablet(s) Oral at bedtime  piperacillin/tazobactam IVPB.. 3.375 Gram(s) IV Intermittent every 8 hours  polyethylene glycol 3350 17 Gram(s) Oral daily  tamsulosin 0.4 milliGRAM(s) Oral every 12 hours    MEDICATIONS  (PRN):  acetaminophen     Tablet .. 650 milliGRAM(s) Oral every 6 hours PRN Mild Pain (1 - 3), Moderate Pain (4 - 6)  dextrose Oral Gel 15 Gram(s) Oral once PRN Blood Glucose LESS THAN 70 milliGRAM(s)/deciliter  ibuprofen  Tablet. 400 milliGRAM(s) Oral every 6 hours PRN Moderate Pain (4 - 6)  oxyCODONE    IR 5 milliGRAM(s) Oral every 4 hours PRN Severe Pain (7 - 10)      Physical Exam:  General: NAD, alert  Chest: Nonlabored breathing.  Abdomen: soft, NT, ND, no rebound tenderness                Labs                        13.4   13.15 )-----------( 184      ( 06 May 2022 07:29 )             37.9     05-06    122<L>  |  87<L>  |  18  ----------------------------<  151<H>  4.1   |  21<L>  |  1.02    Ca    9.3      06 May 2022 07:25  Phos  3.3     05-06  Mg     1.6     05-06      PT/INR - ( 06 May 2022 07:29 )   PT: 18.5 sec;   INR: 1.60 ratio         PTT - ( 06 May 2022 07:29 )  PTT:76.8 sec

## 2022-05-06 NOTE — PROVIDER CONTACT NOTE (OTHER) - ASSESSMENT
Pt awake and alert, no distress noted. c/o suprapubic discomfort. Voided 150ml urine. Post void residual bladder scan 390ml.

## 2022-05-06 NOTE — CONSULT NOTE ADULT - SUBJECTIVE AND OBJECTIVE BOX
[facility] DIVISION OF KIDNEY DISEASES AND HYPERTENSION -- [phone]  -- INITIAL CONSULT NOTE  --------------------------------------------------------------------------------  HPI: Pt. is a 76 year old male with past medical history of DM, afib, HTN, HLD, CAD s/p QUE and CABG, mitral valve replacement on AC, severe aortic stenosis, rectal adenocarcinoma s/p transanal excision s/p biopsy  on 4/27/22, came to ED for rectal pain. Admitted for further management. Nephrology consulted for hyponatremia. SNa was 136 prior to admission on 4/15/22, SNa was 128 on admission on 5/3/22, received IV fluids, SNa decreased to 121 today.     PAST HISTORY  --------------------------------------------------------------------------------  PAST MEDICAL & SURGICAL HISTORY:  CAD (coronary artery disease)    AF (atrial fibrillation)  s/p ablation 2010- on Coumadin    HTN (hypertension)    Hyperlipidemia    After-cataract of both eyes  1996    BPH (Benign Prostatic Hyperplasia)    Diverticulosis    H/O hemorrhoids    CAD (coronary artery disease)  stented coronary- LAD x 1 1997    AMD (age related macular degeneration)  Right eye    Bacteremia due to Streptococcus  9/2020- on Ceftriaxone via PICC line x 6 weeks    Rectal cancer  2020 had radiation 2021    Severe aortic stenosis  echo 4/14/22    Hx of coronary angioplasty  stent to LAD 5/30/1997    S/P ablation of atrial fibrillation  3/2010    S/P left inguinal hernia repair  2002    Retina disorder, 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 1000 milliLiter(s) IV Continuous <Continuous>  dextrose 5%. 1000 milliLiter(s) IV Continuous <Continuous>  dextrose 50% Injectable 25 Gram(s) IV Push once  dextrose 50% Injectable 12.5 Gram(s) IV Push once  dextrose 50% Injectable 25 Gram(s) IV Push once  glucagon  Injectable 1 milliGRAM(s) IntraMuscular once  heparin  Infusion 900 Unit(s)/Hr IV Continuous <Continuous>  insulin lispro (ADMELOG) corrective regimen sliding scale   SubCutaneous three times a day before meals  insulin lispro (ADMELOG) corrective regimen sliding scale   SubCutaneous at bedtime  lidocaine 2% Gel 1 Application(s) Topical two times a day  losartan 25 milliGRAM(s) Oral daily  metoprolol tartrate 25 milliGRAM(s) Oral two times a day  multivitamin 1 Tablet(s) Oral at bedtime  piperacillin/tazobactam IVPB.. 3.375 Gram(s) IV Intermittent every 8 hours  polyethylene glycol 3350 17 Gram(s) Oral daily  tamsulosin 0.4 milliGRAM(s) Oral every 12 hours  warfarin 5 milliGRAM(s) Oral once    PRN Inpatient Medications  acetaminophen     Tablet .. 650 milliGRAM(s) Oral every 6 hours PRN  dextrose Oral Gel 15 Gram(s) Oral once PRN  ibuprofen  Tablet. 400 milliGRAM(s) Oral every 6 hours PRN  oxyCODONE    IR 5 milliGRAM(s) Oral every 4 hours PRN      REVIEW OF SYSTEMS  --------------------------------------------------------------------------------  Gen: No fevers/chills  Skin: No rashes  Head/Eyes/Ears: Normal hearing,   Respiratory: No dyspnea, cough  CV: No chest pain  GI: No abdominal pain, diarrhea  : No dysuria, hematuria  MSK: No  edema  Heme: No easy bruising or bleeding  Psych: No significant depression    All other systems were reviewed and are negative, except as noted.    VITALS/PHYSICAL EXAM  --------------------------------------------------------------------------------  T(C): 36.8 (05-06-22 @ 12:07), Max: 37 (05-06-22 @ 08:53)  HR: 80 (05-06-22 @ 12:07) (73 - 88)  BP: 103/67 (05-06-22 @ 12:07) (103/67 - 158/86)  RR: 18 (05-06-22 @ 12:07) (18 - 18)  SpO2: 98% (05-06-22 @ 12:07) (98% - 100%)  Wt(kg): --        05-05-22 @ 07:01  -  05-06-22 @ 07:00  --------------------------------------------------------  IN: 1561 mL / OUT: 600 mL / NET: 961 mL    05-06-22 @ 07:01  -  05-06-22 @ 16:49  --------------------------------------------------------  IN: 640 mL / OUT: 0 mL / NET: 640 mL      Physical Exam:  	Gen: NAD  	HEENT: MMM  	Pulm: CTA B/L  	CV: S1S2  	Abd: Soft, +BS   	Ext: No LE edema B/L  	Neuro: Awake  	Skin: Warm and dry  	Vascular access:    LABS/STUDIES  --------------------------------------------------------------------------------              12.5   11.38 >-----------<  194      [05-06-22 @ 13:53]              35.4     121  |  86  |  18  ----------------------------<  160      [05-06-22 @ 13:53]  4.3   |  24  |  1.04        Ca     9.1     [05-06-22 @ 13:53]      Mg     1.7     [05-06-22 @ 13:53]      Phos  3.2     [05-06-22 @ 13:53]      PT/INR: PT 18.5 , INR 1.60       [05-06-22 @ 07:29]  PTT: 76.8       [05-06-22 @ 07:29]      Creatinine Trend:  SCr 1.04 [05-06 @ 13:53]  SCr 1.02 [05-06 @ 07:25]  SCr 0.92 [05-05 @ 07:36]  SCr 0.94 [05-04 @ 07:11]  SCr 0.97 [05-03 @ 11:56]    Urinalysis - [05-03-22 @ 11:56]      Color Light Yellow / Appearance Clear / SG 1.009 / pH 7.0      Gluc Negative / Ketone Negative  / Bili Negative / Urobili Negative       Blood Negative / Protein Negative / Leuk Est Negative / Nitrite Negative      RBC 2 / WBC 0 / Hyaline 0 / Gran  / Sq Epi  / Non Sq Epi 0 / Bacteria Negative           [facility] DIVISION OF KIDNEY DISEASES AND HYPERTENSION -- [phone]  -- INITIAL CONSULT NOTE  --------------------------------------------------------------------------------  HPI: Pt. is a 76 year old male with past medical history of DM, afib, HTN, HLD, CAD s/p QUE and CABG, mitral valve replacement on AC, severe aortic stenosis, rectal adenocarcinoma s/p transanal excision s/p biopsy  on 4/27/22, came to ED for rectal pain. Admitted for further management. Nephrology consulted for hyponatremia. SNa was 136 prior to admission on 4/15/22, SNa was 128 on admission on 5/3/22, received IV fluids, SNa decreased to 121 today. Patient was seen and examined at bedside. He endorse good oral intake. Does not drink so much water. Also endorse difficulty emptying bladder b/c of BPH issues.     PAST HISTORY  --------------------------------------------------------------------------------  PAST MEDICAL & SURGICAL HISTORY:  CAD (coronary artery disease)    AF (atrial fibrillation)  s/p ablation 2010- on Coumadin    HTN (hypertension)    Hyperlipidemia    After-cataract of both eyes  1996    BPH (Benign Prostatic Hyperplasia)    Diverticulosis    H/O hemorrhoids    CAD (coronary artery disease)  stented coronary- LAD x 1 1997    AMD (age related macular degeneration)  Right eye    Bacteremia due to Streptococcus  9/2020- on Ceftriaxone via PICC line x 6 weeks    Rectal cancer  2020 had radiation 2021    Severe aortic stenosis  echo 4/14/22    Hx of coronary angioplasty  stent to LAD 5/30/1997    S/P ablation of atrial fibrillation  3/2010    S/P left inguinal hernia repair  2002    Retina disorder, 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 1000 milliLiter(s) IV Continuous <Continuous>  dextrose 5%. 1000 milliLiter(s) IV Continuous <Continuous>  dextrose 50% Injectable 25 Gram(s) IV Push once  dextrose 50% Injectable 12.5 Gram(s) IV Push once  dextrose 50% Injectable 25 Gram(s) IV Push once  glucagon  Injectable 1 milliGRAM(s) IntraMuscular once  heparin  Infusion 900 Unit(s)/Hr IV Continuous <Continuous>  insulin lispro (ADMELOG) corrective regimen sliding scale   SubCutaneous three times a day before meals  insulin lispro (ADMELOG) corrective regimen sliding scale   SubCutaneous at bedtime  lidocaine 2% Gel 1 Application(s) Topical two times a day  losartan 25 milliGRAM(s) Oral daily  metoprolol tartrate 25 milliGRAM(s) Oral two times a day  multivitamin 1 Tablet(s) Oral at bedtime  piperacillin/tazobactam IVPB.. 3.375 Gram(s) IV Intermittent every 8 hours  polyethylene glycol 3350 17 Gram(s) Oral daily  tamsulosin 0.4 milliGRAM(s) Oral every 12 hours  warfarin 5 milliGRAM(s) Oral once    PRN Inpatient Medications  acetaminophen     Tablet .. 650 milliGRAM(s) Oral every 6 hours PRN  dextrose Oral Gel 15 Gram(s) Oral once PRN  ibuprofen  Tablet. 400 milliGRAM(s) Oral every 6 hours PRN  oxyCODONE    IR 5 milliGRAM(s) Oral every 4 hours PRN    REVIEW OF SYSTEMS  --------------------------------------------------------------------------------  Gen: No fevers/chills  Skin: No rashes  Head/Eyes/Ears: Normal hearing,   Respiratory: No dyspnea, cough  CV: No chest pain  GI: No abdominal pain, diarrhea  : No dysuria, hematuria  MSK: No  edema  Heme: No easy bruising or bleeding  Psych: No significant depression    All other systems were reviewed and are negative, except as noted.    VITALS/PHYSICAL EXAM  --------------------------------------------------------------------------------  T(C): 36.8 (05-06-22 @ 12:07), Max: 37 (05-06-22 @ 08:53)  HR: 80 (05-06-22 @ 12:07) (73 - 88)  BP: 103/67 (05-06-22 @ 12:07) (103/67 - 158/86)  RR: 18 (05-06-22 @ 12:07) (18 - 18)  SpO2: 98% (05-06-22 @ 12:07) (98% - 100%)  Wt(kg): --    05-05-22 @ 07:01  -  05-06-22 @ 07:00  --------------------------------------------------------  IN: 1561 mL / OUT: 600 mL / NET: 961 mL    05-06-22 @ 07:01  -  05-06-22 @ 16:49  --------------------------------------------------------  IN: 640 mL / OUT: 0 mL / NET: 640 mL    Physical Exam:  	Gen: NAD  	HEENT: MMM  	Pulm: CTA B/L  	CV: S1S2  	Abd: Soft, +BS   	Ext: No LE edema B/L  	Neuro: Awake  	Skin: Warm and dry    LABS/STUDIES  --------------------------------------------------------------------------------              12.5   11.38 >-----------<  194      [05-06-22 @ 13:53]              35.4     121  |  86  |  18  ----------------------------<  160      [05-06-22 @ 13:53]  4.3   |  24  |  1.04        Ca     9.1     [05-06-22 @ 13:53]      Mg     1.7     [05-06-22 @ 13:53]      Phos  3.2     [05-06-22 @ 13:53]      PT/INR: PT 18.5 , INR 1.60       [05-06-22 @ 07:29]  PTT: 76.8       [05-06-22 @ 07:29]    Creatinine Trend:  SCr 1.04 [05-06 @ 13:53]  SCr 1.02 [05-06 @ 07:25]  SCr 0.92 [05-05 @ 07:36]  SCr 0.94 [05-04 @ 07:11]  SCr 0.97 [05-03 @ 11:56]    Urinalysis - [05-03-22 @ 11:56]      Color Light Yellow / Appearance Clear / SG 1.009 / pH 7.0      Gluc Negative / Ketone Negative  / Bili Negative / Urobili Negative       Blood Negative / Protein Negative / Leuk Est Negative / Nitrite Negative      RBC 2 / WBC 0 / Hyaline 0 / Gran  / Sq Epi  / Non Sq Epi 0 / Bacteria Negative

## 2022-05-06 NOTE — CONSULT NOTE ADULT - ASSESSMENT
76M, s/p L3-4 lami/disc by Carin 2012. P/w worsening localized Left thigh pain x1-2days, doesn’t correlate with a dermatome. Admitted for persistent rectal pain s/p rectal mass transanal excision on 4/27. PMH MVR (bovine), currently on hep gtt transitioning to coumadin. CT shows L5-S1 retrolisthesis, L1-2, L3-5 vacuum discs. Exam: AOx3, CALIXTO 5/5  -Getting planned for d/c tomorrow or following day  -No acute neurosurgical intervention, MRI L-spine outpatient, or inpatient if patient still in hospital
Pt. with acute hyponatremia.

## 2022-05-06 NOTE — PROVIDER CONTACT NOTE (OTHER) - SITUATION
Patient has been retaining urine, post void residual bladder scan shows 390ml urine.
post void bladder scan 379
aPTT 76.8

## 2022-05-07 LAB
ANION GAP SERPL CALC-SCNC: 15 MMOL/L — SIGNIFICANT CHANGE UP (ref 5–17)
ANION GAP SERPL CALC-SCNC: 15 MMOL/L — SIGNIFICANT CHANGE UP (ref 5–17)
APTT BLD: 129.3 SEC — CRITICAL HIGH (ref 27.5–35.5)
APTT BLD: 74.8 SEC — HIGH (ref 27.5–35.5)
BUN SERPL-MCNC: 16 MG/DL — SIGNIFICANT CHANGE UP (ref 7–23)
BUN SERPL-MCNC: 18 MG/DL — SIGNIFICANT CHANGE UP (ref 7–23)
CALCIUM SERPL-MCNC: 8.6 MG/DL — SIGNIFICANT CHANGE UP (ref 8.4–10.5)
CALCIUM SERPL-MCNC: 9.2 MG/DL — SIGNIFICANT CHANGE UP (ref 8.4–10.5)
CHLORIDE SERPL-SCNC: 88 MMOL/L — LOW (ref 96–108)
CHLORIDE SERPL-SCNC: 88 MMOL/L — LOW (ref 96–108)
CO2 SERPL-SCNC: 21 MMOL/L — LOW (ref 22–31)
CO2 SERPL-SCNC: 22 MMOL/L — SIGNIFICANT CHANGE UP (ref 22–31)
CREAT SERPL-MCNC: 0.95 MG/DL — SIGNIFICANT CHANGE UP (ref 0.5–1.3)
CREAT SERPL-MCNC: 0.95 MG/DL — SIGNIFICANT CHANGE UP (ref 0.5–1.3)
EGFR: 82 ML/MIN/1.73M2 — SIGNIFICANT CHANGE UP
EGFR: 82 ML/MIN/1.73M2 — SIGNIFICANT CHANGE UP
GLUCOSE BLDC GLUCOMTR-MCNC: 128 MG/DL — HIGH (ref 70–99)
GLUCOSE BLDC GLUCOMTR-MCNC: 147 MG/DL — HIGH (ref 70–99)
GLUCOSE BLDC GLUCOMTR-MCNC: 147 MG/DL — HIGH (ref 70–99)
GLUCOSE BLDC GLUCOMTR-MCNC: 154 MG/DL — HIGH (ref 70–99)
GLUCOSE SERPL-MCNC: 113 MG/DL — HIGH (ref 70–99)
GLUCOSE SERPL-MCNC: 153 MG/DL — HIGH (ref 70–99)
HCT VFR BLD CALC: 32.7 % — LOW (ref 39–50)
HCT VFR BLD CALC: 37.1 % — LOW (ref 39–50)
HGB BLD-MCNC: 12 G/DL — LOW (ref 13–17)
HGB BLD-MCNC: 13.2 G/DL — SIGNIFICANT CHANGE UP (ref 13–17)
INR BLD: 1.91 RATIO — HIGH (ref 0.88–1.16)
INR BLD: 1.98 RATIO — HIGH (ref 0.88–1.16)
MAGNESIUM SERPL-MCNC: 1.7 MG/DL — SIGNIFICANT CHANGE UP (ref 1.6–2.6)
MCHC RBC-ENTMCNC: 33.6 PG — SIGNIFICANT CHANGE UP (ref 27–34)
MCHC RBC-ENTMCNC: 33.8 PG — SIGNIFICANT CHANGE UP (ref 27–34)
MCHC RBC-ENTMCNC: 35.6 GM/DL — SIGNIFICANT CHANGE UP (ref 32–36)
MCHC RBC-ENTMCNC: 36.7 GM/DL — HIGH (ref 32–36)
MCV RBC AUTO: 92.1 FL — SIGNIFICANT CHANGE UP (ref 80–100)
MCV RBC AUTO: 94.4 FL — SIGNIFICANT CHANGE UP (ref 80–100)
NRBC # BLD: 0 /100 WBCS — SIGNIFICANT CHANGE UP (ref 0–0)
NRBC # BLD: 0 /100 WBCS — SIGNIFICANT CHANGE UP (ref 0–0)
PHOSPHATE SERPL-MCNC: 3.5 MG/DL — SIGNIFICANT CHANGE UP (ref 2.5–4.5)
PLATELET # BLD AUTO: 186 K/UL — SIGNIFICANT CHANGE UP (ref 150–400)
PLATELET # BLD AUTO: 216 K/UL — SIGNIFICANT CHANGE UP (ref 150–400)
POTASSIUM SERPL-MCNC: 3.6 MMOL/L — SIGNIFICANT CHANGE UP (ref 3.5–5.3)
POTASSIUM SERPL-MCNC: 4.5 MMOL/L — SIGNIFICANT CHANGE UP (ref 3.5–5.3)
POTASSIUM SERPL-SCNC: 3.6 MMOL/L — SIGNIFICANT CHANGE UP (ref 3.5–5.3)
POTASSIUM SERPL-SCNC: 4.5 MMOL/L — SIGNIFICANT CHANGE UP (ref 3.5–5.3)
PROTHROM AB SERPL-ACNC: 22.1 SEC — HIGH (ref 10.5–13.4)
PROTHROM AB SERPL-ACNC: 23.1 SEC — HIGH (ref 10.5–13.4)
RBC # BLD: 3.55 M/UL — LOW (ref 4.2–5.8)
RBC # BLD: 3.93 M/UL — LOW (ref 4.2–5.8)
RBC # FLD: 12.7 % — SIGNIFICANT CHANGE UP (ref 10.3–14.5)
RBC # FLD: 12.9 % — SIGNIFICANT CHANGE UP (ref 10.3–14.5)
SODIUM SERPL-SCNC: 124 MMOL/L — LOW (ref 135–145)
SODIUM SERPL-SCNC: 125 MMOL/L — LOW (ref 135–145)
WBC # BLD: 12.43 K/UL — HIGH (ref 3.8–10.5)
WBC # BLD: 8.11 K/UL — SIGNIFICANT CHANGE UP (ref 3.8–10.5)
WBC # FLD AUTO: 12.43 K/UL — HIGH (ref 3.8–10.5)
WBC # FLD AUTO: 8.11 K/UL — SIGNIFICANT CHANGE UP (ref 3.8–10.5)

## 2022-05-07 PROCEDURE — 99232 SBSQ HOSP IP/OBS MODERATE 35: CPT | Mod: GC

## 2022-05-07 RX ORDER — BENZOCAINE AND MENTHOL 5; 1 G/100ML; G/100ML
1 LIQUID ORAL EVERY 6 HOURS
Refills: 0 | Status: DISCONTINUED | OUTPATIENT
Start: 2022-05-07 | End: 2022-05-09

## 2022-05-07 RX ORDER — POTASSIUM CHLORIDE 20 MEQ
20 PACKET (EA) ORAL ONCE
Refills: 0 | Status: COMPLETED | OUTPATIENT
Start: 2022-05-07 | End: 2022-05-07

## 2022-05-07 RX ORDER — WARFARIN SODIUM 2.5 MG/1
5 TABLET ORAL ONCE
Refills: 0 | Status: COMPLETED | OUTPATIENT
Start: 2022-05-07 | End: 2022-05-07

## 2022-05-07 RX ORDER — PIPERACILLIN AND TAZOBACTAM 4; .5 G/20ML; G/20ML
3.38 INJECTION, POWDER, LYOPHILIZED, FOR SOLUTION INTRAVENOUS EVERY 8 HOURS
Refills: 0 | Status: DISCONTINUED | OUTPATIENT
Start: 2022-05-07 | End: 2022-05-09

## 2022-05-07 RX ORDER — MAGNESIUM SULFATE 500 MG/ML
2 VIAL (ML) INJECTION ONCE
Refills: 0 | Status: COMPLETED | OUTPATIENT
Start: 2022-05-07 | End: 2022-05-07

## 2022-05-07 RX ADMIN — PIPERACILLIN AND TAZOBACTAM 25 GRAM(S): 4; .5 INJECTION, POWDER, LYOPHILIZED, FOR SOLUTION INTRAVENOUS at 09:43

## 2022-05-07 RX ADMIN — PIPERACILLIN AND TAZOBACTAM 25 GRAM(S): 4; .5 INJECTION, POWDER, LYOPHILIZED, FOR SOLUTION INTRAVENOUS at 02:15

## 2022-05-07 RX ADMIN — Medication 25 GRAM(S): at 09:43

## 2022-05-07 RX ADMIN — OXYCODONE HYDROCHLORIDE 5 MILLIGRAM(S): 5 TABLET ORAL at 17:28

## 2022-05-07 RX ADMIN — Medication 25 MILLIGRAM(S): at 05:49

## 2022-05-07 RX ADMIN — Medication 20 MILLIEQUIVALENT(S): at 09:42

## 2022-05-07 RX ADMIN — WARFARIN SODIUM 5 MILLIGRAM(S): 2.5 TABLET ORAL at 21:45

## 2022-05-07 RX ADMIN — HEPARIN SODIUM 11 UNIT(S)/HR: 5000 INJECTION INTRAVENOUS; SUBCUTANEOUS at 07:37

## 2022-05-07 RX ADMIN — Medication 81 MILLIGRAM(S): at 12:11

## 2022-05-07 RX ADMIN — OXYCODONE HYDROCHLORIDE 5 MILLIGRAM(S): 5 TABLET ORAL at 15:51

## 2022-05-07 RX ADMIN — TAMSULOSIN HYDROCHLORIDE 0.4 MILLIGRAM(S): 0.4 CAPSULE ORAL at 05:49

## 2022-05-07 RX ADMIN — Medication 650 MILLIGRAM(S): at 12:10

## 2022-05-07 RX ADMIN — SODIUM CHLORIDE 1 GRAM(S): 9 INJECTION INTRAMUSCULAR; INTRAVENOUS; SUBCUTANEOUS at 12:11

## 2022-05-07 RX ADMIN — Medication 25 MILLIGRAM(S): at 17:09

## 2022-05-07 RX ADMIN — BENZOCAINE AND MENTHOL 1 LOZENGE: 5; 1 LIQUID ORAL at 02:15

## 2022-05-07 RX ADMIN — Medication 650 MILLIGRAM(S): at 17:28

## 2022-05-07 RX ADMIN — SODIUM CHLORIDE 1 GRAM(S): 9 INJECTION INTRAMUSCULAR; INTRAVENOUS; SUBCUTANEOUS at 02:15

## 2022-05-07 RX ADMIN — LIDOCAINE 1 APPLICATION(S): 4 CREAM TOPICAL at 17:09

## 2022-05-07 RX ADMIN — TAMSULOSIN HYDROCHLORIDE 0.4 MILLIGRAM(S): 0.4 CAPSULE ORAL at 17:09

## 2022-05-07 RX ADMIN — Medication 650 MILLIGRAM(S): at 09:44

## 2022-05-07 RX ADMIN — HEPARIN SODIUM 9 UNIT(S)/HR: 5000 INJECTION INTRAVENOUS; SUBCUTANEOUS at 09:13

## 2022-05-07 RX ADMIN — Medication 650 MILLIGRAM(S): at 17:09

## 2022-05-07 RX ADMIN — POLYETHYLENE GLYCOL 3350 17 GRAM(S): 17 POWDER, FOR SOLUTION ORAL at 12:11

## 2022-05-07 RX ADMIN — Medication 1 TABLET(S): at 21:45

## 2022-05-07 RX ADMIN — LOSARTAN POTASSIUM 25 MILLIGRAM(S): 100 TABLET, FILM COATED ORAL at 05:50

## 2022-05-07 RX ADMIN — LIDOCAINE 1 APPLICATION(S): 4 CREAM TOPICAL at 05:48

## 2022-05-07 RX ADMIN — ATORVASTATIN CALCIUM 20 MILLIGRAM(S): 80 TABLET, FILM COATED ORAL at 21:45

## 2022-05-07 RX ADMIN — PIPERACILLIN AND TAZOBACTAM 25 GRAM(S): 4; .5 INJECTION, POWDER, LYOPHILIZED, FOR SOLUTION INTRAVENOUS at 18:16

## 2022-05-07 RX ADMIN — SODIUM CHLORIDE 1 GRAM(S): 9 INJECTION INTRAMUSCULAR; INTRAVENOUS; SUBCUTANEOUS at 18:16

## 2022-05-07 NOTE — PROGRESS NOTE ADULT - SUBJECTIVE AND OBJECTIVE BOX
SUBJECTIVE:  NO CP no SOB No IN bleeding   	  MEDICATIONS:  losartan 25 milliGRAM(s) Oral daily  metoprolol tartrate 25 milliGRAM(s) Oral two times a day  tamsulosin 0.4 milliGRAM(s) Oral every 12 hours    piperacillin/tazobactam IVPB.. 3.375 Gram(s) IV Intermittent every 8 hours      acetaminophen     Tablet .. 650 milliGRAM(s) Oral every 6 hours PRN  ibuprofen  Tablet. 400 milliGRAM(s) Oral every 6 hours PRN  oxyCODONE    IR 5 milliGRAM(s) Oral every 4 hours PRN    polyethylene glycol 3350 17 Gram(s) Oral daily    atorvastatin 20 milliGRAM(s) Oral at bedtime  dextrose 50% Injectable 12.5 Gram(s) IV Push once  dextrose 50% Injectable 25 Gram(s) IV Push once  dextrose 50% Injectable 25 Gram(s) IV Push once  dextrose Oral Gel 15 Gram(s) Oral once PRN  glucagon  Injectable 1 milliGRAM(s) IntraMuscular once  insulin lispro (ADMELOG) corrective regimen sliding scale   SubCutaneous three times a day before meals  insulin lispro (ADMELOG) corrective regimen sliding scale   SubCutaneous at bedtime    aspirin enteric coated 81 milliGRAM(s) Oral daily  benzocaine 15 mG/menthol 3.6 mG Lozenge 1 Lozenge Oral every 6 hours PRN  dextrose 5%. 1000 milliLiter(s) IV Continuous <Continuous>  dextrose 5%. 1000 milliLiter(s) IV Continuous <Continuous>  heparin  Infusion 900 Unit(s)/Hr IV Continuous <Continuous>  lidocaine 2% Gel 1 Application(s) Topical two times a day  multivitamin 1 Tablet(s) Oral at bedtime  sodium chloride 1 Gram(s) Oral every 8 hours      REVIEW OF SYSTEMS:    CONSTITUTIONAL: No fever, weight loss, or fatigue  EYES: No eye pain, visual disturbances, or discharge  NECK: No pain or stiffness  RESPIRATORY: No cough, wheezing, chills or hemoptysis; No Shortness of Breath  CARDIOVASCULAR: No chest pain, palpitations, dizziness, or leg swelling  GASTROINTESTINAL: No abdominal or epigastric pain. No nausea, vomiting, or hematemesis; No diarrhea or constipation. No melena or hematochezia.  GENITOURINARY: No dysuria, frequency, hematuria, or incontinence  NEUROLOGICAL: No headaches, memory loss, loss of strength, numbness, or tremors  SKIN: No itching, burning, rashes, or lesions   LYMPH Nodes: No enlarged glands  MUSCULOSKELETAL: No joint pain or swelling; No muscle, back, or extremity pain  All other review of systems are negative.  	  [ ] Unable to obtain    PHYSICAL EXAM:  T(C): 36.5 (05-07-22 @ 05:34), Max: 37 (05-06-22 @ 08:53)  HR: 93 (05-07-22 @ 05:34) (64 - 98)  BP: 152/83 (05-07-22 @ 05:34) (98/51 - 152/83)  RR: 18 (05-07-22 @ 05:34) (18 - 18)  SpO2: 99% (05-07-22 @ 05:34) (98% - 100%)  Wt(kg): --  I&O's Summary    06 May 2022 07:01  -  07 May 2022 07:00  --------------------------------------------------------  IN: 1114 mL / OUT: 1475 mL / NET: -361 mL          PHYSICAL EXAM    Appearance: Normal	  HEENT:   Normal oral mucosa, PERRL, EOMI	  NECK: Soft and supple, No LAD, No JVD  Cardiovascular: Regular Rate and Rhythm, Normal S1 S2, No murmurs, No clicks, gallops or rubs  Respiratory: Lungs clear to auscultation	  Gastrointestinal:  Soft, Non-tender, + BS	  Skin: No rashes, No ecchymoses, No cyanosis  Neurologic: Non-focal  Extremities: No clubbing, cyanosis or edema  Vascular: Peripheral pulses palpable 2+ bilaterally    LABS:	 	                            12.0   8.11  )-----------( 186      ( 07 May 2022 07:34 )             32.7     05-06    121<L>  |  86<L>  |  18  ----------------------------<  160<H>  4.3   |  24  |  1.04    Ca    9.1      06 May 2022 13:53  Phos  3.2     05-06  Mg     1.7     05-06

## 2022-05-07 NOTE — PROGRESS NOTE ADULT - ASSESSMENT
77 y/o male with PMH ofDM, afib/aflutter, HTN, HLD, CAD with QUE and CABG, mitral valve replacement (bovine) on coumadin (last dose yesterday night), severe aortic stenosis echo 4/14/22,  rectal  adenocarcinoma s/p transanal excision in 2020 with post op infectious complication requiring debridements, now patient has recurrent mass s/p transanal excision on 4/27, path showing fibrotic changes. Now with post op persistent rectal pain.    Plan:  - regular diet - consistent carb  - f/u with NSG  - IV zosyn  - Insulin sliding scale  - continue home dose warfarin w heparin drip  - f/u AM labs  - ambulate as tolerated  - pain control as needed  - Anticipate discharge later today with 1w Augmentin and Oxycodone      [facility]  [id number] 75 y/o male with PMH ofDM, afib/aflutter, HTN, HLD, CAD with QUE and CABG, mitral valve replacement (bovine) on coumadin (last dose yesterday night), severe aortic stenosis echo 4/14/22,  rectal  adenocarcinoma s/p transanal excision in 2020 with post op infectious complication requiring debridements, now patient has recurrent mass s/p transanal excision on 4/27, path showing fibrotic changes. Now with post op persistent rectal pain.    Plan:  - regular diet - consistent carb  - IV zosyn  - Insulin sliding scale  - continue home dose warfarin w heparin drip  - f/u repeat PM labs; monitor PTT and BMP  - c/w salt tabs  - ambulate as tolerated  - pain control as needed  - Anticipate discharge later today with 1w Augmentin and Oxycodone based on INR and repeat BMP      [facility]  [id number]

## 2022-05-07 NOTE — PROGRESS NOTE ADULT - ASSESSMENT
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  Also has a past history of HTN, HLD, DM, Afib/flutter now presents with rectal pain after resection of an anal adeno CA in 2020 and then resection of a recurrent mass on 4/27.   - no evidence for CHF nor coronary ischemia  - Heparin --> Coumadin goal INR > 2  - ambulation/pain control / IS  - diet advanced to regular  - ABX  - Per pt to get MRI for sciatica work up   - stable from CV standpoint. Plan per Surgery   Potential DC today Await INR

## 2022-05-07 NOTE — PROGRESS NOTE ADULT - SUBJECTIVE AND OBJECTIVE BOX
Surgery Progress Note     Subjective/24hour Events:   Patient seen and examined.       Vital Signs:  Vital Signs Last 24 Hrs  T(C): 36.7 (06 May 2022 20:50), Max: 37 (06 May 2022 08:53)  T(F): 98 (06 May 2022 20:50), Max: 98.6 (06 May 2022 08:53)  HR: 98 (06 May 2022 20:50) (64 - 98)  BP: 151/79 (06 May 2022 20:50) (98/51 - 158/86)  BP(mean): --  RR: 18 (06 May 2022 20:50) (18 - 18)  SpO2: 100% (06 May 2022 20:50) (98% - 100%)    CAPILLARY BLOOD GLUCOSE      POCT Blood Glucose.: 204 mg/dL (06 May 2022 21:13)  POCT Blood Glucose.: 112 mg/dL (06 May 2022 18:55)  POCT Blood Glucose.: 155 mg/dL (06 May 2022 12:51)  POCT Blood Glucose.: 141 mg/dL (06 May 2022 09:02)      I&O's Detail    05 May 2022 07:01  -  06 May 2022 07:00  --------------------------------------------------------  IN:    Heparin: 122 mL    IV PiggyBack: 99 mL    IV PiggyBack: 300 mL    Oral Fluid: 1040 mL  Total IN: 1561 mL    OUT:    Stool (mL): 0 mL    Voided (mL): 600 mL  Total OUT: 600 mL    Total NET: 961 mL      06 May 2022 07:01  -  07 May 2022 00:35  --------------------------------------------------------  IN:    Heparin: 122 mL    Oral Fluid: 760 mL  Total IN: 882 mL    OUT:    Intermittent Catheterization - Urethral (mL): 400 mL    Stool (mL): 0 mL    Voided (mL): 350 mL  Total OUT: 750 mL    Total NET: 132 mL          MEDICATIONS  (STANDING):  aspirin enteric coated 81 milliGRAM(s) Oral daily  atorvastatin 20 milliGRAM(s) Oral at bedtime  dextrose 5%. 1000 milliLiter(s) (50 mL/Hr) IV Continuous <Continuous>  dextrose 5%. 1000 milliLiter(s) (100 mL/Hr) IV Continuous <Continuous>  dextrose 50% Injectable 25 Gram(s) IV Push once  dextrose 50% Injectable 12.5 Gram(s) IV Push once  dextrose 50% Injectable 25 Gram(s) IV Push once  glucagon  Injectable 1 milliGRAM(s) IntraMuscular once  heparin  Infusion 900 Unit(s)/Hr (11 mL/Hr) IV Continuous <Continuous>  insulin lispro (ADMELOG) corrective regimen sliding scale   SubCutaneous three times a day before meals  insulin lispro (ADMELOG) corrective regimen sliding scale   SubCutaneous at bedtime  lidocaine 2% Gel 1 Application(s) Topical two times a day  losartan 25 milliGRAM(s) Oral daily  metoprolol tartrate 25 milliGRAM(s) Oral two times a day  multivitamin 1 Tablet(s) Oral at bedtime  piperacillin/tazobactam IVPB.. 3.375 Gram(s) IV Intermittent every 8 hours  polyethylene glycol 3350 17 Gram(s) Oral daily  sodium chloride 1 Gram(s) Oral every 8 hours  tamsulosin 0.4 milliGRAM(s) Oral every 12 hours    MEDICATIONS  (PRN):  acetaminophen     Tablet .. 650 milliGRAM(s) Oral every 6 hours PRN Mild Pain (1 - 3), Moderate Pain (4 - 6)  dextrose Oral Gel 15 Gram(s) Oral once PRN Blood Glucose LESS THAN 70 milliGRAM(s)/deciliter  ibuprofen  Tablet. 400 milliGRAM(s) Oral every 6 hours PRN Moderate Pain (4 - 6)  oxyCODONE    IR 5 milliGRAM(s) Oral every 4 hours PRN Severe Pain (7 - 10)      Physical Exam:  General: NAD, alert  Chest: Nonlabored breathing.  Abdomen: soft, NT, ND, no rebound tenderness        Labs:    05-06    121<L>  |  86<L>  |  18  ----------------------------<  160<H>  4.3   |  24  |  1.04    Ca    9.1      06 May 2022 13:53  Phos  3.2     05-06  Mg     1.7     05-06                              12.5   11.38 )-----------( 194      ( 06 May 2022 13:53 )             35.4     PT/INR - ( 06 May 2022 07:29 )   PT: 18.5 sec;   INR: 1.60 ratio         PTT - ( 06 May 2022 07:29 )  PTT:76.8 sec     Surgery Progress Note     Subjective/24hour Events:   Patient seen and examined. Pt denies any CP, SOB, n/v/d, or chills.  Pain is well controlled.        Vital Signs Last 24 Hrs  T(C): 36.8 (07 May 2022 09:27), Max: 36.8 (06 May 2022 12:07)  T(F): 98.2 (07 May 2022 09:27), Max: 98.3 (06 May 2022 16:54)  HR: 64 (07 May 2022 09:27) (64 - 98)  BP: 140/84 (07 May 2022 09:27) (98/51 - 152/83)  BP(mean): --  RR: 18 (07 May 2022 09:27) (18 - 18)  SpO2: 100% (07 May 2022 09:27) (98% - 100%)    I&O's Detail    06 May 2022 07:01  -  07 May 2022 07:00  --------------------------------------------------------  IN:    Heparin: 254 mL    IV PiggyBack: 100 mL    Oral Fluid: 760 mL  Total IN: 1114 mL    OUT:    Intermittent Catheterization - Urethral (mL): 400 mL    Stool (mL): 0 mL    Voided (mL): 1075 mL  Total OUT: 1475 mL    Total NET: -361 mL      07 May 2022 07:01  -  07 May 2022 10:29  --------------------------------------------------------  IN:    Oral Fluid: 200 mL  Total IN: 200 mL    OUT:  Total OUT: 0 mL    Total NET: 200 mL      MEDICATIONS  (STANDING):  aspirin enteric coated 81 milliGRAM(s) Oral daily  atorvastatin 20 milliGRAM(s) Oral at bedtime  dextrose 5%. 1000 milliLiter(s) (50 mL/Hr) IV Continuous <Continuous>  dextrose 5%. 1000 milliLiter(s) (100 mL/Hr) IV Continuous <Continuous>  dextrose 50% Injectable 25 Gram(s) IV Push once  dextrose 50% Injectable 12.5 Gram(s) IV Push once  dextrose 50% Injectable 25 Gram(s) IV Push once  glucagon  Injectable 1 milliGRAM(s) IntraMuscular once  heparin  Infusion 900 Unit(s)/Hr (11 mL/Hr) IV Continuous <Continuous>  insulin lispro (ADMELOG) corrective regimen sliding scale   SubCutaneous three times a day before meals  insulin lispro (ADMELOG) corrective regimen sliding scale   SubCutaneous at bedtime  lidocaine 2% Gel 1 Application(s) Topical two times a day  losartan 25 milliGRAM(s) Oral daily  metoprolol tartrate 25 milliGRAM(s) Oral two times a day  multivitamin 1 Tablet(s) Oral at bedtime  piperacillin/tazobactam IVPB.. 3.375 Gram(s) IV Intermittent every 8 hours  polyethylene glycol 3350 17 Gram(s) Oral daily  sodium chloride 1 Gram(s) Oral every 8 hours  tamsulosin 0.4 milliGRAM(s) Oral every 12 hours    MEDICATIONS  (PRN):  acetaminophen     Tablet .. 650 milliGRAM(s) Oral every 6 hours PRN Mild Pain (1 - 3), Moderate Pain (4 - 6)  dextrose Oral Gel 15 Gram(s) Oral once PRN Blood Glucose LESS THAN 70 milliGRAM(s)/deciliter  ibuprofen  Tablet. 400 milliGRAM(s) Oral every 6 hours PRN Moderate Pain (4 - 6)  oxyCODONE    IR 5 milliGRAM(s) Oral every 4 hours PRN Severe Pain (7 - 10)      Physical Exam:  General: NAD, alert  Chest: Nonlabored breathing.  Abdomen: soft, NT, ND, no rebound tenderness           LABS:                           12.0   8.11  )-----------( 186      ( 07 May 2022 07:34 )             32.7     05-07    124<L>  |  88<L>  |  16  ----------------------------<  113<H>  3.6   |  21<L>  |  0.95    Ca    8.6      07 May 2022 07:44  Phos  3.5     05-07  Mg     1.7     05-07      PT/INR - ( 07 May 2022 07:34 )   PT: 22.1 sec;   INR: 1.91 ratio         PTT - ( 07 May 2022 07:34 )  PTT:129.3 sec

## 2022-05-07 NOTE — PROGRESS NOTE ADULT - SUBJECTIVE AND OBJECTIVE BOX
[facility] DIVISION OF KIDNEY DISEASES AND HYPERTENSION -- FOLLOW UP NOTE  --------------------------------------------------------------------------------  Chief Complaint: Hyponatremia    HPI: Pt. is a 76 year old male with past medical history of DM, afib, HTN, HLD, CAD s/p QUE and CABG, mitral valve replacement on AC, severe aortic stenosis, rectal adenocarcinoma s/p transanal excision s/p biopsy  on 4/27/22, came to ED for rectal pain. Admitted for further management. Nephrology consulted for hyponatremia. SNa was 136 prior to admission on 4/15/22, SNa was 128 on admission on 5/3/22, received IV fluids, SNa decreased to 121 on 5/6. This morning increased to 124.     Patient was seen and examined at bedside. He reports feeling well and denied any nausea, vomiting, or abdominal pain.       PAST HISTORY  --------------------------------------------------------------------------------  No significant changes to PMH, PSH, FHx, SHx, unless otherwise noted    ALLERGIES & MEDICATIONS  --------------------------------------------------------------------------------  Allergies    No Known Allergies    Intolerances      Standing Inpatient Medications  aspirin enteric coated 81 milliGRAM(s) Oral daily  atorvastatin 20 milliGRAM(s) Oral at bedtime  dextrose 5%. 1000 milliLiter(s) IV Continuous <Continuous>  dextrose 5%. 1000 milliLiter(s) IV Continuous <Continuous>  dextrose 50% Injectable 25 Gram(s) IV Push once  dextrose 50% Injectable 12.5 Gram(s) IV Push once  dextrose 50% Injectable 25 Gram(s) IV Push once  glucagon  Injectable 1 milliGRAM(s) IntraMuscular once  heparin  Infusion 900 Unit(s)/Hr IV Continuous <Continuous>  insulin lispro (ADMELOG) corrective regimen sliding scale   SubCutaneous three times a day before meals  insulin lispro (ADMELOG) corrective regimen sliding scale   SubCutaneous at bedtime  lidocaine 2% Gel 1 Application(s) Topical two times a day  losartan 25 milliGRAM(s) Oral daily  metoprolol tartrate 25 milliGRAM(s) Oral two times a day  multivitamin 1 Tablet(s) Oral at bedtime  piperacillin/tazobactam IVPB.. 3.375 Gram(s) IV Intermittent every 8 hours  polyethylene glycol 3350 17 Gram(s) Oral daily  sodium chloride 1 Gram(s) Oral every 8 hours  tamsulosin 0.4 milliGRAM(s) Oral every 12 hours    PRN Inpatient Medications  acetaminophen     Tablet .. 650 milliGRAM(s) Oral every 6 hours PRN  benzocaine 15 mG/menthol 3.6 mG Lozenge 1 Lozenge Oral every 6 hours PRN  dextrose Oral Gel 15 Gram(s) Oral once PRN  ibuprofen  Tablet. 400 milliGRAM(s) Oral every 6 hours PRN  oxyCODONE    IR 5 milliGRAM(s) Oral every 4 hours PRN      REVIEW OF SYSTEMS      All other systems were reviewed and are negative, except as noted.    VITALS/PHYSICAL EXAM  --------------------------------------------------------------------------------  T(C): 36.8 (05-07-22 @ 09:27), Max: 36.8 (05-06-22 @ 16:54)  HR: 64 (05-07-22 @ 09:27) (64 - 98)  BP: 140/84 (05-07-22 @ 09:27) (98/51 - 152/83)  RR: 18 (05-07-22 @ 09:27) (18 - 18)  SpO2: 100% (05-07-22 @ 09:27) (98% - 100%)  Wt(kg): --        05-06-22 @ 07:01  -  05-07-22 @ 07:00  --------------------------------------------------------  IN: 1114 mL / OUT: 1475 mL / NET: -361 mL    05-07-22 @ 07:01  -  05-07-22 @ 12:14  --------------------------------------------------------  IN: 200 mL / OUT: 0 mL / NET: 200 mL        Physical Exam:  	Gen: NAD  	HEENT: MMM  	Pulm: CTA B/L  	CV: S1S2  	Abd: Soft, +BS   	Ext: No LE edema B/L  	Neuro: Awake  	Skin: Warm and dry        LABS/STUDIES  --------------------------------------------------------------------------------              12.0   8.11  >-----------<  186      [05-07-22 @ 07:34]              32.7     124  |  88  |  16  ----------------------------<  113      [05-07-22 @ 07:44]  3.6   |  21  |  0.95        Ca     8.6     [05-07-22 @ 07:44]      Mg     1.7     [05-07-22 @ 07:44]      Phos  3.5     [05-07-22 @ 07:44]      PT/INR: PT 22.1 , INR 1.91       [05-07-22 @ 07:34]  PTT: 129.3      [05-07-22 @ 07:34]      Creatinine Trend:  SCr 0.95 [05-07 @ 07:44]  SCr 1.04 [05-06 @ 13:53]  SCr 1.02 [05-06 @ 07:25]  SCr 0.92 [05-05 @ 07:36]  SCr 0.94 [05-04 @ 07:11]    Urinalysis - [05-03-22 @ 11:56]      Color Light Yellow / Appearance Clear / SG 1.009 / pH 7.0      Gluc Negative / Ketone Negative  / Bili Negative / Urobili Negative       Blood Negative / Protein Negative / Leuk Est Negative / Nitrite Negative      RBC 2 / WBC 0 / Hyaline 0 / Gran  / Sq Epi  / Non Sq Epi 0 / Bacteria Negative    Urine Sodium 48      [05-06-22 @ 20:08]  Urine Potassium 40      [05-06-22 @ 20:08]  Urine Chloride 39      [05-06-22 @ 20:08]  Urine Osmolality 342      [05-06-22 @ 20:08]

## 2022-05-08 ENCOUNTER — TRANSCRIPTION ENCOUNTER (OUTPATIENT)
Age: 77
End: 2022-05-08

## 2022-05-08 LAB
ANION GAP SERPL CALC-SCNC: 14 MMOL/L — SIGNIFICANT CHANGE UP (ref 5–17)
APTT BLD: 34.1 SEC — SIGNIFICANT CHANGE UP (ref 27.5–35.5)
BUN SERPL-MCNC: 17 MG/DL — SIGNIFICANT CHANGE UP (ref 7–23)
CALCIUM SERPL-MCNC: 8.9 MG/DL — SIGNIFICANT CHANGE UP (ref 8.4–10.5)
CHLORIDE SERPL-SCNC: 93 MMOL/L — LOW (ref 96–108)
CO2 SERPL-SCNC: 20 MMOL/L — LOW (ref 22–31)
CREAT SERPL-MCNC: 0.9 MG/DL — SIGNIFICANT CHANGE UP (ref 0.5–1.3)
EGFR: 87 ML/MIN/1.73M2 — SIGNIFICANT CHANGE UP
GLUCOSE BLDC GLUCOMTR-MCNC: 115 MG/DL — HIGH (ref 70–99)
GLUCOSE BLDC GLUCOMTR-MCNC: 158 MG/DL — HIGH (ref 70–99)
GLUCOSE BLDC GLUCOMTR-MCNC: 187 MG/DL — HIGH (ref 70–99)
GLUCOSE BLDC GLUCOMTR-MCNC: 188 MG/DL — HIGH (ref 70–99)
GLUCOSE SERPL-MCNC: 111 MG/DL — HIGH (ref 70–99)
HCT VFR BLD CALC: 35.4 % — LOW (ref 39–50)
HGB BLD-MCNC: 12.5 G/DL — LOW (ref 13–17)
INR BLD: 2.05 RATIO — HIGH (ref 0.88–1.16)
MAGNESIUM SERPL-MCNC: 2.1 MG/DL — SIGNIFICANT CHANGE UP (ref 1.6–2.6)
MCHC RBC-ENTMCNC: 33.6 PG — SIGNIFICANT CHANGE UP (ref 27–34)
MCHC RBC-ENTMCNC: 35.3 GM/DL — SIGNIFICANT CHANGE UP (ref 32–36)
MCV RBC AUTO: 95.2 FL — SIGNIFICANT CHANGE UP (ref 80–100)
NRBC # BLD: 0 /100 WBCS — SIGNIFICANT CHANGE UP (ref 0–0)
PHOSPHATE SERPL-MCNC: 2.8 MG/DL — SIGNIFICANT CHANGE UP (ref 2.5–4.5)
PLATELET # BLD AUTO: 214 K/UL — SIGNIFICANT CHANGE UP (ref 150–400)
POTASSIUM SERPL-MCNC: 4.5 MMOL/L — SIGNIFICANT CHANGE UP (ref 3.5–5.3)
POTASSIUM SERPL-SCNC: 4.5 MMOL/L — SIGNIFICANT CHANGE UP (ref 3.5–5.3)
PROTHROM AB SERPL-ACNC: 23.8 SEC — HIGH (ref 10.5–13.4)
RBC # BLD: 3.72 M/UL — LOW (ref 4.2–5.8)
RBC # FLD: 13.2 % — SIGNIFICANT CHANGE UP (ref 10.3–14.5)
SODIUM SERPL-SCNC: 127 MMOL/L — LOW (ref 135–145)
WBC # BLD: 10.16 K/UL — SIGNIFICANT CHANGE UP (ref 3.8–10.5)
WBC # FLD AUTO: 10.16 K/UL — SIGNIFICANT CHANGE UP (ref 3.8–10.5)

## 2022-05-08 PROCEDURE — 99232 SBSQ HOSP IP/OBS MODERATE 35: CPT

## 2022-05-08 RX ORDER — SODIUM CHLORIDE 9 MG/ML
1 INJECTION INTRAMUSCULAR; INTRAVENOUS; SUBCUTANEOUS
Qty: 18 | Refills: 0
Start: 2022-05-08 | End: 2022-05-13

## 2022-05-08 RX ORDER — POLYETHYLENE GLYCOL 3350 17 G/17G
17 POWDER, FOR SOLUTION ORAL
Qty: 0 | Refills: 0 | DISCHARGE
Start: 2022-05-08

## 2022-05-08 RX ORDER — WARFARIN SODIUM 2.5 MG/1
5 TABLET ORAL ONCE
Refills: 0 | Status: COMPLETED | OUTPATIENT
Start: 2022-05-08 | End: 2022-05-08

## 2022-05-08 RX ORDER — IBUPROFEN 200 MG
1 TABLET ORAL
Qty: 0 | Refills: 0 | DISCHARGE
Start: 2022-05-08

## 2022-05-08 RX ORDER — SODIUM CHLORIDE 9 MG/ML
1 INJECTION INTRAMUSCULAR; INTRAVENOUS; SUBCUTANEOUS
Qty: 0 | Refills: 0 | DISCHARGE
Start: 2022-05-08

## 2022-05-08 RX ORDER — OXYCODONE HYDROCHLORIDE 5 MG/1
1 TABLET ORAL
Qty: 10 | Refills: 0
Start: 2022-05-08 | End: 2022-05-10

## 2022-05-08 RX ORDER — ACETAMINOPHEN 500 MG
2 TABLET ORAL
Qty: 0 | Refills: 0 | DISCHARGE
Start: 2022-05-08

## 2022-05-08 RX ORDER — OXYCODONE HYDROCHLORIDE 5 MG/1
1 TABLET ORAL
Qty: 5 | Refills: 0
Start: 2022-05-08 | End: 2022-05-09

## 2022-05-08 RX ORDER — OMEGA-3 ACID ETHYL ESTERS 1 G
2 CAPSULE ORAL
Qty: 0 | Refills: 0 | DISCHARGE

## 2022-05-08 RX ADMIN — WARFARIN SODIUM 5 MILLIGRAM(S): 2.5 TABLET ORAL at 21:09

## 2022-05-08 RX ADMIN — ATORVASTATIN CALCIUM 20 MILLIGRAM(S): 80 TABLET, FILM COATED ORAL at 21:14

## 2022-05-08 RX ADMIN — Medication 650 MILLIGRAM(S): at 01:35

## 2022-05-08 RX ADMIN — OXYCODONE HYDROCHLORIDE 5 MILLIGRAM(S): 5 TABLET ORAL at 16:05

## 2022-05-08 RX ADMIN — POLYETHYLENE GLYCOL 3350 17 GRAM(S): 17 POWDER, FOR SOLUTION ORAL at 12:18

## 2022-05-08 RX ADMIN — Medication 650 MILLIGRAM(S): at 02:30

## 2022-05-08 RX ADMIN — Medication 1: at 13:54

## 2022-05-08 RX ADMIN — Medication 1: at 17:44

## 2022-05-08 RX ADMIN — OXYCODONE HYDROCHLORIDE 5 MILLIGRAM(S): 5 TABLET ORAL at 15:35

## 2022-05-08 RX ADMIN — TAMSULOSIN HYDROCHLORIDE 0.4 MILLIGRAM(S): 0.4 CAPSULE ORAL at 17:42

## 2022-05-08 RX ADMIN — LIDOCAINE 1 APPLICATION(S): 4 CREAM TOPICAL at 17:46

## 2022-05-08 RX ADMIN — SODIUM CHLORIDE 1 GRAM(S): 9 INJECTION INTRAMUSCULAR; INTRAVENOUS; SUBCUTANEOUS at 02:28

## 2022-05-08 RX ADMIN — LOSARTAN POTASSIUM 25 MILLIGRAM(S): 100 TABLET, FILM COATED ORAL at 05:29

## 2022-05-08 RX ADMIN — PIPERACILLIN AND TAZOBACTAM 25 GRAM(S): 4; .5 INJECTION, POWDER, LYOPHILIZED, FOR SOLUTION INTRAVENOUS at 02:28

## 2022-05-08 RX ADMIN — Medication 650 MILLIGRAM(S): at 21:45

## 2022-05-08 RX ADMIN — TAMSULOSIN HYDROCHLORIDE 0.4 MILLIGRAM(S): 0.4 CAPSULE ORAL at 05:29

## 2022-05-08 RX ADMIN — SODIUM CHLORIDE 1 GRAM(S): 9 INJECTION INTRAMUSCULAR; INTRAVENOUS; SUBCUTANEOUS at 12:19

## 2022-05-08 RX ADMIN — Medication 1 TABLET(S): at 21:10

## 2022-05-08 RX ADMIN — Medication 650 MILLIGRAM(S): at 21:09

## 2022-05-08 RX ADMIN — SODIUM CHLORIDE 1 GRAM(S): 9 INJECTION INTRAMUSCULAR; INTRAVENOUS; SUBCUTANEOUS at 21:10

## 2022-05-08 RX ADMIN — LIDOCAINE 1 APPLICATION(S): 4 CREAM TOPICAL at 05:29

## 2022-05-08 RX ADMIN — Medication 25 MILLIGRAM(S): at 05:29

## 2022-05-08 RX ADMIN — PIPERACILLIN AND TAZOBACTAM 25 GRAM(S): 4; .5 INJECTION, POWDER, LYOPHILIZED, FOR SOLUTION INTRAVENOUS at 21:10

## 2022-05-08 RX ADMIN — Medication 25 MILLIGRAM(S): at 17:42

## 2022-05-08 RX ADMIN — Medication 81 MILLIGRAM(S): at 12:22

## 2022-05-08 RX ADMIN — PIPERACILLIN AND TAZOBACTAM 25 GRAM(S): 4; .5 INJECTION, POWDER, LYOPHILIZED, FOR SOLUTION INTRAVENOUS at 12:18

## 2022-05-08 NOTE — DISCHARGE NOTE PROVIDER - PROVIDER TOKENS
PROVIDER:[TOKEN:[[id number]],FOLLOWUP:[1 week]],PROVIDER:[TOKEN:[[id number]],FOLLOWUP:[1 week]],PROVIDER:[TOKEN:[[id number]],FOLLOWUP:[2 weeks]]

## 2022-05-08 NOTE — PROGRESS NOTE ADULT - ASSESSMENT
77 y/o male with PMH ofDM, afib/aflutter, HTN, HLD, CAD with QUE and CABG, mitral valve replacement (bovine) on coumadin (last dose yesterday night), severe aortic stenosis echo 4/14/22,  rectal  adenocarcinoma s/p transanal excision in 2020 with post op infectious complication requiring debridements, now patient has recurrent mass s/p transanal excision on 4/27, path showing fibrotic changes. Now with post op persistent rectal pain.    Plan:  - regular diet - consistent carb  - IV zosyn  - Insulin sliding scale  - continue home dose warfarin   - c/w salt tabs  - ambulate as tolerated  - pain control as needed  - Anticipate discharge later today with 1w Augmentin and Oxycodone based on INR       [facility]  [id number]

## 2022-05-08 NOTE — DISCHARGE NOTE NURSING/CASE MANAGEMENT/SOCIAL WORK - PATIENT PORTAL LINK FT
You can access the FollowMyHealth Patient Portal offered by [facility] by registering at the following website: [email or web address]. By joining [facility]’s FollowMyHealth portal, you will also be able to view your health information using other applications (apps) compatible with our system.

## 2022-05-08 NOTE — PROGRESS NOTE ADULT - SUBJECTIVE AND OBJECTIVE BOX
SUBJECTIVE:   Patient seen and examined. Pt denies any CP, SOB, n/v/d, or chills.  Pain is well controlled.     OBJECTIVE: T(C): 36.8 (05-08-22 @ 05:03), Max: 36.9 (05-07-22 @ 20:05)  HR: 73 (05-08-22 @ 05:03) (64 - 80)  BP: 155/89 (05-08-22 @ 05:03) (112/67 - 155/89)  RR: 18 (05-08-22 @ 05:03) (18 - 18)  SpO2: 100% (05-08-22 @ 05:03) (99% - 100%)  Wt(kg): --  I&O's Summary    06 May 2022 07:01  -  07 May 2022 07:00  --------------------------------------------------------  IN: 1114 mL / OUT: 1475 mL / NET: -361 mL    07 May 2022 07:01  -  08 May 2022 05:37  --------------------------------------------------------  IN: 320 mL / OUT: 2050 mL / NET: -1730 mL      I&O's Detail    06 May 2022 07:01  -  07 May 2022 07:00  --------------------------------------------------------  IN:    Heparin: 254 mL    IV PiggyBack: 100 mL    Oral Fluid: 760 mL  Total IN: 1114 mL    OUT:    Intermittent Catheterization - Urethral (mL): 400 mL    Stool (mL): 0 mL    Voided (mL): 1075 mL  Total OUT: 1475 mL    Total NET: -361 mL      07 May 2022 07:01  -  08 May 2022 05:37  --------------------------------------------------------  IN:    Oral Fluid: 320 mL  Total IN: 320 mL    OUT:    Voided (mL): 2050 mL  Total OUT: 2050 mL    Total NET: -1730 mL        Physical Exam:  General: NAD, alert  Chest: Nonlabored breathing.  Abdomen: soft, NT, ND, no rebound tenderness    MEDICATIONS  (STANDING):  aspirin enteric coated 81 milliGRAM(s) Oral daily  atorvastatin 20 milliGRAM(s) Oral at bedtime  dextrose 5%. 1000 milliLiter(s) (50 mL/Hr) IV Continuous <Continuous>  dextrose 5%. 1000 milliLiter(s) (100 mL/Hr) IV Continuous <Continuous>  dextrose 50% Injectable 25 Gram(s) IV Push once  dextrose 50% Injectable 12.5 Gram(s) IV Push once  dextrose 50% Injectable 25 Gram(s) IV Push once  glucagon  Injectable 1 milliGRAM(s) IntraMuscular once  insulin lispro (ADMELOG) corrective regimen sliding scale   SubCutaneous three times a day before meals  insulin lispro (ADMELOG) corrective regimen sliding scale   SubCutaneous at bedtime  lidocaine 2% Gel 1 Application(s) Topical two times a day  losartan 25 milliGRAM(s) Oral daily  metoprolol tartrate 25 milliGRAM(s) Oral two times a day  multivitamin 1 Tablet(s) Oral at bedtime  piperacillin/tazobactam IVPB.. 3.375 Gram(s) IV Intermittent every 8 hours  polyethylene glycol 3350 17 Gram(s) Oral daily  sodium chloride 1 Gram(s) Oral every 8 hours  tamsulosin 0.4 milliGRAM(s) Oral every 12 hours    MEDICATIONS  (PRN):  acetaminophen     Tablet .. 650 milliGRAM(s) Oral every 6 hours PRN Mild Pain (1 - 3), Moderate Pain (4 - 6)  benzocaine 15 mG/menthol 3.6 mG Lozenge 1 Lozenge Oral every 6 hours PRN Sore Throat  dextrose Oral Gel 15 Gram(s) Oral once PRN Blood Glucose LESS THAN 70 milliGRAM(s)/deciliter  ibuprofen  Tablet. 400 milliGRAM(s) Oral every 6 hours PRN Moderate Pain (4 - 6)  oxyCODONE    IR 5 milliGRAM(s) Oral every 4 hours PRN Severe Pain (7 - 10)      LABS:                        13.2   12.43 )-----------( 216      ( 07 May 2022 16:13 )             37.1     05-07    125<L>  |  88<L>  |  18  ----------------------------<  153<H>  4.5   |  22  |  0.95    Ca    9.2      07 May 2022 16:13  Phos  3.5     05-07  Mg     1.7     05-07      PT/INR - ( 07 May 2022 16:13 )   PT: 23.1 sec;   INR: 1.98 ratio         PTT - ( 07 May 2022 16:13 )  PTT:74.8 sec

## 2022-05-08 NOTE — DISCHARGE NOTE PROVIDER - CARE PROVIDERS DIRECT ADDRESSES
[email or web address]

## 2022-05-08 NOTE — PROGRESS NOTE ADULT - ASSESSMENT
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  Also has a past history of HTN, HLD, DM, Afib/flutter now presents with rectal pain after resection of an anal adeno CA in 2020 and then resection of a recurrent mass on 4/27.   - no evidence for CHF nor coronary ischemia  - Heparin --> Coumadin goal INR > 2 -today 2/05  - ambulation/pain control / IS  - diet advanced to regular  - ABX  - Per pt to get MRI for sciatica work up done 5/5 demonstrating:  *  STATUS POST LEFT L3-4 PARTIAL LAMINECTOMY. NO RECURRENT OR RESIDUAL   DISC PROTRUSION.  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 PLEASE SEE REPORT FOR   DETAIL.  *  POST RADIATION CHANGES OF THE L5 VERTEBRAL BODY AND SACRUM.  - stable from CV standpoint. DC Plan per Surgery for today. D/w Wife at bedside      Attending

## 2022-05-08 NOTE — DISCHARGE NOTE PROVIDER - HOSPITAL COURSE
75 y/o male with PMH ofDM, afib/aflutter, HTN, HLD, CAD with QUE and CABG, mitral valve replacement (bovine) on coumadin (last dose yesterday night), severe aortic stenosis echo 4/14/22,  rectal  adenocarcinoma 2020 s/p transanal excision/biopsy of rectal mass 4/27/22 sent to ED by Dr Connelly for persistent post op rectal pain. Of note patient had hx of transanal excision of distal rectal carcinoma 10/15/2020 was taken to OR urgently 10/21 for r/o necrotizing fasciitis, found to have breakdown of rectal incision with fistulization into perineum, s/p debridement and penrose drain placement. Patient reported b/l lower extremity pain starting at the groin and extending to calves. Patient did not take coumadin for five days prior to procedure and started coumadin 4/27/22. Patient has no prior h/o DVTs. Denied fever chills, CP, SOB, N/V, back pain, dysuria, confusion, headache.  Patient was admitted and his pain was controlled, vital signs remained stable, was afebrile, without abscess on ct or exam, and on IV abx (Zosyn) and Heparin drip.    Patient was seen by Neurosurgery. Pt has hx of left L3-4 discectomy and recent rectal surgery, now with several day history of left anterior - superior thigh pain. Motor / sensory intact. CT and MRI demonstrate post-op changes, no evidence of recurrent or residual HNP, multilevel DDD with neural foraminal narrowing. No acute Neurosurgical intervention anticipated. Pt to contact office for follow-up.     During his stay his rectal pain improved and his diet was advanced and patient tolerated. Pt with hyponatremia to 121, gradually improving after pt was started on sodium tabs 1g TID per Nephrology recs.    On hospital day 4, patient reported small amount of blood in his bowel movement in the afternoon, but H/H 13.2/37.1 from 12/32.7 on 5/7 and 12.5/35.4 on 5/8 in the AM. The heparin drip was stopped on 5/7 and at   discharge his INR was 2.05, Patient to continue Warfarin 5mg daily at home and follow-up with PCP for INR monitoring. Patient to receive 5 days of Augmentin and Oxycodone PRN for rectal pain.    On day of discharge, the patient's vitals are stable, is tolerating a regular diet, voiding appropriately, ambulating well and pain controlled.

## 2022-05-08 NOTE — PROGRESS NOTE ADULT - SUBJECTIVE AND OBJECTIVE BOX
[facility] DIVISION OF KIDNEY DISEASES AND HYPERTENSION -- FOLLOW UP NOTE  --------------------------------------------------------------------------------  Chief Complaint: Hyponatremia    HPI: Pt. is a 76 year old male with past medical history of DM, afib, HTN, HLD, CAD s/p QUE and CABG, mitral valve replacement on AC, severe aortic stenosis, rectal adenocarcinoma s/p transanal excision s/p biopsy  on 4/27/22, came to ED for rectal pain. Admitted for further management. Nephrology consulted for hyponatremia. SNa was 136 prior to admission on 4/15/22, SNa was 128 on admission on 5/3/22, received IV fluids, SNa decreased to 121 on 5/6. This morning increased to 127.     Patient was seen and examined at bedside. He reports feeling well and denied any nausea, vomiting, or abdominal pain.       PAST HISTORY  --------------------------------------------------------------------------------  No significant changes to PMH, PSH, FHx, SHx, unless otherwise noted    ALLERGIES & MEDICATIONS  --------------------------------------------------------------------------------  Allergies    No Known Allergies    Intolerances      Standing Inpatient Medications  aspirin enteric coated 81 milliGRAM(s) Oral daily  atorvastatin 20 milliGRAM(s) Oral at bedtime  dextrose 5%. 1000 milliLiter(s) IV Continuous <Continuous>  dextrose 5%. 1000 milliLiter(s) IV Continuous <Continuous>  dextrose 50% Injectable 25 Gram(s) IV Push once  dextrose 50% Injectable 12.5 Gram(s) IV Push once  dextrose 50% Injectable 25 Gram(s) IV Push once  glucagon  Injectable 1 milliGRAM(s) IntraMuscular once  insulin lispro (ADMELOG) corrective regimen sliding scale   SubCutaneous three times a day before meals  insulin lispro (ADMELOG) corrective regimen sliding scale   SubCutaneous at bedtime  lidocaine 2% Gel 1 Application(s) Topical two times a day  losartan 25 milliGRAM(s) Oral daily  metoprolol tartrate 25 milliGRAM(s) Oral two times a day  multivitamin 1 Tablet(s) Oral at bedtime  piperacillin/tazobactam IVPB.. 3.375 Gram(s) IV Intermittent every 8 hours  polyethylene glycol 3350 17 Gram(s) Oral daily  sodium chloride 1 Gram(s) Oral every 8 hours  tamsulosin 0.4 milliGRAM(s) Oral every 12 hours    PRN Inpatient Medications  acetaminophen     Tablet .. 650 milliGRAM(s) Oral every 6 hours PRN  benzocaine 15 mG/menthol 3.6 mG Lozenge 1 Lozenge Oral every 6 hours PRN  dextrose Oral Gel 15 Gram(s) Oral once PRN  ibuprofen  Tablet. 400 milliGRAM(s) Oral every 6 hours PRN  oxyCODONE    IR 5 milliGRAM(s) Oral every 4 hours PRN      REVIEW OF SYSTEMS      All other systems were reviewed and are negative, except as noted.    VITALS/PHYSICAL EXAM  --------------------------------------------------------------------------------  T(C): 36.6 (05-08-22 @ 09:27), Max: 36.9 (05-07-22 @ 20:05)  HR: 74 (05-08-22 @ 09:27) (67 - 80)  BP: 165/83 (05-08-22 @ 09:27) (112/67 - 165/83)  RR: 18 (05-08-22 @ 09:27) (18 - 18)  SpO2: 99% (05-08-22 @ 09:27) (99% - 100%)  Wt(kg): --        05-07-22 @ 07:01  -  05-08-22 @ 07:00  --------------------------------------------------------  IN: 320 mL / OUT: 2575 mL / NET: -2255 mL    05-08-22 @ 07:01  -  05-08-22 @ 09:38  --------------------------------------------------------  IN: 0 mL / OUT: 500 mL / NET: -500 mL        Physical Exam:  	Gen: NAD  	HEENT: MMM  	Pulm: CTA B/L  	CV: S1S2  	Abd: Soft, +BS   	Ext: No LE edema B/L  	Neuro: Awake  	Skin: Warm and dry  	Vascular access:      LABS/STUDIES  --------------------------------------------------------------------------------              12.5   10.16 >-----------<  214      [05-08-22 @ 07:34]              35.4     127  |  93  |  17  ----------------------------<  111      [05-08-22 @ 07:40]  4.5   |  20  |  0.90        Ca     8.9     [05-08-22 @ 07:40]      Mg     2.1     [05-08-22 @ 07:40]      Phos  2.8     [05-08-22 @ 07:40]      PT/INR: PT 23.8 , INR 2.05       [05-08-22 @ 07:12]  PTT: 34.1       [05-08-22 @ 07:12]      Creatinine Trend:  SCr 0.90 [05-08 @ 07:40]  SCr 0.95 [05-07 @ 16:13]  SCr 0.95 [05-07 @ 07:44]  SCr 1.04 [05-06 @ 13:53]  SCr 1.02 [05-06 @ 07:25]    Urinalysis - [05-03-22 @ 11:56]      Color Light Yellow / Appearance Clear / SG 1.009 / pH 7.0      Gluc Negative / Ketone Negative  / Bili Negative / Urobili Negative       Blood Negative / Protein Negative / Leuk Est Negative / Nitrite Negative      RBC 2 / WBC 0 / Hyaline 0 / Gran  / Sq Epi  / Non Sq Epi 0 / Bacteria Negative    Urine Sodium 48      [05-06-22 @ 20:08]  Urine Potassium 40      [05-06-22 @ 20:08]  Urine Chloride 39      [05-06-22 @ 20:08]  Urine Osmolality 342      [05-06-22 @ 20:08]

## 2022-05-08 NOTE — PROGRESS NOTE ADULT - SUBJECTIVE AND OBJECTIVE BOX
SUBJECTIVE:  NO CP no SOB  	  MEDICATIONS:  losartan 25 milliGRAM(s) Oral daily  metoprolol tartrate 25 milliGRAM(s) Oral two times a day  tamsulosin 0.4 milliGRAM(s) Oral every 12 hours    piperacillin/tazobactam IVPB.. 3.375 Gram(s) IV Intermittent every 8 hours      acetaminophen     Tablet .. 650 milliGRAM(s) Oral every 6 hours PRN  ibuprofen  Tablet. 400 milliGRAM(s) Oral every 6 hours PRN  oxyCODONE    IR 5 milliGRAM(s) Oral every 4 hours PRN    polyethylene glycol 3350 17 Gram(s) Oral daily    atorvastatin 20 milliGRAM(s) Oral at bedtime  dextrose 50% Injectable 25 Gram(s) IV Push once  dextrose 50% Injectable 12.5 Gram(s) IV Push once  dextrose 50% Injectable 25 Gram(s) IV Push once  dextrose Oral Gel 15 Gram(s) Oral once PRN  glucagon  Injectable 1 milliGRAM(s) IntraMuscular once  insulin lispro (ADMELOG) corrective regimen sliding scale   SubCutaneous three times a day before meals  insulin lispro (ADMELOG) corrective regimen sliding scale   SubCutaneous at bedtime    aspirin enteric coated 81 milliGRAM(s) Oral daily  benzocaine 15 mG/menthol 3.6 mG Lozenge 1 Lozenge Oral every 6 hours PRN  dextrose 5%. 1000 milliLiter(s) IV Continuous <Continuous>  dextrose 5%. 1000 milliLiter(s) IV Continuous <Continuous>  lidocaine 2% Gel 1 Application(s) Topical two times a day  multivitamin 1 Tablet(s) Oral at bedtime  sodium chloride 1 Gram(s) Oral every 8 hours      REVIEW OF SYSTEMS:    CONSTITUTIONAL: No fever, weight loss, or fatigue  EYES: No eye pain, visual disturbances, or discharge  NECK: No pain or stiffness  RESPIRATORY: No cough, wheezing, chills or hemoptysis; No Shortness of Breath  CARDIOVASCULAR: No chest pain, palpitations, dizziness, or leg swelling  GASTROINTESTINAL: No abdominal or epigastric pain. No nausea, vomiting, or hematemesis; No diarrhea or constipation. No melena or hematochezia.  GENITOURINARY: No dysuria, frequency, hematuria, or incontinence  NEUROLOGICAL: No headaches, memory loss, loss of strength, numbness, or tremors  SKIN: No itching, burning, rashes, or lesions   LYMPH Nodes: No enlarged glands  MUSCULOSKELETAL: No joint pain or swelling; No muscle, back, or extremity pain  All other review of systems are negative.  	  [ ] Unable to obtain    PHYSICAL EXAM:  T(C): 36.8 (05-08-22 @ 13:38), Max: 36.9 (05-07-22 @ 20:05)  HR: 74 (05-08-22 @ 09:27) (67 - 80)  BP: 102/57 (05-08-22 @ 13:38) (102/57 - 165/83)  RR: 18 (05-08-22 @ 09:27) (18 - 18)  SpO2: 99% (05-08-22 @ 09:27) (99% - 100%)  Wt(kg): --  I&O's Summary    07 May 2022 07:01  -  08 May 2022 07:00  --------------------------------------------------------  IN: 320 mL / OUT: 2575 mL / NET: -2255 mL    08 May 2022 07:01  -  08 May 2022 15:12  --------------------------------------------------------  IN: 640 mL / OUT: 500 mL / NET: 140 mL          PHYSICAL EXAM    Appearance: Normal	  HEENT:   Normal oral mucosa, PERRL, EOMI	  NECK: Soft and supple, No LAD, No JVD  Cardiovascular: Regular Rate and Rhythm, Normal S1 S2, II/VI PEG  Respiratory: Lungs clear to auscultation	  Gastrointestinal:  Soft, Non-tender, + BS	  Skin: No rashes, No ecchymoses, No cyanosis  Neurologic: Non-focal  Extremities: No clubbing, cyanosis or edema  Vascular: Peripheral pulses palpable 2+ bilaterally    LABS:	 	    CARDIAC MARKERS:                                  12.5   10.16 )-----------( 214      ( 08 May 2022 07:34 )             35.4     05-08    127<L>  |  93<L>  |  17  ----------------------------<  111<H>  4.5   |  20<L>  |  0.90    Ca    8.9      08 May 2022 07:40  Phos  2.8     05-08  Mg     2.1     05-08

## 2022-05-08 NOTE — DISCHARGE NOTE PROVIDER - NSDCFUSCHEDAPPT_GEN_ALL_CORE_FT
[facility]  [place]  Cardio [address]  Scheduled Appointment: 05/19/2022    Daniel Rivera  [facility]  Urology [address]  Scheduled Appointment: 06/02/2022

## 2022-05-08 NOTE — DISCHARGE NOTE PROVIDER - NSDCCPCAREPLAN_GEN_ALL_CORE_FT
PRINCIPAL DISCHARGE DIAGNOSIS  Diagnosis: Rectal pain  Assessment and Plan of Treatment: Rectal pain after transanal excision of rectal mass       PRINCIPAL DISCHARGE DIAGNOSIS  Diagnosis: Rectal pain  Assessment and Plan of Treatment: Rectal pain after transanal excision of rectal mass  Notify your surgeon and return to ER for temperatures greater than 101, chills sweats, pain not controlled with pain medications, persistent nausea and vomiting, inability to tolerate food, significant abdominal bloating/distension, no passing of flatus or bowel movements, or acutely concerning matters to you, that may require urgent medical attention.   Follow up with Dr. Connelly within 1-2 weeks upon discharge.  Follow up with your PMD within 1 week upon discharge regarding your hospitalization.      SECONDARY DISCHARGE DIAGNOSES  Diagnosis: Hyponatremia  Assessment and Plan of Treatment: Your sodium was low during your admission, you were seen by nephrology -please continue taking salt tabs as prescribed  Follow up with Nephrology and PMD within 1 week upon discharge.     PRINCIPAL DISCHARGE DIAGNOSIS  Diagnosis: Rectal pain  Assessment and Plan of Treatment: Rectal pain after transanal excision of rectal mass  Notify your surgeon and return to ER for temperatures greater than 101, chills sweats, pain not controlled with pain medications, persistent nausea and vomiting, inability to tolerate food, significant abdominal bloating/distension, no passing of flatus or bowel movements, or acutely concerning matters to you, that may require urgent medical attention.   Continue taking Augmentin 2x daily for 5 more days until course completed.  Follow up with Dr. Connelly within 1-2 weeks upon discharge.  Follow up with your PMD within 1 week upon discharge regarding your hospitalization.      SECONDARY DISCHARGE DIAGNOSES  Diagnosis: Hyponatremia  Assessment and Plan of Treatment: Your sodium was low during your admission, you were seen by nephrology -please continue taking salt tabs as prescribed  Follow up with Nephrology and PMD within 1 week upon discharge.

## 2022-05-08 NOTE — DISCHARGE NOTE PROVIDER - CARE PROVIDER_API CALL
Christian Connelly)  ColonRectal Surgery; Surgery  [address], [address]  [place] [address]  Phone: [phone]  Fax: [phone]  Follow Up Time: 1 week    Adolfo Robison)  Nephrology  [address] [place] [address]  Phone: [phone]  Fax: [phone]  Follow Up Time: 1 week    Flash Del Rosario)  Neurosurgery  [address], [address]  [place] [address]  Phone: [phone]  Fax: [phone]  Follow Up Time: 2 weeks

## 2022-05-09 VITALS
RESPIRATION RATE: 18 BRPM | OXYGEN SATURATION: 99 % | HEART RATE: 79 BPM | TEMPERATURE: 98 F | DIASTOLIC BLOOD PRESSURE: 74 MMHG | SYSTOLIC BLOOD PRESSURE: 136 MMHG

## 2022-05-09 LAB
ANION GAP SERPL CALC-SCNC: 14 MMOL/L — SIGNIFICANT CHANGE UP (ref 5–17)
APTT BLD: 35.1 SEC — SIGNIFICANT CHANGE UP (ref 27.5–35.5)
BUN SERPL-MCNC: 12 MG/DL — SIGNIFICANT CHANGE UP (ref 7–23)
CALCIUM SERPL-MCNC: 9.1 MG/DL — SIGNIFICANT CHANGE UP (ref 8.4–10.5)
CHLORIDE SERPL-SCNC: 97 MMOL/L — SIGNIFICANT CHANGE UP (ref 96–108)
CO2 SERPL-SCNC: 21 MMOL/L — LOW (ref 22–31)
CREAT SERPL-MCNC: 0.9 MG/DL — SIGNIFICANT CHANGE UP (ref 0.5–1.3)
EGFR: 87 ML/MIN/1.73M2 — SIGNIFICANT CHANGE UP
GLUCOSE BLDC GLUCOMTR-MCNC: 135 MG/DL — HIGH (ref 70–99)
GLUCOSE BLDC GLUCOMTR-MCNC: 149 MG/DL — HIGH (ref 70–99)
GLUCOSE SERPL-MCNC: 100 MG/DL — HIGH (ref 70–99)
HCT VFR BLD CALC: 34.2 % — LOW (ref 39–50)
HGB BLD-MCNC: 11.9 G/DL — LOW (ref 13–17)
INR BLD: 2.05 RATIO — HIGH (ref 0.88–1.16)
MAGNESIUM SERPL-MCNC: 1.9 MG/DL — SIGNIFICANT CHANGE UP (ref 1.6–2.6)
MCHC RBC-ENTMCNC: 33.6 PG — SIGNIFICANT CHANGE UP (ref 27–34)
MCHC RBC-ENTMCNC: 34.8 GM/DL — SIGNIFICANT CHANGE UP (ref 32–36)
MCV RBC AUTO: 96.6 FL — SIGNIFICANT CHANGE UP (ref 80–100)
NRBC # BLD: 0 /100 WBCS — SIGNIFICANT CHANGE UP (ref 0–0)
PHOSPHATE SERPL-MCNC: 3.1 MG/DL — SIGNIFICANT CHANGE UP (ref 2.5–4.5)
PLATELET # BLD AUTO: 224 K/UL — SIGNIFICANT CHANGE UP (ref 150–400)
POTASSIUM SERPL-MCNC: 4.3 MMOL/L — SIGNIFICANT CHANGE UP (ref 3.5–5.3)
POTASSIUM SERPL-SCNC: 4.3 MMOL/L — SIGNIFICANT CHANGE UP (ref 3.5–5.3)
PROTHROM AB SERPL-ACNC: 23.9 SEC — HIGH (ref 10.5–13.4)
RBC # BLD: 3.54 M/UL — LOW (ref 4.2–5.8)
RBC # FLD: 13.4 % — SIGNIFICANT CHANGE UP (ref 10.3–14.5)
SODIUM SERPL-SCNC: 132 MMOL/L — LOW (ref 135–145)
WBC # BLD: 6.94 K/UL — SIGNIFICANT CHANGE UP (ref 3.8–10.5)
WBC # FLD AUTO: 6.94 K/UL — SIGNIFICANT CHANGE UP (ref 3.8–10.5)

## 2022-05-09 PROCEDURE — 99285 EMERGENCY DEPT VISIT HI MDM: CPT

## 2022-05-09 PROCEDURE — 86901 BLOOD TYPING SEROLOGIC RH(D): CPT

## 2022-05-09 PROCEDURE — 74177 CT ABD & PELVIS W/CONTRAST: CPT | Mod: MA

## 2022-05-09 PROCEDURE — A9585: CPT

## 2022-05-09 PROCEDURE — 83735 ASSAY OF MAGNESIUM: CPT

## 2022-05-09 PROCEDURE — 85730 THROMBOPLASTIN TIME PARTIAL: CPT

## 2022-05-09 PROCEDURE — 93970 EXTREMITY STUDY: CPT

## 2022-05-09 PROCEDURE — 83605 ASSAY OF LACTIC ACID: CPT

## 2022-05-09 PROCEDURE — 99232 SBSQ HOSP IP/OBS MODERATE 35: CPT

## 2022-05-09 PROCEDURE — 82330 ASSAY OF CALCIUM: CPT

## 2022-05-09 PROCEDURE — 82272 OCCULT BLD FECES 1-3 TESTS: CPT

## 2022-05-09 PROCEDURE — 84295 ASSAY OF SERUM SODIUM: CPT

## 2022-05-09 PROCEDURE — 84300 ASSAY OF URINE SODIUM: CPT

## 2022-05-09 PROCEDURE — 82962 GLUCOSE BLOOD TEST: CPT

## 2022-05-09 PROCEDURE — 84132 ASSAY OF SERUM POTASSIUM: CPT

## 2022-05-09 PROCEDURE — 85014 HEMATOCRIT: CPT

## 2022-05-09 PROCEDURE — 80048 BASIC METABOLIC PNL TOTAL CA: CPT

## 2022-05-09 PROCEDURE — 85025 COMPLETE CBC W/AUTO DIFF WBC: CPT

## 2022-05-09 PROCEDURE — 85610 PROTHROMBIN TIME: CPT

## 2022-05-09 PROCEDURE — 86850 RBC ANTIBODY SCREEN: CPT

## 2022-05-09 PROCEDURE — 85018 HEMOGLOBIN: CPT

## 2022-05-09 PROCEDURE — 80053 COMPREHEN METABOLIC PANEL: CPT

## 2022-05-09 PROCEDURE — 85027 COMPLETE CBC AUTOMATED: CPT

## 2022-05-09 PROCEDURE — 82947 ASSAY GLUCOSE BLOOD QUANT: CPT

## 2022-05-09 PROCEDURE — 36415 COLL VENOUS BLD VENIPUNCTURE: CPT

## 2022-05-09 PROCEDURE — 81001 URINALYSIS AUTO W/SCOPE: CPT

## 2022-05-09 PROCEDURE — 82436 ASSAY OF URINE CHLORIDE: CPT

## 2022-05-09 PROCEDURE — 84100 ASSAY OF PHOSPHORUS: CPT

## 2022-05-09 PROCEDURE — 83935 ASSAY OF URINE OSMOLALITY: CPT

## 2022-05-09 PROCEDURE — 72158 MRI LUMBAR SPINE W/O & W/DYE: CPT

## 2022-05-09 PROCEDURE — 87637 SARSCOV2&INF A&B&RSV AMP PRB: CPT

## 2022-05-09 PROCEDURE — 86900 BLOOD TYPING SEROLOGIC ABO: CPT

## 2022-05-09 PROCEDURE — 84133 ASSAY OF URINE POTASSIUM: CPT

## 2022-05-09 PROCEDURE — 82803 BLOOD GASES ANY COMBINATION: CPT

## 2022-05-09 PROCEDURE — 82435 ASSAY OF BLOOD CHLORIDE: CPT

## 2022-05-09 PROCEDURE — 71045 X-RAY EXAM CHEST 1 VIEW: CPT

## 2022-05-09 PROCEDURE — 87086 URINE CULTURE/COLONY COUNT: CPT

## 2022-05-09 RX ADMIN — POLYETHYLENE GLYCOL 3350 17 GRAM(S): 17 POWDER, FOR SOLUTION ORAL at 12:14

## 2022-05-09 RX ADMIN — Medication 650 MILLIGRAM(S): at 06:05

## 2022-05-09 RX ADMIN — TAMSULOSIN HYDROCHLORIDE 0.4 MILLIGRAM(S): 0.4 CAPSULE ORAL at 05:59

## 2022-05-09 RX ADMIN — PIPERACILLIN AND TAZOBACTAM 25 GRAM(S): 4; .5 INJECTION, POWDER, LYOPHILIZED, FOR SOLUTION INTRAVENOUS at 12:14

## 2022-05-09 RX ADMIN — LIDOCAINE 1 APPLICATION(S): 4 CREAM TOPICAL at 06:00

## 2022-05-09 RX ADMIN — Medication 25 MILLIGRAM(S): at 05:59

## 2022-05-09 RX ADMIN — Medication 81 MILLIGRAM(S): at 12:14

## 2022-05-09 RX ADMIN — Medication 650 MILLIGRAM(S): at 12:44

## 2022-05-09 RX ADMIN — OXYCODONE HYDROCHLORIDE 5 MILLIGRAM(S): 5 TABLET ORAL at 08:39

## 2022-05-09 RX ADMIN — Medication 650 MILLIGRAM(S): at 12:14

## 2022-05-09 RX ADMIN — PIPERACILLIN AND TAZOBACTAM 25 GRAM(S): 4; .5 INJECTION, POWDER, LYOPHILIZED, FOR SOLUTION INTRAVENOUS at 05:59

## 2022-05-09 RX ADMIN — SODIUM CHLORIDE 1 GRAM(S): 9 INJECTION INTRAMUSCULAR; INTRAVENOUS; SUBCUTANEOUS at 05:59

## 2022-05-09 RX ADMIN — OXYCODONE HYDROCHLORIDE 5 MILLIGRAM(S): 5 TABLET ORAL at 09:09

## 2022-05-09 RX ADMIN — LOSARTAN POTASSIUM 25 MILLIGRAM(S): 100 TABLET, FILM COATED ORAL at 06:00

## 2022-05-09 RX ADMIN — Medication 650 MILLIGRAM(S): at 06:31

## 2022-05-09 NOTE — PROGRESS NOTE ADULT - PROBLEM SELECTOR PLAN 1
Pt. with acute hyponatremia. Uncertain etiology. Could be from SIADH from pain, and have component of urinary retention. SNa was 136 prior to admission on 4/15/22, SNa was 128 on admission on 5/3/22, received IV fluids, SNa decreased to 121 on 5/6. Pt. started on PO salt tablets 1g TID and now SNa improved to 124 this morning. Recommend to continue with same. Uosm of 342 and Jay of 48 suggesting ADH dependent hyponatremia. Avoid hypotonic fluids (anything that is in D5W). Monitor SNa daily. SNa should not correct by more than 6-8 meq over 24h period.     If any questions, please feel free to contact me     Cory Ball  Nephrology Fellow  [facility] Pager: [phone]
Pt. with acute hyponatremia. Uncertain etiology. Could be from SIADH from pain, and have component of urinary retention. SNa was 136 prior to admission on 4/15/22, SNa was 128 on admission on 5/3/22, received IV fluids, SNa decreased to 121 on 5/6. Pt. started on PO salt tablets 1g TID and now SNa improved to 127 this morning. Recommend to continue with same. Recommend fluid restriction. Uosm of 342 and Jay of 48 suggesting ADH dependent hyponatremia. Avoid hypotonic fluids (anything that is in D5W). Monitor SNa daily. SNa should not correct by more than 6-8 meq over 24h period.     If any questions, please feel free to contact me     Cory Ball  Nephrology Fellow  [facility] Pager: [phone]
Pt. with acute hyponatremia. Uncertain etiology. Could be from SIADH from pain, and have component of urinary retention. SNa was 136 prior to admission on 4/15/22, SNa was 128 on admission on 5/3/22, received IV fluids, SNa decreased to 121 on 5/6. Pt. started on PO salt tablets 1g TID and now SNa improved to 132 this morning. Recommend to continue with same dose of salt tabs. Recommend fluid restriction. Uosm of 342 and Jay of 48 suggesting ADH dependent hyponatremia. Avoid hypotonic fluids (anything that is in D5W). Monitor SNa daily. SNa should not correct by more than 6-8 meq over 24h period.     If any questions, please feel free to contact me     Alayna Rocha  Nephrology Fellow  [facility] Pager: [phone]  [facility] Pager: [phone]

## 2022-05-09 NOTE — PROGRESS NOTE ADULT - ASSESSMENT
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  Also has a past history of HTN, HLD, DM, Afib/flutter now presents with rectal pain after resection of an anal adeno CA in 2020 and then resection of a recurrent mass on 4/27.   - no evidence for CHF nor coronary ischemia  - Heparin --> Coumadin goal INR > 2 -today 2.05  - ambulation/pain control / IS  - diet advanced to regular  - ABX  - Per pt to get MRI for sciatica work up done 5/5 demonstrating:  *  STATUS POST LEFT L3-4 PARTIAL LAMINECTOMY. NO RECURRENT OR RESIDUAL   DISC PROTRUSION.  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 PLEASE SEE REPORT FOR   DETAIL.  *  POST RADIATION CHANGES OF THE L5 VERTEBRAL BODY AND SACRUM.  - stable from CV standpoint. DC Plan per Surgery for today since patient is urinating without difficulty

## 2022-05-09 NOTE — PROGRESS NOTE ADULT - SUBJECTIVE AND OBJECTIVE BOX
SUBJECTIVE: Had difficulty urinating last night.  After straight cath of bladder patient had 500 cc of urine.  Since then his urine flow has been normal.  1400 cc overnight.      MEDICATIONS  (STANDING):  aspirin enteric coated 81 milliGRAM(s) Oral daily  atorvastatin 20 milliGRAM(s) Oral at bedtime  dextrose 5%. 1000 milliLiter(s) (50 mL/Hr) IV Continuous <Continuous>  dextrose 5%. 1000 milliLiter(s) (100 mL/Hr) IV Continuous <Continuous>  dextrose 50% Injectable 25 Gram(s) IV Push once  dextrose 50% Injectable 12.5 Gram(s) IV Push once  dextrose 50% Injectable 25 Gram(s) IV Push once  glucagon  Injectable 1 milliGRAM(s) IntraMuscular once  insulin lispro (ADMELOG) corrective regimen sliding scale   SubCutaneous three times a day before meals  insulin lispro (ADMELOG) corrective regimen sliding scale   SubCutaneous at bedtime  lidocaine 2% Gel 1 Application(s) Topical two times a day  losartan 25 milliGRAM(s) Oral daily  metoprolol tartrate 25 milliGRAM(s) Oral two times a day  multivitamin 1 Tablet(s) Oral at bedtime  piperacillin/tazobactam IVPB.. 3.375 Gram(s) IV Intermittent every 8 hours  polyethylene glycol 3350 17 Gram(s) Oral daily  sodium chloride 1 Gram(s) Oral every 8 hours  tamsulosin 0.4 milliGRAM(s) Oral every 12 hours    MEDICATIONS  (PRN):  acetaminophen     Tablet .. 650 milliGRAM(s) Oral every 6 hours PRN Mild Pain (1 - 3), Moderate Pain (4 - 6)  benzocaine 15 mG/menthol 3.6 mG Lozenge 1 Lozenge Oral every 6 hours PRN Sore Throat  dextrose Oral Gel 15 Gram(s) Oral once PRN Blood Glucose LESS THAN 70 milliGRAM(s)/deciliter  ibuprofen  Tablet. 400 milliGRAM(s) Oral every 6 hours PRN Moderate Pain (4 - 6)  oxyCODONE    IR 5 milliGRAM(s) Oral every 4 hours PRN Severe Pain (7 - 10)        REVIEW OF SYSTEMS:    CONSTITUTIONAL: No fever, weight loss, or fatigue  EYES: No eye pain, visual disturbances, or discharge  NECK: No pain or stiffness  RESPIRATORY: No cough, wheezing, chills or hemoptysis; No Shortness of Breath  CARDIOVASCULAR: No chest pain, palpitations, dizziness, or leg swelling  GASTROINTESTINAL: No abdominal or epigastric pain. No nausea, vomiting, or hematemesis; No diarrhea or constipation. No melena or hematochezia.  GENITOURINARY: No dysuria, frequency, hematuria, or incontinence  NEUROLOGICAL: No headaches, memory loss, loss of strength, numbness, or tremors  SKIN: No itching, burning, rashes, or lesions   LYMPH Nodes: No enlarged glands  MUSCULOSKELETAL: No joint pain or swelling; No muscle, back, or extremity pain  All other review of systems are negative.  	  [ ] Unable to obtain    PHYSICAL EXAM:  T(F): 97.8 (05-09-22 @ 05:51), Max: 98.8 (05-08-22 @ 20:50)  HR: 75 (05-09-22 @ 05:51) (60 - 76)  BP: 149/84 (05-09-22 @ 05:51) (102/57 - 149/84)  RR: 18 (05-09-22 @ 05:51) (18 - 18)  SpO2: 98% (05-09-22 @ 05:51) (98% - 100%)  Wt(kg): --  ,   I&O's Summary    08 May 2022 07:01  -  09 May 2022 07:00  --------------------------------------------------------  IN: 1320 mL / OUT: 2600 mL / NET: -1280 mL    09 May 2022 07:01  -  09 May 2022 09:36  --------------------------------------------------------  IN: 0 mL / OUT: 300 mL / NET: -300 mL            PHYSICAL EXAM    Appearance: Normal	  HEENT:   Normal oral mucosa, PERRL, EOMI	  NECK: Soft and supple, No LAD, No JVD  Cardiovascular: Regular Rate and Rhythm, Normal S1 S2, II/VI PEG  Respiratory: Lungs clear to auscultation	  Gastrointestinal:  Soft, Non-tender, + BS	  Skin: No rashes, No ecchymoses, No cyanosis  Neurologic: Non-focal  Extremities: No clubbing, cyanosis or edema  Vascular: Peripheral pulses palpable 2+ bilaterally    LABS:	 	                          11.9   6.94  )-----------( 224      ( 09 May 2022 07:21 )             34.2               05-09    132<L>  |  97  |  12  ----------------------------<  100<H>  4.3   |  21<L>  |  0.90    Ca    9.1      09 May 2022 07:15  Phos  3.1     05-09  Mg     1.9     05-09      PT/INR - ( 09 May 2022 07:20 )   PT: 23.9 sec;   INR: 2.05 ratio         PTT - ( 09 May 2022 07:20 )  PTT:35.1 sec

## 2022-05-09 NOTE — PROGRESS NOTE ADULT - SUBJECTIVE AND OBJECTIVE BOX
Surgery Progress Note     Subjective/24hour Events:   Patient seen and examined.       Vital Signs:  Vital Signs Last 24 Hrs  T(C): 37.1 (08 May 2022 20:50), Max: 37.1 (08 May 2022 20:50)  T(F): 98.8 (08 May 2022 20:50), Max: 98.8 (08 May 2022 20:50)  HR: 60 (08 May 2022 20:50) (60 - 80)  BP: 149/76 (08 May 2022 20:50) (102/57 - 165/83)  BP(mean): --  RR: 18 (08 May 2022 20:50) (18 - 18)  SpO2: 100% (08 May 2022 20:50) (98% - 100%)    CAPILLARY BLOOD GLUCOSE      POCT Blood Glucose.: 188 mg/dL (08 May 2022 21:42)  POCT Blood Glucose.: 187 mg/dL (08 May 2022 17:42)  POCT Blood Glucose.: 158 mg/dL (08 May 2022 13:36)  POCT Blood Glucose.: 115 mg/dL (08 May 2022 08:28)      I&O's Detail    07 May 2022 07:01  -  08 May 2022 07:00  --------------------------------------------------------  IN:    Oral Fluid: 320 mL  Total IN: 320 mL    OUT:    Voided (mL): 2575 mL  Total OUT: 2575 mL    Total NET: -2255 mL      08 May 2022 07:01  -  09 May 2022 00:17  --------------------------------------------------------  IN:    IV PiggyBack: 100 mL    Oral Fluid: 1120 mL  Total IN: 1220 mL    OUT:    Intermittent Catheterization - Urethral (mL): 600 mL    Voided (mL): 900 mL  Total OUT: 1500 mL    Total NET: -280 mL          MEDICATIONS  (STANDING):  aspirin enteric coated 81 milliGRAM(s) Oral daily  atorvastatin 20 milliGRAM(s) Oral at bedtime  dextrose 5%. 1000 milliLiter(s) (50 mL/Hr) IV Continuous <Continuous>  dextrose 5%. 1000 milliLiter(s) (100 mL/Hr) IV Continuous <Continuous>  dextrose 50% Injectable 25 Gram(s) IV Push once  dextrose 50% Injectable 12.5 Gram(s) IV Push once  dextrose 50% Injectable 25 Gram(s) IV Push once  glucagon  Injectable 1 milliGRAM(s) IntraMuscular once  insulin lispro (ADMELOG) corrective regimen sliding scale   SubCutaneous three times a day before meals  insulin lispro (ADMELOG) corrective regimen sliding scale   SubCutaneous at bedtime  lidocaine 2% Gel 1 Application(s) Topical two times a day  losartan 25 milliGRAM(s) Oral daily  metoprolol tartrate 25 milliGRAM(s) Oral two times a day  multivitamin 1 Tablet(s) Oral at bedtime  piperacillin/tazobactam IVPB.. 3.375 Gram(s) IV Intermittent every 8 hours  polyethylene glycol 3350 17 Gram(s) Oral daily  sodium chloride 1 Gram(s) Oral every 8 hours  tamsulosin 0.4 milliGRAM(s) Oral every 12 hours    MEDICATIONS  (PRN):  acetaminophen     Tablet .. 650 milliGRAM(s) Oral every 6 hours PRN Mild Pain (1 - 3), Moderate Pain (4 - 6)  benzocaine 15 mG/menthol 3.6 mG Lozenge 1 Lozenge Oral every 6 hours PRN Sore Throat  dextrose Oral Gel 15 Gram(s) Oral once PRN Blood Glucose LESS THAN 70 milliGRAM(s)/deciliter  ibuprofen  Tablet. 400 milliGRAM(s) Oral every 6 hours PRN Moderate Pain (4 - 6)  oxyCODONE    IR 5 milliGRAM(s) Oral every 4 hours PRN Severe Pain (7 - 10)      Physical Exam:  General: NAD, alert  Chest: Nonlabored breathing.  Abdomen: soft, NT, ND, no rebound tenderness        Labs:    05-08    127<L>  |  93<L>  |  17  ----------------------------<  111<H>  4.5   |  20<L>  |  0.90    Ca    8.9      08 May 2022 07:40  Phos  2.8     05-08  Mg     2.1     05-08                              12.5   10.16 )-----------( 214      ( 08 May 2022 07:34 )             35.4     PT/INR - ( 08 May 2022 07:12 )   PT: 23.8 sec;   INR: 2.05 ratio         PTT - ( 08 May 2022 07:12 )  PTT:34.1 sec

## 2022-05-09 NOTE — PROGRESS NOTE ADULT - PROVIDER SPECIALTY LIST ADULT
Surgery
Cardiology
Nephrology
Surgery
Cardiology
Cardiology
Nephrology
Nephrology

## 2022-05-09 NOTE — PROGRESS NOTE ADULT - SUBJECTIVE AND OBJECTIVE BOX
[facility] DIVISION OF KIDNEY DISEASES AND HYPERTENSION -- FOLLOW UP NOTE  --------------------------------------------------------------------------------  HPI: Pt. is a 76 year old male with past medical history of DM, afib, HTN, HLD, CAD s/p QUE and CABG, mitral valve replacement on AC, severe aortic stenosis, rectal adenocarcinoma s/p transanal excision s/p biopsy  on 4/27/22, came to ED for rectal pain. Admitted for further management. Nephrology consulted for hyponatremia. SNa was 136 prior to admission on 4/15/22, SNa was 128 on admission on 5/3/22, received IV fluids, SNa decreased to 121 on 5/6. This morning increased to 132 this morning.     Patient was seen and examined at bedside. He reports feeling well and denied any nausea, vomiting, or abdominal pain.     PAST HISTORY  --------------------------------------------------------------------------------  No significant changes to PMH, PSH, FHx, SHx, unless otherwise noted    ALLERGIES & MEDICATIONS  --------------------------------------------------------------------------------  Allergies    No Known Allergies    Intolerances    Standing Inpatient Medications  aspirin enteric coated 81 milliGRAM(s) Oral daily  atorvastatin 20 milliGRAM(s) Oral at bedtime  dextrose 5%. 1000 milliLiter(s) IV Continuous <Continuous>  dextrose 5%. 1000 milliLiter(s) IV Continuous <Continuous>  dextrose 50% Injectable 25 Gram(s) IV Push once  dextrose 50% Injectable 12.5 Gram(s) IV Push once  dextrose 50% Injectable 25 Gram(s) IV Push once  glucagon  Injectable 1 milliGRAM(s) IntraMuscular once  insulin lispro (ADMELOG) corrective regimen sliding scale   SubCutaneous three times a day before meals  insulin lispro (ADMELOG) corrective regimen sliding scale   SubCutaneous at bedtime  lidocaine 2% Gel 1 Application(s) Topical two times a day  losartan 25 milliGRAM(s) Oral daily  metoprolol tartrate 25 milliGRAM(s) Oral two times a day  multivitamin 1 Tablet(s) Oral at bedtime  piperacillin/tazobactam IVPB.. 3.375 Gram(s) IV Intermittent every 8 hours  polyethylene glycol 3350 17 Gram(s) Oral daily  sodium chloride 1 Gram(s) Oral every 8 hours  tamsulosin 0.4 milliGRAM(s) Oral every 12 hours    PRN Inpatient Medications  acetaminophen     Tablet .. 650 milliGRAM(s) Oral every 6 hours PRN  benzocaine 15 mG/menthol 3.6 mG Lozenge 1 Lozenge Oral every 6 hours PRN  dextrose Oral Gel 15 Gram(s) Oral once PRN  ibuprofen  Tablet. 400 milliGRAM(s) Oral every 6 hours PRN  oxyCODONE    IR 5 milliGRAM(s) Oral every 4 hours PRN    REVIEW OF SYSTEMS  --------------------------------------------------------------------------------  Gen: No fevers/chills  Respiratory: No dyspnea, cough  CV: No chest pain  GI: No abdominal pain, diarrhea  : No dysuria, hematuria  MSK: No  edema    All other systems were reviewed and are negative, except as noted.    VITALS/PHYSICAL EXAM  --------------------------------------------------------------------------------  T(C): 36.7 (05-09-22 @ 13:28), Max: 37.1 (05-08-22 @ 20:50)  HR: 79 (05-09-22 @ 13:28) (60 - 80)  BP: 136/74 (05-09-22 @ 13:28) (111/61 - 149/84)  RR: 18 (05-09-22 @ 13:28) (18 - 18)  SpO2: 99% (05-09-22 @ 13:28) (98% - 100%)  Wt(kg): --    05-08-22 @ 07:01  -  05-09-22 @ 07:00  --------------------------------------------------------  IN: 1320 mL / OUT: 2600 mL / NET: -1280 mL    05-09-22 @ 07:01  -  05-09-22 @ 14:13  --------------------------------------------------------  IN: 800 mL / OUT: 300 mL / NET: 500 mL    Physical Exam:  	Gen: NAD  	HEENT: MMM  	Pulm: CTA B/L  	CV: S1S2  	Abd: Soft, +BS   	Ext: No LE edema B/L  	Neuro: Awake  	Skin: Warm and dry    LABS/STUDIES  --------------------------------------------------------------------------------              11.9   6.94  >-----------<  224      [05-09-22 @ 07:21]              34.2     132  |  97  |  12  ----------------------------<  100      [05-09-22 @ 07:15]  4.3   |  21  |  0.90        Ca     9.1     [05-09-22 @ 07:15]      Mg     1.9     [05-09-22 @ 07:15]      Phos  3.1     [05-09-22 @ 07:15]    PT/INR: PT 23.9 , INR 2.05       [05-09-22 @ 07:20]  PTT: 35.1       [05-09-22 @ 07:20]    Creatinine Trend:  SCr 0.90 [05-09 @ 07:15]  SCr 0.90 [05-08 @ 07:40]  SCr 0.95 [05-07 @ 16:13]  SCr 0.95 [05-07 @ 07:44]  SCr 1.04 [05-06 @ 13:53]    Urinalysis - [05-03-22 @ 11:56]      Color Light Yellow / Appearance Clear / SG 1.009 / pH 7.0      Gluc Negative / Ketone Negative  / Bili Negative / Urobili Negative       Blood Negative / Protein Negative / Leuk Est Negative / Nitrite Negative      RBC 2 / WBC 0 / Hyaline 0 / Gran  / Sq Epi  / Non Sq Epi 0 / Bacteria Negative    Urine Sodium 48      [05-06-22 @ 20:08]  Urine Potassium 40      [05-06-22 @ 20:08]  Urine Chloride 39      [05-06-22 @ 20:08]  Urine Osmolality 342      [05-06-22 @ 20:08]

## 2022-05-10 NOTE — REVIEW OF SYSTEMS
[Feeling Fatigued] : feeling fatigued [Dyspnea on exertion] : dyspnea during exertion [Fever] : no fever [Headache] : no headache [Chills] : no chills [SOB] : shortness of breath [Chest Discomfort] : no chest discomfort [Lower Ext Edema] : no extremity edema [Palpitations] : no palpitations [Orthopnea] : no orthopnea [Negative] : Genitourinary [FreeTextEntry3] : AMD, Receives injections

## 2022-05-10 NOTE — PHYSICAL EXAM
[Well Developed] : well developed [Normal S1, S2] : normal S1, S2 [Murmur] : murmur [Clear Lung Fields] : clear lung fields [Good Air Entry] : good air entry [Normal] : moves all extremities, no focal deficits, normal speech [No Rub] : no rub [de-identified] : Bloodshot R eye after injection for AMD [de-identified] : II/VI PEG

## 2022-05-10 NOTE — CARDIOLOGY SUMMARY
[de-identified] : 4/13/22 @ PMD: QUIANA 0.7, mGr 18, pGr 39, AoV 3.2, EF 66%\par Trace AI, Mild to moderate MR/TR

## 2022-05-10 NOTE — HISTORY OF PRESENT ILLNESS
[FreeTextEntry1] : Mr. Guillaume is referred to us by Dr. Draper, for evaluation of his Aortic Stenosis. He has a past medical history of CABG/MVR 5/13/2015, AFib, HTN, HLD, BPV, and PCI in 1997.\par \par Today he presents with his wife, stating he feels ok. He states he gets SOB with climbing stairs or bending over. He can without SOB, but he does walk slowly. Denies any CP, Pedal edema or dizziness. He sleeps with 1-2 pillows, but breathing is not an issue. He lives at home with his wife, and is independent in his ADL's. He was never a smoker. Of note, he is also scheduled for a transanal excision of rectal mass for Colon Ca, which is focal and small. Per the patient there is no metastases. \par \par 2015 he had a MV replacement and then he retired.  He worked in [place] and he was carrying a lot of tools and going on buses/trains.  Dr. Draper was his cardiologist since 2009.  He 1997 he had a stent placed in LAD.  In 2010 he underwent ablation procedure.  In 2015 he sees Dr. Draper every 4 months.  He saw that he mitral valve was getting bed for which he was getting worse.  He does all his activities normal.  Notes some mild shortness of breath when going up flight of stairs or bending down.  At baseline he is not a fast worker  He and his wife note that he has slown down.  \par \par Biopsy Sept 2020 he had a serious infection with chills, fevers and emesis.  He was in the hospital/[facility].  He developed pus in his perineum and it took 1 year to repair.  He had a PICC line for treatment for infection.  Closely followed for infection of his heart/5MON.\par \par Aortic valve stenosis\par -- The patient has been experiencing some symptoms which may be consistent with his aortic valve stenosis which includes shortness of breath/dyspnea upon exertion and worsening fatigue.  He denies any heart failure signs or symptoms and denies any change in orthopnea, lower extremity edema or paroxysmal nocturnal dyspnea.  Discussed with patient what is aortic valve stenosis.  The associated signs and symptoms of severe aortic valve stenosis was reviewed.  The natural pathophysiology of untreated severe aortic valve stenosis was gone over.  Different treatment options for severe symptomatic aortic valve stenosis was reviewed including medical therapy, TAVR and SAVR.  The pros/cons and the risk/benefits of the various treatment options were gone over.  TTE on 4/14/2022 demonstrated normal left ventricular function, dilated aortic root measuring 43 cm, severe calcific aortic valve stenosis with peak gradient 39, mean gradient 18 with aortic valve area 0.7.  Bioprosthetic mitral valve with a mean gradient of 4.  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  He is a low to intermediate risk individual and able to carry greater than 2 METS of physical activity.  The patient is at an acceptable risk to proceed.\par -- Once the patient is clinically doing well following his GI surgery it is recommended that he be reassessed in the valve clinic.  Due to the patient's age and comorbidities he is felt to be an appropriate candidate to be worked up for TAVR.\par --Continue aspirin 81 mg daily.\par --Continue metoprolol tartrate 25 mg twice a day.\par --For his atrial fibrillation he is on Coumadin.  Signs and symptoms bleeding were reviewed with the patient.\par --Continue rosuvastatin 10 mg daily, coenzyme Q 10 and Lovaza.\par --The importance of following a low salt fluid restricted diet was reviewed.\par --Carotid ultrasound scheduled to be done next week.\par --EKG performed due to history of coronary artery disease, atrial fibrillation and valvular disease.\par \par All questions of the patient and his wife were addressed.\par \par STS risk 2.9%\par \par Surgery:  Dr. Connelly\par Oncologist:  Dr. Liu

## 2022-05-10 NOTE — REASON FOR VISIT
[Structural Heart and Valve Disease] : structural heart and valve disease [Spouse] : spouse [FreeTextEntry3] : Baron

## 2022-05-11 ENCOUNTER — NON-APPOINTMENT (OUTPATIENT)
Age: 77
End: 2022-05-11

## 2022-05-11 ENCOUNTER — FORM ENCOUNTER (OUTPATIENT)
Age: 77
End: 2022-05-11

## 2022-05-11 NOTE — HISTORY OF PRESENT ILLNESS
"Subjective   Nelly Mahoney is a 21 m.o. female.   Chief Complaint   Patient presents with   • Weight Check     21 month old weight check       History of Present Illness    Nelly present for a weight check today due to decline in weight from previous check up visit.  Her appetite has improved.  She has not had any vomiting or abnormal stools.  No rashes.  She will still not drink milk, but will eat the squeeze yogurt packs fine.      The following portions of the patient's history were reviewed and updated as appropriate: allergies, current medications and problem list.    Review of Systems   Constitutional: Negative for activity change, appetite change, fatigue and fever.   Genitourinary: Negative for difficulty urinating.   Neurological: Negative for weakness.   Psychiatric/Behavioral: Negative for behavioral problems.       Objective    Height 87.6 cm (34.5\"), weight 12.2 kg (27 lb).    Wt Readings from Last 3 Encounters:   03/26/18 12.2 kg (27 lb) (83 %, Z= 0.96)*   01/22/18 11.3 kg (25 lb) (75 %, Z= 0.69)*   01/13/18 10.9 kg (24 lb 0.5 oz) (66 %, Z= 0.42)*     * Growth percentiles are based on WHO (Girls, 0-2 years) data.     Ht Readings from Last 3 Encounters:   03/26/18 87.6 cm (34.5\") (90 %, Z= 1.29)*   01/22/18 85.1 cm (33.5\") (88 %, Z= 1.19)*   12/12/17 81.3 cm (32\") (64 %, Z= 0.36)*     * Growth percentiles are based on WHO (Girls, 0-2 years) data.     Body mass index is 15.95 kg/m².  62 %ile (Z= 0.31) based on WHO (Girls, 0-2 years) BMI-for-age data using vitals from 3/26/2018.  83 %ile (Z= 0.96) based on WHO (Girls, 0-2 years) weight-for-age data using vitals from 3/26/2018.  90 %ile (Z= 1.29) based on WHO (Girls, 0-2 years) length-for-age data using vitals from 3/26/2018.    Physical Exam   Constitutional: She appears well-developed. She is active.   HENT:   Mouth/Throat: Mucous membranes are moist.   Eyes: Conjunctivae are normal.   Neck: Neck supple.   Pulmonary/Chest: Effort normal. "   Musculoskeletal: Normal range of motion.   Neurological: She is alert.   Skin: No rash noted.       Assessment/Plan   Nelly was seen today for weight check.    Diagnoses and all orders for this visit:    Patient underweight       Her weight decrease improved and she is back on the curve where she was recently.    Return for Next scheduled follow up.            [FreeTextEntry1] : Mr. Guillaume returns to clinic today for follow up evaluation of aortic stenosis. He has a history of rectal adenocarcinoma and underwent a transanal excision/biopsy of rectal mass on 4/27. Post op course was complicated by persistent rectal pain and was admitted on 5/3 for evaluation, incidentally found to be hyponatremic. Pain was stabilized, hyponatremia treated, and he was discharged home on 5/8. \par \par Since discharge he reports\par \par To review, his past medical history also includes DM, afib/aflutter, HTN, HLD, CAD with QUE and CABG, mitral valve replacement (bovine) on Coumadin (last dose yesterday night), severe aortic stenosis echo 4/14/22. \par \par Repeat echocardiogram today demonstrates

## 2022-05-11 NOTE — REASON FOR VISIT
[Structural Heart and Valve Disease] : structural heart and valve disease [Other: ____] : [unfilled] [FreeTextEntry3] : Dr. Draper

## 2022-05-13 ENCOUNTER — APPOINTMENT (OUTPATIENT)
Dept: SURGERY | Facility: CLINIC | Age: 77
End: 2022-05-13
Payer: MEDICARE

## 2022-05-13 PROCEDURE — 99024 POSTOP FOLLOW-UP VISIT: CPT

## 2022-05-13 NOTE — HISTORY OF PRESENT ILLNESS
[FreeTextEntry1] : Quoc is a 76 year old male here for post op visit. S/p transanal excision of distal rectal mass on 4/27/22. Pathology; 1. Rectum, distal, mass - Colorectal mucosa and submucosa with fibrosis and degenerative changes. Colonic glandular and adjacent squamous mucosa is negative for dysplasia.\par \par Lesion at the anorectal junction. Biopsy demonstrated adenocarcinoma. The lesion was felt to be either T1 versus early T2 on preoperative imaging. The lesion was mobile approximately 2 cm in diameter, located in the anterior position just at the level of the dentate line. There was no evidence of metastatic disease on preoperative workup, therefore transanal excision was performed on 10/15/20. \par \par 6/15/21 MR Pelvis/Rectal/Anal - Post treatment changes in the anal canal. No evidence of recurrent disease.\par \par 3/16/22 MR Pelvis/Rectal/Anal - Since the prior examination 6/11/2021, the primary tumor shows: Incomplete response (likely residual tumor). Suspicious mesorectal lymph nodes: No. Suspicious extra mesorectal lymph nodes: No\par \par 5/3/22 CT Abdomen and Pelvis - Mild rectal wall thickening.  No discrete abscess.\par \par Last seen 5/3/22 - In summary the patient is 1 week status post transanal excision of a distal rectal scar. He has a history of rectal cancer treated with transanal excision. Postoperatively he developed a wound infection. He is having more pain than expected and adequate examination is precluded in the office. Therefore I sent him to the ER for CT and likely admission and possible examination under anesthesia. \par \par Today patient reports having anal pain, similar to when he was hospitalized. Reports that 2 days ago he noticed an opening and that he has had drainage of stool and mucous from the area daily. Takes Tylenol every 8 hours, and Oxycodone for breakthrough pain. Denies fever or chills. 1 BM daily. Currently completing course of antibiotics.

## 2022-05-13 NOTE — ASSESSMENT
[FreeTextEntry1] : In summary the patient is status post transanal excision of a distal rectal scar in the anterior midline at his previous tumor bed.  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  There is no obvious residual undrained abscess.  He will continue Augmentin for another week and I will see him in the office early next week.  I explained that if he develops fever or worsening pain to go to the emergency room.  At the present time his abscess appears to be adequately drained and there is no obvious indication for return to the OR.

## 2022-05-13 NOTE — PHYSICAL EXAM
[Abdomen Tenderness] : ~T No ~M abdominal tenderness [de-identified] : Perianal inspection reveals a spontaneously draining anterior midline perianal abscess with residual purulent discharge.  There is no surrounding cellulitis or fluctuance.

## 2022-05-17 ENCOUNTER — APPOINTMENT (OUTPATIENT)
Dept: SURGERY | Facility: CLINIC | Age: 77
End: 2022-05-17
Payer: MEDICARE

## 2022-05-17 ENCOUNTER — RESULT REVIEW (OUTPATIENT)
Age: 77
End: 2022-05-17

## 2022-05-17 ENCOUNTER — APPOINTMENT (OUTPATIENT)
Dept: CT IMAGING | Facility: IMAGING CENTER | Age: 77
End: 2022-05-17
Payer: MEDICARE

## 2022-05-17 ENCOUNTER — OUTPATIENT (OUTPATIENT)
Dept: OUTPATIENT SERVICES | Facility: HOSPITAL | Age: 77
LOS: 1 days | End: 2022-05-17
Payer: MEDICARE

## 2022-05-17 VITALS
DIASTOLIC BLOOD PRESSURE: 82 MMHG | OXYGEN SATURATION: 99 % | WEIGHT: 162 LBS | HEIGHT: 65 IN | BODY MASS INDEX: 26.99 KG/M2 | SYSTOLIC BLOOD PRESSURE: 148 MMHG | TEMPERATURE: 97.3 F | HEART RATE: 90 BPM | RESPIRATION RATE: 17 BRPM

## 2022-05-17 DIAGNOSIS — Z95.2 PRESENCE OF PROSTHETIC HEART VALVE: Chronic | ICD-10-CM

## 2022-05-17 DIAGNOSIS — C20 MALIGNANT NEOPLASM OF RECTUM: ICD-10-CM

## 2022-05-17 PROCEDURE — 72193 CT PELVIS W/DYE: CPT | Mod: 26,MH

## 2022-05-17 PROCEDURE — 99024 POSTOP FOLLOW-UP VISIT: CPT

## 2022-05-17 PROCEDURE — 72193 CT PELVIS W/DYE: CPT

## 2022-05-17 NOTE — ASSESSMENT
[FreeTextEntry1] : In summary the patient has a recurrent anterior midline anal fistula status post transanal excision of a distal rectal scar.  There is no residual carcinoma in the specimen.  He will remain on antibiotics.  I sent him for CT of the pelvis today to be sure there is no undrained abscess and sent him for blood work.  He will continue local wound care at home.  If there is any sign of systemic sepsis or abscess on pelvic CT I will send him to the hospital for admission and IV antibiotics.

## 2022-05-17 NOTE — HISTORY OF PRESENT ILLNESS
[FreeTextEntry1] : Quoc is a 76 year old male here for post op visit. S/p transanal excision of distal rectal mass on 4/27/22. Pathology; 1. Rectum, distal, mass - Colorectal mucosa and submucosa with fibrosis and degenerative changes. Colonic glandular and adjacent squamous mucosa is negative for dysplasia.\par \par Lesion at the anorectal junction. Biopsy demonstrated adenocarcinoma. The lesion was felt to be either T1 versus early T2 on preoperative imaging. The lesion was mobile approximately 2 cm in diameter, located in the anterior position just at the level of the dentate line. There was no evidence of metastatic disease on preoperative workup, therefore transanal excision was performed on 10/15/20. \par \par 6/15/21 MR Pelvis/Rectal/Anal - Post treatment changes in the anal canal. No evidence of recurrent disease.\par \par 3/16/22 MR Pelvis/Rectal/Anal - Since the prior examination 6/11/2021, the primary tumor shows: Incomplete response (likely residual tumor). Suspicious mesorectal lymph nodes: No. Suspicious extra mesorectal lymph nodes: No\par \par 5/3/22 CT Abdomen and Pelvis - Mild rectal wall thickening. No discrete abscess\par \par Last seen 5/13/22 - In summary the patient is status post transanal excision of a distal rectal scar in the anterior midline at his previous tumor bed. 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 There is no obvious residual undrained abscess. He will continue Augmentin for another week and I will see him in the office early next week. I explained that if he develops fever or worsening pain to go to the emergency room. At the present time his abscess appears to be adequately drained and there is no obvious indication for return to the OR.\par The patient complains of persistent discomfort and drainage from the perineum.. States that the pain has remained the same since last week. Takes Tylenol and Oxycodone for breakthrough. Has daily drainage from wound. Currently taking course of Augmentin. Small appetite, feeling fatigued. Denies fever or chills.

## 2022-05-17 NOTE — PHYSICAL EXAM
[de-identified] : Perianal inspection reveals an external opening in the anterior midline at the site of his previous anal fistula.  This is draining feculent material.  There is no obvious fluctuance/undrained abscess.

## 2022-05-18 ENCOUNTER — NON-APPOINTMENT (OUTPATIENT)
Age: 77
End: 2022-05-18

## 2022-05-19 ENCOUNTER — NON-APPOINTMENT (OUTPATIENT)
Age: 77
End: 2022-05-19

## 2022-05-23 NOTE — PROGRESS NOTE ADULT - PROBLEM SELECTOR PROBLEM 7
Rectal cancer
room air

## 2022-05-24 ENCOUNTER — APPOINTMENT (OUTPATIENT)
Dept: SURGERY | Facility: CLINIC | Age: 77
End: 2022-05-24
Payer: MEDICARE

## 2022-05-24 VITALS
WEIGHT: 160 LBS | HEIGHT: 65 IN | TEMPERATURE: 97.3 F | BODY MASS INDEX: 26.66 KG/M2 | OXYGEN SATURATION: 99 % | RESPIRATION RATE: 16 BRPM | HEART RATE: 97 BPM

## 2022-05-24 PROCEDURE — 99024 POSTOP FOLLOW-UP VISIT: CPT

## 2022-05-24 NOTE — PHYSICAL EXAM
[de-identified] : Perianal inspection reveals a persistent anterior midline external opening to an anal fistula.  There is no undrained collection or abscess.

## 2022-05-24 NOTE — HISTORY OF PRESENT ILLNESS
[FreeTextEntry1] : Quoc is a 76 year old male here for follow up visit. S/p transanal excision of distal rectal mass on 4/27/22. Pathology; 1. Rectum, distal, mass - Colorectal mucosa and submucosa with fibrosis and degenerative changes. Colonic glandular and adjacent squamous mucosa is negative for dysplasia.\par \par Lesion at the anorectal junction. Biopsy demonstrated adenocarcinoma. The lesion was felt to be either T1 versus early T2 on preoperative imaging. The lesion was mobile approximately 2 cm in diameter, located in the anterior position just at the level of the dentate line. There was no evidence of metastatic disease on preoperative workup, therefore transanal excision was performed on 10/15/20. \par \par 6/15/21 MR Pelvis/Rectal/Anal - Post treatment changes in the anal canal. No evidence of recurrent disease.\par \par 3/16/22 MR Pelvis/Rectal/Anal - Since the prior examination 6/11/2021, the primary tumor shows: Incomplete response (likely residual tumor). Suspicious mesorectal lymph nodes: No. Suspicious extra mesorectal lymph nodes: No\par \par 5/3/22 CT Abdomen and Pelvis - Mild rectal wall thickening. No discrete abscess\par \par Last seen 5/17/22 - In summary the patient has a recurrent anterior midline anal fistula status post transanal excision of a distal rectal scar. There is no residual carcinoma in the specimen. He will remain on antibiotics. I sent him for CT of the pelvis today to be sure there is no undrained abscess and sent him for blood work. He will continue local wound care at home. If there is any sign of systemic sepsis or abscess on pelvic CT I will send him to the hospital for admission and IV antibiotics. \par \par Today patient reports 8/10 pain. States the pain has slightly improved since last visit, pain is now intermittent and managing with Tylenol and Sitz bath. Decreasing drainage from area daily. 1 hard BM daily. Denies fever or chills.

## 2022-05-24 NOTE — ASSESSMENT
[FreeTextEntry1] : In summary the patient has developed a recurrent anal fistula following transanal excision of his distal rectal scar to rule out recurrent rectal cancer.  Pathology demonstrates no evidence of recurrent rectal cancer however he is miserable from his recurrent anal fistula.  He was discussed at tumor board last week and recommendation was to try hyperbaric oxygen therapy to see if this can help with his wound healing.  I explained if his symptoms worsen the other option would be fecal diversion however at the present time I do not feel a colostomy is indicated.  I will see him in 2 weeks for checkup

## 2022-06-01 ENCOUNTER — APPOINTMENT (OUTPATIENT)
Dept: WOUND CARE | Facility: CLINIC | Age: 77
End: 2022-06-01
Payer: MEDICARE

## 2022-06-01 VITALS
SYSTOLIC BLOOD PRESSURE: 134 MMHG | OXYGEN SATURATION: 98 % | HEART RATE: 103 BPM | DIASTOLIC BLOOD PRESSURE: 87 MMHG | TEMPERATURE: 98.1 F | RESPIRATION RATE: 18 BRPM
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
## 2022-06-01 DIAGNOSIS — L59.8 OTHER SPECIFIED DISORDERS OF THE SKIN AND SUBCUTANEOUS TISSUE RELATED TO RADIATION: ICD-10-CM

## 2022-06-01 PROCEDURE — 99205 OFFICE O/P NEW HI 60 MIN: CPT

## 2022-06-01 NOTE — REVIEW OF SYSTEMS
[Skin Wound] : skin wound [Negative] : Psychiatric [FreeTextEntry7] : perianal fistula [FreeTextEntry9] : uses a cane

## 2022-06-01 NOTE — ASSESSMENT
[FreeTextEntry1] : Pt accompanied by wife.  History from chart as well as pt and wife.\par \par Patient has hx of rectal cancer treated with resection and radiation now with chronic perianal fistula treated with standard treatment methods which have failed. 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  Pt is recommended to obtained ophthalmology clearance\par patient has aortic stenosis with Hx of thoracic corrective surgery (mitral valve replacement--bovine valve as per pt and wife).  Pt followed by cardiology for Aortic stenosis and to be reassessed in valve clinic for TAVR.  Pt recommended to obtain cardiology clearance.\par \par \par Patient was evaluated for Hyperbaric treatment and is deemed a candidate for Hyperbaric oxygen therapy. Patient's h&p was reviewed and patient was given an orientation of the suite and treatment. Patient was educated on the risks and benefits of the treatment. Patient read the consent and signed it prior to the initiation of any screening procedure. Patient had the opportunity to discuss any questions regarding the Hyperbaric oxygen therapy. Writer witnessed the signing of the consent and the patient was given a copy of the signed consent.\par

## 2022-06-01 NOTE — HISTORY OF PRESENT ILLNESS
[FreeTextEntry1] : Quoc is a 76 year old male here for follow up visit. S/p transanal excision of distal rectal mass on 4/27/22. Pathology; 1. Rectum, distal, mass - Colorectal mucosa and submucosa with fibrosis and degenerative changes. Colonic glandular and adjacent squamous mucosa is negative for dysplasia.\par \par Lesion at the anorectal junction. Biopsy demonstrated adenocarcinoma. The lesion was felt to be either T1 versus early T2 on preoperative imaging. The lesion was mobile approximately 2 cm in diameter, located in the anterior position just at the level of the dentate line. There was no evidence of metastatic disease on preoperative workup, therefore transanal excision was performed on 10/15/20. \par \par 6/15/21 MR Pelvis/Rectal/Anal - Post treatment changes in the anal canal. No evidence of recurrent disease.\par \par 3/16/22 MR Pelvis/Rectal/Anal - Since the prior examination 6/11/2021, the primary tumor shows: Incomplete response (likely residual tumor). Suspicious mesorectal lymph nodes: No. Suspicious extra mesorectal lymph nodes: No\par \par 5/3/22 CT Abdomen and Pelvis - Mild rectal wall thickening. No discrete abscess

## 2022-06-01 NOTE — PHYSICAL EXAM
[de-identified] : NAD [de-identified] : MATEO POLLARD uses hearing aids [de-identified] : supple [de-identified] : equal chest rise [de-identified] : soft [de-identified] : perianal fistula   [FreeTextEntry1] : perianal fistula-- 2 openings located between 5:00 and 6:00 (anterior) about 2 cm from anus   Granulation tissue present in larger one,  smaller one is a small hole.  No warmth, tenderness, fluctuance, crepitus or induration.  No stool seen coming from openings

## 2022-06-01 NOTE — REASON FOR VISIT
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 Tried standard methods of tx which have failed

## 2022-06-15 ENCOUNTER — APPOINTMENT (OUTPATIENT)
Dept: WOUND CARE | Facility: CLINIC | Age: 77
End: 2022-06-15

## 2022-06-17 ENCOUNTER — APPOINTMENT (OUTPATIENT)
Dept: SURGERY | Facility: CLINIC | Age: 77
End: 2022-06-17
Payer: MEDICARE

## 2022-06-17 VITALS
TEMPERATURE: 97.1 F | DIASTOLIC BLOOD PRESSURE: 80 MMHG | HEART RATE: 86 BPM | OXYGEN SATURATION: 97 % | RESPIRATION RATE: 16 BRPM | SYSTOLIC BLOOD PRESSURE: 113 MMHG

## 2022-06-17 PROCEDURE — 99024 POSTOP FOLLOW-UP VISIT: CPT

## 2022-06-17 RX ORDER — AMOXICILLIN AND CLAVULANATE POTASSIUM 875; 125 MG/1; MG/1
875-125 TABLET, COATED ORAL
Qty: 14 | Refills: 0 | Status: DISCONTINUED | COMMUNITY
Start: 2022-05-13 | End: 2022-06-17

## 2022-06-17 RX ORDER — METRONIDAZOLE 250 MG/1
250 TABLET ORAL
Qty: 3 | Refills: 0 | Status: DISCONTINUED | COMMUNITY
Start: 2022-04-07 | End: 2022-06-17

## 2022-06-17 RX ORDER — NEOMYCIN SULFATE 500 MG/1
500 TABLET ORAL
Qty: 3 | Refills: 0 | Status: DISCONTINUED | COMMUNITY
Start: 2022-04-07 | End: 2022-06-17

## 2022-06-17 NOTE — HISTORY OF PRESENT ILLNESS
[FreeTextEntry1] : Quoc is a 76 year old male here for follow up visit. S/p transanal excision of distal rectal mass on 4/27/22. Pathology; 1. Rectum, distal, mass - Colorectal mucosa and submucosa with fibrosis and degenerative changes. Colonic glandular and adjacent squamous mucosa is negative for dysplasia.\par \par Lesion at the anorectal junction. Biopsy demonstrated adenocarcinoma. The lesion was felt to be either T1 versus early T2 on preoperative imaging. The lesion was mobile approximately 2 cm in diameter, located in the anterior position just at the level of the dentate line. There was no evidence of metastatic disease on preoperative workup, therefore transanal excision was performed on 10/15/20. \par \par 6/15/21 MR Pelvis/Rectal/Anal - Post treatment changes in the anal canal. No evidence of recurrent disease.\par \par 3/16/22 MR Pelvis/Rectal/Anal - Since the prior examination 6/11/2021, the primary tumor shows: Incomplete response (likely residual tumor). Suspicious mesorectal lymph nodes: No. Suspicious extra mesorectal lymph nodes: No\par \par 5/3/22 CT Abdomen and Pelvis - Mild rectal wall thickening. No discrete abscess\par \par 5/17/22- postoperative changes of the lower rectum/ anal canal. A subtle linear tract from the inferior anal canal anteriorly towards the perineum on the right contains a droplet of air and may correspond with patient's recurrent fistula. No associated abscess. \par \par Last seen 5/24/22- Perianal inspection reveals a persistent anterior midline external opening to an anal fistula. There is no undrained collection or abscess. In summary the patient has developed a recurrent anal fistula following transanal excision of his distal rectal scar to rule out recurrent rectal cancer. Pathology demonstrates no evidence of recurrent rectal cancer however he is miserable from his recurrent anal fistula. He was discussed at tumor board last week and recommendation was to try hyperbaric oxygen therapy to see if this can help with his wound healing. I explained if his symptoms worsen the other option would be fecal diversion however at the present time I do not feel a colostomy is indicated. I will see him in 2 weeks for checkup. \par \par Today pt reports feeling well, no pain. Pt reports occasional discomfort and feeling itchy. Decrease in daily drainage. Citrucel taken daily. Soft/ formed BMs daily, no pain, occasional BRB on toilet paper. No episodes of incontinence, reports urgency to move bowels. Mixed appetite, no nausea, no vomiting. Denies fever and chills.

## 2022-06-17 NOTE — ASSESSMENT
[FreeTextEntry1] : In summary the patient had a recurrent anterior midline anal fistula following transanal excision of his previous rectal scar 2 months ago.  His wound appears to be healing with local therapy.  He was evaluated for possible hyperbaric oxygen therapy however his wound healing appears to be healing on its own.  I will see him in the office in 3 weeks and if his fistula is healed we will forego additional treatment.

## 2022-06-17 NOTE — PHYSICAL EXAM
[Abdomen Tenderness] : ~T No ~M abdominal tenderness [de-identified] : Perianal inspection reveals a healing anterior midline anal fistula.  There is no abscess no feculent discharge and only minimal mucoid discharge from the external opening in the anterior midline.  Anal rectal tone is normal.

## 2022-06-28 ENCOUNTER — OUTPATIENT (OUTPATIENT)
Dept: OUTPATIENT SERVICES | Facility: HOSPITAL | Age: 77
LOS: 1 days | Discharge: HOME | End: 2022-06-28

## 2022-06-28 DIAGNOSIS — C20 MALIGNANT NEOPLASM OF RECTUM: ICD-10-CM

## 2022-06-28 DIAGNOSIS — Z95.2 PRESENCE OF PROSTHETIC HEART VALVE: Chronic | ICD-10-CM

## 2022-06-30 ENCOUNTER — APPOINTMENT (OUTPATIENT)
Dept: CARDIOLOGY | Facility: CLINIC | Age: 77
End: 2022-06-30

## 2022-07-01 ENCOUNTER — RESULT REVIEW (OUTPATIENT)
Age: 77
End: 2022-07-01

## 2022-07-01 ENCOUNTER — APPOINTMENT (OUTPATIENT)
Dept: HEMATOLOGY ONCOLOGY | Facility: CLINIC | Age: 77
End: 2022-07-01

## 2022-07-01 ENCOUNTER — NON-APPOINTMENT (OUTPATIENT)
Age: 77
End: 2022-07-01

## 2022-07-01 LAB
BASOPHILS # BLD AUTO: 0.01 K/UL — SIGNIFICANT CHANGE UP (ref 0–0.2)
BASOPHILS NFR BLD AUTO: 0.2 % — SIGNIFICANT CHANGE UP (ref 0–2)
EOSINOPHIL # BLD AUTO: 0.03 K/UL — SIGNIFICANT CHANGE UP (ref 0–0.5)
EOSINOPHIL NFR BLD AUTO: 0.5 % — SIGNIFICANT CHANGE UP (ref 0–6)
HCT VFR BLD CALC: 38.4 % — LOW (ref 39–50)
HGB BLD-MCNC: 13.3 G/DL — SIGNIFICANT CHANGE UP (ref 13–17)
IMM GRANULOCYTES NFR BLD AUTO: 0.2 % — SIGNIFICANT CHANGE UP (ref 0–1.5)
LYMPHOCYTES # BLD AUTO: 1.07 K/UL — SIGNIFICANT CHANGE UP (ref 1–3.3)
LYMPHOCYTES # BLD AUTO: 18.5 % — SIGNIFICANT CHANGE UP (ref 13–44)
MCHC RBC-ENTMCNC: 33.6 PG — SIGNIFICANT CHANGE UP (ref 27–34)
MCHC RBC-ENTMCNC: 34.6 G/DL — SIGNIFICANT CHANGE UP (ref 32–36)
MCV RBC AUTO: 97 FL — SIGNIFICANT CHANGE UP (ref 80–100)
MONOCYTES # BLD AUTO: 0.42 K/UL — SIGNIFICANT CHANGE UP (ref 0–0.9)
MONOCYTES NFR BLD AUTO: 7.3 % — SIGNIFICANT CHANGE UP (ref 2–14)
NEUTROPHILS # BLD AUTO: 4.25 K/UL — SIGNIFICANT CHANGE UP (ref 1.8–7.4)
NEUTROPHILS NFR BLD AUTO: 73.3 % — SIGNIFICANT CHANGE UP (ref 43–77)
NRBC # BLD: 0 /100 WBCS — SIGNIFICANT CHANGE UP (ref 0–0)
PLATELET # BLD AUTO: 189 K/UL — SIGNIFICANT CHANGE UP (ref 150–400)
RBC # BLD: 3.96 M/UL — LOW (ref 4.2–5.8)
RBC # FLD: 13.7 % — SIGNIFICANT CHANGE UP (ref 10.3–14.5)
WBC # BLD: 5.79 K/UL — SIGNIFICANT CHANGE UP (ref 3.8–10.5)
WBC # FLD AUTO: 5.79 K/UL — SIGNIFICANT CHANGE UP (ref 3.8–10.5)

## 2022-07-08 ENCOUNTER — APPOINTMENT (OUTPATIENT)
Dept: SURGERY | Facility: CLINIC | Age: 77
End: 2022-07-08

## 2022-07-08 VITALS
SYSTOLIC BLOOD PRESSURE: 137 MMHG | OXYGEN SATURATION: 98 % | TEMPERATURE: 97.6 F | RESPIRATION RATE: 16 BRPM | DIASTOLIC BLOOD PRESSURE: 87 MMHG | HEART RATE: 82 BPM

## 2022-07-08 DIAGNOSIS — Z09 ENCOUNTER FOR FOLLOW-UP EXAMINATION AFTER COMPLETED TREATMENT FOR CONDITIONS OTHER THAN MALIGNANT NEOPLASM: ICD-10-CM

## 2022-07-08 PROCEDURE — 99024 POSTOP FOLLOW-UP VISIT: CPT

## 2022-07-08 RX ORDER — AMOXICILLIN 500 MG/1
500 CAPSULE ORAL
Qty: 32 | Refills: 0 | Status: DISCONTINUED | COMMUNITY
Start: 2022-03-10

## 2022-07-08 NOTE — PHYSICAL EXAM
[de-identified] : Perianal inspection reveals a healed anterior midline anal fistula.  Anal rectal tone is diminished.  Digital rectal examination reveals no palpable mass.  He is intra-anal incision is healing appropriately.

## 2022-07-08 NOTE — ASSESSMENT
[FreeTextEntry1] : In summary the patient is 3 months status post transanal excisional biopsy of his rectal scar at the site of his previous rectal carcinoma.  His postoperative course was complicated by development of a recurrent anal fistula which has subsequently healed with conservative treatment.  Overall he continues to improve.  He still has some occasional incontinence to loose stool.  I recommended continued fiber supplementation and Imodium as needed.  I will see him in 3 months for a checkup.

## 2022-07-08 NOTE — HISTORY OF PRESENT ILLNESS
[FreeTextEntry1] : Quoc is a 76 year old male here for follow up visit. S/p transanal excision of distal rectal mass on 4/27/22. Pathology; 1. Rectum, distal, mass - Colorectal mucosa and submucosa with fibrosis and degenerative changes. Colonic glandular and adjacent squamous mucosa is negative for dysplasia.\par \par Lesion at the anorectal junction. Biopsy demonstrated adenocarcinoma. The lesion was felt to be either T1 versus early T2 on preoperative imaging. The lesion was mobile approximately 2 cm in diameter, located in the anterior position just at the level of the dentate line. There was no evidence of metastatic disease on preoperative workup, therefore transanal excision was performed on 10/15/20. \par \par 6/15/21 MR Pelvis/Rectal/Anal - Post treatment changes in the anal canal. No evidence of recurrent disease.\par \par 3/16/22 MR Pelvis/Rectal/Anal - Since the prior examination 6/11/2021, the primary tumor shows: Incomplete response (likely residual tumor). Suspicious mesorectal lymph nodes: No. Suspicious extra mesorectal lymph nodes: No\par \par 5/3/22 CT Abdomen and Pelvis - Mild rectal wall thickening. No discrete abscess\par \par 5/17/22- postoperative changes of the lower rectum/ anal canal. A subtle linear tract from the inferior anal canal anteriorly towards the perineum on the right contains a droplet of air and may correspond with patient's recurrent fistula. No associated abscess. \par \par Last seen 06/17/22  patient had a recurrent anterior midline anal fistula following transanal excision of his previous rectal scar 2 months ago. His wound appears to be healing with local therapy. He was evaluated for possible hyperbaric oxygen therapy however his wound healing appears to be healing on its own. I will see him in the office in 3 weeks and if his fistula is healed we will forego additional treatment. \par \par Today pt reports feeling well, no pain. Pt denies drainage, itchy. Multiple formed BMs daily, no, pain, no bleeding, urgency to move bowels. Good appetite, no nausea, no vomiting. Denies fever and chills.

## 2022-07-25 ENCOUNTER — APPOINTMENT (OUTPATIENT)
Dept: UROLOGY | Facility: CLINIC | Age: 77
End: 2022-07-25

## 2022-09-27 ENCOUNTER — APPOINTMENT (OUTPATIENT)
Dept: INTERNAL MEDICINE | Facility: CLINIC | Age: 77
End: 2022-09-27

## 2022-09-27 PROCEDURE — 90662 IIV NO PRSV INCREASED AG IM: CPT

## 2022-09-27 PROCEDURE — G0008: CPT

## 2022-09-30 ENCOUNTER — OUTPATIENT (OUTPATIENT)
Dept: OUTPATIENT SERVICES | Facility: HOSPITAL | Age: 77
LOS: 1 days | Discharge: HOME | End: 2022-09-30

## 2022-09-30 DIAGNOSIS — C20 MALIGNANT NEOPLASM OF RECTUM: ICD-10-CM

## 2022-09-30 DIAGNOSIS — Z95.2 PRESENCE OF PROSTHETIC HEART VALVE: Chronic | ICD-10-CM

## 2022-10-03 ENCOUNTER — APPOINTMENT (OUTPATIENT)
Dept: HEMATOLOGY ONCOLOGY | Facility: CLINIC | Age: 77
End: 2022-10-03

## 2022-10-03 ENCOUNTER — RESULT REVIEW (OUTPATIENT)
Age: 77
End: 2022-10-03

## 2022-10-03 LAB
BASOPHILS # BLD AUTO: 0.02 K/UL — SIGNIFICANT CHANGE UP (ref 0–0.2)
BASOPHILS NFR BLD AUTO: 0.4 % — SIGNIFICANT CHANGE UP (ref 0–2)
EOSINOPHIL # BLD AUTO: 0.08 K/UL — SIGNIFICANT CHANGE UP (ref 0–0.5)
EOSINOPHIL NFR BLD AUTO: 1.5 % — SIGNIFICANT CHANGE UP (ref 0–6)
HCT VFR BLD CALC: 38.6 % — LOW (ref 39–50)
HGB BLD-MCNC: 13.3 G/DL — SIGNIFICANT CHANGE UP (ref 13–17)
IMM GRANULOCYTES NFR BLD AUTO: 0.2 % — SIGNIFICANT CHANGE UP (ref 0–0.9)
LYMPHOCYTES # BLD AUTO: 1.23 K/UL — SIGNIFICANT CHANGE UP (ref 1–3.3)
LYMPHOCYTES # BLD AUTO: 23.1 % — SIGNIFICANT CHANGE UP (ref 13–44)
MCHC RBC-ENTMCNC: 33.3 PG — SIGNIFICANT CHANGE UP (ref 27–34)
MCHC RBC-ENTMCNC: 34.5 G/DL — SIGNIFICANT CHANGE UP (ref 32–36)
MCV RBC AUTO: 96.5 FL — SIGNIFICANT CHANGE UP (ref 80–100)
MONOCYTES # BLD AUTO: 0.36 K/UL — SIGNIFICANT CHANGE UP (ref 0–0.9)
MONOCYTES NFR BLD AUTO: 6.8 % — SIGNIFICANT CHANGE UP (ref 2–14)
NEUTROPHILS # BLD AUTO: 3.62 K/UL — SIGNIFICANT CHANGE UP (ref 1.8–7.4)
NEUTROPHILS NFR BLD AUTO: 68 % — SIGNIFICANT CHANGE UP (ref 43–77)
NRBC # BLD: 0 /100 WBCS — SIGNIFICANT CHANGE UP (ref 0–0)
PLATELET # BLD AUTO: 156 K/UL — SIGNIFICANT CHANGE UP (ref 150–400)
RBC # BLD: 4 M/UL — LOW (ref 4.2–5.8)
RBC # FLD: 12.4 % — SIGNIFICANT CHANGE UP (ref 10.3–14.5)
WBC # BLD: 5.32 K/UL — SIGNIFICANT CHANGE UP (ref 3.8–10.5)
WBC # FLD AUTO: 5.32 K/UL — SIGNIFICANT CHANGE UP (ref 3.8–10.5)

## 2022-11-11 ENCOUNTER — APPOINTMENT (OUTPATIENT)
Dept: HEMATOLOGY ONCOLOGY | Facility: CLINIC | Age: 77
End: 2022-11-11

## 2022-11-11 VITALS
DIASTOLIC BLOOD PRESSURE: 76 MMHG | BODY MASS INDEX: 26.41 KG/M2 | OXYGEN SATURATION: 99 % | HEART RATE: 73 BPM | SYSTOLIC BLOOD PRESSURE: 140 MMHG | RESPIRATION RATE: 18 BRPM | WEIGHT: 160.45 LBS | HEIGHT: 65.5 IN | TEMPERATURE: 97.3 F

## 2022-11-11 PROCEDURE — 99214 OFFICE O/P EST MOD 30 MIN: CPT

## 2022-11-11 NOTE — HISTORY OF PRESENT ILLNESS
[Disease: _____________________] : Disease: [unfilled] [T: ___] : T[unfilled] [N: ___] : N[unfilled] [M: ___] : M[unfilled] [AJCC Stage: ____] : AJCC Stage: [unfilled] [de-identified] : Mr. Guillaume is a 74 year old male with DM2, HTN, AFib presenting to the office for an initial consultation of rectal CA.\par \par Patient reports BRBPR with almost every bowel movement.  He has a history of constipation, main concern is sensation of rectal swelling, and a hard lesion when places finger in rectum.  This is different from prior soft hemorrhoids. He sometimes must manually removes stool from his rectum. He brought this to the attention of his PCP, who palpated a rectal tumor, and referred to surgeon.\par \par Patient was seen by Dr. Christian Connelly (Colorectal Surgery) who performed JAYLIN and anoscopy which revealed a friable mobile firm distal/anal lesion approximately 2 cm in diameter in the left anterior position.  This lesion was biopsied on 2020. Pathology demonstrated adenocarcinoma. Dr. Connelly plans to do a colonoscopy.\par \par CT chest,abdomen/pelvis on 2020 demonstrated no evidence of metastatic disease in the chest, abdomen or pelvis\par MRI pelvis scheduled 9/15/2020.\par \par Patient had recent episode of red toe after a long car trip. The right big toe was painful and then it improved over a few days. He never had gout before. This had happened once before 2020 as well. \par He notes that 36 hours after the CT scan, he had shaking chills (rigors), nausea with vomiting x 1 and then this subsided. He also had a drop in blood pressure to SBP upper 80's\par He had IV contrast and oral contrast only. \par He has no focal localizing symptoms for infection, such as cough, abdominal pain, dysuria, or diarrhea. He has baseline urinary frequency. \par \par 2020\par Early 2020, he developed fever and bacteremia with Strep mitis. He then needed 4 weeks of IV antibiotics. \par On 10/15/2020, patient underwent transrectal excision of the low rectal lesion. Case also reviewed at GI tumor board. This was not an anal canal lesion, but a low lying rectal lesion. \par Pathology: \par - Invasive moderately to poorly differentiated adenocarcinoma\par - Margins are negative for tumor\par - AJCC stage pT1\par - Lymph-Vascular Invasion:  Present\par - Perineural Invasion:  Not identified\par \par MLH1: Intact nuclear expression\par MSH2:  Loss of nuclear expression\par MSH6:  Intact nuclear expression\par PMS2:  Intact nuclear expression\par \par On 10/21/20 he was readmitted for abscess - needed I&D. He received antibiotics for 2 weeks.\par He is now healing from that and still has a small Penrose drain in the perineal tissue.\par \par 3/4/22\par Patient had SCRT  - 21\par Patient had second vaccine 3/5/21 (Moderna)\par Last MRI 2021 showed resolving post-surgical changes.\par Colonoscopy 2021 with Dr Perkins was negative with 2 year follow-up recommended.\par Last visit with Dr Connelly was 2021, and is due to return.\par Genetic counseling and testing was negative for germline mutations.\par \par He does have intermittent SOB exertion that is related to valves and Afib.\par He notes cough that is intermittent every day.\par It is better with sip of water.\par He notes a lot of nasal discharge.\par COVID Booster 21 (Moderna)\par \par 2022:\par Rectal CA: 3/16/22 MR Pelvis/Rectal/Anal - Since the prior examination 2021, the primary tumor shows: Incomplete response (likely residual tumor). Suspicious mesorectal lymph nodes: No. Suspicious extra mesorectal lymph nodes: No\par On surveillance imaging there is evidence of possible local recurrence.  He will require transanal excision/biopsy. If local recurrence is confirmed patient will require APR.\par If negative margins can monitor area.\par Patient will require ctDNA (Bijan) testing performed today as baseline prior to his surgery with Dr. Connelly.\par \par 22\par End of 2022, had repeat transanal to assess for residual tumor at distal rectal scar in the anterior midline at his previous tumor bed. \par Pathology reveals no residual carcinoma however the patient has developed a recurrent anterior midline anal fistula from his wound to the perineum. \par He was in hospital for 9 days and then 2 more weeks oral antibiotics at home.\par Was going to consider hyperbaric oxygen, and did not need it ultimately.\par Signatera testin22, 22, 10/1/22 are all negative. [de-identified] : adenocarcinoma [de-identified] : Colorectal Surgeon: Christian Connelly  [phone]\par Internist / GI:  Raciel Perkins [phone]\par Cardiology: Jona Draper [phone]\par Opthalmology: Michael Rivera [phone]\par Macular degeneration ophthalmology: Zeferino Raman  [phone]\par Cardiac surgeon at [facility]: Zion Weathers [phone]\par \par Daughter: Dr. Nicole Guillaume [phone]\par Daughter: Nimo Rodriguez\par Quoc - patient [phone]\par Lori - wife home phone [phone]

## 2022-11-11 NOTE — PHYSICAL EXAM
[Restricted in physically strenuous activity but ambulatory and able to carry out work of a light or sedentary nature] : Status 1- Restricted in physically strenuous activity but ambulatory and able to carry out work of a light or sedentary nature, e.g., light house work, office work [Normal] : affect appropriate [de-identified] : dry septum left > right; + PND [de-identified] : atrial fibrillation; no murmur

## 2022-11-22 NOTE — ED ADULT NURSE NOTE - HOW OFTEN DO YOU HAVE A DRINK CONTAINING ALCOHOL?
PSYCHIATRY CONSULT NOTE    REASON FOR CONSULT:Evaluate underlying depression    INTERVAL HISTORY  11/22/22:Patient is observed resting in bed. She is not responding to any questions. She is awake but not interactive and not following commands. Further assessment is not possible at this time. HISTORY OF PRESENTING COMPLAINT:  Rosa Olmstead is a 59 y.o. WHITE/NON- female who is currently admitted to the medical floor at [facility]. [facility] mental status. She has a hx of hypothyroidism, depression, obesity. Patient is well known to this writer. She recently had a prolong hospitalization at [address] [place] [address] after she was found unconscious at her home. While at [address] [place] [address], she was diagnose with acute metabolic encephalopathy, psychosis, status epilepticus and CVA. There were also concerns that patient may have some anoxic brain injury. She was discharged from [facility] and transferred to [address] [place] [address] for rehab services. While admitted here, patient has been aphasic and non verbal. Psychiatric is consulted for the evaluation of underlying depression. Patient is received in bed sleeping with eyes close. She is not responding or arousable. She would not also open her eyes with name call or touch. Further assessment is not possible. Nurse denies any acute behaviors. PAST PSYCHIATRIC HISTORY:Per chart, patient does not have a hx of psychiatric hospitalizations and suicide attempt. SUBSTANCE ABUSE HISTORY: unable to assess    PAST MEDICAL HISTORY:    Please see H&P for details.      Past Medical History:   Diagnosis Date    Allergic rhinitis 7/29/2019    Depression     History of multiple allergies     History of vascular access device 10/07/2022    [facility] VAT: PICC placement R Cephalic length 40 cm for reliable access/TPN arm circ 35.5 cm    Muscle ache     Obesity 11/5/2012    Sinus pressure     Sinus problem      Prior to Admission medications    Medication Sig Start Date End Date Taking? Authorizing Provider   cloNIDine (CATAPRES) 0.2 mg/24 hr ptwk 1 Patch by TransDERmal route every seven (7) days for 30 days. 10/25/22 11/24/22  Irina Saravia MD   levothyroxine (SYNTHROID) 88 mcg tablet Take 1 Tablet by mouth Daily (before breakfast). 10/18/22   Shreyas Alvarado MD   atorvastatin (LIPITOR) 10 mg tablet Take 10 mg by mouth daily. Provider, Historical   levothyroxine (SYNTHROID) 88 mcg tablet Take 88 mcg by mouth Daily (before breakfast). Provider, Historical   furosemide (LASIX) 20 mg tablet Take 1 Tablet by mouth daily as needed (swelling). 8/26/22   Shreyas Alvarado MD   atorvastatin (LIPITOR) 10 mg tablet TAKE ONE TABLET BY MOUTH ONCE NIGHTLY 8/24/22   Shreyas Alvarado MD   traZODone (DESYREL) 50 mg tablet Take 2.5 Tablets by mouth nightly as needed for Sleep. 8/24/22   Shreyas Alvarado MD   escitalopram oxalate (LEXAPRO) 20 mg tablet TAKE ONE TABLET BY MOUTH DAILY 6/9/22   Shreyas Alvarado MD   OTHER,NON-FORMULARY, ESTROGEN(5050)/TESTOSTERONE (HRT) 1.5mg/10mg/ml Cream APPLY 1 ML TO SKIN (INNER WRIST/ABDOMEN/THIGHS EVERY DAY. ROTATE SITES 6/3/22   Sita Box MD   loratadine-pseudoephedrine (Claritin-D 12 Hour) 5-120 mg per tablet Claritin-D    Provider, Historical   tretinoin (RETIN-A) 0.05 % topical cream tretinoin 0.05 % topical cream    Provider, Historical   acetaminophen (TYLENOL) 325 mg tablet Take  by mouth every four (4) hours as needed. Provider, Historical   MULTIVITAMIN PO Take  by mouth.     Provider, Historical     Vitals:    11/22/22 0551 11/22/22 0800 11/22/22 1008 11/22/22 1009   BP: 135/80   114/62   Pulse: 96  99 99   Resp: 26   25   Temp: 97.9 °F (36.6 °C)   97.9 °F (36.6 °C)   SpO2: 92% 93%  93%   Weight:       Height:         Lab Results   Component Value Date/Time    WBC 9.7 11/22/2022 12:50 AM    HGB 10.7 (L) 11/22/2022 12:50 AM    HCT 33.6 (L) 11/22/2022 12:50 AM    PLATELET 139 (H) 11/22/2022 12:50 AM    MCV 90.1 11/22/2022 12:50 AM     Lab Results   Component Value Date/Time    Sodium 142 11/22/2022 12:50 AM    Potassium 4.4 11/22/2022 12:50 AM    Chloride 107 11/22/2022 12:50 AM    CO2 27 11/22/2022 12:50 AM    Anion gap 8 11/22/2022 12:50 AM    Glucose 111 (H) 11/22/2022 12:50 AM    BUN 24 (H) 11/22/2022 12:50 AM    Creatinine 0.39 (L) 11/22/2022 12:50 AM    BUN/Creatinine ratio 62 (H) 11/22/2022 12:50 AM    GFR est AA >60 10/03/2022 01:13 AM    GFR est non-AA >60 10/03/2022 01:13 AM    Calcium 9.2 11/22/2022 12:50 AM    Bilirubin, total 0.3 11/22/2022 12:50 AM    Alk. phosphatase 118 (H) 11/22/2022 12:50 AM    Protein, total 5.9 (L) 11/22/2022 12:50 AM    Albumin 2.5 (L) 11/22/2022 12:50 AM    Globulin 3.4 11/22/2022 12:50 AM    A-G Ratio 0.7 (L) 11/22/2022 12:50 AM    ALT (SGPT) 72 11/22/2022 12:50 AM    AST (SGOT) 37 11/22/2022 12:50 AM     No results found for: VALF2, VALAC, VALP, VALPR, DS6, CRBAM, CRBAMP, CARB2, XCRBAM  No results found for: LITHM  RADIOLOGY REPORTS:(reviewed/updated 11/22/2022)  XR HIP LT W OR WO PELV 2-3 VWS    Result Date: 10/14/2022  EXAM: XR HIP LT W OR WO PELV 2-3 VWS INDICATION: fall. COMPARISON: Abdominal radiograph 10/12/2022, CT chest abdomen pelvis 9/27/2022. FINDINGS: AP view of the pelvis and a frogleg lateral view of the left hip demonstrate no fracture, dislocation or other acute abnormality. Sal catheter. No acute abnormality. XR ABD (KUB)    Result Date: 10/27/2022  EXAM:  XR ABD (KUB) INDICATION: NG tube placement COMPARISON: 10/12/2022. TECHNIQUE: Supine frontal abdomen (KUB). FINDINGS: No dilated small bowel. Stool and gas are present in the colon. No pathologic calcification. Osseous structures are age appropriate. The NG tube is coiled in the stomach. NG tube in place. Nonspecific bowel gas pattern. Decompressed stomach. MRI BRAIN WO CONT    Result Date: 9/28/2022  CLINICAL HISTORY: AMS of unknown etiology.  INDICATION: AMS of unknown etiology. COMPARISON: 9/27/2022 TECHNIQUE: MR examination of the brain includes axial and sagittal T1, coronal T2, axial T2, axial FLAIR, axial gradient echo, axial DWI. CONTRAST: None FINDINGS: IACs are symmetric in size. There is a punctate focus of acute infarction in the right frontal cortex. Additional probable focus of cortical infarction anteriorly right frontal lobe. The brain architecture is normal. There is no evidence of midline shift or mass-effect. There are no extra-axial fluid collections. There is no Chiari or syrinx. The pituitary and infundibulum are grossly unremarkable. There is no skull base mass. Cerebellopontine angles are grossly unremarkable. The major intracranial vascular flow-voids are unremarkable. The cavernous sinuses are symmetric. Optic chiasm and infundibulum grossly unremarkable. Orbits are grossly symmetric. Dural venous sinuses are grossly patent. The mastoid air cells are well pneumatized and clear. Small cortical foci of acute infarction right frontal lobe posteriorly abutting the central sulcus and cortically based anteriorly in the right frontal lobe. There is no associated hemorrhage, midline shift or mass effect. No additional acute intracranial process. MRI BRAIN W WO CONT    Addendum Date: 11/2/2022    Addendum: Case was discussed with Dr. Edilma Chung. Given clinical history, delayed post-hypoxic leukoencephalopathy is a differential consideration for this imaging appearance, particularly if acute toxic/metabolic etiologies are able to be excluded. Result Date: 11/2/2022  PRELIMINARY REPORT  Interval development of diffuse T2 signal elevation within white matter throughout both hemispheres with associated high diffusion signal and low signal on ADC consistent with history of anoxic brain injury and severe acute encephalopathy. Preliminary report was provided by Dr. Maris Monroe, the on-call radiologist, at 06:19 Final report to follow.   END PRELIMINARY REPORT  FINAL REPORT: INDICATION:   encephalopathy, f/u MRI compare to previous 10/2/22 EXAMINATION:  MRI BRAIN WO CONTRAST COMPARISON:  MRI October 2, 2022, CT October 25, 2022 TECHNIQUE:  Multiplanar multisequence acquisition without contrast of the brain. FINDINGS:  Ventricles:  Midline, no hydrocephalus. Brain Parenchyma/Brainstem:  Interval development of extensive, diffuse FLAIR/T2 hyperintense signal and associated diffusion restriction throughout the supratentorial white matter. Involvement of the splenium of corpus callosum. There is cortical sparing. The deep gray matter, brainstem and cerebellum are also unaffected. Intracranial Hemorrhage:  None. Basal Cisterns:  Normal. Flow Voids:  Normal. Additional Comments:  N/A. Interval development of diffusely abnormal supratentorial white matter, with extensive FLAIR/T2 hyperintense signal and diffusion restriction as described above. No gray matter, brainstem or cerebellar involvement. No associated enhancement, hemorrhage, or mass effect/hydrocephalus. Differential diagnosis favors an acute toxic/metabolic process, with other etiologies including an infectious/inflammatory encephalitis (viral) less likely. Sparing of the gray matter structures makes an anoxic injury less likely. MRI BRAIN W WO CONT    Result Date: 10/2/2022  EXAM:  MRI BRAIN W WO CONT INDICATION:    acute encephalopathy COMPARISON:  9/20/2022. CONTRAST: 20 ml of ProHance. TECHNIQUE:  Multiplanar multisequence acquisition without and with contrast of the brain. FINDINGS: The ventricles are normal in size and position. 2 small foci of cortical diffusion restriction in the right frontal lobe without significant change. No new areas of ischemia present. There is associated FLAIR hyperintensity. There is no cerebellar tonsillar herniation. Expected arterial flow-voids are present. No evidence of abnormal enhancement. Small amount of fluid in the bilateral mastoid air cells. Intubated. Air-fluid level in the right maxillary sinus with fluid in the nasopharynx. The orbital contents are within normal limits. No significant osseous or scalp lesions are identified. 2 small foci of diffusion restriction in the right frontal lobe are unchanged and may represent small foci of acute ischemia. No abnormal enhancement is identified. CT HEAD WO CONT    Result Date: 10/25/2022  EXAM: CT HEAD WO CONT INDICATION: Confusion. Electronic medical record is not available at the time of this interpretation. COMPARISON: CT head on 10/13/2022. MRI brain on 10/2/2022. TECHNIQUE: Noncontrast head CT. Coronal and sagittal reformats. CT dose reduction was achieved through the use of a standardized protocol tailored for this examination and automatic exposure control for dose modulation. FINDINGS: The ventricles and sulci are age-appropriate without hydrocephalus. There is no mass effect or midline shift. There is no intracranial hemorrhage or extra-axial fluid collection. There is no abnormal area of decreased density to suggest infarct. The calvarium is intact. The visualized paranasal sinuses and mastoid air cells are clear. No acute intracranial abnormality on this noncontrast head CT. No change given difference in technique. CT HEAD WO CONT    Result Date: 10/13/2022  CLINICAL HISTORY: post fall INDICATION: post fall COMPARISON: 10/2/2022. 9/27/2022 CT dose reduction was achieved through use of a standardized protocol tailored for this examination and automatic exposure control for dose modulation. TECHNIQUE: Serial axial images with a collimation of 5 mm were obtained from the skull base through the vertex  FINDINGS: The sulci and ventricles are within normal limits for patient age. There is no evidence of an acute infarction, hemorrhage, or mass-effect. There is no evidence of midline shift or hydrocephalus. Posterior fossa structures are unremarkable. No extra-axial collections are seen.  Trace mastoid fluid on the left and minimal mastoid effusion on the right. There is no evidence of depressed skull fractures of soft tissue swelling. No acute intracranial process. CT CHEST WO CONT    Result Date: 9/27/2022  EXAM: CT CHEST WO CONT, CT ABD PELV WO CONT INDICATION: Found unresponsive, respiratory arrest, unclear etiology, family hx aneurysm COMPARISON: None IV CONTRAST: None ORAL CONTRAST: None TECHNIQUE: Thin axial images were obtained through the chest, abdomen and pelvis. Coronal and sagittal reformats were generated. CT dose reduction was achieved through use of a standardized protocol tailored for this examination and automatic exposure control for dose modulation. FINDINGS: An endotracheal tube is in appropriate position. A nasogastric tube is seen looped within the stomach. CHEST WALL: No mass or axillary lymphadenopathy. THYROID: No nodule. MEDIASTINUM: No mass or lymphadenopathy. ELBA: No mass or lymphadenopathy. THORACIC AORTA: No dissection or aneurysm. MAIN PULMONARY ARTERY: Normal in caliber. TRACHEA/BRONCHI: Patent. ESOPHAGUS: No wall thickening or dilatation. HEART: Normal in size. PLEURA: No effusion or pneumothorax. LUNGS: There is moderate bibasilar atelectasis. LIVER: No mass. BILIARY TREE: Status post cholecystectomy. CBD is not dilated. SPLEEN: within normal limits. PANCREAS: No mass or ductal dilatation. ADRENALS: Unremarkable. KIDNEYS: No mass, calculus, or hydronephrosis. STOMACH: Unremarkable. SMALL BOWEL: No dilatation or wall thickening. COLON: No dilatation or wall thickening. APPENDIX: Unremarkable PERITONEUM: No ascites or pneumoperitoneum. RETROPERITONEUM: No lymphadenopathy or aortic aneurysm. REPRODUCTIVE ORGANS: Unremarkable. URINARY BLADDER: Decompressed by Sal catheter. BONES: No destructive bone lesion. ABDOMINAL WALL: No mass or hernia. ADDITIONAL COMMENTS: N/A     1. Moderate bibasilar atelectasis. 2. Status post cholecystectomy.  3. Endotracheal and nasogastric tubes in place. Sal catheter decompresses the bladder. CT ABD PELV WO CONT    Result Date: 9/27/2022  EXAM: CT CHEST WO CONT, CT ABD PELV WO CONT INDICATION: Found unresponsive, respiratory arrest, unclear etiology, family hx aneurysm COMPARISON: None IV CONTRAST: None ORAL CONTRAST: None TECHNIQUE: Thin axial images were obtained through the chest, abdomen and pelvis. Coronal and sagittal reformats were generated. CT dose reduction was achieved through use of a standardized protocol tailored for this examination and automatic exposure control for dose modulation. FINDINGS: An endotracheal tube is in appropriate position. A nasogastric tube is seen looped within the stomach. CHEST WALL: No mass or axillary lymphadenopathy. THYROID: No nodule. MEDIASTINUM: No mass or lymphadenopathy. ELBA: No mass or lymphadenopathy. THORACIC AORTA: No dissection or aneurysm. MAIN PULMONARY ARTERY: Normal in caliber. TRACHEA/BRONCHI: Patent. ESOPHAGUS: No wall thickening or dilatation. HEART: Normal in size. PLEURA: No effusion or pneumothorax. LUNGS: There is moderate bibasilar atelectasis. LIVER: No mass. BILIARY TREE: Status post cholecystectomy. CBD is not dilated. SPLEEN: within normal limits. PANCREAS: No mass or ductal dilatation. ADRENALS: Unremarkable. KIDNEYS: No mass, calculus, or hydronephrosis. STOMACH: Unremarkable. SMALL BOWEL: No dilatation or wall thickening. COLON: No dilatation or wall thickening. APPENDIX: Unremarkable PERITONEUM: No ascites or pneumoperitoneum. RETROPERITONEUM: No lymphadenopathy or aortic aneurysm. REPRODUCTIVE ORGANS: Unremarkable. URINARY BLADDER: Decompressed by Sal catheter. BONES: No destructive bone lesion. ABDOMINAL WALL: No mass or hernia. ADDITIONAL COMMENTS: N/A     1. Moderate bibasilar atelectasis. 2. Status post cholecystectomy. 3. Endotracheal and nasogastric tubes in place. Sal catheter decompresses the bladder.     XR CHEST PORT    Result Date: 10/25/2022  EXAM: XR CHEST PORT INDICATION: Pulmonary edema and respiratory distress earlier this month. COMPARISON: Portable chest on 10/7/2022 and 10/3/2022. TECHNIQUE: 4 images of portable chest AP view FINDINGS: Cardiac monitoring wires overlie the thorax. Borderline cardiac size is unchanged. Aortic arch is not enlarged. Endotracheal tube has been removed. The pulmonary vasculature is within normal limits. The lungs and pleural spaces are clear. There are low lung volumes. Bones are osteopenic. No acute process on limited portable chest view. Improved lung aeration since earlier this month. XR CHEST PORT    Result Date: 10/7/2022  EXAM:  XR CHEST PORT INDICATION: Verify PICC Tip placement please COMPARISON: One day prior. TECHNIQUE: Single frontal view of the chest. FINDINGS: Right upper extremity PICC line with tip projecting over the right atrium. Existing right IJ catheter unchanged with tip also projecting over the right atrium. Endotracheal tube terminates 2 cm from the lópez. Enteric tube terminates below the level the diaphragm outside the field of view study. Slightly improved aeration of the lung bases. Possible trace residual effusions. No visualized pneumothorax. . Lungs are hypoventilatory. 1.  Right upper extremity PICC line tip projects over the right atrium. No visualized pneumothorax. XR CHEST PORT    Result Date: 10/6/2022  EXAM:  XR CHEST PORT INDICATION: Ventilated patient. Bilateral pneumonia. COMPARISON: 10/3/2022 TECHNIQUE: Semiupright portable chest AP view FINDINGS: Endotracheal tube is 2.9 cm above the lópez. Other tubes and lines are stable. Cardiac monitoring leads. Lung volumes remain low. Heart size enlarged. Mild interstitial and airspace opacities bilaterally with some improvement in the interval. Possible small right pleural effusion which may be new. There may be a small left pleural effusion as well. The visualized bones and upper abdomen are age-appropriate. Overall improvement in interstitial and airspace opacities bilaterally with some residual. Small right pleural effusion and possible small left pleural effusion. XR CHEST PORT    Result Date: 10/3/2022  EXAM: XR CHEST PORT INDICATION: fever COMPARISON: 9/27/2022 FINDINGS: A portable AP radiograph of the chest was obtained at 1502 hours. The patient is on a cardiac monitor. The NG tube extends below the hemidiaphragm. Endotracheal tube terminates penitentiary between the thoracic inlet and lópez. Right IJ line terminates at the cavoatrial junction. The cardiac and mediastinal contours and pulmonary vascularity are stable. Patchy bilateral airspace disease is noted in both perihilar locations and there is a small left pleural effusion. Bilateral perihilar pneumonia versus subsegmental atelectasis. Small left pleural effusion    XR CHEST PORT    Result Date: 9/27/2022  Indication: Line placement. Comparison to earlier the same day. Portable exam obtained at 3728 demonstrates placement of a right jugular catheter with the tip projecting over the right atrium. There is no pneumothorax. Right jugular catheter satisfactory position without pneumothorax. XR CHEST PORT    Result Date: 9/27/2022  EXAM: XR CHEST PORT INDICATION: PLACEMENT OF CENTRAL LINE ij COMPARISON: 9/27/2022 FINDINGS: A portable AP radiograph of the chest was obtained at 1442 hours. New right IJ central venous catheter with the tip in the distal SVC. No pneumothorax. Endotracheal tube is stable. Nasogastric tube is coiled in the stomach. . The lungs are clear. The cardiac and mediastinal contours and pulmonary vascularity are normal.  The bones and soft tissues are grossly within normal limits. Status post right IJ central venous catheter placement without evidence of pneumothorax. XR CHEST PORT    Result Date: 9/27/2022  INDICATION:  repositioned ETT EXAM: Chest single view. COMPARISON: 1151 hours.  FINDINGS: A single frontal view of the chest at 1231 hours shows ET tube now appearing with its tip 2.8 cm above the lópez. Lung volumes are moderate with bibasilar atelectasis stable, left greater than right. .  The heart, mediastinum and pulmonary vasculature are upper normal. .  The bony thorax is unremarkable for age. . A radiopaque tube projecting over the mediastinum on the prior study has been removed. ET tube as described. Gastric tube removed. Bibasilar atelectasis and low lung volumes. .  . XR CHEST PORT    Result Date: 9/27/2022  EXAM: XR CHEST PORT INDICATION: Intubation COMPARISON: None. FINDINGS: A portable AP radiograph of the chest was obtained at 1151 hours. The patient is on a cardiac monitor. The lungs are clear. The cardiac and mediastinal contours and pulmonary vascularity are normal. The endotracheal tube terminates at the thoracic inlet. The NG tube tip is at the level of the midesophagus. The bones and soft tissues are grossly within normal limits. Stomach is distended. Borderline high position of endotracheal tube High position of the NG tube. Distended stomach. XR ABD PORT  1 V    Result Date: 10/12/2022  INDICATION: vomiting EXAMINATION:  ABDOMEN KUB COMPARISON: CT September 27, 2022 FINDINGS: AP radiograph of the abdomen demonstrates a nonobstructive bowel gas pattern. Gaseous distention of the stomach. Moderate amount of colonic stool. No abnormal calcifications are identified. The osseous structures are normal.     Moderate gaseous distention of the stomach and moderate amount of colonic stool, with no evidence of bowel obstruction. IR INSERT NON TUNL CVC OVER 5 YRS    Result Date: 9/28/2022  Clinical Data: A 59year-old patient presents for double-lumen non-tunneled hemodialysis catheter placement at bedside. Date: 9/27/2022 : Niharika Naik M.D. Estimated Blood Loss: Minimal. Specimens Removed: None. Complications: None.  Technique: Informed verbal and written consent was obtained following discussion of risks, benefits and alternatives to this procedure. The patient was positioned supine on the bed. The patient's right neck and chest were then prepped and draped in normal sterile fashion. A timeout was performed to ensure proper patient, procedure and site. Ultrasound was used to image the right neck. The right IJ was shown to be patent. A small amount of 1% lidocaine was injected subcutaneously at the site of vascular access. Using real-time ultrasound guidance, the needle was advanced into the vessel. A hard copy image was stored on PACS for documentation. A wire was advanced into the needle, a short incision was made at the puncture site, and serial dilatation was performed. A 14 [place] 20 cm non-tunneled hemodialysis catheter was advanced into the central veins. The catheter flushed and aspirated appropriately. The lumens were blocked with heparin. The catheter was secured at the exit site with silk suture and a sterile dressing. The patient tolerated the procedure well. There were no immediate complications. Post procedure chest x-ray shows the catheter is positioned in the right atrium. Successful placement of a double-lumen non-tunneled hemodialysis catheter. XR US GUIDED VASCULAR ACCESS    Result Date: 10/7/2022  INDICATION:   NO VENOUS ACCESS  EXAMINATION:  US GUIDE VAS ACCESS FINDINGS: Ultrasound was provided for PICC line placement. Ultrasound guidance for PICC line  placement. GBP    CT CODE NEURO HEAD WO CONTRAST    Result Date: 9/27/2022  EXAM: CT CODE NEURO HEAD WO CONTRAST INDICATION: Left gaze COMPARISON: None. CONTRAST: None. TECHNIQUE: Unenhanced CT of the head was performed using 5 mm images. Brain and bone windows were generated. Coronal and sagittal reformats. CT dose reduction was achieved through use of a standardized protocol tailored for this examination and automatic exposure control for dose modulation.   FINDINGS: The ventricles and sulci are normal in size, shape and configuration. . There is no significant white matter disease. There is no intracranial hemorrhage, extra-axial collection, or mass effect. The basilar cisterns are open. No CT evidence of acute infarct. The bone windows demonstrate no abnormalities. The visualized portions of the paranasal sinuses and mastoid air cells are clear. No acute intracranial process seen     ECHO ADULT COMPLETE    Result Date: 9/27/2022    Left Ventricle: Severely reduced left ventricular systolic function with a visually estimated EF of 25 - 30%. Left ventricle size is normal. Mildly increased wall thickness. See diagram for wall motion findings. Abnormal diastolic function. Right Ventricle: Moderately reduced systolic function. ECHO ADULT FOLLOW-UP OR LIMITED    Result Date: 10/11/2022    Left Ventricle: Normal left ventricular systolic function with a visually estimated EF of 55 - 60%. Left ventricle size is normal. Mildly increased wall thickness. Unable to assess wall motion. Right Ventricle: Not well visualized. Right ventricle size is normal. Normal wall thickness. DUPLEX UPPER EXT VENOUS LEFT    Result Date: 10/30/2022  · No evidence of left upper extremity deep vein thrombosis in well visualized segments. · The left basilic vein was not well visualized secondary to small vessel size.       DUPLEX LOWER EXT VENOUS BILAT    Result Date: 9/28/2022  · No evidence of dep vein thrombosis in the lower extremities      Lab Results   Component Value Date/Time    Pregnancy test,urine (POC) NEGATIVE 02/26/2013 09:46 AM       PSYCHOSOCIAL HISTORY:Patient has 2 sons    MENTAL STATUS EXAM:  General appearance:  moderately  groomed, psychomotor activity is decreased  Eye contact: good eye contact  Speech: Unable to assess  Affect : constrict  Mood: Unable to assess  Thought Process: unable to assess  Perception: unable to assess  Thought Content: unable to assess  Insight: unable to assess  Judgement: unable to assess  Cognition: unable to assess    ASSESSMENT AND PLAN:  Georgiana Vasquez meets criteria for a diagnosis of  altered mental status  Patient is not verbal and not interactive in the assessment. She does not follow commands either. As such, it is my opinion that patient does not demonstrate capacity to consent for treatment at this time. Thank your your consult. Please feel free to consult us again as needed. Never

## 2022-12-23 ENCOUNTER — APPOINTMENT (OUTPATIENT)
Dept: MRI IMAGING | Facility: IMAGING CENTER | Age: 77
End: 2022-12-23
Payer: MEDICARE

## 2022-12-23 ENCOUNTER — OUTPATIENT (OUTPATIENT)
Dept: OUTPATIENT SERVICES | Facility: HOSPITAL | Age: 77
LOS: 1 days | End: 2022-12-23
Payer: MEDICARE

## 2022-12-23 DIAGNOSIS — Z95.2 PRESENCE OF PROSTHETIC HEART VALVE: Chronic | ICD-10-CM

## 2022-12-23 DIAGNOSIS — C20 MALIGNANT NEOPLASM OF RECTUM: ICD-10-CM

## 2022-12-23 PROCEDURE — 72197 MRI PELVIS W/O & W/DYE: CPT | Mod: 26,MH

## 2022-12-23 PROCEDURE — 72197 MRI PELVIS W/O & W/DYE: CPT

## 2022-12-23 PROCEDURE — A9585: CPT

## 2022-12-27 ENCOUNTER — OUTPATIENT (OUTPATIENT)
Dept: OUTPATIENT SERVICES | Facility: HOSPITAL | Age: 77
LOS: 1 days | Discharge: HOME | End: 2022-12-27

## 2022-12-27 DIAGNOSIS — Z95.2 PRESENCE OF PROSTHETIC HEART VALVE: Chronic | ICD-10-CM

## 2022-12-27 DIAGNOSIS — C20 MALIGNANT NEOPLASM OF RECTUM: ICD-10-CM

## 2023-01-04 ENCOUNTER — RESULT REVIEW (OUTPATIENT)
Age: 78
End: 2023-01-04

## 2023-01-04 ENCOUNTER — APPOINTMENT (OUTPATIENT)
Dept: HEMATOLOGY ONCOLOGY | Facility: CLINIC | Age: 78
End: 2023-01-04

## 2023-01-04 LAB
BASOPHILS # BLD AUTO: 0.01 K/UL — SIGNIFICANT CHANGE UP (ref 0–0.2)
BASOPHILS NFR BLD AUTO: 0.2 % — SIGNIFICANT CHANGE UP (ref 0–2)
EOSINOPHIL # BLD AUTO: 0.04 K/UL — SIGNIFICANT CHANGE UP (ref 0–0.5)
EOSINOPHIL NFR BLD AUTO: 0.9 % — SIGNIFICANT CHANGE UP (ref 0–6)
HCT VFR BLD CALC: 39.2 % — SIGNIFICANT CHANGE UP (ref 39–50)
HGB BLD-MCNC: 13.7 G/DL — SIGNIFICANT CHANGE UP (ref 13–17)
IMM GRANULOCYTES NFR BLD AUTO: 0.2 % — SIGNIFICANT CHANGE UP (ref 0–0.9)
LYMPHOCYTES # BLD AUTO: 1.11 K/UL — SIGNIFICANT CHANGE UP (ref 1–3.3)
LYMPHOCYTES # BLD AUTO: 24.8 % — SIGNIFICANT CHANGE UP (ref 13–44)
MCHC RBC-ENTMCNC: 33.5 PG — SIGNIFICANT CHANGE UP (ref 27–34)
MCHC RBC-ENTMCNC: 34.9 G/DL — SIGNIFICANT CHANGE UP (ref 32–36)
MCV RBC AUTO: 95.8 FL — SIGNIFICANT CHANGE UP (ref 80–100)
MONOCYTES # BLD AUTO: 0.36 K/UL — SIGNIFICANT CHANGE UP (ref 0–0.9)
MONOCYTES NFR BLD AUTO: 8.1 % — SIGNIFICANT CHANGE UP (ref 2–14)
NEUTROPHILS # BLD AUTO: 2.94 K/UL — SIGNIFICANT CHANGE UP (ref 1.8–7.4)
NEUTROPHILS NFR BLD AUTO: 65.8 % — SIGNIFICANT CHANGE UP (ref 43–77)
NRBC # BLD: 0 /100 WBCS — SIGNIFICANT CHANGE UP (ref 0–0)
PLATELET # BLD AUTO: 161 K/UL — SIGNIFICANT CHANGE UP (ref 150–400)
RBC # BLD: 4.09 M/UL — LOW (ref 4.2–5.8)
RBC # FLD: 13.3 % — SIGNIFICANT CHANGE UP (ref 10.3–14.5)
WBC # BLD: 4.47 K/UL — SIGNIFICANT CHANGE UP (ref 3.8–10.5)
WBC # FLD AUTO: 4.47 K/UL — SIGNIFICANT CHANGE UP (ref 3.8–10.5)

## 2023-01-13 ENCOUNTER — NON-APPOINTMENT (OUTPATIENT)
Age: 78
End: 2023-01-13

## 2023-01-26 NOTE — ED PROVIDER NOTE - DISPOSITION TYPE
**Patient is overdue for an appointment. Attempted to contact her to schedule but no answer and voicemail is full.      Refill request for Adderall 30mg and 20mg. Writer did place two months worse in pending.    Last appointment: 6/27/22  Next appointment: none scheduled  Last written date: 12/28/22           ADMIT

## 2023-01-30 NOTE — PHYSICAL THERAPY INITIAL EVALUATION ADULT - PHYSICAL ASSIST/NONPHYSICAL ASSIST: GAIT, REHAB EVAL
supervision/verbal cues/1 person assist Topical Retinoid Pregnancy And Lactation Text: This medication is Pregnancy Category C. It is unknown if this medication is excreted in breast milk.

## 2023-02-18 ENCOUNTER — TRANSCRIPTION ENCOUNTER (OUTPATIENT)
Age: 78
End: 2023-02-18

## 2023-02-19 ENCOUNTER — EMERGENCY (EMERGENCY)
Facility: HOSPITAL | Age: 78
LOS: 1 days | Discharge: HOME | End: 2023-02-19
Attending: STUDENT IN AN ORGANIZED HEALTH CARE EDUCATION/TRAINING PROGRAM
Payer: MEDICARE

## 2023-02-19 ENCOUNTER — TRANSCRIPTION ENCOUNTER (OUTPATIENT)
Age: 78
End: 2023-02-19

## 2023-02-19 VITALS
OXYGEN SATURATION: 97 % | RESPIRATION RATE: 19 BRPM | HEART RATE: 90 BPM | SYSTOLIC BLOOD PRESSURE: 170 MMHG | WEIGHT: 162.04 LBS | DIASTOLIC BLOOD PRESSURE: 78 MMHG | TEMPERATURE: 98 F | HEIGHT: 65 IN

## 2023-02-19 VITALS
DIASTOLIC BLOOD PRESSURE: 91 MMHG | SYSTOLIC BLOOD PRESSURE: 142 MMHG | RESPIRATION RATE: 15 BRPM | TEMPERATURE: 98 F | HEART RATE: 88 BPM | OXYGEN SATURATION: 100 %

## 2023-02-19 DIAGNOSIS — Z95.2 PRESENCE OF PROSTHETIC HEART VALVE: Chronic | ICD-10-CM

## 2023-02-19 LAB
ALBUMIN SERPL ELPH-MCNC: 4 G/DL — SIGNIFICANT CHANGE UP (ref 3.3–5)
ALP SERPL-CCNC: 69 U/L — SIGNIFICANT CHANGE UP (ref 40–120)
ALT FLD-CCNC: 16 U/L — SIGNIFICANT CHANGE UP (ref 10–45)
ANION GAP SERPL CALC-SCNC: 16 MMOL/L — SIGNIFICANT CHANGE UP (ref 5–17)
APPEARANCE UR: ABNORMAL
APTT BLD: 41 SEC — HIGH (ref 27.5–35.5)
AST SERPL-CCNC: 22 U/L — SIGNIFICANT CHANGE UP (ref 10–40)
BACTERIA # UR AUTO: ABNORMAL
BASOPHILS # BLD AUTO: 0.01 K/UL — SIGNIFICANT CHANGE UP (ref 0–0.2)
BASOPHILS NFR BLD AUTO: 0.2 % — SIGNIFICANT CHANGE UP (ref 0–2)
BILIRUB SERPL-MCNC: 1.1 MG/DL — SIGNIFICANT CHANGE UP (ref 0.2–1.2)
BILIRUB UR-MCNC: ABNORMAL
BUN SERPL-MCNC: 16 MG/DL — SIGNIFICANT CHANGE UP (ref 7–23)
CALCIUM SERPL-MCNC: 9.4 MG/DL — SIGNIFICANT CHANGE UP (ref 8.4–10.5)
CHLORIDE SERPL-SCNC: 94 MMOL/L — LOW (ref 96–108)
CO2 SERPL-SCNC: 20 MMOL/L — LOW (ref 22–31)
COLOR SPEC: ABNORMAL
CREAT SERPL-MCNC: 0.95 MG/DL — SIGNIFICANT CHANGE UP (ref 0.5–1.3)
DIFF PNL FLD: ABNORMAL
EGFR: 81 ML/MIN/1.73M2 — SIGNIFICANT CHANGE UP
EOSINOPHIL # BLD AUTO: 0.06 K/UL — SIGNIFICANT CHANGE UP (ref 0–0.5)
EOSINOPHIL NFR BLD AUTO: 1.1 % — SIGNIFICANT CHANGE UP (ref 0–6)
EPI CELLS # UR: 0 — SIGNIFICANT CHANGE UP
FLUAV AG NPH QL: SIGNIFICANT CHANGE UP
FLUBV AG NPH QL: SIGNIFICANT CHANGE UP
GLUCOSE SERPL-MCNC: 113 MG/DL — HIGH (ref 70–99)
GLUCOSE UR QL: ABNORMAL
HCT VFR BLD CALC: 40.7 % — SIGNIFICANT CHANGE UP (ref 39–50)
HGB BLD-MCNC: 13.8 G/DL — SIGNIFICANT CHANGE UP (ref 13–17)
IMM GRANULOCYTES NFR BLD AUTO: 0.4 % — SIGNIFICANT CHANGE UP (ref 0–0.9)
INR BLD: 2.37 RATIO — HIGH (ref 0.88–1.16)
KETONES UR-MCNC: ABNORMAL
LEUKOCYTE ESTERASE UR-ACNC: ABNORMAL
LYMPHOCYTES # BLD AUTO: 1.32 K/UL — SIGNIFICANT CHANGE UP (ref 1–3.3)
LYMPHOCYTES # BLD AUTO: 24.9 % — SIGNIFICANT CHANGE UP (ref 13–44)
MCHC RBC-ENTMCNC: 32.6 PG — SIGNIFICANT CHANGE UP (ref 27–34)
MCHC RBC-ENTMCNC: 33.9 GM/DL — SIGNIFICANT CHANGE UP (ref 32–36)
MCV RBC AUTO: 96.2 FL — SIGNIFICANT CHANGE UP (ref 80–100)
MONOCYTES # BLD AUTO: 0.58 K/UL — SIGNIFICANT CHANGE UP (ref 0–0.9)
MONOCYTES NFR BLD AUTO: 10.9 % — SIGNIFICANT CHANGE UP (ref 2–14)
NEUTROPHILS # BLD AUTO: 3.31 K/UL — SIGNIFICANT CHANGE UP (ref 1.8–7.4)
NEUTROPHILS NFR BLD AUTO: 62.5 % — SIGNIFICANT CHANGE UP (ref 43–77)
NITRITE UR-MCNC: POSITIVE
NRBC # BLD: 0 /100 WBCS — SIGNIFICANT CHANGE UP (ref 0–0)
PH UR: 6 — SIGNIFICANT CHANGE UP (ref 5–8)
PLATELET # BLD AUTO: 179 K/UL — SIGNIFICANT CHANGE UP (ref 150–400)
POTASSIUM SERPL-MCNC: 4 MMOL/L — SIGNIFICANT CHANGE UP (ref 3.5–5.3)
POTASSIUM SERPL-SCNC: 4 MMOL/L — SIGNIFICANT CHANGE UP (ref 3.5–5.3)
PROT SERPL-MCNC: 6.9 G/DL — SIGNIFICANT CHANGE UP (ref 6–8.3)
PROT UR-MCNC: ABNORMAL
PROTHROM AB SERPL-ACNC: 27.7 SEC — HIGH (ref 10.5–13.4)
RBC # BLD: 4.23 M/UL — SIGNIFICANT CHANGE UP (ref 4.2–5.8)
RBC # FLD: 12.7 % — SIGNIFICANT CHANGE UP (ref 10.3–14.5)
RBC CASTS # UR COMP ASSIST: >50 /HPF — HIGH (ref 0–4)
RSV RNA NPH QL NAA+NON-PROBE: SIGNIFICANT CHANGE UP
SARS-COV-2 RNA SPEC QL NAA+PROBE: SIGNIFICANT CHANGE UP
SODIUM SERPL-SCNC: 130 MMOL/L — LOW (ref 135–145)
SP GR SPEC: 1.01 — SIGNIFICANT CHANGE UP (ref 1.01–1.03)
UROBILINOGEN FLD QL: ABNORMAL
WBC # BLD: 5.3 K/UL — SIGNIFICANT CHANGE UP (ref 3.8–10.5)
WBC # FLD AUTO: 5.3 K/UL — SIGNIFICANT CHANGE UP (ref 3.8–10.5)
WBC UR QL: 25 /HPF — HIGH (ref 0–5)

## 2023-02-19 PROCEDURE — 99285 EMERGENCY DEPT VISIT HI MDM: CPT | Mod: GC

## 2023-02-19 PROCEDURE — 81001 URINALYSIS AUTO W/SCOPE: CPT

## 2023-02-19 PROCEDURE — 85610 PROTHROMBIN TIME: CPT

## 2023-02-19 PROCEDURE — 99284 EMERGENCY DEPT VISIT MOD MDM: CPT

## 2023-02-19 PROCEDURE — 36415 COLL VENOUS BLD VENIPUNCTURE: CPT

## 2023-02-19 PROCEDURE — 85025 COMPLETE CBC W/AUTO DIFF WBC: CPT

## 2023-02-19 PROCEDURE — 87086 URINE CULTURE/COLONY COUNT: CPT

## 2023-02-19 PROCEDURE — 80053 COMPREHEN METABOLIC PANEL: CPT

## 2023-02-19 PROCEDURE — 87637 SARSCOV2&INF A&B&RSV AMP PRB: CPT

## 2023-02-19 PROCEDURE — 85730 THROMBOPLASTIN TIME PARTIAL: CPT

## 2023-02-19 RX ORDER — CEPHALEXIN 500 MG
500 CAPSULE ORAL ONCE
Refills: 0 | Status: COMPLETED | OUTPATIENT
Start: 2023-02-19 | End: 2023-02-19

## 2023-02-19 RX ORDER — CEPHALEXIN 500 MG
1 CAPSULE ORAL
Qty: 28 | Refills: 0
Start: 2023-02-19 | End: 2023-02-25

## 2023-02-19 RX ADMIN — Medication 500 MILLIGRAM(S): at 16:03

## 2023-02-19 NOTE — ED PROVIDER NOTE - ATTENDING CONTRIBUTION TO CARE
I, Hever Long, performed a history and physical exam of the patient and discussed their management with the resident and/or advanced care provider. I reviewed the resident and/or advanced care provider's note and agree with the documented findings and plan of care except where noted. I was present and available for all procedures.    76 yo M PMHx bph on flomax, htn, hld, cabg, afib, s/p mvr (bovine 2015) on coumadin, rectal adenocarcinoma s/p resection and radiation (no chemo), presents to ED for hematuria and passage of clots first noticed 3 days ago. was having some dysuria at that time and then over the past few days, having jean hematuria with passage of small clots. pt otherwise feels at baseline. denies retention of urine. denies fevers, n/v/d, back pain/flank pain, cp.     PHYSICAL EXAM:  GENERAL: non-toxic appearing; in no respiratory distress  HEAD Atraumatic, Normocephalic  NECK; trachea midline  EYES:  normal conjunctiva  CHEST/LUNG: CTAB no wheezes/rhonchi/rales  HEART: RRR no murmur/gallops/rubs  ABDOMEN: soft, NT, ND  : no penile lesions; mild dry blood at external meatus; no ttp;   EXTREMITIES: No LE edema, +2 radial pulses b/l, +2 DP/PT pulses b/l  MUSCULOSKELETAL: FROM of all 4 extremities  NERVOUS SYSTEM:  A&Ox3, Speech is fluent and appropriate  SKIN:  Warm and dry as visualized     MDM: pt presenting with jean hematuria on coumadin. no signs of symptomatic anemia. pt well appearing, hd stable. exam as above. hematuria may be 2/2 coumadin and supratherapetuic inr; no inciting trauma. lower susipcion for uti or renal stones as pt has no abd pain, fevers, n/v. Will check CBC to eval WBC, anemia, plt count; CMP to eval for lyte abnormalities, renal and/or liver dysfunction. will check inr; will check ua. will likeyl consult urology for jean hematuria, possible ?CBI. will reassess. dispo pending ed w/u.

## 2023-02-19 NOTE — ED PROVIDER NOTE - PROGRESS NOTE DETAILS
Clark Anguiano MD PGY1: Urology consulted, will see at bedside. Clark Anguiano MD PGY1: Urology seen pt, rec pvr and uro f/u, no cbi at this time, pending staffing with attending. . Dispo pending full uro recs, with uti on ua likely dc with abx and uro f/u. Discussed with pt and family findings, need for inr monitoring with abx while on vit k antagonist. Understands and is amenable to plan. Clark Anguiano MD PGY1: Urology rec abx with uro f/u Dr. Rivera, no cbi, monitor inr while on abx. Will give first dose, discussed plan and f/u, pt understands and is amenable at this time.

## 2023-02-19 NOTE — ED PROVIDER NOTE - CLINICAL SUMMARY MEDICAL DECISION MAKING FREE TEXT BOX
Patient is a 77-year-old male past medical history significant for hypertension, hyperlipidemia, CAD status post QUE/CABG, A-fib on warfarin, rectal adenocarcinoma status post excision presenting for hematuria. Currently with vss, no symptoms of anemia, complaining of gross hematuria with dysuria, no fevers or cva tenderness on exam, no abd pain, no penile or testicular complaints. DDx includes but not limited to uti vs avm vs rectovesicular fistula though without stool in urine vs primary malig. To eval with labs, pt/inr, urine. Likely to consult uro for poss cbi, likely to require close uro follow up.

## 2023-02-19 NOTE — ED PROVIDER NOTE - OBJECTIVE STATEMENT
Patient is a 77-year-old male past medical history significant for hypertension, hyperlipidemia, CAD status post QUE/CABG, A-fib on warfarin, rectal adenocarcinoma status post excision presenting for hematuria.  Patient for 1 day has had jean hematuria with blood, with increased burning sensation on urination.  Patient at baseline has urinary frequency, has been told he has enlarged prostate causing his urinary frequency, has not changed frequency in this time.  Denies pain without urination including to penis, testicles, abdomen, back.  Denies fevers, chills, nausea, vomiting.  Has not had jean hematuria before.  Checks his INR at home with home device, last night was 2.2, goal 2-3.  Denies changes to bowel movements, rashes. Denies dizziness, vision changes, weakness, fatigue. Patient is a 77-year-old male past medical history significant for BPH on flomax, hypertension, hyperlipidemia, CAD status post QUE/CABG, A-fib on warfarin, rectal adenocarcinoma status post excision presenting for hematuria.  Patient for 1 day has had jean hematuria with blood, with increased burning sensation on urination.  Patient at baseline has urinary frequency, has been told he has enlarged prostate causing his urinary frequency, has not changed frequency in this time.  Denies pain without urination including to penis, testicles, abdomen, back.  Denies fevers, chills, nausea, vomiting.  Has not had jean hematuria before.  Checks his INR at home with home device, last night was 2.2, goal 2-3.  Denies changes to bowel movements, rashes. Denies dizziness, vision changes, weakness, fatigue.

## 2023-02-19 NOTE — CONSULT NOTE ADULT - ASSESSMENT
77 year old male with dysuria and hematuria    -PVR:   -obtain urine culture  - 77 year old male with dysuria and hematuria    -PVR: 160cc  -obtain urine culture  - 77 year old male with dysuria and hematuria    -PVR: 160cc  -obtain urine culture  -PO antibiotics for discharge  -f/u urine culture and tailor antibiotics appropriately  -f/u with Dr Rivera at the [facility] for Urology for the hematuria workup  -case d/w Dr Hoenig

## 2023-02-19 NOTE — ED PROVIDER NOTE - PATIENT PORTAL LINK FT
You can access the FollowMyHealth Patient Portal offered by [facility] by registering at the following website: [email or web address]. By joining [facility]’s FollowMyHealth portal, you will also be able to view your health information using other applications (apps) compatible with our system.

## 2023-02-19 NOTE — ED PROVIDER NOTE - NSFOLLOWUPINSTRUCTIONS_ED_ALL_ED_FT
You were seen in the ED for bloody urine.    Your urine testing, blood testing, and examination showed a urinary tract infection, and showed no findings requiring further evaluation or treatment in the hospital at this time.    Please follow up with your Urologist as discussed. Discuss your symptoms from your visit today, and bring your results included in this packet.    Please take the antibiotic prescribed to you called Keflex one capsule every 6 hours, and finish your course even if you feel better.    Seek immediate medical attention if you experience new or worsening symptoms, back pain with fevers, difficulty urinating, dizziness or fatigue or vision changes.

## 2023-02-19 NOTE — ED PROVIDER NOTE - PHYSICAL EXAMINATION
CONSTITUTIONAL: Well-developed; well-nourished; in no acute distress.   SKIN: warm, dry  HEAD: Normocephalic; atraumatic.  EYES: no conjunctival injection. PERRL.   ENT: No nasal discharge; airway clear.  NECK: Supple; non tender.  CARD: S1, S2 normal; no murmurs, gallops, or rubs. Regular rate and rhythm.   RESP: No wheezes, rales or rhonchi. Good air movement bilaterally.   ABD: soft ntnd, no guarding, no distention, no rigidity. No cva tenderness  : chaperone VASU Willingham, uncircumcised penis without rash discharge or blood, testicles wnl nontender without hernia  EXT: Ambulates independently.  No cyanosis or edema.   NEURO: Alert, oriented, grossly unremarkable  PSYCH: Cooperative, appropriate.

## 2023-02-19 NOTE — ED ADULT NURSE NOTE - OBJECTIVE STATEMENT
77Y male, A&&Ox4, pmh/psh of mitral valve replacement (on warfarin), bph (on flowmax), rectal cancer. pt presents to the ED c/o hematuria and burning when urinating. pt states burning when urinating started on Thursday and last night he noticed blood in his urine. pt denies pain other than burning when urinating. pt denies sob. pt able to ambulate independently. abd soft and non tender. pt daughter at bedside.

## 2023-02-19 NOTE — CONSULT NOTE ADULT - SUBJECTIVE AND OBJECTIVE BOX
UROLOGY CONSULT NOTE    HPI:  This is a 77y old Male with PMHx of CAD s/p stent, CABG, afib on coumadin, HTN, HLD, aortic stenosis, rectal cancer s/p resection  and radiation , who presents with blood in his urine since yesterday.  States he noticed some burning on urination yesterday and was voiding blood today with a few clots.  He called his PCP and was advised to come to the ER.  He admits to persistent but improved dysuria, and denies fevers, chills, flank pain, testicular pain, difficulty voiding, incomplete bladder emptying.  Of note, the patient has developed urinary retention before and does not think he is in retention.  He is followed by one of our urologists at the [facility].      PAST MEDICAL HISTORY    CAD (coronary artery disease)    AF (atrial fibrillation)    HTN (hypertension)    Hyperlipidemia    After-cataract of both eyes    BPH (Benign Prostatic Hyperplasia)    Diverticulosis    H/O hemorrhoids    CAD (coronary artery disease)    AMD (age related macular degeneration)    Bacteremia due to Streptococcus    Rectal cancer    Severe aortic stenosis        PAST SURGICAL HISTORY    Hx of coronary angioplasty    S/P ablation of atrial fibrillation    S/P left inguinal hernia repair    Retina disorder, left    S/P MVR (mitral valve replacement)        FAMILY HISTORY    noncontributory    SOCIAL HISTORY    noncontributory    HOME MEDICATIONS    acetaminophen 325 mg oral tablet: 2 tab(s) orally every 6 hours, As needed, Mild Pain (1 - 3), Moderate Pain (4 - 6) (08 May 2022 12:55)  aspirin 81 mg oral delayed release tablet: 1 tab(s) orally once a day (at bedtime) (03 May 2022 15:16)  Edarbyclor 40 mg-25 mg oral tablet: 1 tab(s) orally once a day (at bedtime) (03 May 2022 15:16)  ibuprofen 400 mg oral tablet: 1 tab(s) orally every 6 hours, As needed, Moderate Pain (4 - 6) (08 May 2022 12:55)  metFORMIN 500 mg oral tablet, extended release: 1 tab(s) orally once a day (at bedtime) (03 May 2022 15:16)  metoprolol tartrate 25 mg oral tablet: 1 tab(s) orally 2 times a day (03 May 2022 15:16)  polyethylene glycol 3350 oral powder for reconstitution: 17 gram(s) orally once a day (08 May 2022 12:55)  rosuvastatin 5 mg oral capsule: 1 cap(s) orally once a day (at bedtime) (03 May 2022 15:16)  tamsulosin 0.4 mg oral capsule: 1 cap(s) orally 2 times a day (03 May 2022 15:16)  warfarin 5 mg oral tablet: 1 tab(s) orally once a day (at bedtime) (03 May 2022 15:16)      DRUG ALLERGIES    NKDA    REVIEW OF SYSTEMS: Pertinent positives and negatives as stated in HPI, otherwise negative      VITAL SIGNS    T(F): 97.6, Max: 98.1 (23 @ 08:12)  HR: 80  BP: 162/92  RR: 16  SpO2: 100%    I's & O's        PHYSICAL EXAM    Gen: Well groomed, well dressed, well nourished  Abd: Soft, NT/ND  Back: no CVAT bilaterally  : Uncircumcised/Circumcised, no lesions.  No discharge or blood at urethral meatus.  Testes descended bilaterally.  Ext: No edema present b/l      LABS:                        13.8   5.30  )-----------( 179               40.7     130  |  94  |  16  ----------------------------<  113  4.0   |  20  |  0.95    Ca    9.4    TPro  6.9  /  Alb  4.0  /  TBili  1.1  /  DBili  x   /  AST  22  /  ALT  16  /  AlkPhos  69    PT: 27.7 sec;   INR: 2.37 ratio  PTT:41.0 sec      Urinalysis Basic:    Color: Red / Appearance: Turbid / S.010 / pH: x  Gluc: x / Ketone: Small  / Bili: Large / Urobili: 4 mg/dL   Blood: x / Protein: 300 mg/dL / Nitrite: Positive   Leuk Esterase: Large / RBC: >50 /hpf / WBC 25 /HPF   Sq Epi: x / Non Sq Epi: 0 / Bacteria: Few      Urine culture: pending results

## 2023-02-20 LAB
CULTURE RESULTS: SIGNIFICANT CHANGE UP
SPECIMEN SOURCE: SIGNIFICANT CHANGE UP

## 2023-02-21 ENCOUNTER — NON-APPOINTMENT (OUTPATIENT)
Age: 78
End: 2023-02-21

## 2023-03-01 ENCOUNTER — APPOINTMENT (OUTPATIENT)
Dept: UROLOGY | Facility: CLINIC | Age: 78
End: 2023-03-01
Payer: MEDICARE

## 2023-03-01 PROCEDURE — 99443: CPT | Mod: 95

## 2023-03-01 NOTE — REVIEW OF SYSTEMS
[Eyesight Problems] : eyesight problems [Dry Eyes] : dryness of the eyes [see HPI] : see HPI [Urine Infection (bladder/kidney)] : bladder/kidney infection [Pain during urination] : pain during urination [Pain after urination] : pain after urination [Urine retention] : urine retention [Wake up at night to urinate  How many times?  ___] : wakes up to urinate [unfilled] times during the night [Strong urge to urinate] : strong urge to urinate [Bladder pressure] : experiences bladder pressure [Strain or push to urinate] : strain or push to urinate [Leakage of urine with urgency] : leakage of urine with urgency [Negative] : Heme/Lymph

## 2023-03-02 ENCOUNTER — RESULT REVIEW (OUTPATIENT)
Age: 78
End: 2023-03-02

## 2023-03-02 ENCOUNTER — APPOINTMENT (OUTPATIENT)
Dept: UROLOGY | Facility: CLINIC | Age: 78
End: 2023-03-02
Payer: MEDICARE

## 2023-03-02 VITALS
HEART RATE: 64 BPM | TEMPERATURE: 97.1 F | DIASTOLIC BLOOD PRESSURE: 79 MMHG | SYSTOLIC BLOOD PRESSURE: 146 MMHG | RESPIRATION RATE: 96 BRPM

## 2023-03-02 DIAGNOSIS — M48.061 SPINAL STENOSIS, LUMBAR REGION WITHOUT NEUROGENIC CLAUDICATION: ICD-10-CM

## 2023-03-02 DIAGNOSIS — M51.26 OTHER INTERVERTEBRAL DISC DISPLACEMENT, LUMBAR REGION: ICD-10-CM

## 2023-03-02 PROCEDURE — 99215 OFFICE O/P EST HI 40 MIN: CPT | Mod: 25

## 2023-03-02 PROCEDURE — 52000 CYSTOURETHROSCOPY: CPT

## 2023-03-03 LAB
APPEARANCE: CLEAR
BACTERIA: NEGATIVE
BILIRUBIN URINE: NEGATIVE
BLOOD URINE: NEGATIVE
COLOR: YELLOW
GLUCOSE QUALITATIVE U: NEGATIVE
HYALINE CASTS: 0 /LPF
KETONES URINE: NEGATIVE
LEUKOCYTE ESTERASE URINE: NEGATIVE
MICROSCOPIC-UA: NORMAL
NITRITE URINE: NEGATIVE
PH URINE: 6.5
PROTEIN URINE: NORMAL
PSA FREE FLD-MCNC: 33 %
PSA FREE SERPL-MCNC: 0.68 NG/ML
PSA SERPL-MCNC: 2.06 NG/ML
RED BLOOD CELLS URINE: 2 /HPF
SPECIFIC GRAVITY URINE: 1.02
SQUAMOUS EPITHELIAL CELLS: 0 /HPF
URINE COMMENTS: NORMAL
UROBILINOGEN URINE: NORMAL
WHITE BLOOD CELLS URINE: 1 /HPF

## 2023-03-04 LAB — BACTERIA UR CULT: NORMAL

## 2023-03-05 LAB
ALBUMIN SERPL ELPH-MCNC: 4 G/DL
ALP BLD-CCNC: 65 U/L
ALT SERPL-CCNC: 17 U/L
ANION GAP SERPL CALC-SCNC: 13 MMOL/L
AST SERPL-CCNC: 24 U/L
BASOPHILS # BLD AUTO: 0.02 K/UL
BASOPHILS NFR BLD AUTO: 0.4 %
BILIRUB SERPL-MCNC: 0.8 MG/DL
BUN SERPL-MCNC: 18 MG/DL
CALCIUM SERPL-MCNC: 9.1 MG/DL
CHLORIDE SERPL-SCNC: 98 MMOL/L
CO2 SERPL-SCNC: 24 MMOL/L
CREAT SERPL-MCNC: 1.03 MG/DL
EGFR: 74 ML/MIN/1.73M2
EOSINOPHIL # BLD AUTO: 0.07 K/UL
EOSINOPHIL NFR BLD AUTO: 1.5 %
GLUCOSE SERPL-MCNC: 113 MG/DL
HCT VFR BLD CALC: 36.1 %
HGB BLD-MCNC: 12.7 G/DL
IMM GRANULOCYTES NFR BLD AUTO: 0.2 %
LYMPHOCYTES # BLD AUTO: 1.39 K/UL
LYMPHOCYTES NFR BLD AUTO: 29.3 %
MAN DIFF?: NORMAL
MCHC RBC-ENTMCNC: 33.8 PG
MCHC RBC-ENTMCNC: 35.2 GM/DL
MCV RBC AUTO: 96 FL
MONOCYTES # BLD AUTO: 0.5 K/UL
MONOCYTES NFR BLD AUTO: 10.5 %
NEUTROPHILS # BLD AUTO: 2.75 K/UL
NEUTROPHILS NFR BLD AUTO: 58.1 %
PLATELET # BLD AUTO: 196 K/UL
POTASSIUM SERPL-SCNC: 4.4 MMOL/L
PROT SERPL-MCNC: 6.2 G/DL
RBC # BLD: 3.76 M/UL
RBC # FLD: 13.3 %
SODIUM SERPL-SCNC: 135 MMOL/L
WBC # FLD AUTO: 4.74 K/UL

## 2023-03-05 NOTE — PHYSICAL EXAM
[General Appearance - Well Developed] : well developed [General Appearance - Well Nourished] : well nourished [Normal Appearance] : normal appearance [Well Groomed] : well groomed [General Appearance - In No Acute Distress] : no acute distress [Abdomen Soft] : soft [Abdomen Tenderness] : non-tender [Costovertebral Angle Tenderness] : no ~M costovertebral angle tenderness [Skin Color & Pigmentation] : normal skin color and pigmentation [Edema] : no peripheral edema [] : no respiratory distress [Respiration, Rhythm And Depth] : normal respiratory rhythm and effort [Exaggerated Use Of Accessory Muscles For Inspiration] : no accessory muscle use [Oriented To Time, Place, And Person] : oriented to person, place, and time [Affect] : the affect was normal [Mood] : the mood was normal [Not Anxious] : not anxious [Normal Station and Gait] : the gait and station were normal for the patient's age [No Focal Deficits] : no focal deficits [FreeTextEntry1] : pt not circumcised. Foreskin is not phimotic. No balanitis. Skin of the pubis and glans penis is normal.

## 2023-03-05 NOTE — ASSESSMENT
[FreeTextEntry1] : 05/24/2021: Mr. Dodd is a 78y/o M presenting today for evaluation of urinary frequency and incomplete emptying PMHx of rectal cancer s/p transanal rectal resection 10/2020 and radiation therapy 2/2021. Has not seen urologist as he was told not to while he had healing surgical scar in rectum. States he is now able to have rectal exam if needed. Notes he has been on Tamsulosin once daily since 2004, but was increased to twice daily for frequent urination after the rectal surgery. Had woken up every 2 hours to urinate with urge, but did not leak. Notes he double voids with small volume the second time. Stream is strong. Takes Tamsulosin in afternoon and night. Denies dysuria. Urinates frequently during the day but is tolerable as he is able to reach the toilet on time. BMs were irregular after radiation, but is now normal and going 2x a day with soft stools. Takes Citrucel. PSHx mitral valve replacement, CABG, cardiac ablation. Takes Warfarin. Two daughters. One daughter is a pediatrician and the other is a teacher. \par \par Digital rectal exam found no suspicious rectal masses. No external hemorrhoids. Scar on left side anteriorly around 5:00.  No rectal mucosal lesions. Anal tone is normal. The prostate is non tender, with normal texture, and no nodules. Borders a little obscured. Decreased compliance in front anterior portion of rectal wall. It is a 15 gram transurethral resection size. No gross blood on examining fingers. \par \par I prescribed the patient Trospium 20 mg, one tablet every morning, for frequent urination. I informed the patient there may be constipation and dry mouth as a side effect. \par Patient gave permission to speak to daughter who is a pediatrician about patient's medical history. \par RTO in 3 weeks for reassessment. \par \par 08/04/2021: The patient presents today for a follow up telephone visit for which he gave permission for. The patient has been taking Oxybutynin ER 10 mg once daily for urinary frequency in addition to tamsulosin 0.4 mg BID. The medication has shown significant improvement reducing his urinary frequency from once every 2 hours to once every 3 hours. He reports today that there is blood coming out of his left nostril repeatedly when he bends over to brush his teeth.  \par \par I informed the patient that with oxybutynin, the membranes in his nasal passage ways can become dry. I instructed him to come off it for two weeks. If there is no improvement, I suggested he see an ENT. If there is improvement and it stops being off oxybutynin, he should discontinue the medication and I will consider giving Tolterodine.\par \par 03/01/2023: Mr. JOSEPHINE DODD presents today for an audio only telephone visit for which he gave permission for. The pt was located at home, [address] [place] [address], and I was located in my office in [place], [place]. He was at [facility] 2/19/23 for gross hematuria and dysuria. Discharged with Keflex 500 mg po q 6 h and told to follow up with urologist. Urine cx is < 10 k NICOL , grossly abnormal UA. No Renal imaging done.Creatinine was 0.95. +PMH Rectal cancer s/p Trans anal excision 10/2021, RT and gets MRI pelvis q 6 months and last MRI was benign from 12/23/22. He sees Dr. Connelly for Rectal cancer. Pt is scheduled for a cystoscopy tomorrow. He inquired on if he should take amoxicillin prior to the cysto as he has a mitral valve replacement. He states he has four tablets of the 500 mg left in the house. He reports that his gross hematuria resolved as of yesterday. Was on Cephalexin for 7 days, twice a day. The patient is on Warfarin and 81 mg of aspirin. Pt's daughter is a pediatrician. He is retired. Has two grandchildren. \par \par The patient was instructed to take 4 tablets of amoxicillin one hour prior to the cystoscopy for prophylaxis for mitral valve replacement. I informed him that I would also like him to get a refill on his prescription because he should also take another 4 tablets about 6 hours after the cystoscopy. Pt understood. Pt can continue taking his anticoagulation medications. Pt was informed he can eat in the morning. \par \par Discussed getting a CT Urogram in the near future, will discuss more tomorrow. \par \par Patient will RTO tomorrow for cystoscopy. \par \par Duration of telephone visit: 22 minutes.

## 2023-03-05 NOTE — HISTORY OF PRESENT ILLNESS
[FreeTextEntry1] : 05/24/2021: Mr. Dodd is a 76 y/o M presenting today for evaluation of urinary frequency and incomplete emptying PMHx of rectal cancer s/p transanal rectal resection 10/2020 and radiation therapy 2/2021. Has not seen urologist as he was told not to while he had healing surgical scar in rectum. States he is now able to have rectal exam if needed. Notes he has been on Tamsulosin once daily since 2004, but was increased to twice daily for frequent urination after the rectal surgery. Had woken up every 2 hours to urinate with urge, but did not leak. Notes he double voids with small volume the second time. Stream is strong. Takes Tamsulosin in afternoon and night. Denies dysuria. Urinates frequently during the day but is tolerable as he is able to reach the toilet on time. BMs were irregular after radiation, but is now normal and going 2x a day with soft stools. Takes Citrucel. PSHx mitral valve replacement, CABG, cardiac ablation. Takes Warfarin. Two daughters. One daughter is a pediatrician and the other is a teacher. \par \par 08/04/2021: The patient presents today for a follow up telephone visit for which he gave permission for. The patient has been taking Oxybutynin ER 10 mg once daily for urinary frequency in addition to tamsulosin 0.4 mg BID. The medication has shown significant improvement reducing his urinary frequency from once every 2 hours to once every 3 hours. He reports today that there is blood coming out of his left nostril repeatedly when he bends over to brush his teeth.  \par \par 03/01/2023: Mr. JOSEPHINE DODD presents today for an audio only telephone visit for which he gave permission for. The pt was located at home, [address] [place] [address], and I was located in my office in [place], [place]. He was at [facility] 2/[facility] for gross hematuria and dysuria. Discharged with Keflex 500 mg po q 6 h and told to follow up with urologist. Urine cx is < 10 k NICOL , grossly abnormal UA. No Renal imaging done.Creatinine was 0.95. +PMH Rectal cancer s/p Trans anal excision 10/2021, RT and gets MRI pelvis q 6 months and last MRI was benign from 12/23/22. He sees Dr. Connelly for Rectal cancer. Pt is scheduled for a cystoscopy tomorrow. He inquired on if he should take amoxicillin prior to the cysto as he has a mitral valve replacement. He states he has four tablets of the 500 mg left in the house. He reports that his gross hematuria resolved as of yesterday. Was on Cephalexin for 7 days, twice a day. The patient is on Warfarin and 81 mg of aspirin. Pt's daughter is a pediatrician. He is retired. Has two grandchildren.

## 2023-03-05 NOTE — ASSESSMENT
[FreeTextEntry1] : 05/24/2021: Mr. Dodd is a 78y/o M presenting today for evaluation of urinary frequency and incomplete emptying PMHx of rectal cancer s/p transanal rectal resection 10/2020 and radiation therapy 2/2021. Has not seen urologist as he was told not to while he had healing surgical scar in rectum. States he is now able to have rectal exam if needed. Notes he has been on Tamsulosin once daily since 2004, but was increased to twice daily for frequent urination after the rectal surgery. Had woken up every 2 hours to urinate with urge, but did not leak. Notes he double voids with small volume the second time. Stream is strong. Takes Tamsulosin in afternoon and night. Denies dysuria. Urinates frequently during the day but is tolerable as he is able to reach the toilet on time. BMs were irregular after radiation, but is now normal and going 2x a day with soft stools. Takes Citrucel. PSHx mitral valve replacement, CABG, cardiac ablation. Takes Warfarin. Two daughters. One daughter is a pediatrician and the other is a teacher. \par \par Digital rectal exam found no suspicious rectal masses. No external hemorrhoids. Scar on left side anteriorly around 5:00.  No rectal mucosal lesions. Anal tone is normal. The prostate is non tender, with normal texture, and no nodules. Borders a little obscured. Decreased compliance in front anterior portion of rectal wall. It is a 15 gram transurethral resection size. No gross blood on examining fingers. \par \par I prescribed the patient Trospium 20 mg, one tablet every morning, for frequent urination. I informed the patient there may be constipation and dry mouth as a side effect. \par Patient gave permission to speak to daughter who is a pediatrician about patient's medical history. \par RTO in 3 weeks for reassessment. \par \par 08/04/2021: The patient presents today for a follow up telephone visit for which he gave permission for. The patient has been taking Oxybutynin ER 10 mg once daily for urinary frequency in addition to tamsulosin 0.4 mg BID. The medication has shown significant improvement reducing his urinary frequency from once every 2 hours to once every 3 hours. He reports today that there is blood coming out of his left nostril repeatedly when he bends over to brush his teeth.  \par \par I informed the patient that with oxybutynin, the membranes in his nasal passage ways can become dry. I instructed him to come off it for two weeks. If there is no improvement, I suggested he see an ENT. If there is improvement and it stops being off oxybutynin, he should discontinue the medication and I will consider giving Tolterodine.\par \par 03/01/2023: Mr. JOSEPHINE DODD presents today for an audio only telephone visit for which he gave permission for. The pt was located at home, [address] [place] [address], and I was located in my office in [place], [place]. He was at [facility] 2/19/23 for gross hematuria and dysuria. Discharged with Keflex 500 mg po q 6 h and told to follow up with urologist. Urine cx is < 10 k NICOL , grossly abnormal UA. No Renal imaging done.Creatinine was 0.95. +PMH Rectal cancer s/p Trans anal excision 10/2021, RT and gets MRI pelvis q 6 months and last MRI was benign from 12/23/22. He sees Dr. Connelly for Rectal cancer. Pt is scheduled for a cystoscopy tomorrow. He inquired on if he should take amoxicillin prior to the cysto as he has a mitral valve replacement. He states he has four tablets of the 500 mg left in the house. He reports that his gross hematuria resolved as of yesterday. Was on Cephalexin for 7 days, twice a day. The patient is on Warfarin and 81 mg of aspirin. Pt's daughter is a pediatrician. He is retired. Has two grandchildren. \par \par The patient was instructed to take 4 tablets of amoxicillin one hour prior to the cystoscopy for prophylaxis for mitral valve replacement. I informed him that I would also like him to get a refill on his prescription because he should also take another 4 tablets about 6 hours after the cystoscopy. Pt understood. Pt can continue taking his anticoagulation medications. Pt was informed he can eat in the morning. \par Discussed getting a CT Urogram in the near future, will discuss more tomorrow. \par Patient will RTO tomorrow for cystoscopy\par \par 03/02/2023: Mr. JOSEPHINE DODD presents today for cystoscopy to evaluate gross hematuria. He showed me a picture of the gross hematuria which was a deep read with clots present at the bottom. He is accompanied by his wife. The patient took his amoxicillin prophylaxis an hour and 15 minutes prior to the cystoscopy. The patient reports frequent urination at night, about every 2 hours. He is able to fall back asleep easily so it is not too bothersome. Daytime urination is a bit frequent, about 7x a day. Reports drinking a lot of water. Denies urinary urgency, pushing, and straining. Continues to take tamsulosin 0.4 mg BID. Was tried on oxybutynin in the past but experienced nose bleeds and discontinued it. Pt does admit to constipation, managed with citrucel powder. He does take a half a tablet of Imodium when his stool becomes too frequent and soft. Pt is retired. \par \par Cystoscopy: Lidocaine jelly was inserted for local anesthesia. Normal pink urothelium in the anterior urethra. Bladder mucosa is a normal pale pink color. Bladder has a low PVR. Hemorrhagic spots in the submucosa left over from bleeding. Inferiorly there are a few little spots of corkscrew vessels. Trigone from a retroflexed position is easily visible. Right ureteral orifice is U shaped when looked up from a retroflexed position. Small diverticulum wide mouth no tumors no stones that is posteriorly on the right side. Bit distant from the ureteral orifice. Left ureteral orifice is just made out, just below small lip of small median lobe. The bladder is 2+ trabeculated. No bladder stones. Right sided diverticulum 1.5 cm from bladder neck. Bladder distends well. Good capacity. No bladder tumors are visualized. Prostate has some bulging submucosal vessels posteriorly near the midline. Prostate does not ooze despite being in contact with the cystoscope. Prostate could potentially be a candidate for a TURP in the future if causes obstruction. No urethral strictures, stones, or neoplasms. No big patches of inflammation. Few glomerulations and a few spots of submucosal vessels resolving. 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 Pt is on aspirin 81 mg and warfarin 5 mg. Pt felt well after the cystoscopy. He denied any burning or gross hematuria after. \par \par Patient will take the amoxicillin 6 hours after the cystoscopy. \par \par The patient was instructed by his colo-rectal surgeon to not have a JAYLIN for a few years due to hx of rectal carcinoma. Reviewed 1/3/2023 MRI pelvis. Bladder looks fine on MRI. Also reviewed 5/17/2022 CT abdomen and pelvis. \par \par I am sending the patient for a CT urogram w/wo iv contrast.\par \par I advised the pt to take Colace, once a day, with a large glass of water for his constipation in addition to the Citrucel powder. I suggested that he might get liquid Imodium so he can take an even smaller dose if his stool becomes too soft and he needs to stop the BMs but not take too much to become constipated. \par \par The patient produced a urine sample which will be sent for urinalysis, urine cytology, and urine culture. \par \par Blood work today included BMP, alkaline phosphatase, PSA, and testosterone. \par \par Patient will schedule a telehealth visit shortly after obtaining the CT to review and discuss the results. Patient should then schedule a telehealth visit for 3 months after that, preceded by lab work at his nearest [facility]. \par \par Preparation, in person office visit, and coordination of care other than cysto: 45 minutes.

## 2023-03-05 NOTE — ADDENDUM
[FreeTextEntry1] : This note was authored by Stefanie Seaman working as a scribe for Dr.Gary Rivera. I, Dr. Daniel Rivera have reviewed the content of this note and confirm it is true and accurate. I personally performed the history and physical examination and made all the decisions 03/01/2023

## 2023-03-05 NOTE — HISTORY OF PRESENT ILLNESS
[FreeTextEntry1] : 05/24/2021: Mr. Dodd is a 78 y/o M presenting today for evaluation of urinary frequency and incomplete emptying PMHx of rectal cancer s/p transanal rectal resection 10/2020 and radiation therapy 2/2021. Has not seen urologist as he was told not to while he had healing surgical scar in rectum. States he is now able to have rectal exam if needed. Notes he has been on Tamsulosin once daily since 2004, but was increased to twice daily for frequent urination after the rectal surgery. Had woken up every 2 hours to urinate with urge, but did not leak. Notes he double voids with small volume the second time. Stream is strong. Takes Tamsulosin in afternoon and night. Denies dysuria. Urinates frequently during the day but is tolerable as he is able to reach the toilet on time. BMs were irregular after radiation, but is now normal and going 2x a day with soft stools. Takes Citrucel. PSHx mitral valve replacement, CABG, cardiac ablation. Takes Warfarin. Two daughters. One daughter is a pediatrician and the other is a teacher. \par \par 08/04/2021: The patient presents today for a follow up telephone visit for which he gave permission for. The patient has been taking Oxybutynin ER 10 mg once daily for urinary frequency in addition to tamsulosin 0.4 mg BID. The medication has shown significant improvement reducing his urinary frequency from once every 2 hours to once every 3 hours. He reports today that there is blood coming out of his left nostril repeatedly when he bends over to brush his teeth.  \par \par 03/01/2023: Mr. JOSEPHINE DODD presents today for an audio only telephone visit for which he gave permission for. The pt was located at home, [address] [place] [address], and I was located in my office in [place], [place]. He was at [facility] 2/19/23 for gross hematuria and dysuria. Discharged with Keflex 500 mg po q 6 h and told to follow up with urologist. Urine cx is < 10 k NICOL , grossly abnormal UA. No Renal imaging done.Creatinine was 0.95. +PMH Rectal cancer s/p Trans anal excision 10/2021, RT and gets MRI pelvis q 6 months and last MRI was benign from 12/23/22. He sees Dr. Connelly for Rectal cancer. Pt is scheduled for a cystoscopy tomorrow. He inquired on if he should take amoxicillin prior to the cysto as he has a mitral valve replacement. He states he has four tablets of the 500 mg left in the house. He reports that his gross hematuria resolved as of yesterday. Was on Cephalexin for 7 days, twice a day. The patient is on Warfarin and 81 mg of aspirin. Pt's daughter is a pediatrician. He is retired. Has two grandchildren. \par \par 03/02/2023: Mr. JOSEPHINE DODD presents today for cystoscopy to evaluate gross hematuria. He showed me a picture of the gross hematuria which was a deep read with clots present at the bottom. He is accompanied by his wife. The patient took his amoxicillin prophylaxis an hour and 15 minutes prior to the cystoscopy. The patient reports frequent urination at night, about every 2 hours. He is able to fall back asleep easily so it is not too bothersome. Daytime urination is a bit frequent, about 7x a day. Reports drinking a lot of water. Denies urinary urgency, pushing, and straining. Continues to take tamsulosin 0.4 mg BID. Was tried on oxybutynin in the past but experienced nose bleeds and discontinued it. Pt does admit to constipation, managed with citrucel powder. He does take a half a tablet of Imodium when his stool becomes too frequent and soft. Pt is retired.

## 2023-03-06 LAB — URINE CYTOLOGY: NORMAL

## 2023-03-07 LAB
TESTOST FREE SERPL-MCNC: 4.8 PG/ML
TESTOST SERPL-MCNC: 297 NG/DL

## 2023-03-12 ENCOUNTER — OUTPATIENT (OUTPATIENT)
Dept: OUTPATIENT SERVICES | Facility: HOSPITAL | Age: 78
LOS: 1 days | End: 2023-03-12
Payer: MEDICARE

## 2023-03-12 ENCOUNTER — APPOINTMENT (OUTPATIENT)
Dept: CT IMAGING | Facility: IMAGING CENTER | Age: 78
End: 2023-03-12
Payer: MEDICARE

## 2023-03-12 DIAGNOSIS — R31.0 GROSS HEMATURIA: ICD-10-CM

## 2023-03-12 DIAGNOSIS — Z95.2 PRESENCE OF PROSTHETIC HEART VALVE: Chronic | ICD-10-CM

## 2023-03-12 PROCEDURE — 74178 CT ABD&PLV WO CNTR FLWD CNTR: CPT | Mod: 26,MH

## 2023-03-12 PROCEDURE — 74178 CT ABD&PLV WO CNTR FLWD CNTR: CPT

## 2023-03-21 ENCOUNTER — APPOINTMENT (OUTPATIENT)
Dept: UROLOGY | Facility: CLINIC | Age: 78
End: 2023-03-21

## 2023-03-21 ENCOUNTER — APPOINTMENT (OUTPATIENT)
Dept: UROLOGY | Facility: CLINIC | Age: 78
End: 2023-03-21
Payer: MEDICARE

## 2023-03-21 PROCEDURE — 99442: CPT | Mod: 95

## 2023-03-21 NOTE — ASU PATIENT PROFILE, ADULT - HOW PATIENT ADDRESSED, PROFILE
Procedure Note      Procedure: Intralesional injection of steroid      Location: bilateral breasts     Product: Kenalog 10mg/ml                      VOLUME: 2ml                       TREATMENT #: 1     Anesthesia: topical lidocaine 2.5% with prilocaine, and 1% lidocaine with 1:100,000 epinephrine injection     Description: After pre-procedural counseling of the risks and benefits of steroid injection, the patient's keloid was washed and prepped appropriately. Anesthetic was applied as above for 1 hour prior to treatment, followed by subcutaneous and intradermal injection of 1% lidocaine with 1:100,000. The entire keloid was injected intralesionally.      Keshawn tolerated this well.  Bleeding was controlled with gentle pressure and ice was applied following the procedure.      Follow Up: She was instructed to follow up in 6 weeks for re-evaluation and potential repeat treatment. Additional topical anesthetic provided.     Sydni Baxter PA-C  Plastic and Reconstructive Surgery          [place]

## 2023-03-21 NOTE — ASSESSMENT
[FreeTextEntry1] : 05/24/2021: Mr. Dodd is a 76y/o M presenting today for evaluation of urinary frequency and incomplete emptying PMHx of rectal cancer s/p transanal rectal resection 10/2020 and radiation therapy 2/2021. Has not seen urologist as he was told not to while he had healing surgical scar in rectum. States he is now able to have rectal exam if needed. Notes he has been on Tamsulosin once daily since 2004, but was increased to twice daily for frequent urination after the rectal surgery. Had woken up every 2 hours to urinate with urge, but did not leak. Notes he double voids with small volume the second time. Stream is strong. Takes Tamsulosin in afternoon and night. Denies dysuria. Urinates frequently during the day but is tolerable as he is able to reach the toilet on time. BMs were irregular after radiation, but is now normal and going 2x a day with soft stools. Takes Citrucel. PSHx mitral valve replacement, CABG, cardiac ablation. Takes Warfarin. Two daughters. One daughter is a pediatrician and the other is a teacher. \par \par Digital rectal exam found no suspicious rectal masses. No external hemorrhoids. Scar on left side anteriorly around 5:00.  No rectal mucosal lesions. Anal tone is normal. The prostate is non tender, with normal texture, and no nodules. Borders a little obscured. Decreased compliance in front anterior portion of rectal wall. It is a 15 gram transurethral resection size. No gross blood on examining fingers. \par \par I prescribed the patient Trospium 20 mg, one tablet every morning, for frequent urination. I informed the patient there may be constipation and dry mouth as a side effect. \par Patient gave permission to speak to daughter who is a pediatrician about patient's medical history. \par RTO in 3 weeks for reassessment. \par \par 08/04/2021: The patient presents today for a follow up telephone visit for which he gave permission for. The patient has been taking Oxybutynin ER 10 mg once daily for urinary frequency in addition to tamsulosin 0.4 mg BID. The medication has shown significant improvement reducing his urinary frequency from once every 2 hours to once every 3 hours. He reports today that there is blood coming out of his left nostril repeatedly when he bends over to brush his teeth.  \par \par I informed the patient that with oxybutynin, the membranes in his nasal passage ways can become dry. I instructed him to come off it for two weeks. If there is no improvement, I suggested he see an ENT. If there is improvement and it stops being off oxybutynin, he should discontinue the medication and I will consider giving Tolterodine.\par \par 03/01/2023: Mr. JOSEPHINE DODD presents today for an audio only telephone visit for which he gave permission for. The pt was located at home, [address] [place] [address], and I was located in my office in [place], [place]. He was at [facility] 2/19/23 for gross hematuria and dysuria. Discharged with Keflex 500 mg po q 6 h and told to follow up with urologist. Urine cx is < 10 k NICOL , grossly abnormal UA. No Renal imaging done.Creatinine was 0.95. +PMH Rectal cancer s/p Trans anal excision 10/2021, RT and gets MRI pelvis q 6 months and last MRI was benign from 12/23/22. He sees Dr. Connelly for Rectal cancer. Pt is scheduled for a cystoscopy tomorrow. He inquired on if he should take amoxicillin prior to the cysto as he has a mitral valve replacement. He states he has four tablets of the 500 mg left in the house. He reports that his gross hematuria resolved as of yesterday. Was on Cephalexin for 7 days, twice a day. The patient is on Warfarin and 81 mg of aspirin. Pt's daughter is a pediatrician. He is retired. Has two grandchildren. \par \par The patient was instructed to take 4 tablets of amoxicillin one hour prior to the cystoscopy for prophylaxis for mitral valve replacement. I informed him that I would also like him to get a refill on his prescription because he should also take another 4 tablets about 6 hours after the cystoscopy. Pt understood. Pt can continue taking his anticoagulation medications. Pt was informed he can eat in the morning. \par Discussed getting a CT Urogram in the near future, will discuss more tomorrow. \par Patient will RTO tomorrow for cystoscopy\par \par 03/02/2023: Mr. JOSEPHINE DODD presents today for cystoscopy to evaluate gross hematuria. He showed me a picture of the gross hematuria which was a deep read with clots present at the bottom. He is accompanied by his wife. The patient took his amoxicillin prophylaxis an hour and 15 minutes prior to the cystoscopy. The patient reports frequent urination at night, about every 2 hours. He is able to fall back asleep easily so it is not too bothersome. Daytime urination is a bit frequent, about 7x a day. Reports drinking a lot of water. Denies urinary urgency, pushing, and straining. Continues to take tamsulosin 0.4 mg BID. Was tried on oxybutynin in the past but experienced nose bleeds and discontinued it. Pt does admit to constipation, managed with citrucel powder. He does take a half a tablet of Imodium when his stool becomes too frequent and soft. Pt is retired. \par \par Cystoscopy: Lidocaine jelly was inserted for local anesthesia. Normal pink urothelium in the anterior urethra. Bladder mucosa is a normal pale pink color. Bladder has a low PVR. Hemorrhagic spots in the submucosa left over from bleeding. Inferiorly there are a few little spots of corkscrew vessels. Trigone from a retroflexed position is easily visible. Right ureteral orifice is U shaped when looked up from a retroflexed position. Small diverticulum wide mouth no tumors no stones that is posteriorly on the right side. Bit distant from the ureteral orifice. Left ureteral orifice is just made out, just below small lip of small median lobe. The bladder is 2+ trabeculated. No bladder stones. Right sided diverticulum 1.5 cm from bladder neck. Bladder distends well. Good capacity. No bladder tumors are visualized. Prostate has some bulging submucosal vessels posteriorly near the midline. Prostate does not ooze despite being in contact with the cystoscope. Prostate could potentially be a candidate for a TURP in the future if causes obstruction. No urethral strictures, stones, or neoplasms. No big patches of inflammation. Few glomerulations and a few spots of submucosal vessels resolving. 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 Pt is on aspirin 81 mg and warfarin 5 mg. Pt felt well after the cystoscopy. He denied any burning or gross hematuria after. \par \par Patient will take the amoxicillin 6 hours after the cystoscopy. \par The patient was instructed by his colo-rectal surgeon to not have a JAYLIN for a few years due to hx of rectal carcinoma. Reviewed 1/3/2023 MRI pelvis. Bladder looks fine on MRI. Also reviewed 5/17/2022 CT abdomen and pelvis. \par I am sending the patient for a CT urogram w/wo iv contrast.\par I advised the pt to take Colace, once a day, with a large glass of water for his constipation in addition to the Citrucel powder. I suggested that he might get liquid Imodium so he can take an even smaller dose if his stool becomes too soft and he needs to stop the BMs but not take too much to become constipated. \par The patient produced a urine sample which will be sent for urinalysis, urine cytology, and urine culture. \par Blood work today included BMP, alkaline phosphatase, PSA, and testosterone. \par Patient will schedule a telehealth visit shortly after obtaining the CT to review and discuss the results. Patient should then schedule a telehealth visit for 3 months after that, preceded by lab work at his nearest [facility]. \par \par 03/21/2023: Mr. DODD presents today for a follow up audio only telehealth visit for which they gave permission for. The patient was located at home [address] [place] [address] and I was located in my office in [place], [place]. The patient obtained for a CT Urogram 3/15/23 prior to today's visit which revealed IMPRESSION:No renal stones, renal masses or evidence of a urothelial lesion. Right lateral urinary bladder diverticulum. The pt also obtained lab work 3/2/23. The CBC  revealed low RBC count at 3.76, low HGB at 12.7, and Low HCT at 36.1%. The CMP revealed elevated glucose at 113 and normal Creatinine at 1.03. Total testosterone is only very slightly low at 297.0 and free is moderately low at 4.8 neither needs supplementation at this time. PSA results is satisfactory at 2.06. Patient admits to having a micro valve as he is afib.\par He voids every 2-3 hours during the night and day. However, he states his stream is sufficient. He drinks about 8-9 cups of water a day. Patient denies dysuria, gross hematuria, burning, urgency, frequency, pushing, straining, or incontinence. Admits to taking Tamsulosin 0.4 mg.\par \par He will continue to take Tamsulosin 0.4mg twice a day. \par \par Encouraged the pt to inform his PCP of gallstones, however it is not alarming as he denies pain and is able to consume spicy food without complications.\par He is also to consult his PCP about obtaining consent for me to perform a JAYLIN. \par \par I informed the patient to exercise lightly and to drink a plethora of water. He is not to over exert himself if he notices gross hematuria. \par \par Clinical findings and CT Urogram results were reviewed at length with the patient. I personally reviewed the imaging myself.\par \par Pt will schedule a telehealth visit in 3 months unless clinically indicated. \par \par Duration of call: 15 Minutes

## 2023-03-21 NOTE — HISTORY OF PRESENT ILLNESS
[FreeTextEntry1] : 05/24/2021: Mr. Dodd is a 78 y/o M presenting today for evaluation of urinary frequency and incomplete emptying PMHx of rectal cancer s/p transanal rectal resection 10/2020 and radiation therapy 2/2021. Has not seen urologist as he was told not to while he had healing surgical scar in rectum. States he is now able to have rectal exam if needed. Notes he has been on Tamsulosin once daily since 2004, but was increased to twice daily for frequent urination after the rectal surgery. Had woken up every 2 hours to urinate with urge, but did not leak. Notes he double voids with small volume the second time. Stream is strong. Takes Tamsulosin in afternoon and night. Denies dysuria. Urinates frequently during the day but is tolerable as he is able to reach the toilet on time. BMs were irregular after radiation, but is now normal and going 2x a day with soft stools. Takes Citrucel. PSHx mitral valve replacement, CABG, cardiac ablation. Takes Warfarin. Two daughters. One daughter is a pediatrician and the other is a teacher. \par \par 08/04/2021: The patient presents today for a follow up telephone visit for which he gave permission for. The patient has been taking Oxybutynin ER 10 mg once daily for urinary frequency in addition to tamsulosin 0.4 mg BID. The medication has shown significant improvement reducing his urinary frequency from once every 2 hours to once every 3 hours. He reports today that there is blood coming out of his left nostril repeatedly when he bends over to brush his teeth.  \par \par 03/01/2023: Mr. JOSEPHINE DODD presents today for an audio only telephone visit for which he gave permission for. The pt was located at home, [address] [place] [address], and I was located in my office in [place], [place]. He was at [facility] 2/19/23 for gross hematuria and dysuria. Discharged with Keflex 500 mg po q 6 h and told to follow up with urologist. Urine cx is < 10 k NICOL , grossly abnormal UA. No Renal imaging done.Creatinine was 0.95. +PMH Rectal cancer s/p Trans anal excision 10/2021, RT and gets MRI pelvis q 6 months and last MRI was benign from 12/23/22. He sees Dr. Connelly for Rectal cancer. Pt is scheduled for a cystoscopy tomorrow. He inquired on if he should take amoxicillin prior to the cysto as he has a mitral valve replacement. He states he has four tablets of the 500 mg left in the house. He reports that his gross hematuria resolved as of yesterday. Was on Cephalexin for 7 days, twice a day. The patient is on Warfarin and 81 mg of aspirin. Pt's daughter is a pediatrician. He is retired. Has two grandchildren. \par \par 03/02/2023: Mr. JOSEPHINE DODD presents today for cystoscopy to evaluate gross hematuria. He showed me a picture of the gross hematuria which was a deep read with clots present at the bottom. He is accompanied by his wife. The patient took his amoxicillin prophylaxis an hour and 15 minutes prior to the cystoscopy. The patient reports frequent urination at night, about every 2 hours. He is able to fall back asleep easily so it is not too bothersome. Daytime urination is a bit frequent, about 7x a day. Reports drinking a lot of water. Denies urinary urgency, pushing, and straining. Continues to take tamsulosin 0.4 mg BID. Was tried on oxybutynin in the past but experienced nose bleeds and discontinued it. Pt does admit to constipation, managed with citrucel powder. He does take a half a tablet of Imodium when his stool becomes too frequent and soft. Pt is retired. \par \par 03/21/2023: Mr. DODD presents today for a follow up audio only telehealth visit for which they gave permission for. The patient was located at home [address] [place] [address] and I was located in my office in [place], [place]. The patient obtained for a CT Urogram 3/15/23 prior to today's visit which revealed IMPRESSION:No renal stones, renal masses or evidence of a urothelial lesion. Right lateral urinary bladder diverticulum. The pt also obtained lab work 3/2/23. The CBC  revealed low RBC count at 3.76, low HGB at 12.7, and Low HCT at 36.1%. The CMP revealed elevated glucose at 113 and normal Creatinine at 1.03. Total testosterone is only very slightly low at 297.0 and free is moderately low at 4.8 neither needs supplementation at this time. PSA results is satisfactory at 2.06. Patient admits to having a micro valve as he is afib.\par He voids every 2-3 hours during the night and day. However, he states his stream is sufficient. He drinks about 8-9 cups of water a day. Patient denies dysuria, gross hematuria, burning, urgency, frequency, pushing, straining, or incontinence. Admits to taking Tamsulosin 0.4 mg. No

## 2023-03-21 NOTE — ADDENDUM
[FreeTextEntry1] : This note was authored by Rosita Cano working as a scribe for Dr.Gary Rivera. I, Dr. Daniel Rivera have reviewed the content of this note and confirm it is true and accurate. I personally performed the history and physical examination and made all the decisions 03/21/2023\par

## 2023-03-30 ENCOUNTER — APPOINTMENT (OUTPATIENT)
Dept: CARDIOLOGY | Facility: CLINIC | Age: 78
End: 2023-03-30

## 2023-04-06 ENCOUNTER — APPOINTMENT (OUTPATIENT)
Dept: CARDIOLOGY | Facility: CLINIC | Age: 78
End: 2023-04-06
Payer: MEDICARE

## 2023-04-06 ENCOUNTER — OUTPATIENT (OUTPATIENT)
Dept: OUTPATIENT SERVICES | Facility: HOSPITAL | Age: 78
LOS: 1 days | End: 2023-04-06
Payer: MEDICARE

## 2023-04-06 VITALS
RESPIRATION RATE: 18 BRPM | DIASTOLIC BLOOD PRESSURE: 88 MMHG | WEIGHT: 160 LBS | OXYGEN SATURATION: 98 % | HEIGHT: 65 IN | HEART RATE: 74 BPM | BODY MASS INDEX: 26.66 KG/M2 | SYSTOLIC BLOOD PRESSURE: 157 MMHG

## 2023-04-06 VITALS — SYSTOLIC BLOOD PRESSURE: 157 MMHG | DIASTOLIC BLOOD PRESSURE: 88 MMHG | RESPIRATION RATE: 12 BRPM | HEART RATE: 74 BPM

## 2023-04-06 DIAGNOSIS — R01.1 CARDIAC MURMUR, UNSPECIFIED: ICD-10-CM

## 2023-04-06 DIAGNOSIS — I35.1 NONRHEUMATIC AORTIC (VALVE) INSUFFICIENCY: ICD-10-CM

## 2023-04-06 DIAGNOSIS — Z95.2 PRESENCE OF PROSTHETIC HEART VALVE: Chronic | ICD-10-CM

## 2023-04-06 PROCEDURE — 93306 TTE W/DOPPLER COMPLETE: CPT | Mod: 26

## 2023-04-06 PROCEDURE — 99215 OFFICE O/P EST HI 40 MIN: CPT

## 2023-04-06 PROCEDURE — 93306 TTE W/DOPPLER COMPLETE: CPT

## 2023-04-06 RX ORDER — METRONIDAZOLE 10 MG/G
1 GEL TOPICAL TWICE DAILY
Qty: 1 | Refills: 0 | Status: DISCONTINUED | COMMUNITY
Start: 2022-05-02 | End: 2023-04-06

## 2023-04-06 RX ORDER — SODIUM SULFATE, POTASSIUM SULFATE, MAGNESIUM SULFATE 17.5; 3.13; 1.6 G/ML; G/ML; G/ML
17.5-3.13-1.6 SOLUTION, CONCENTRATE ORAL
Qty: 2 | Refills: 0 | Status: DISCONTINUED | COMMUNITY
Start: 2021-11-08 | End: 2023-04-06

## 2023-04-06 RX ORDER — OXYCODONE 5 MG/1
5 TABLET ORAL EVERY 6 HOURS
Qty: 8 | Refills: 0 | Status: DISCONTINUED | COMMUNITY
Start: 2022-05-13 | End: 2023-04-06

## 2023-04-08 PROBLEM — R01.1 HEART MURMUR: Status: ACTIVE | Noted: 2022-05-10

## 2023-04-08 NOTE — CARDIOLOGY SUMMARY
[de-identified] : 4/13/2022\par LVEF 66%, QUIANA 0.7 sqcm, mGr 18 mmHg, PGr 39 mmHg, AoV 3.2 m/s, bioprosthetic MVR, mitral mGr 4 mmHg, mild-moderate MR

## 2023-04-08 NOTE — HISTORY OF PRESENT ILLNESS
[FreeTextEntry1] : Mr. Guillaume returns to clinic today for follow up evaluation of aortic stenosis. He has a past medical history of CABG/MVR 5/13/2015, AFib, HTN, HLD, BPV, and PCI in 1997. He was seen last in April, however was awaiting transanal excision of rectal mass for Colon Ca and was advised to follow up with us for re-evaluation of aortic stenosis once recovered. \par \par He reports that he experiences dyspnea upon exertion along with fatigued when ambulating.  He is able to carry out his normal daily activities without any limitations.  He now experiences some mild pedal edema.  No light-headed sensation, chest pain/tightness/discomfort, dizziness, change orthopnea, PND or palpitations.  He is less active than when he was previously evaluated.  He wakes up in the middle of the night to go to the bathroom.  No changes in his medications reported.In February he had dysuria and then developed hematuria for which he was evaluated. He was started on antibiotics. CT Urogram 3/15/23 prior to today's visit which revealed IMPRESSION:No renal stones, renal masses or evidence of a urothelial lesion. Right lateral urinary bladder diverticulum. The pt also obtained lab work 3/2/23. The CBC revealed low RBC count at 3.76, low HGB at 12.7, and Low HCT at 36.1%. Repeat echocardiogram today demonstrates moderate aortic stenosis QUIANA 1.3 sqcm, DVI 0.28, mGr 16, AoV 2.5m/s, SV 40, normal LV function\par \par \par Assessment/Plan\par --Blood pressure is elevated.  Recommend that patient keep a blood pressure/heart rate log that he will share with his cardiologist/Dr. Draper for further review.  Also noted to have mild pedal edema bilaterally.  May consider low dose diuretic for worsening exertional dyspnea and mild pedal edema.\par --Reviewed with patient findings from recent TTE that demonstrates moderate aortic valve stenosis.  Discussed with patient and his wife what is severe aortic valve stenosis. The associated signs and symptoms of severe aortic valve stenosis was reviewed. The natural pathophysiology of untreated severe aortic valve stenosis was gone over. Different treatment options for severe symptomatic aortic valve stenosis was reviewed including medical therapy, TAVR and SAVR. The pros/cons and the risk/benefits of the various treatment options were gone over. \par --Continue aspirin 81 mg daily.\par --Continue metoprolol tartrate 25 mg twice a day.\par --For his atrial fibrillation he is on Coumadin. Signs and symptoms bleeding were reviewed with the patient.\par --Continue rosuvastatin 10 mg daily, coenzyme Q 10 and Lovaza.\par --The importance of following a low salt fluid restricted diet was reviewed.\par --EKG performed due to history of coronary artery disease, atrial fibrillation and valvular disease.\par \par All questions of the patient and his wife were addressed.\par \par Recommend follow-up in 4 months with a repeat TTE.\par \par

## 2023-04-08 NOTE — PHYSICAL EXAM
[Well Developed] : well developed [Well Nourished] : well nourished [Normal Rate] : normal [Rhythm Regular] : regular [No Murmur] : no murmurs heard [___ +] : bilateral [unfilled]U+ pitting edema to the ankles [Clear Lung Fields] : clear lung fields [Soft] : abdomen soft [Non Tender] : non-tender [Normal Gait] : normal gait [Normal] : normal venous pressure, no carotid bruit [de-identified] : Irregular irregular with II/VI crescendo decrescendo murmur heard loudest at right upper sternal border [de-identified] : trace ankle edema bilaterally

## 2023-04-08 NOTE — REVIEW OF SYSTEMS
[Feeling Fatigued] : feeling fatigued [Dyspnea on exertion] : dyspnea during exertion [Lower Ext Edema] : lower extremity edema [Negative] : Heme/Lymph [Palpitations] : no palpitations [Orthopnea] : no orthopnea [PND] : no PND [Abdominal Pain] : no abdominal pain [Change in Appetite] : no change in appetite [Dizziness] : no dizziness [FreeTextEntry3] : eyeglasses

## 2023-05-21 LAB
ALBUMIN SERPL ELPH-MCNC: 4.8 G/DL
ALP BLD-CCNC: 76 U/L
ALT SERPL-CCNC: 24 U/L
ANION GAP SERPL CALC-SCNC: 14 MMOL/L
APPEARANCE: CLEAR
AST SERPL-CCNC: 25 U/L
BILIRUB SERPL-MCNC: 1.3 MG/DL
BILIRUBIN URINE: NEGATIVE
BLOOD URINE: NEGATIVE
BUN SERPL-MCNC: 14 MG/DL
CALCIUM SERPL-MCNC: 9.9 MG/DL
CHLORIDE SERPL-SCNC: 96 MMOL/L
CHOLEST SERPL-MCNC: 166 MG/DL
CO2 SERPL-SCNC: 25 MMOL/L
COLOR: YELLOW
CREAT SERPL-MCNC: 0.98 MG/DL
EGFR: 78 ML/MIN/1.73M2
ESTIMATED AVERAGE GLUCOSE: 134 MG/DL
GLUCOSE QUALITATIVE U: NEGATIVE MG/DL
GLUCOSE SERPL-MCNC: 95 MG/DL
HBA1C MFR BLD HPLC: 6.3 %
HDLC SERPL-MCNC: 53 MG/DL
KETONES URINE: NEGATIVE MG/DL
LDLC SERPL CALC-MCNC: 74 MG/DL
LEUKOCYTE ESTERASE URINE: NEGATIVE
NITRITE URINE: NEGATIVE
NONHDLC SERPL-MCNC: 113 MG/DL
NT-PROBNP SERPL-MCNC: 1798 PG/ML
PH URINE: 6.5
POTASSIUM SERPL-SCNC: 4.6 MMOL/L
PROT SERPL-MCNC: 7.2 G/DL
PROTEIN URINE: NEGATIVE MG/DL
PSA SERPL-MCNC: 1.16 NG/ML
SODIUM SERPL-SCNC: 136 MMOL/L
SPECIFIC GRAVITY URINE: 1.01
T4 FREE SERPL-MCNC: 1.4 NG/DL
TRIGL SERPL-MCNC: 196 MG/DL
TSH SERPL-ACNC: 4.65 UIU/ML
UROBILINOGEN URINE: 0.2 MG/DL

## 2023-05-22 ENCOUNTER — OUTPATIENT (OUTPATIENT)
Dept: OUTPATIENT SERVICES | Facility: HOSPITAL | Age: 78
LOS: 1 days | End: 2023-05-22
Payer: MEDICARE

## 2023-05-22 ENCOUNTER — APPOINTMENT (OUTPATIENT)
Dept: MRI IMAGING | Facility: IMAGING CENTER | Age: 78
End: 2023-05-22
Payer: MEDICARE

## 2023-05-22 DIAGNOSIS — C20 MALIGNANT NEOPLASM OF RECTUM: ICD-10-CM

## 2023-05-22 DIAGNOSIS — Z95.2 PRESENCE OF PROSTHETIC HEART VALVE: Chronic | ICD-10-CM

## 2023-05-22 PROCEDURE — 72197 MRI PELVIS W/O & W/DYE: CPT | Mod: 26,MH

## 2023-05-22 PROCEDURE — 72197 MRI PELVIS W/O & W/DYE: CPT

## 2023-05-24 ENCOUNTER — APPOINTMENT (OUTPATIENT)
Dept: INTERNAL MEDICINE | Facility: CLINIC | Age: 78
End: 2023-05-24
Payer: MEDICARE

## 2023-05-24 VITALS
SYSTOLIC BLOOD PRESSURE: 162 MMHG | DIASTOLIC BLOOD PRESSURE: 89 MMHG | HEIGHT: 65 IN | WEIGHT: 162 LBS | BODY MASS INDEX: 26.99 KG/M2 | TEMPERATURE: 97.9 F | HEART RATE: 88 BPM | OXYGEN SATURATION: 98 %

## 2023-05-24 DIAGNOSIS — C20 MALIGNANT NEOPLASM OF RECTUM: ICD-10-CM

## 2023-05-24 DIAGNOSIS — Z00.00 ENCOUNTER FOR GENERAL ADULT MEDICAL EXAMINATION W/OUT ABNORMAL FINDINGS: ICD-10-CM

## 2023-05-24 PROCEDURE — G0439: CPT

## 2023-05-24 RX ORDER — BLOOD SUGAR DIAGNOSTIC
STRIP MISCELLANEOUS
Qty: 300 | Refills: 2 | Status: ACTIVE | COMMUNITY
Start: 2023-05-24 | End: 1900-01-01

## 2023-05-24 RX ORDER — LANCETS 33 GAUGE
EACH MISCELLANEOUS
Qty: 300 | Refills: 2 | Status: ACTIVE | COMMUNITY
Start: 2023-05-24 | End: 1900-01-01

## 2023-05-24 NOTE — PLAN
[FreeTextEntry1] : Annual\par Had vaccine\par s/p rectal ca\par \par \par Low salt diet\par elevate legs\par warfarin and diet discussed\par lipid good

## 2023-05-24 NOTE — HISTORY OF PRESENT ILLNESS
[de-identified] : Annual\par Had alll vaccine\par \par s/p mrv\par recent echo aortic valve ok\par occasional swelling in feet\par in warfarin 5 mg daily\par tests once weekly at home\par diabetic on metformin last a1c 6.3\par rectal cancer\par s/p treatment under observation  Oncology Dr Raciel Dietz\par recent mri good\par Cardiologist Dr Draper

## 2023-05-24 NOTE — HEALTH RISK ASSESSMENT
[Good] : ~his/her~  mood as  good [No] : No [Never (0 pts)] : Never (0 points) [No falls in past year] : Patient reported no falls in the past year [0] : 2) Feeling down, depressed, or hopeless: Not at all (0) [PHQ-2 Negative - No further assessment needed] : PHQ-2 Negative - No further assessment needed [[id number]] : 0 [Change in mental status noted] : No change in mental status noted [Language] : denies difficulty with language [Behavior] : denies difficulty with behavior [Learning/Retaining New Information] : denies difficulty learning/retaining new information [Handling Complex Tasks] : denies difficulty handling complex tasks [Reasoning] : denies difficulty with reasoning [Spatial Ability and Orientation] : denies difficulty with spatial ability and orientation [With Family] : lives with family [Retired] : retired [] :  [Feels Safe at Home] : Feels safe at home [Fully functional (bathing, dressing, toileting, transferring, walking, feeding)] : Fully functional (bathing, dressing, toileting, transferring, walking, feeding) [Fully functional (using the telephone, shopping, preparing meals, housekeeping, doing laundry, using] : Fully functional and needs no help or supervision to perform IADLs (using the telephone, shopping, preparing meals, housekeeping, doing laundry, using transportation, managing medications and managing finances) [Reports changes in hearing] : Reports no changes in hearing [Reports changes in vision] : Reports no changes in vision [Reports changes in dental health] : Reports no changes in dental health [Smoke Detector] : smoke detector [Carbon Monoxide Detector] : carbon monoxide detector [Guns at Home] : no guns at home [Safety elements used in home] : safety elements used in home [Seat Belt] :  uses seat belt

## 2023-06-27 ENCOUNTER — RX RENEWAL (OUTPATIENT)
Age: 78
End: 2023-06-27

## 2023-06-28 ENCOUNTER — OUTPATIENT (OUTPATIENT)
Dept: OUTPATIENT SERVICES | Facility: HOSPITAL | Age: 78
LOS: 1 days | Discharge: HOME | End: 2023-06-28

## 2023-06-28 DIAGNOSIS — C20 MALIGNANT NEOPLASM OF RECTUM: ICD-10-CM

## 2023-06-28 DIAGNOSIS — Z95.2 PRESENCE OF PROSTHETIC HEART VALVE: Chronic | ICD-10-CM

## 2023-07-05 ENCOUNTER — APPOINTMENT (OUTPATIENT)
Dept: HEMATOLOGY ONCOLOGY | Facility: CLINIC | Age: 78
End: 2023-07-05

## 2023-07-05 ENCOUNTER — RESULT REVIEW (OUTPATIENT)
Age: 78
End: 2023-07-05

## 2023-07-05 LAB
BASOPHILS # BLD AUTO: 0.01 K/UL — SIGNIFICANT CHANGE UP (ref 0–0.2)
BASOPHILS NFR BLD AUTO: 0.2 % — SIGNIFICANT CHANGE UP (ref 0–2)
EOSINOPHIL # BLD AUTO: 0.04 K/UL — SIGNIFICANT CHANGE UP (ref 0–0.5)
EOSINOPHIL NFR BLD AUTO: 0.8 % — SIGNIFICANT CHANGE UP (ref 0–6)
HCT VFR BLD CALC: 39.6 % — SIGNIFICANT CHANGE UP (ref 39–50)
HGB BLD-MCNC: 14 G/DL — SIGNIFICANT CHANGE UP (ref 13–17)
IMM GRANULOCYTES NFR BLD AUTO: 0.2 % — SIGNIFICANT CHANGE UP (ref 0–0.9)
LYMPHOCYTES # BLD AUTO: 1.02 K/UL — SIGNIFICANT CHANGE UP (ref 1–3.3)
LYMPHOCYTES # BLD AUTO: 20.9 % — SIGNIFICANT CHANGE UP (ref 13–44)
MCHC RBC-ENTMCNC: 33.3 PG — SIGNIFICANT CHANGE UP (ref 27–34)
MCHC RBC-ENTMCNC: 35.4 G/DL — SIGNIFICANT CHANGE UP (ref 32–36)
MCV RBC AUTO: 94.1 FL — SIGNIFICANT CHANGE UP (ref 80–100)
MONOCYTES # BLD AUTO: 0.41 K/UL — SIGNIFICANT CHANGE UP (ref 0–0.9)
MONOCYTES NFR BLD AUTO: 8.4 % — SIGNIFICANT CHANGE UP (ref 2–14)
NEUTROPHILS # BLD AUTO: 3.4 K/UL — SIGNIFICANT CHANGE UP (ref 1.8–7.4)
NEUTROPHILS NFR BLD AUTO: 69.5 % — SIGNIFICANT CHANGE UP (ref 43–77)
NRBC # BLD: 0 /100 WBCS — SIGNIFICANT CHANGE UP (ref 0–0)
PLATELET # BLD AUTO: 163 K/UL — SIGNIFICANT CHANGE UP (ref 150–400)
RBC # BLD: 4.21 M/UL — SIGNIFICANT CHANGE UP (ref 4.2–5.8)
RBC # FLD: 12.9 % — SIGNIFICANT CHANGE UP (ref 10.3–14.5)
WBC # BLD: 4.89 K/UL — SIGNIFICANT CHANGE UP (ref 3.8–10.5)
WBC # FLD AUTO: 4.89 K/UL — SIGNIFICANT CHANGE UP (ref 3.8–10.5)

## 2023-07-06 LAB
ALBUMIN SERPL ELPH-MCNC: 4.3 G/DL
ALP BLD-CCNC: 72 U/L
ALT SERPL-CCNC: 19 U/L
ANION GAP SERPL CALC-SCNC: 13 MMOL/L
AST SERPL-CCNC: 22 U/L
BILIRUB SERPL-MCNC: 1.1 MG/DL
BUN SERPL-MCNC: 15 MG/DL
CALCIUM SERPL-MCNC: 9.3 MG/DL
CEA SERPL-MCNC: 1.6 NG/ML
CHLORIDE SERPL-SCNC: 97 MMOL/L
CO2 SERPL-SCNC: 24 MMOL/L
CREAT SERPL-MCNC: 1.05 MG/DL
EGFR: 72 ML/MIN/1.73M2
GLUCOSE SERPL-MCNC: 115 MG/DL
POTASSIUM SERPL-SCNC: 5.1 MMOL/L
PROT SERPL-MCNC: 6.7 G/DL
SODIUM SERPL-SCNC: 134 MMOL/L

## 2023-07-10 ENCOUNTER — APPOINTMENT (OUTPATIENT)
Dept: UROLOGY | Facility: CLINIC | Age: 78
End: 2023-07-10
Payer: MEDICARE

## 2023-07-10 DIAGNOSIS — R35.0 FREQUENCY OF MICTURITION: ICD-10-CM

## 2023-07-10 DIAGNOSIS — R31.0 GROSS HEMATURIA: ICD-10-CM

## 2023-07-10 DIAGNOSIS — N13.8 BENIGN PROSTATIC HYPERPLASIA WITH LOWER URINARY TRACT SYMPMS: ICD-10-CM

## 2023-07-10 DIAGNOSIS — R10.30 LOWER ABDOMINAL PAIN, UNSPECIFIED: ICD-10-CM

## 2023-07-10 DIAGNOSIS — K60.3 ANAL FISTULA: ICD-10-CM

## 2023-07-10 DIAGNOSIS — N43.3 HYDROCELE, UNSPECIFIED: ICD-10-CM

## 2023-07-10 DIAGNOSIS — N30.40 IRRADIATION CYSTITIS W/OUT HEMATURIA: ICD-10-CM

## 2023-07-10 DIAGNOSIS — Z92.3 PERSONAL HISTORY OF IRRADIATION: ICD-10-CM

## 2023-07-10 DIAGNOSIS — Z85.048 PERSONAL HISTORY OF OTHER MALIGNANT NEOPLASM OF RECTUM, RECTOSIGMOID JUNCTION, AND ANUS: ICD-10-CM

## 2023-07-10 DIAGNOSIS — N40.1 BENIGN PROSTATIC HYPERPLASIA WITH LOWER URINARY TRACT SYMPMS: ICD-10-CM

## 2023-07-10 PROCEDURE — 99214 OFFICE O/P EST MOD 30 MIN: CPT | Mod: 95

## 2023-07-16 PROBLEM — Z85.048 HISTORY OF RECTAL CANCER: Status: ACTIVE | Noted: 2022-06-01

## 2023-07-16 PROBLEM — R10.30 GROIN PAIN: Status: ACTIVE | Noted: 2023-07-16

## 2023-07-16 PROBLEM — R35.0 URINE FREQUENCY: Status: ACTIVE | Noted: 2021-04-29

## 2023-07-16 PROBLEM — R31.0 GROSS HEMATURIA: Status: ACTIVE | Noted: 2023-03-01

## 2023-07-16 PROBLEM — K60.3 PERIANAL FISTULA: Status: ACTIVE | Noted: 2022-06-01

## 2023-07-16 PROBLEM — N30.40 RADIATION CYSTITIS: Status: ACTIVE | Noted: 2023-03-05

## 2023-07-16 PROBLEM — N43.3 HYDROCELE: Status: ACTIVE | Noted: 2023-07-16

## 2023-07-16 PROBLEM — N40.1 BENIGN LOCALIZED HYPERPLASIA OF PROSTATE WITH URINARY OBSTRUCTION: Status: ACTIVE | Noted: 2021-05-24

## 2023-07-16 NOTE — PHYSICAL EXAM
[General Appearance - Well Developed] : well developed [General Appearance - Well Nourished] : well nourished [Normal Appearance] : normal appearance [Well Groomed] : well groomed [General Appearance - In No Acute Distress] : no acute distress [Skin Color & Pigmentation] : normal skin color and pigmentation [] : no respiratory distress [Respiration, Rhythm And Depth] : normal respiratory rhythm and effort [Exaggerated Use Of Accessory Muscles For Inspiration] : no accessory muscle use [Oriented To Time, Place, And Person] : oriented to person, place, and time [Affect] : the affect was normal [Mood] : the mood was normal [Not Anxious] : not anxious [FreeTextEntry1] : Wears glasses.

## 2023-07-16 NOTE — HISTORY OF PRESENT ILLNESS
[FreeTextEntry1] : 05/24/2021: Mr. Dodd is a 78 y/o M presenting today for evaluation of urinary frequency and incomplete emptying PMHx of rectal cancer s/p transanal rectal resection 10/2020 and radiation therapy 2/2021. Has not seen urologist as he was told not to while he had healing surgical scar in rectum. States he is now able to have rectal exam if needed. Notes he has been on Tamsulosin once daily since 2004, but was increased to twice daily for frequent urination after the rectal surgery. Had woken up every 2 hours to urinate with urge, but did not leak. Notes he double voids with small volume the second time. Stream is strong. Takes Tamsulosin in afternoon and night. Denies dysuria. Urinates frequently during the day but is tolerable as he is able to reach the toilet on time. BMs were irregular after radiation, but is now normal and going 2x a day with soft stools. Takes Citrucel. PSHx mitral valve replacement, CABG, cardiac ablation. Takes Warfarin. Two daughters. One daughter is a pediatrician and the other is a teacher. \par \par 08/04/2021: The patient presents today for a follow up telephone visit for which he gave permission for. The patient has been taking Oxybutynin ER 10 mg once daily for urinary frequency in addition to tamsulosin 0.4 mg BID. The medication has shown significant improvement reducing his urinary frequency from once every 2 hours to once every 3 hours. He reports today that there is blood coming out of his left nostril repeatedly when he bends over to brush his teeth.  \par \par 03/01/2023: Mr. JOSEPHINE DODD presents today for an audio only telephone visit for which he gave permission for. The pt was located at home, [address] [place] [address], and I was located in my office in [place], [place]. He was at [facility] 2/19/23 for gross hematuria and dysuria. Discharged with Keflex 500 mg po q 6 h and told to follow up with urologist. Urine cx is < 10 k NICOL , grossly abnormal UA. No Renal imaging done.Creatinine was 0.95. +PMH Rectal cancer s/p Trans anal excision 10/2021, RT and gets MRI pelvis q 6 months and last MRI was benign from 12/23/22. He sees Dr. Connelly for Rectal cancer. Pt is scheduled for a cystoscopy tomorrow. He inquired on if he should take amoxicillin prior to the cysto as he has a mitral valve replacement. He states he has four tablets of the 500 mg left in the house. He reports that his gross hematuria resolved as of yesterday. Was on Cephalexin for 7 days, twice a day. The patient is on Warfarin and 81 mg of aspirin. Pt's daughter is a pediatrician. He is retired. Has two grandchildren. \par \par 03/02/2023: Mr. JOSEPHINE DODD presents today for cystoscopy to evaluate gross hematuria. He showed me a picture of the gross hematuria which was a deep read with clots present at the bottom. He is accompanied by his wife. The patient took his amoxicillin prophylaxis an hour and 15 minutes prior to the cystoscopy. The patient reports frequent urination at night, about every 2 hours. He is able to fall back asleep easily so it is not too bothersome. Daytime urination is a bit frequent, about 7x a day. Reports drinking a lot of water. Denies urinary urgency, pushing, and straining. Continues to take tamsulosin 0.4 mg BID. Was tried on oxybutynin in the past but experienced nose bleeds and discontinued it. Pt does admit to constipation, managed with citrucel powder. He does take a half a tablet of Imodium when his stool becomes too frequent and soft. Pt is retired. \par \par 03/21/2023: Mr. DODD presents today for a follow up audio only telehealth visit for which they gave permission for. The patient was located at home [address] [place] [address] and I was located in my office in [place], [place]. The patient obtained for a CT Urogram 3/15/23 prior to today's visit which revealed IMPRESSION:No renal stones, renal masses or evidence of a urothelial lesion. Right lateral urinary bladder diverticulum. The pt also obtained lab work 3/2/23. The CBC  revealed low RBC count at 3.76, low HGB at 12.7, and Low HCT at 36.1%. The CMP revealed elevated glucose at 113 and normal Creatinine at 1.03. Total testosterone is only very slightly low at 297.0 and free is moderately low at 4.8 neither needs supplementation at this time. PSA results is satisfactory at 2.06. Patient admits to having a micro valve as he is afib.\par He voids every 2-3 hours during the night and day. However, he states his stream is sufficient. He drinks about 8-9 cups of water a day. Patient denies dysuria, gross hematuria, burning, urgency, frequency, pushing, straining, or incontinence. Admits to taking Tamsulosin 0.4 mg.\par \par 07/10/2023: Mr. JOSEPHINE DODD presents today for an audio visual telehealth visit for which he gave permission for. The patient was located at home at [address] [place] [address] and I was located at my office in [place], [place]. The last few weeks he reports having some discomfort in the left groin, more so when he walks. The pain/discomfort is intermittent. Does not occur when he is sleeping. Occurs sometimes when he is sitting. When he palpates the area he does not feel anything. He does not see anything bulging. He describes it as a burning pain. CT of 3/15/2023 revealed bilateral fat-containing inguinal hernias. He reports he had a hernia repair on the left side in 1999. He denies any pain or discomfort on the right side. Pt had a hydrocele repair in 2006. He reports the testicles are normal when he touches them. Finds that one is slightly bigger than the other. Urination is good.

## 2023-07-16 NOTE — ASSESSMENT
[FreeTextEntry1] : 05/24/2021: Mr. Dodd is a 76y/o M presenting today for evaluation of urinary frequency and incomplete emptying PMHx of rectal cancer s/p transanal rectal resection 10/2020 and radiation therapy 2/2021. Has not seen urologist as he was told not to while he had healing surgical scar in rectum. States he is now able to have rectal exam if needed. Notes he has been on Tamsulosin once daily since 2004, but was increased to twice daily for frequent urination after the rectal surgery. Had woken up every 2 hours to urinate with urge, but did not leak. Notes he double voids with small volume the second time. Stream is strong. Takes Tamsulosin in afternoon and night. Denies dysuria. Urinates frequently during the day but is tolerable as he is able to reach the toilet on time. BMs were irregular after radiation, but is now normal and going 2x a day with soft stools. Takes Citrucel. PSHx mitral valve replacement, CABG, cardiac ablation. Takes Warfarin. Two daughters. One daughter is a pediatrician and the other is a teacher. \par \par Digital rectal exam found no suspicious rectal masses. No external hemorrhoids. Scar on left side anteriorly around 5:00.  No rectal mucosal lesions. Anal tone is normal. The prostate is non tender, with normal texture, and no nodules. Borders a little obscured. Decreased compliance in front anterior portion of rectal wall. It is a 15 gram transurethral resection size. No gross blood on examining fingers. \par \par I prescribed the patient Trospium 20 mg, one tablet every morning, for frequent urination. I informed the patient there may be constipation and dry mouth as a side effect. \par Patient gave permission to speak to daughter who is a pediatrician about patient's medical history. \par RTO in 3 weeks for reassessment. \par \par 08/04/2021: The patient presents today for a follow up telephone visit for which he gave permission for. The patient has been taking Oxybutynin ER 10 mg once daily for urinary frequency in addition to tamsulosin 0.4 mg BID. The medication has shown significant improvement reducing his urinary frequency from once every 2 hours to once every 3 hours. He reports today that there is blood coming out of his left nostril repeatedly when he bends over to brush his teeth.  \par \par I informed the patient that with oxybutynin, the membranes in his nasal passage ways can become dry. I instructed him to come off it for two weeks. If there is no improvement, I suggested he see an ENT. If there is improvement and it stops being off oxybutynin, he should discontinue the medication and I will consider giving Tolterodine.\par \par 03/01/2023: Mr. JOSEPHINE DODD presents today for an audio only telephone visit for which he gave permission for. The pt was located at home, [address] [place] [address], and I was located in my office in [place], [place]. He was at [facility] 2/19/23 for gross hematuria and dysuria. Discharged with Keflex 500 mg po q 6 h and told to follow up with urologist. Urine cx is < 10 k NICOL , grossly abnormal UA. No Renal imaging done.Creatinine was 0.95. +PMH Rectal cancer s/p Trans anal excision 10/2021, RT and gets MRI pelvis q 6 months and last MRI was benign from 12/23/22. He sees Dr. Connelly for Rectal cancer. Pt is scheduled for a cystoscopy tomorrow. He inquired on if he should take amoxicillin prior to the cysto as he has a mitral valve replacement. He states he has four tablets of the 500 mg left in the house. He reports that his gross hematuria resolved as of yesterday. Was on Cephalexin for 7 days, twice a day. The patient is on Warfarin and 81 mg of aspirin. Pt's daughter is a pediatrician. He is retired. Has two grandchildren. \par \par The patient was instructed to take 4 tablets of amoxicillin one hour prior to the cystoscopy for prophylaxis for mitral valve replacement. I informed him that I would also like him to get a refill on his prescription because he should also take another 4 tablets about 6 hours after the cystoscopy. Pt understood. Pt can continue taking his anticoagulation medications. Pt was informed he can eat in the morning. \par Discussed getting a CT Urogram in the near future, will discuss more tomorrow. \par Patient will RTO tomorrow for cystoscopy\par \par 03/02/2023: Mr. JOSEPHINE DODD presents today for cystoscopy to evaluate gross hematuria. He showed me a picture of the gross hematuria which was a deep read with clots present at the bottom. He is accompanied by his wife. The patient took his amoxicillin prophylaxis an hour and 15 minutes prior to the cystoscopy. The patient reports frequent urination at night, about every 2 hours. He is able to fall back asleep easily so it is not too bothersome. Daytime urination is a bit frequent, about 7x a day. Reports drinking a lot of water. Denies urinary urgency, pushing, and straining. Continues to take tamsulosin 0.4 mg BID. Was tried on oxybutynin in the past but experienced nose bleeds and discontinued it. Pt does admit to constipation, managed with citrucel powder. He does take a half a tablet of Imodium when his stool becomes too frequent and soft. Pt is retired. \par \par Cystoscopy: Lidocaine jelly was inserted for local anesthesia. Normal pink urothelium in the anterior urethra. Bladder mucosa is a normal pale pink color. Bladder has a low PVR. Hemorrhagic spots in the submucosa left over from bleeding. Inferiorly there are a few little spots of corkscrew vessels. Trigone from a retroflexed position is easily visible. Right ureteral orifice is U shaped when looked up from a retroflexed position. Small diverticulum wide mouth no tumors no stones that is posteriorly on the right side. Bit distant from the ureteral orifice. Left ureteral orifice is just made out, just below small lip of small median lobe. The bladder is 2+ trabeculated. No bladder stones. Right sided diverticulum 1.5 cm from bladder neck. Bladder distends well. Good capacity. No bladder tumors are visualized. Prostate has some bulging submucosal vessels posteriorly near the midline. Prostate does not ooze despite being in contact with the cystoscope. Prostate could potentially be a candidate for a TURP in the future if causes obstruction. No urethral strictures, stones, or neoplasms. No big patches of inflammation. Few glomerulations and a few spots of submucosal vessels resolving. 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 Pt is on aspirin 81 mg and warfarin 5 mg. Pt felt well after the cystoscopy. He denied any burning or gross hematuria after. \par \par Patient will take the amoxicillin 6 hours after the cystoscopy. \par The patient was instructed by his colo-rectal surgeon to not have a JAYLIN for a few years due to hx of rectal carcinoma. Reviewed 1/3/2023 MRI pelvis. Bladder looks fine on MRI. Also reviewed 5/17/2022 CT abdomen and pelvis. \par I am sending the patient for a CT urogram w/wo iv contrast.\par I advised the pt to take Colace, once a day, with a large glass of water for his constipation in addition to the Citrucel powder. I suggested that he might get liquid Imodium so he can take an even smaller dose if his stool becomes too soft and he needs to stop the BMs but not take too much to become constipated. \par The patient produced a urine sample which will be sent for urinalysis, urine cytology, and urine culture. \par Blood work today included BMP, alkaline phosphatase, PSA, and testosterone. \par Patient will schedule a telehealth visit shortly after obtaining the CT to review and discuss the results. Patient should then schedule a telehealth visit for 3 months after that, preceded by lab work at his nearest [facility]. \par \par 03/21/2023: Mr. DODD presents today for a follow up audio only telehealth visit for which they gave permission for. The patient was located at home [address] [place] [address] and I was located in my office in [place]. The patient obtained for a CT Urogram 3/15/23 prior to today's visit which revealed IMPRESSION:No renal stones, renal masses or evidence of a urothelial lesion. Right lateral urinary bladder diverticulum. The pt also obtained lab work 3/2/23. The CBC  revealed low RBC count at 3.76, low HGB at 12.7, and Low HCT at 36.1%. The CMP revealed elevated glucose at 113 and normal Creatinine at 1.03. Total testosterone is only very slightly low at 297.0 and free is moderately low at 4.8 neither needs supplementation at this time. PSA results is satisfactory at 2.06. Patient admits to having a micro valve as he is afib.\par He voids every 2-3 hours during the night and day. However, he states his stream is sufficient. He drinks about 8-9 cups of water a day. Patient denies dysuria, gross hematuria, burning, urgency, frequency, pushing, straining, or incontinence. Admits to taking Tamsulosin 0.4 mg.\par \par He will continue to take Tamsulosin 0.4mg twice a day. \par Encouraged the pt to inform his PCP of gallstones, however it is not alarming as he denies pain and is able to consume spicy food without complications.\par He is also to consult his PCP about obtaining consent for me to perform a JAYLIN. \par I informed the patient to exercise lightly and to drink a plethora of water. He is not to over exert himself if he notices gross hematuria. \par Clinical findings and CT Urogram results were reviewed at length with the patient. I personally reviewed the imaging myself.\par Pt will schedule a telehealth visit in 3 months unless clinically indicated. \par \par 07/10/2023: Mr. JOSEPHINE DODD presents today for an audio visual telehealth visit for which he gave permission for. The patient was located at home at [address] [place] [address] and I was located at my office in [place], [place]. The last few weeks he reports having some discomfort in the left groin, more so when he walks. The pain/discomfort is intermittent. Does not occur when he is sleeping. Occurs sometimes when he is sitting. When he palpates the area he does not feel anything. He does not see anything bulging. He describes it as a burning pain. CT of 3/15/2023 revealed bilateral fat-containing inguinal hernias. He reports he had a hernia repair on the left side in 1999. He denies any pain or discomfort on the right side. Pt had a hydrocele repair in 2006. He reports the testicles are normal when he touches them. Finds that one is slightly bigger than the other. Urination is good. \par \par I believe the patient's left hernia is becoming symptomatic. I am recommending he consult with a general surgeon for hernia repair. I provided him the name of Dr.Marc Neves of general surgery. \par \par Patient will RTO in the next few weeks for a scrotal US to check on hydrocele. \par \par Preparation, audio-visual visit, and coordination of care took : 30 minutes.

## 2023-07-16 NOTE — ADDENDUM
[FreeTextEntry1] : This note was authored by Stefanie Seaman working as a scribe for Dr.Gary Rivera. I, Dr. Daniel Rivera have reviewed the content of this note and confirm it is true and accurate. I personally performed the history and physical examination and made all the decisions 07/10/2023

## 2023-08-08 ENCOUNTER — APPOINTMENT (OUTPATIENT)
Dept: SURGERY | Facility: CLINIC | Age: 78
End: 2023-08-08
Payer: MEDICARE

## 2023-08-08 VITALS
TEMPERATURE: 95.6 F | OXYGEN SATURATION: 99 % | DIASTOLIC BLOOD PRESSURE: 83 MMHG | RESPIRATION RATE: 17 BRPM | SYSTOLIC BLOOD PRESSURE: 158 MMHG | HEART RATE: 85 BPM | WEIGHT: 162 LBS | BODY MASS INDEX: 26.99 KG/M2 | HEIGHT: 65 IN

## 2023-08-08 DIAGNOSIS — K40.90 UNILATERAL INGUINAL HERNIA, W/OUT OBSTRUCTION OR GANGRENE, NOT SPECIFIED AS RECURRENT: ICD-10-CM

## 2023-08-08 PROCEDURE — 45330 DIAGNOSTIC SIGMOIDOSCOPY: CPT

## 2023-08-08 PROCEDURE — 99213 OFFICE O/P EST LOW 20 MIN: CPT | Mod: 25

## 2023-08-08 NOTE — PHYSICAL EXAM
[No HSM] : no hepatosplenomegaly [Wheezing] : no wheezing was heard [Normal Rate and Rhythm] : normal rate and rhythm [No Rash or Lesion] : No rash or lesion [Alert] : alert [Calm] : calm [de-identified] : Soft, nontender, small reducible recurrent left inguinal hernia.  No palpable left femoral hernia.  No palpable right inguinal hernia. [de-identified] : Digital rectal examination reveals normal anal rectal tone.  There is no fissure fistula or abscess.  There is scarring in the anterior midline with no palpable recurrent tumor.  Proctoscopy reveals a scar in the anterior midline with no evidence of recurrent tumor. [de-identified] : nad [de-identified] : nl

## 2023-08-08 NOTE — HISTORY OF PRESENT ILLNESS
[FreeTextEntry1] : JOSEPHINE is a 77 year old male being seen for a follow up visit, flex [facility] and [facility].  Disease: rectal cancer   Pathology: adenocarcinoma   TNM stage: T1, Nx, M0 AJCC Stage: 1  RT completed in 2021  S/p transanal excision of distal rectal mass on 4/27/22. Pathology; 1. Rectum, distal, mass - Colorectal mucosa and submucosa with fibrosis and degenerative changes. Colonic glandular and adjacent squamous mucosa is negative for dysplasia. Lesion at the anorectal junction. Biopsy demonstrated adenocarcinoma. The lesion was felt to be either T1 versus early T2 on preoperative imaging. The lesion was mobile approximately 2 cm in diameter, located in the anterior position just at the level of the dentate line. There was no evidence of metastatic disease on preoperative workup, therefore transanal excision was performed on 10/15/20.  CT A+P on 5/17/22 - Postoperative changes of the lower rectum/anal canal. A subtle linear tract from the inferior anal canal anteriorly towards the perineum on the right contains a droplet of air and may correspond with patient's recurrent fistula. No associated abscess.  MR Pelvis/Rectal/Anal on 5/22/23 - Since the prior examination 12/23/2022, the primary tumor shows: Complete/near complete response. A linear focus of diffusion restriction along the left lateral lower rectal wall without corresponding mass or abnormal enhancement is nonspecific, but favored to reflect postprocedural edema. Correlate with clinical exam/direct visualization. Suspicious Mesorectal Lymph Nodes: No. Suspicious Extra Mesorectal Lymph Nodes: No.  Today pt reports no pain. Daily 3-4 BMs within 2 hours, starts off hard then becomes formed, no straining, denies pain, no bleeding, no episodes of incontinence, and denies feeling swollen or prolapsed tissue. Does have pain of left groin area (that comes and goes ever few days) especially while walking. Doesn't see a bulge in area. Denies nausea or vomiting. Denies fevers or chills. Hx of LIH repair 20+ years ago. Takes baby aspirin and warfarin.

## 2023-08-08 NOTE — ASSESSMENT
[FreeTextEntry1] : In summary the patient is doing well.  He has a history of rectal cancer treated with transanal excision.  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  His fistula ultimately healed.  He underwent transanal excision of his scar a year ago for suspected local recurrence.  This demonstrated no evidence of local recurrence and he developed a wound infection postoperatively.  This ultimately healed.  Most recent CT and pelvic MRI demonstrated no evidence of metastatic disease or local recurrence.  Digital examination and proctoscopy in the office today reveal a benign scar in the anterior midline with no evidence of local recurrence.  I will see him in 6 months for a surveillance sigmoidoscopy.  He also has mild discomfort in his left groin and has a small reducible nontender recurrent left inguinal hernia.  He had a previous left inguinal hernia repair in the 1990s by .  I will be obtaining his previous operative note but suspect that this may have been a shoulder ice repair.  Either way the patient is minimally symptomatic and the hernia is small.  He is undergoing evaluation for his aortic stenosis and will be undergoing an echo in the near future.  If his hernia remains minimally symptomatic I would recommend observation.  If it becomes larger or more symptomatic that I will ultimately recommend elective repair.  I will reevaluate his recurrent left inguinal hernia at his next office visit in 6 mo.  I instructed him to call me before that should he develop r worsening pain or swelling in the left groin

## 2023-08-10 ENCOUNTER — APPOINTMENT (OUTPATIENT)
Dept: CARDIOLOGY | Facility: CLINIC | Age: 78
End: 2023-08-10
Payer: MEDICARE

## 2023-08-10 ENCOUNTER — OUTPATIENT (OUTPATIENT)
Dept: OUTPATIENT SERVICES | Facility: HOSPITAL | Age: 78
LOS: 1 days | End: 2023-08-10
Payer: MEDICARE

## 2023-08-10 VITALS
OXYGEN SATURATION: 97 % | DIASTOLIC BLOOD PRESSURE: 77 MMHG | HEART RATE: 70 BPM | SYSTOLIC BLOOD PRESSURE: 119 MMHG | TEMPERATURE: 98.1 F | RESPIRATION RATE: 16 BRPM

## 2023-08-10 DIAGNOSIS — I35.0 NONRHEUMATIC AORTIC (VALVE) STENOSIS: ICD-10-CM

## 2023-08-10 DIAGNOSIS — Z95.2 PRESENCE OF PROSTHETIC HEART VALVE: Chronic | ICD-10-CM

## 2023-08-10 DIAGNOSIS — R06.00 DYSPNEA, UNSPECIFIED: ICD-10-CM

## 2023-08-10 PROCEDURE — 93306 TTE W/DOPPLER COMPLETE: CPT | Mod: 26

## 2023-08-10 PROCEDURE — 99215 OFFICE O/P EST HI 40 MIN: CPT

## 2023-08-10 PROCEDURE — 93306 TTE W/DOPPLER COMPLETE: CPT

## 2023-08-19 PROBLEM — R06.00 DYSPNEA: Status: ACTIVE | Noted: 2022-05-10

## 2023-08-19 NOTE — HISTORY OF PRESENT ILLNESS
[FreeTextEntry1] : Mr. Guillaume returns to clinic today for follow up evaluation of aortic stenosis. He has a past medical history of CABG/MVR 5/13/2015, AFib, HTN, HLD, BPV, and PCI in 1997. He was seen last in April, however was awaiting transanal excision of rectal mass for Colon Ca and was advised to follow up with us for re-evaluation of aortic stenosis once recovered.  He is accompanied by his wife today. He says that, since we last saw him he is feeling well. He has some occasional dyspnea with moderate exertion (rushing, going upstairs, bending over to pick things up). He denies chest pain and chest pressure as well as syncope. He denies orthopnea.   Assessment/Plan Moderate Aortic Valve Stenosis --Reviewed with patient findings from recent TTE that demonstrates moderate aortic valve stenosis ([id number], m19.8mmHg, DVI 0.27, QUIANA 1.1cm2), mild AI, mild-moderate TR and bioprosthetic MV with mild intravalvular MR. Discussed with patient and his wife what is aortic valve stenosis. The natural pathophysiology of aortic valve stenosis was reviewed.  Theassociated signs and symptoms of severe aortic valve stenosis was reviewed. Different treatment options for severe symptomatic aortic valve stenosis was explained including medical therapy, TAVR and SAVR. The pros/cons and the risk/benefits of the various treatment options were gone over.  Recommend close follow-up with serial echocardiograms. If there is any change in his signs/symptoms he was told to please contact his medical team.  The patients aortic stenosis gradients are too low for his to considered for enrollment in the TAVR moderate symptomatic aortic valve stenosis/EXPAND II clincal trial. --Continue aspirin 81 mg daily. --Continue metoprolol tartrate 25 mg twice a day. --For his atrial fibrillation he is on Coumadin. Signs and symptoms bleeding were reviewed with the patient. --Continue rosuvastatin 10 mg daily, coenzyme Q 10 and Lovaza. --The importance of following a low salt fluid restricted diet was reviewed. --EKG performed due to history of coronary artery disease, atrial fibrillation and valvular disease.  All questions of the patient and his wife were addressed.  Recommend follow-up in 4 months with a repeat TTE.

## 2023-08-19 NOTE — ASSESSMENT
[FreeTextEntry1] : Mr Markell has symptomatic moderate AS, though the gradients do not meet criteria for the EXPAND II clinical trial. He is advised to follow up with us in 6 months with echo, but to call sooner should he experience worsening symptoms.

## 2023-08-19 NOTE — REASON FOR VISIT
[Structural Heart and Valve Disease] : structural heart and valve disease [FreeTextEntry3] : Dr. Draper

## 2023-08-19 NOTE — REVIEW OF SYSTEMS
[Dyspnea on exertion] : dyspnea during exertion [Feeling Fatigued] : feeling fatigued [Lower Ext Edema] : lower extremity edema [Palpitations] : no palpitations [Orthopnea] : no orthopnea [PND] : no PND [Abdominal Pain] : no abdominal pain [Change in Appetite] : no change in appetite [Dizziness] : no dizziness [Negative] : Heme/Lymph [FreeTextEntry3] : eyeglasses

## 2023-08-19 NOTE — PHYSICAL EXAM
[Irregularly Irregular] : irregularly irregular [Normal S1] : normal S1 [Normal S2] : normal S2 [II] : a grade 2 [No Pitting Edema] : no pitting edema present [2+] : left 2+ [Well Developed] : well developed [Well Nourished] : well nourished [Normal Rate] : normal [Rhythm Regular] : regular [No Murmur] : no murmurs heard [___ +] : bilateral [unfilled]U+ pitting edema to the ankles [Clear Lung Fields] : clear lung fields [Non Tender] : non-tender [Soft] : abdomen soft [Normal Gait] : normal gait [Normal] : no rash, no skin lesions [de-identified] : trace ankle edema bilaterally [de-identified] : Irregular irregular with II/VI crescendo decrescendo murmur heard loudest at right upper sternal border

## 2023-08-29 NOTE — ASU DISCHARGE PLAN (ADULT/PEDIATRIC) - FOR NEXT 24 HOURS DO NOT:
Statement Selected setting       Therapy Session Time      Session 1 Session 2   Time In 0905 1030   Time Out 0930 1105   Time Code Minutes 25 35   Individual Minutes 25 35     Timed Code Treatment Minutes: 60  Total Treatment Minutes: 60    Electronically Signed By:   Usama Knutson M.A.  CCC-SLP S.P. [id number]  Speech-Language Pathologist   8/29/2023 9:17 AM

## 2023-08-31 ENCOUNTER — RX RENEWAL (OUTPATIENT)
Age: 78
End: 2023-08-31

## 2023-09-22 ENCOUNTER — RX RENEWAL (OUTPATIENT)
Age: 78
End: 2023-09-22

## 2023-09-22 RX ORDER — METFORMIN ER 500 MG 500 MG/1
500 TABLET ORAL DAILY
Qty: 90 | Refills: 3 | Status: ACTIVE | COMMUNITY
Start: 2020-09-14 | End: 1900-01-01

## 2023-10-01 PROBLEM — Z92.3 HISTORY OF RADIATION THERAPY: Status: ACTIVE | Noted: 2021-05-24

## 2023-10-03 ENCOUNTER — APPOINTMENT (OUTPATIENT)
Dept: INTERNAL MEDICINE | Facility: CLINIC | Age: 78
End: 2023-10-03
Payer: MEDICARE

## 2023-10-03 PROCEDURE — 90662 IIV NO PRSV INCREASED AG IM: CPT

## 2023-10-03 PROCEDURE — G0008: CPT

## 2023-11-12 LAB
ALBUMIN SERPL ELPH-MCNC: 4.3 G/DL
ALP BLD-CCNC: 65 U/L
ALT SERPL-CCNC: 21 U/L
ANION GAP SERPL CALC-SCNC: 12 MMOL/L
APPEARANCE: CLEAR
AST SERPL-CCNC: 21 U/L
BILIRUB SERPL-MCNC: 1.2 MG/DL
BILIRUBIN URINE: NEGATIVE
BLOOD URINE: NEGATIVE
BUN SERPL-MCNC: 18 MG/DL
CALCIUM SERPL-MCNC: 8.9 MG/DL
CHLORIDE SERPL-SCNC: 99 MMOL/L
CHOLEST SERPL-MCNC: 129 MG/DL
CO2 SERPL-SCNC: 26 MMOL/L
COLOR: YELLOW
CREAT SERPL-MCNC: 1.06 MG/DL
EGFR: 71 ML/MIN/1.73M2
ESTIMATED AVERAGE GLUCOSE: 134 MG/DL
GLUCOSE QUALITATIVE U: NEGATIVE MG/DL
GLUCOSE SERPL-MCNC: 123 MG/DL
HBA1C MFR BLD HPLC: 6.3 %
HCT VFR BLD CALC: 40.9 %
HDLC SERPL-MCNC: 54 MG/DL
HGB BLD-MCNC: 14 G/DL
KETONES URINE: NEGATIVE MG/DL
LDLC SERPL CALC-MCNC: 55 MG/DL
LEUKOCYTE ESTERASE URINE: NEGATIVE
MCHC RBC-ENTMCNC: 33.1 PG
MCHC RBC-ENTMCNC: 34.2 GM/DL
MCV RBC AUTO: 96.7 FL
NITRITE URINE: NEGATIVE
NONHDLC SERPL-MCNC: 75 MG/DL
PH URINE: 6.5
PLATELET # BLD AUTO: 156 K/UL
POTASSIUM SERPL-SCNC: 4.1 MMOL/L
PROT SERPL-MCNC: 6.6 G/DL
PROTEIN URINE: NEGATIVE MG/DL
PSA SERPL-MCNC: 1.04 NG/ML
RBC # BLD: 4.23 M/UL
RBC # FLD: 13.2 %
SODIUM SERPL-SCNC: 137 MMOL/L
SPECIFIC GRAVITY URINE: 1.02
T4 FREE SERPL-MCNC: 1.5 NG/DL
TRIGL SERPL-MCNC: 111 MG/DL
TSH SERPL-ACNC: 2.86 UIU/ML
UROBILINOGEN URINE: 1 MG/DL
WBC # FLD AUTO: 6.9 K/UL

## 2023-11-13 ENCOUNTER — APPOINTMENT (OUTPATIENT)
Dept: INTERNAL MEDICINE | Facility: CLINIC | Age: 78
End: 2023-11-13
Payer: MEDICARE

## 2023-11-13 VITALS
TEMPERATURE: 98 F | WEIGHT: 162 LBS | OXYGEN SATURATION: 97 % | HEART RATE: 76 BPM | DIASTOLIC BLOOD PRESSURE: 89 MMHG | SYSTOLIC BLOOD PRESSURE: 153 MMHG | BODY MASS INDEX: 26.99 KG/M2 | HEIGHT: 65 IN

## 2023-11-13 DIAGNOSIS — E11.9 TYPE 2 DIABETES MELLITUS W/OUT COMPLICATIONS: ICD-10-CM

## 2023-11-13 DIAGNOSIS — I48.91 UNSPECIFIED ATRIAL FIBRILLATION: ICD-10-CM

## 2023-11-13 PROCEDURE — 99214 OFFICE O/P EST MOD 30 MIN: CPT | Mod: 25

## 2023-12-28 ENCOUNTER — OUTPATIENT (OUTPATIENT)
Dept: OUTPATIENT SERVICES | Facility: HOSPITAL | Age: 78
LOS: 1 days | Discharge: HOME | End: 2023-12-28

## 2023-12-28 DIAGNOSIS — C20 MALIGNANT NEOPLASM OF RECTUM: ICD-10-CM

## 2023-12-28 DIAGNOSIS — Z95.2 PRESENCE OF PROSTHETIC HEART VALVE: Chronic | ICD-10-CM

## 2024-01-05 ENCOUNTER — RESULT REVIEW (OUTPATIENT)
Age: 79
End: 2024-01-05

## 2024-01-05 ENCOUNTER — APPOINTMENT (OUTPATIENT)
Dept: HEMATOLOGY ONCOLOGY | Facility: CLINIC | Age: 79
End: 2024-01-05

## 2024-01-05 LAB
BASOPHILS # BLD AUTO: 0.02 K/UL — SIGNIFICANT CHANGE UP (ref 0–0.2)
BASOPHILS # BLD AUTO: 0.02 K/UL — SIGNIFICANT CHANGE UP (ref 0–0.2)
BASOPHILS NFR BLD AUTO: 0.4 % — SIGNIFICANT CHANGE UP (ref 0–2)
BASOPHILS NFR BLD AUTO: 0.4 % — SIGNIFICANT CHANGE UP (ref 0–2)
EOSINOPHIL # BLD AUTO: 0.03 K/UL — SIGNIFICANT CHANGE UP (ref 0–0.5)
EOSINOPHIL # BLD AUTO: 0.03 K/UL — SIGNIFICANT CHANGE UP (ref 0–0.5)
EOSINOPHIL NFR BLD AUTO: 0.6 % — SIGNIFICANT CHANGE UP (ref 0–6)
EOSINOPHIL NFR BLD AUTO: 0.6 % — SIGNIFICANT CHANGE UP (ref 0–6)
HCT VFR BLD CALC: 39.7 % — SIGNIFICANT CHANGE UP (ref 39–50)
HCT VFR BLD CALC: 39.7 % — SIGNIFICANT CHANGE UP (ref 39–50)
HGB BLD-MCNC: 14 G/DL — SIGNIFICANT CHANGE UP (ref 13–17)
HGB BLD-MCNC: 14 G/DL — SIGNIFICANT CHANGE UP (ref 13–17)
IMM GRANULOCYTES NFR BLD AUTO: 0.2 % — SIGNIFICANT CHANGE UP (ref 0–0.9)
IMM GRANULOCYTES NFR BLD AUTO: 0.2 % — SIGNIFICANT CHANGE UP (ref 0–0.9)
LYMPHOCYTES # BLD AUTO: 1.28 K/UL — SIGNIFICANT CHANGE UP (ref 1–3.3)
LYMPHOCYTES # BLD AUTO: 1.28 K/UL — SIGNIFICANT CHANGE UP (ref 1–3.3)
LYMPHOCYTES # BLD AUTO: 24.3 % — SIGNIFICANT CHANGE UP (ref 13–44)
LYMPHOCYTES # BLD AUTO: 24.3 % — SIGNIFICANT CHANGE UP (ref 13–44)
MCHC RBC-ENTMCNC: 34.3 PG — HIGH (ref 27–34)
MCHC RBC-ENTMCNC: 34.3 PG — HIGH (ref 27–34)
MCHC RBC-ENTMCNC: 35.3 G/DL — SIGNIFICANT CHANGE UP (ref 32–36)
MCHC RBC-ENTMCNC: 35.3 G/DL — SIGNIFICANT CHANGE UP (ref 32–36)
MCV RBC AUTO: 97.3 FL — SIGNIFICANT CHANGE UP (ref 80–100)
MCV RBC AUTO: 97.3 FL — SIGNIFICANT CHANGE UP (ref 80–100)
MONOCYTES # BLD AUTO: 0.37 K/UL — SIGNIFICANT CHANGE UP (ref 0–0.9)
MONOCYTES # BLD AUTO: 0.37 K/UL — SIGNIFICANT CHANGE UP (ref 0–0.9)
MONOCYTES NFR BLD AUTO: 7 % — SIGNIFICANT CHANGE UP (ref 2–14)
MONOCYTES NFR BLD AUTO: 7 % — SIGNIFICANT CHANGE UP (ref 2–14)
NEUTROPHILS # BLD AUTO: 3.56 K/UL — SIGNIFICANT CHANGE UP (ref 1.8–7.4)
NEUTROPHILS # BLD AUTO: 3.56 K/UL — SIGNIFICANT CHANGE UP (ref 1.8–7.4)
NEUTROPHILS NFR BLD AUTO: 67.5 % — SIGNIFICANT CHANGE UP (ref 43–77)
NEUTROPHILS NFR BLD AUTO: 67.5 % — SIGNIFICANT CHANGE UP (ref 43–77)
NRBC # BLD: 0 /100 WBCS — SIGNIFICANT CHANGE UP (ref 0–0)
NRBC # BLD: 0 /100 WBCS — SIGNIFICANT CHANGE UP (ref 0–0)
PLATELET # BLD AUTO: 154 K/UL — SIGNIFICANT CHANGE UP (ref 150–400)
PLATELET # BLD AUTO: 154 K/UL — SIGNIFICANT CHANGE UP (ref 150–400)
RBC # BLD: 4.08 M/UL — LOW (ref 4.2–5.8)
RBC # BLD: 4.08 M/UL — LOW (ref 4.2–5.8)
RBC # FLD: 13.3 % — SIGNIFICANT CHANGE UP (ref 10.3–14.5)
RBC # FLD: 13.3 % — SIGNIFICANT CHANGE UP (ref 10.3–14.5)
WBC # BLD: 5.27 K/UL — SIGNIFICANT CHANGE UP (ref 3.8–10.5)
WBC # BLD: 5.27 K/UL — SIGNIFICANT CHANGE UP (ref 3.8–10.5)
WBC # FLD AUTO: 5.27 K/UL — SIGNIFICANT CHANGE UP (ref 3.8–10.5)
WBC # FLD AUTO: 5.27 K/UL — SIGNIFICANT CHANGE UP (ref 3.8–10.5)

## 2024-01-08 LAB
ALBUMIN SERPL ELPH-MCNC: 4.3 G/DL
ALP BLD-CCNC: 73 U/L
ALT SERPL-CCNC: 21 U/L
ANION GAP SERPL CALC-SCNC: 12 MMOL/L
AST SERPL-CCNC: 23 U/L
BILIRUB SERPL-MCNC: 0.9 MG/DL
BUN SERPL-MCNC: 21 MG/DL
CALCIUM SERPL-MCNC: 9.5 MG/DL
CEA SERPL-MCNC: 1.9 NG/ML
CHLORIDE SERPL-SCNC: 100 MMOL/L
CO2 SERPL-SCNC: 25 MMOL/L
CREAT SERPL-MCNC: 1 MG/DL
EGFR: 76 ML/MIN/1.73M2
GLUCOSE SERPL-MCNC: 127 MG/DL
POTASSIUM SERPL-SCNC: 5 MMOL/L
PROT SERPL-MCNC: 7 G/DL
SODIUM SERPL-SCNC: 137 MMOL/L

## 2024-02-15 ENCOUNTER — RESULT REVIEW (OUTPATIENT)
Age: 79
End: 2024-02-15

## 2024-02-15 ENCOUNTER — OUTPATIENT (OUTPATIENT)
Dept: OUTPATIENT SERVICES | Facility: HOSPITAL | Age: 79
LOS: 1 days | End: 2024-02-15
Payer: MEDICARE

## 2024-02-15 ENCOUNTER — APPOINTMENT (OUTPATIENT)
Dept: CARDIOLOGY | Facility: CLINIC | Age: 79
End: 2024-02-15
Payer: MEDICARE

## 2024-02-15 VITALS
HEART RATE: 90 BPM | OXYGEN SATURATION: 98 % | SYSTOLIC BLOOD PRESSURE: 145 MMHG | DIASTOLIC BLOOD PRESSURE: 87 MMHG | RESPIRATION RATE: 20 BRPM

## 2024-02-15 DIAGNOSIS — E78.5 HYPERLIPIDEMIA, UNSPECIFIED: ICD-10-CM

## 2024-02-15 DIAGNOSIS — I34.0 NONRHEUMATIC MITRAL (VALVE) INSUFFICIENCY: ICD-10-CM

## 2024-02-15 DIAGNOSIS — Z95.2 PRESENCE OF PROSTHETIC HEART VALVE: Chronic | ICD-10-CM

## 2024-02-15 DIAGNOSIS — I10 ESSENTIAL (PRIMARY) HYPERTENSION: ICD-10-CM

## 2024-02-15 DIAGNOSIS — Z79.01 LONG TERM (CURRENT) USE OF ANTICOAGULANTS: ICD-10-CM

## 2024-02-15 DIAGNOSIS — I50.43 ACUTE ON CHRONIC COMBINED SYSTOLIC (CONGESTIVE) AND DIASTOLIC (CONGESTIVE) HEART FAILURE: ICD-10-CM

## 2024-02-15 DIAGNOSIS — I25.10 ATHEROSCLEROTIC HEART DISEASE OF NATIVE CORONARY ARTERY W/OUT ANGINA PECTORIS: ICD-10-CM

## 2024-02-15 PROCEDURE — C8929: CPT

## 2024-02-15 PROCEDURE — 99215 OFFICE O/P EST HI 40 MIN: CPT

## 2024-02-15 PROCEDURE — 93306 TTE W/DOPPLER COMPLETE: CPT | Mod: 26

## 2024-02-16 ENCOUNTER — NON-APPOINTMENT (OUTPATIENT)
Age: 79
End: 2024-02-16

## 2024-02-17 PROBLEM — E78.5 DYSLIPIDEMIA: Status: ACTIVE | Noted: 2022-05-10

## 2024-02-17 PROBLEM — Z79.01 ANTICOAGULANT LONG-TERM USE: Status: ACTIVE | Noted: 2023-04-08

## 2024-02-17 PROBLEM — I50.43 ACUTE ON CHRONIC COMBINED SYSTOLIC AND DIASTOLIC CONGESTIVE HEART FAILURE: Status: ACTIVE | Noted: 2024-02-17

## 2024-02-17 NOTE — HISTORY OF PRESENT ILLNESS
[FreeTextEntry1] : Mr. Guillaume returns to clinic today for follow up evaluation of aortic stenosis. He has a past medical history of CABG/MVR 5/13/2015, AFib, HTN, HLD, BPV, and PCI in 1997. He was seen last in August, his AS was in the moderate range and he was feeling well with no symptomatic progression. Since his last visit he reports a two-three-month progression of exertional dyspnea both when walking quickly or climbing stairs (NYHA II). He has noticed a progression of ankle edema over the past week most significantly in the last 2-3 days.  He is not on a diuretic.  Follows a low salt diet. Denies any chest pain/tightness/discomfort, PND, change in orthopnea, dizziness, light-headed sensation or palpitations.   Repeat echocardiogram today demonstrates progression of aortic stenosis to severe.    Assessment/Plan Severe symptomatic aortic valve stenosis Acute on chronic diastolic heart failure CAD s/p PCI with subsequent 2V CABG/MVR Chronic atrial fibrillation HTN HLD BPV  --Discussion was had with the patient, his wifeand his daughter (who was called after the visit on the phone) the changes in his clinical presentation and findings on TTE that demonstrated severe aortic valve stenosis.   --Explained to the patient and his family what is aortic valve stenosis.  The associated signs and symptoms of severe aortic valve stenosis was reviewed.  The natural pathophysiology of untreated severe aortic valve stenosis was discussed.   -The various treatment options for severe aortic valve stenosis was gone over including medical therapy, TAVR and SAVR.  The pros/cons and the/benefits of various treatment options were gone over.  Due to the patient age and comorbidities he is felt to be appropriate candidate to undergo TAVR. --The necessary workup prior to the TAVR procedure was gone over including carotid ultrasound, heart CTA (size his aortic valve and assess for anatomic suitability) and coronary angiogram.  Indications and these procedures were reviewed.  Plan for patient to undergo cardiac catheterization by his primary cardiologist/Dr. Galvan. -- Indications and details for the TAVR procedure reviewed.  Benefits and risks of the procedure reviewed.  Risks include but are not limited to infection, bleeding, arrhythmia, TIA/stroke, hemodynamic instability, vascular injury, need for urgent surgery and death. -- The patient previously has undergone 2V CABG/mitral valve replacement.  The patient's daughter was asked to please try to assist in trying to get operative report.  It sounds that following his surgery he was taken back to the operating urgently due to his significant bleed. -- The patient also developed bacteremia following an in office rectal mass biopsy after which she was diagnosed with colon cancer.   -- The patient is an acute on chronic diastolic heart failure.  It is recommended that he fluid restrict to less than 2 L a day and follow a low-sodium diet.  He was started on furosemide 20 mg daily.  Patient may need to be started on potassium and magnesium repletion. -- Continue metoprolol tartrate 25 mg twice a day. -- Continue Coumadin with goal INR between 2-3.  Coumadin will need to be held prior to his cardiac catheterization. --Continue aspirin 81 mg daily.  Signs incisional bleeding reviewed.  Told to avoid all NSAIDs. -- Continue rosuvastatin 10 mg daily, Lovaza and coenzyme Q 10.  All questions and concerns of the patient and his family were addressed.  The patient would like to proceed with the TAVR workup/intervention.  Findings and plan to be discussed with cardiology/Dr. Draper.

## 2024-02-17 NOTE — PHYSICAL EXAM
[Well Developed] : well developed [Well Nourished] : well nourished [Normal Rate] : normal [Irregularly Irregular] : irregularly irregular [II] : a grade 2 [___ +] : bilateral [unfilled]U+ pitting edema to the ankles [Clear Lung Fields] : clear lung fields [Soft] : abdomen soft [Non Tender] : non-tender [Normal Gait] : normal gait [Normal] : alert and oriented, normal memory [de-identified] : 2+ ankle edema bilaterally

## 2024-02-17 NOTE — CARDIOLOGY SUMMARY
[de-identified] : Reviewed with patient findings from recent TTE that demonstrates moderate aortic valve stenosis (r51ziSz, m19.8mmHg, DVI 0.27, QUIANA 1.1cm2), mild AI, mild-moderate TR and bioprosthetic MV with mild intravalvular MR. Discussed with patient and his wife what is aortic valve stenosis. The natural pathophysiology of aortic valve stenosis was reviewed. Theassociated signs and symptoms of severe aortic valve stenosis was reviewed. Different treatment options for severe symptomatic aortic valve stenosis was explained including medical therapy, TAVR and SAVR. The pros/cons and the risk/benefits of the various treatment options were gone over. Recommend close follow-up with serial echocardiograms. If there is any change in his signs/symptoms he was told to please contact his medical team. The patients aortic stenosis gradients are too low for his to considered for enrollment in the TAVR moderate symptomatic aortic valve stenosis/EXPAND II clincal trial.

## 2024-02-17 NOTE — REVIEW OF SYSTEMS
[Dyspnea on exertion] : dyspnea during exertion [Joint Pain] : joint pain [Negative] : Heme/Lymph [Fever] : no fever [Chills] : no chills [Feeling Fatigued] : not feeling fatigued [Chest Discomfort] : no chest discomfort [Lower Ext Edema] : no extremity edema [Palpitations] : no palpitations [Orthopnea] : no orthopnea [PND] : no PND [Abdominal Pain] : no abdominal pain [Change in Appetite] : no change in appetite [Dizziness] : no dizziness [FreeTextEntry3] : macular degeneration

## 2024-02-20 ENCOUNTER — OUTPATIENT (OUTPATIENT)
Dept: OUTPATIENT SERVICES | Facility: HOSPITAL | Age: 79
LOS: 1 days | End: 2024-02-20
Payer: MEDICARE

## 2024-02-20 ENCOUNTER — TRANSCRIPTION ENCOUNTER (OUTPATIENT)
Age: 79
End: 2024-02-20

## 2024-02-20 VITALS
DIASTOLIC BLOOD PRESSURE: 82 MMHG | SYSTOLIC BLOOD PRESSURE: 155 MMHG | TEMPERATURE: 98 F | RESPIRATION RATE: 17 BRPM | HEIGHT: 65 IN | HEART RATE: 71 BPM | OXYGEN SATURATION: 99 % | WEIGHT: 158.95 LBS

## 2024-02-20 VITALS
SYSTOLIC BLOOD PRESSURE: 137 MMHG | HEART RATE: 70 BPM | RESPIRATION RATE: 17 BRPM | DIASTOLIC BLOOD PRESSURE: 80 MMHG | OXYGEN SATURATION: 99 %

## 2024-02-20 DIAGNOSIS — Z95.2 PRESENCE OF PROSTHETIC HEART VALVE: Chronic | ICD-10-CM

## 2024-02-20 DIAGNOSIS — I25.10 ATHEROSCLEROTIC HEART DISEASE OF NATIVE CORONARY ARTERY WITHOUT ANGINA PECTORIS: ICD-10-CM

## 2024-02-20 LAB
ANION GAP SERPL CALC-SCNC: 15 MMOL/L — SIGNIFICANT CHANGE UP (ref 5–17)
APTT BLD: 34.2 SEC — SIGNIFICANT CHANGE UP (ref 24.5–35.6)
BUN SERPL-MCNC: 22 MG/DL — SIGNIFICANT CHANGE UP (ref 7–23)
CALCIUM SERPL-MCNC: 9.5 MG/DL — SIGNIFICANT CHANGE UP (ref 8.4–10.5)
CHLORIDE SERPL-SCNC: 96 MMOL/L — SIGNIFICANT CHANGE UP (ref 96–108)
CO2 SERPL-SCNC: 26 MMOL/L — SIGNIFICANT CHANGE UP (ref 22–31)
CREAT SERPL-MCNC: 1.08 MG/DL — SIGNIFICANT CHANGE UP (ref 0.5–1.3)
EGFR: 69 ML/MIN/1.73M2 — SIGNIFICANT CHANGE UP
GLUCOSE BLDC GLUCOMTR-MCNC: 101 MG/DL — HIGH (ref 70–99)
GLUCOSE SERPL-MCNC: 97 MG/DL — SIGNIFICANT CHANGE UP (ref 70–99)
HCT VFR BLD CALC: 44.5 % — SIGNIFICANT CHANGE UP (ref 39–50)
HGB BLD-MCNC: 15.3 G/DL — SIGNIFICANT CHANGE UP (ref 13–17)
INR BLD: 1.44 RATIO — HIGH (ref 0.85–1.18)
MCHC RBC-ENTMCNC: 33.9 PG — SIGNIFICANT CHANGE UP (ref 27–34)
MCHC RBC-ENTMCNC: 34.4 GM/DL — SIGNIFICANT CHANGE UP (ref 32–36)
MCV RBC AUTO: 98.7 FL — SIGNIFICANT CHANGE UP (ref 80–100)
NRBC # BLD: 0 /100 WBCS — SIGNIFICANT CHANGE UP (ref 0–0)
PLATELET # BLD AUTO: 177 K/UL — SIGNIFICANT CHANGE UP (ref 150–400)
POTASSIUM SERPL-MCNC: 3.4 MMOL/L — LOW (ref 3.5–5.3)
POTASSIUM SERPL-SCNC: 3.4 MMOL/L — LOW (ref 3.5–5.3)
PROTHROM AB SERPL-ACNC: 15 SEC — HIGH (ref 9.5–13)
RBC # BLD: 4.51 M/UL — SIGNIFICANT CHANGE UP (ref 4.2–5.8)
RBC # FLD: 13.3 % — SIGNIFICANT CHANGE UP (ref 10.3–14.5)
SODIUM SERPL-SCNC: 137 MMOL/L — SIGNIFICANT CHANGE UP (ref 135–145)
WBC # BLD: 7.14 K/UL — SIGNIFICANT CHANGE UP (ref 3.8–10.5)
WBC # FLD AUTO: 7.14 K/UL — SIGNIFICANT CHANGE UP (ref 3.8–10.5)

## 2024-02-20 PROCEDURE — 93005 ELECTROCARDIOGRAM TRACING: CPT

## 2024-02-20 PROCEDURE — 99152 MOD SED SAME PHYS/QHP 5/>YRS: CPT

## 2024-02-20 PROCEDURE — C1894: CPT

## 2024-02-20 PROCEDURE — 80048 BASIC METABOLIC PNL TOTAL CA: CPT

## 2024-02-20 PROCEDURE — 93010 ELECTROCARDIOGRAM REPORT: CPT

## 2024-02-20 PROCEDURE — C1887: CPT

## 2024-02-20 PROCEDURE — 93455 CORONARY ART/GRFT ANGIO S&I: CPT

## 2024-02-20 PROCEDURE — 85027 COMPLETE CBC AUTOMATED: CPT

## 2024-02-20 PROCEDURE — C1769: CPT

## 2024-02-20 PROCEDURE — 85730 THROMBOPLASTIN TIME PARTIAL: CPT

## 2024-02-20 PROCEDURE — 82962 GLUCOSE BLOOD TEST: CPT

## 2024-02-20 PROCEDURE — 85610 PROTHROMBIN TIME: CPT

## 2024-02-20 PROCEDURE — 93455 CORONARY ART/GRFT ANGIO S&I: CPT | Mod: 26

## 2024-02-20 RX ORDER — TAMSULOSIN HYDROCHLORIDE 0.4 MG/1
1 CAPSULE ORAL
Qty: 0 | Refills: 0 | DISCHARGE

## 2024-02-20 RX ORDER — AZILSARTAN KAMEDOXOMIL AND CHLORTHALIDONE 40; 12.5 MG/1; MG/1
1 TABLET ORAL
Qty: 0 | Refills: 0 | DISCHARGE

## 2024-02-20 RX ORDER — POTASSIUM CHLORIDE 20 MEQ
40 PACKET (EA) ORAL ONCE
Refills: 0 | Status: COMPLETED | OUTPATIENT
Start: 2024-02-20 | End: 2024-02-20

## 2024-02-20 RX ORDER — POTASSIUM CHLORIDE 20 MEQ
1 PACKET (EA) ORAL
Qty: 30 | Refills: 0
Start: 2024-02-20 | End: 2024-03-20

## 2024-02-20 RX ORDER — SODIUM CHLORIDE 9 MG/ML
1000 INJECTION INTRAMUSCULAR; INTRAVENOUS; SUBCUTANEOUS
Refills: 0 | Status: DISCONTINUED | OUTPATIENT
Start: 2024-02-20 | End: 2024-03-05

## 2024-02-20 RX ORDER — ROSUVASTATIN CALCIUM 5 MG/1
1 TABLET ORAL
Qty: 0 | Refills: 0 | DISCHARGE

## 2024-02-20 RX ORDER — ASPIRIN/CALCIUM CARB/MAGNESIUM 324 MG
325 TABLET ORAL ONCE
Refills: 0 | Status: COMPLETED | OUTPATIENT
Start: 2024-02-20 | End: 2024-02-20

## 2024-02-20 RX ADMIN — SODIUM CHLORIDE 100 MILLILITER(S): 9 INJECTION INTRAMUSCULAR; INTRAVENOUS; SUBCUTANEOUS at 09:19

## 2024-02-20 RX ADMIN — Medication 325 MILLIGRAM(S): at 08:07

## 2024-02-20 RX ADMIN — Medication 40 MILLIEQUIVALENT(S): at 07:52

## 2024-02-20 NOTE — H&P CARDIOLOGY - HISTORY OF PRESENT ILLNESS
Cardiologist: Dr. Jona Draper  Structuralist: Dr. 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 Patient's aortic stenosis was noted to be moderate 8/2023; however had since noted a progression of JOSUE when walking quickly or climbing stairs. He also notes progression of ankle edema. In light of pt's risk factors, CCS class II anginal-equivalent symptoms and abnormal TTE; pt referred to [facility] for recommended cardiac catheterization w/ possible intervention if clinically indicated as pre-op testing for TAVR. Denies headaches, changes in vision, dizziness, chest pain, palpitations, abdominal pain, N/V/D, leg pain, hematuria, BRBPR, melena or any other complaints. No implantable devices.    Review of studies:  TTE 2/15/24: LVEF 58%, LV wall thickness mildly increased, analysis of LV diastolic fxn/filling pressure challenging d/t prosthetic mitral valve, LA severely dilated, RA dilated. severe aortic stenosis (low flow, low gradient AS w/ preserved EF, LV stroke volume 55.5ml, peak transaortic velocity 3.14m/s, pGr 39.4 mmHg, mGr 23mmHg, LVOT/aortic valve VTI ratio 0.23, QUIANA 0.86cm²), trace AI, bioprosthetic valve present in mitral position, trace intravalvular MR, no paravalvular regurgitation, transmitral peak gradient 14.3mmHg and mean gradient 3.50mmHg, mild MR, mod TR, Aortic root at the sinuses of Valsalva is normal in size, measuring 3.90 cm (indexed 2.19 cm/m²), Aortic diameter at the sinotubular junction is dilated and effaced, measuring 3.8 cm. 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

## 2024-02-20 NOTE — ASU DISCHARGE PLAN (ADULT/PEDIATRIC) - NS MD DC FALL RISK RISK
For information on Fall & Injury Prevention, visit: [email or web address] OR  [email or web address] OR  https://www.cdc.gov/steadi/patient.html

## 2024-02-20 NOTE — ASU DISCHARGE PLAN (ADULT/PEDIATRIC) - ASU DC SPECIAL INSTRUCTIONSFT
Wound Care:   the day AFTER your procedure remove bandage GENTLY, and clean using  mild soap and gentle warm, water stream, pat dry. leave OPEN to air. YOU MAY SHOWER   DO NOT apply lotions, creams, ointments, powder, perfumes to your incision site  DO NOT SOAK your site for 1 week ( no baths, no pools, no tubs, etc...)  Check  your groin and/or wrist daily. A small amount of bruising, and soreness are normal    ACTIVITY: for 24 hours   - DO NOT DRIVE  - DO NOT make any important decisions or sign legal documents   - DO NOT operate heavy machineries   - you may resume sexual activity in 48 hours, unless otherwise instructed by your cardiologist     If your procedure was done through the WRIST: for the NEXT 3 DAYS  - avoid pushing, pulling, with that affected wrist   - avoid repeated movement of that hand and wrist ( eg: typing, hammering)  - DO NOT LIFT anything more than 5 lbs     If your procedure was done through the GROIN: for the NEXT 5 DAYS  - Limit climbing stairs, DO NOT soak in bathtub or pool  - no strenuous activities, pushing, pulling, straining  - Do not lift anything 10lbs or heavier     MEDICATION:   take your medications as explained ( see discharge paperwork)   If you received a STENT, you will be taking antiplatelet medications to KEEP YOUR STENT OPEN ( eg: Aspirin, Plavix, Brilinta, Effient, etc).  Take as prescribed DO NOT STOP taking them without consulting with your cardiologist first.     Follow heart healthy diet recommended by your doctor, , if you smoke STOP SMOKING ( may call [phone] for center of tobacco control if you need assistance)     CALL your doctor to make appointment in 2 WEEKS     ***CALL YOUR DOCTOR***  if you experience: fever, chills, body aches, or severe pain, swelling, redness, heat or yellow discharge at incision site  If you experience Bleeding or excruciating pain at the procedural site, swelling (golf ball size) at your procedural site  If you experience CHEST PAIN  If you experience extremity numbness, tingling, temperature change (of your procedural site)   If you are unable to reach your doctor, you may contact:   -Cardiology Office at [facility] at [phone] or   - [facility] [phone]  - [facility] [phone]

## 2024-02-20 NOTE — ASU DISCHARGE PLAN (ADULT/PEDIATRIC) - PROVIDER TOKENS
PROVIDER:[TOKEN:[[id number]],FOLLOWUP:[Routine],ESTABLISHEDPATIENT:[T]],PROVIDER:[TOKEN:[[id number]],FOLLOWUP:[1 month],ESTABLISHEDPATIENT:[T]]

## 2024-02-20 NOTE — ASU DISCHARGE PLAN (ADULT/PEDIATRIC) - CARE PROVIDER_API CALL
Kaiser Ragland  Interventional Cardiology  [address], [address] [place] [id number]  Phone: [phone]  Fax: [phone]  Established Patient  Follow Up Time: Routine    Jona Draper  Cardiology  [address] [place] [address]  Phone: [phone]  Fax: [phone]  Established Patient  Follow Up Time: 1 month

## 2024-02-20 NOTE — H&P CARDIOLOGY - NEGATIVE NEUROLOGICAL SYMPTOMS
I concur with the Admission Order and I certify that services are provided in accordance with Section 42 CFR § 412.3 no transient paralysis/no weakness/no paresthesias/no generalized seizures/no focal seizures/no syncope/no tremors/no vertigo/no loss of sensation/no difficulty walking/no headache/no loss of consciousness/no confusion

## 2024-02-20 NOTE — H&P CARDIOLOGY - NSICDXPASTMEDICALHX_GEN_ALL_CORE_FT
PAST MEDICAL HISTORY:  AF (atrial fibrillation) s/p ablation 2010- on Coumadin    After-cataract of both eyes 1996    AMD (age related macular degeneration) Right eye    Bacteremia due to Streptococcus 9/2020- on Ceftriaxone via PICC line x 6 weeks    BPH (Benign Prostatic Hyperplasia)     CAD (coronary artery disease)     CAD (coronary artery disease) stented coronary- LAD x 1 1997    Diverticulosis     H/O hemorrhoids     HTN (hypertension)     Hyperlipidemia     Rectal cancer 2020 had radiation 2021    Severe aortic stenosis echo 4/14/22    Type 2 diabetes mellitus

## 2024-02-23 PROBLEM — E11.9 TYPE 2 DIABETES MELLITUS WITHOUT COMPLICATIONS: Chronic | Status: ACTIVE | Noted: 2024-02-20

## 2024-02-27 ENCOUNTER — OUTPATIENT (OUTPATIENT)
Dept: OUTPATIENT SERVICES | Facility: HOSPITAL | Age: 79
LOS: 1 days | End: 2024-02-27
Payer: MEDICARE

## 2024-02-27 ENCOUNTER — RESULT REVIEW (OUTPATIENT)
Age: 79
End: 2024-02-27

## 2024-02-27 ENCOUNTER — APPOINTMENT (OUTPATIENT)
Dept: CARDIOTHORACIC SURGERY | Facility: CLINIC | Age: 79
End: 2024-02-27
Payer: MEDICARE

## 2024-02-27 ENCOUNTER — APPOINTMENT (OUTPATIENT)
Dept: CARDIOLOGY | Facility: CLINIC | Age: 79
End: 2024-02-27

## 2024-02-27 VITALS
HEART RATE: 78 BPM | RESPIRATION RATE: 14 BRPM | HEIGHT: 65.5 IN | SYSTOLIC BLOOD PRESSURE: 132 MMHG | WEIGHT: 156.97 LBS | TEMPERATURE: 98 F | OXYGEN SATURATION: 100 % | DIASTOLIC BLOOD PRESSURE: 84 MMHG

## 2024-02-27 VITALS
BODY MASS INDEX: 26.99 KG/M2 | RESPIRATION RATE: 18 BRPM | HEIGHT: 65 IN | SYSTOLIC BLOOD PRESSURE: 121 MMHG | DIASTOLIC BLOOD PRESSURE: 83 MMHG | WEIGHT: 162 LBS | TEMPERATURE: 99 F | OXYGEN SATURATION: 99 % | HEART RATE: 84 BPM

## 2024-02-27 DIAGNOSIS — Z00.00 ENCOUNTER FOR GENERAL ADULT MEDICAL EXAMINATION WITHOUT ABNORMAL FINDINGS: ICD-10-CM

## 2024-02-27 DIAGNOSIS — I35.0 NONRHEUMATIC AORTIC (VALVE) STENOSIS: ICD-10-CM

## 2024-02-27 DIAGNOSIS — Z29.9 ENCOUNTER FOR PROPHYLACTIC MEASURES, UNSPECIFIED: ICD-10-CM

## 2024-02-27 DIAGNOSIS — Z01.818 ENCOUNTER FOR OTHER PREPROCEDURAL EXAMINATION: ICD-10-CM

## 2024-02-27 DIAGNOSIS — Z98.890 OTHER SPECIFIED POSTPROCEDURAL STATES: Chronic | ICD-10-CM

## 2024-02-27 DIAGNOSIS — Z95.1 PRESENCE OF AORTOCORONARY BYPASS GRAFT: Chronic | ICD-10-CM

## 2024-02-27 DIAGNOSIS — Z95.2 PRESENCE OF PROSTHETIC HEART VALVE: Chronic | ICD-10-CM

## 2024-02-27 LAB
A1C WITH ESTIMATED AVERAGE GLUCOSE RESULT: 6 % — HIGH (ref 4–5.6)
ADD ON TEST-SPECIMEN IN LAB: SIGNIFICANT CHANGE UP
APTT BLD: 38.4 SEC — HIGH (ref 24.5–35.6)
BLD GP AB SCN SERPL QL: NEGATIVE — SIGNIFICANT CHANGE UP
BUN SERPL-MCNC: 23 MG/DL — SIGNIFICANT CHANGE UP (ref 7–23)
CALCIUM SERPL-MCNC: 9.6 MG/DL — SIGNIFICANT CHANGE UP (ref 8.4–10.5)
CHLORIDE SERPL-SCNC: 98 MMOL/L — SIGNIFICANT CHANGE UP (ref 96–108)
CO2 SERPL-SCNC: 26 MMOL/L — SIGNIFICANT CHANGE UP (ref 22–31)
CREAT SERPL-MCNC: 1.14 MG/DL — SIGNIFICANT CHANGE UP (ref 0.5–1.3)
EGFR: 65 ML/MIN/1.73M2 — SIGNIFICANT CHANGE UP
ESTIMATED AVERAGE GLUCOSE: 126 MG/DL — HIGH (ref 68–114)
GLUCOSE SERPL-MCNC: 106 MG/DL — HIGH (ref 70–99)
HCT VFR BLD CALC: 41.9 % — SIGNIFICANT CHANGE UP (ref 39–50)
HGB BLD-MCNC: 13.8 G/DL — SIGNIFICANT CHANGE UP (ref 13–17)
INR BLD: 1.7 RATIO — HIGH (ref 0.85–1.18)
MCHC RBC-ENTMCNC: 32.4 PG — SIGNIFICANT CHANGE UP (ref 27–34)
MCHC RBC-ENTMCNC: 32.9 GM/DL — SIGNIFICANT CHANGE UP (ref 32–36)
MCV RBC AUTO: 98.4 FL — SIGNIFICANT CHANGE UP (ref 80–100)
MRSA PCR RESULT.: SIGNIFICANT CHANGE UP
NRBC # BLD: 0 /100 WBCS — SIGNIFICANT CHANGE UP (ref 0–0)
NT-PROBNP SERPL-SCNC: 1807 PG/ML — HIGH (ref 0–300)
PLATELET # BLD AUTO: 181 K/UL — SIGNIFICANT CHANGE UP (ref 150–400)
POTASSIUM SERPL-MCNC: 4.3 MMOL/L — SIGNIFICANT CHANGE UP (ref 3.5–5.3)
POTASSIUM SERPL-SCNC: 4.3 MMOL/L — SIGNIFICANT CHANGE UP (ref 3.5–5.3)
PROTHROM AB SERPL-ACNC: 18.4 SEC — HIGH (ref 9.5–13)
RBC # BLD: 4.26 M/UL — SIGNIFICANT CHANGE UP (ref 4.2–5.8)
RBC # FLD: 13.2 % — SIGNIFICANT CHANGE UP (ref 10.3–14.5)
RH IG SCN BLD-IMP: POSITIVE — SIGNIFICANT CHANGE UP
S AUREUS DNA NOSE QL NAA+PROBE: SIGNIFICANT CHANGE UP
SODIUM SERPL-SCNC: 136 MMOL/L — SIGNIFICANT CHANGE UP (ref 135–145)
WBC # BLD: 7.11 K/UL — SIGNIFICANT CHANGE UP (ref 3.8–10.5)
WBC # FLD AUTO: 7.11 K/UL — SIGNIFICANT CHANGE UP (ref 3.8–10.5)

## 2024-02-27 PROCEDURE — 71275 CT ANGIOGRAPHY CHEST: CPT | Mod: 26,MH

## 2024-02-27 PROCEDURE — 74174 CTA ABD&PLVS W/CONTRAST: CPT | Mod: 26,MH

## 2024-02-27 PROCEDURE — 75574 CT ANGIO HRT W/3D IMAGE: CPT | Mod: 26,MH

## 2024-02-27 PROCEDURE — 93880 EXTRACRANIAL BILAT STUDY: CPT | Mod: 26

## 2024-02-27 PROCEDURE — 99204 OFFICE O/P NEW MOD 45 MIN: CPT

## 2024-02-27 RX ORDER — AZILSARTAN KAMEDOXOMIL AND CHLORTHALIDONE 40; 12.5 MG/1; MG/1
1 TABLET ORAL
Qty: 0 | Refills: 0 | DISCHARGE

## 2024-02-27 RX ORDER — METOPROLOL TARTRATE 50 MG
1 TABLET ORAL
Qty: 0 | Refills: 0 | DISCHARGE

## 2024-02-27 RX ORDER — ROSUVASTATIN CALCIUM 5 MG/1
1 TABLET ORAL
Qty: 0 | Refills: 0 | DISCHARGE

## 2024-02-27 RX ORDER — CEFUROXIME AXETIL 250 MG
1500 TABLET ORAL ONCE
Refills: 0 | Status: COMPLETED | OUTPATIENT
Start: 2024-03-01 | End: 2024-03-01

## 2024-02-27 RX ORDER — CHOLECALCIFEROL (VITAMIN D3) 125 MCG
1 CAPSULE ORAL
Qty: 0 | Refills: 0 | DISCHARGE

## 2024-02-27 NOTE — H&P PST ADULT - NSCAFFEAMTFREQ_GEN_ALL_CORE_SD
1-2 cups/cans per day Consent: Written consent obtained. Risks include but not limited to lid/brow ptosis, bruising, swelling, diplopia, temporary effect, incomplete chemical denervation.

## 2024-02-27 NOTE — PHYSICAL EXAM
[General Appearance - Alert] : alert [General Appearance - In No Acute Distress] : in no acute distress [General Appearance - Well Nourished] : well nourished [Neck Appearance] : the appearance of the neck was normal [General Appearance - Well Developed] : well developed [Jugular Venous Distention Increased] : there was no jugular-venous distention [Respiration, Rhythm And Depth] : normal respiratory rhythm and effort [] : no respiratory distress [Exaggerated Use Of Accessory Muscles For Inspiration] : no accessory muscle use [Auscultation Breath Sounds / Voice Sounds] : lungs were clear to auscultation bilaterally [Apical Impulse] : the apical impulse was normal [Heart Sounds] : normal S1 and S2 [Abdomen Soft] : soft [Abdomen Tenderness] : non-tender [Involuntary Movements] : no involuntary movements were seen [No Focal Deficits] : no focal deficits [Impaired Insight] : insight and judgment were intact [Oriented To Time, Place, And Person] : oriented to person, place, and time [Affect] : the affect was normal [Mood] : the mood was normal

## 2024-02-27 NOTE — H&P PST ADULT - CARDIOVASCULAR
details… Spironolactone Counseling: Patient advised regarding risks of diarrhea, abdominal pain, hyperkalemia, birth defects (for female patients), liver toxicity and renal toxicity. The patient may need blood work to monitor liver and kidney function and potassium levels while on therapy. The patient verbalized understanding of the proper use and possible adverse effects of spironolactone.  All of the patient's questions and concerns were addressed. regular rate and rhythm/S1 S2 present/murmur

## 2024-02-27 NOTE — H&P PST ADULT - HISTORY OF PRESENT ILLNESS
78 year old male, PMH of CABG/MVR on 5/13/2015 at [facility] with Dr. Zion Weathers, A-Fib, HTN, HLD, BPV, and PCI in 1997, he is followed by cardiologist Dr. Draper and referred today by Dr. Brent Ragland from [facility] for evaluation of his aortic stenosis. To review, PMH includes CABG/MVR 5/13/2015 at [facility] with Dr. Zion Weathers, AFib, HTN, HLD, BPV, and PCI in 1997. He has been followed by Dr. Ragland for his AS since August 2022 when his AS was deemed moderate. He was seen again by Dr. Ragland 2 weeks ago with complaints of two-three-month progression of exertional dyspnea both when walking quickly or climbing stairs. Per review of notes, he also endorses progression of ankle edema over recently as well.  ?  TTE 2/15/2023 showed PVG 39.4, MVG 23, QUIANA 0.86cm2 consistent with low flow, low gradient severe AS. Bioprosthetic valve is present in the mitral position. Trace intravalvular mitral regurgitation. There is no paravalvular regurgitation. The transmitral peak gradient is 14.3 mmHg and mean gradient is 3.50 mmHg at a heart rate of 85 bpm. There is mild mitral regurgitation, moderate tricuspid regurgitation.  ?  He presents today with wife. Reports for the past few months he has noticed more SOB with exertion. Not bad with walking flat but notices it more with inclines. Lives in a house, and reports SOB with walking up and down stairs over the past few months, gradually getting worse. Also has Afib and he initially thought his SOB was from his afib. He also reports recent swelling to LE. Was prescribed lasix 20mg three times a week recently with improvement in swelling. Denies CP, palpitations, weight gain, syncope, cough, fever or chills. On Warfarin followed by Dr. Draper, has home INR kit. NYHA II- III  ?  Had TAVR Ct this morning, cath last week. Scheduled for PST after visit toda  followed by cardiologist Dr. Draper and referred today by Dr. Brent Ragland from [facility] for evaluation of his aortic stenosis. To review, PMH includes CABG/MVR 5/13/2015 at [facility] with Dr. Zion Weathers, AFib, HTN, HLD, BPV, and PCI in 1997. He has been followed by Dr. Ragland for his AS since August 2022 when his AS was deemed moderate. He was seen again by Dr. Ragland 2 weeks ago with complaints of two-three-month progression of exertional dyspnea both when walking quickly or climbing stairs. Per review of notes, he also endorses progression of ankle edema over recently as well.  ?  TTE 2/15/2023 showed PVG 39.4, MVG 23, QUIANA 0.86cm2 consistent with low flow, low gradient severe AS. Bioprosthetic valve is present in the mitral position. Trace intravalvular mitral regurgitation. There is no paravalvular regurgitation. The transmitral peak gradient is 14.3 mmHg and mean gradient is 3.50 mmHg at a heart rate of 85 bpm. There is mild mitral regurgitation, moderate tricuspid regurgitation.  ?  He presents today with wife. Reports for the past few months he has noticed more SOB with exertion. Not bad with walking flat but notices it more with inclines. Lives in a house, and reports SOB with walking up and down stairs over the past few months, gradually getting worse. Also has Afib and he initially thought his SOB was from his afib. He also reports recent swelling to LE. Was prescribed lasix 20mg three times a week recently with improvement in swelling. Denies CP, palpitations, weight gain, syncope, cough, fever or chills. On Warfarin followed by Dr. Draper, has home INR kit. NYHA II- III  ?  Had TAVR Ct this morning, cath last week. Scheduled for PST after visit tojim 78 year old male, with significant PMH including CAD, CABG/Mitral Valve Replacement in 2015 at [facility], PCI/stent in 1997, A-Fib, (on warfarin, last dose 2/26), HTN, HLD, and PCI in 1997, Rectal Cancer, S/P Resection/XRT in 2021, with Aortic Stenosis. Patient reports known AS since August 2022 when his AS was deemed moderate. Recent TTE revealed severe Stenosis of Aortic Valve, he was subsequently referred to structural [facility] for evaluation. Patient reports 3 month progression of exertional dyspnea both when walking quickly or climbing stairs, as well as progression of ankle edema. He denies fever, chills, chest pain, palpitations, syncope, or near syncope. He presents to PST today for evaluation prior to scheduled TAVR on 3/1/2024.

## 2024-02-27 NOTE — H&P PST ADULT - REASON FOR ADMISSION
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 Responsible Provider: Naren Cabrera M.D.    Anesthesia Type: general ASA Status: 3 - Emergent          Final Anesthesia Type: general  Last vitals  BP   Blood Pressure: (!) 156/82 (Rn aware)    Temp   36.2 °C (97.2 °F)    Pulse   83   Resp   18    SpO2   100 %      Anesthesia Post Evaluation    Patient location during evaluation: PACU  Patient participation: complete - patient participated  Level of consciousness: awake and alert  Pain score: 10    Airway patency: patent  Anesthetic complications: no  Cardiovascular status: hemodynamically stable  Respiratory status: acceptable  Hydration status: euvolemic    PONV: none          No notable events documented.     Nurse Pain Score: 10 (NPRS)         TAVR

## 2024-02-27 NOTE — H&P PST ADULT - NSICDXPASTMEDICALHX_GEN_ALL_CORE_FT
PAST MEDICAL HISTORY:  AF (atrial fibrillation) s/p ablation 2010- on Coumadin    After-cataract of both eyes 1996    AMD (age related macular degeneration) Right eye    Bacteremia due to Streptococcus 9/2020- on Ceftriaxone via PICC line x 6 weeks    BPH (Benign Prostatic Hyperplasia)     CAD (coronary artery disease)     CAD (coronary artery disease) stented coronary- LAD x 1 1997    Diverticulosis     H/O hemorrhoids     HTN (hypertension)     Hyperlipidemia     Macular degeneration     Rectal cancer 2020 had radiation 2021    Severe aortic stenosis echo 4/14/22    Type 2 diabetes mellitus

## 2024-02-27 NOTE — H&P PST ADULT - ASSESSMENT
denies loose or removable teeth  CAPRINI VTE 2.0 SCORE [CLOT updated 2019]    AGE RELATED RISK FACTORS                                                       MOBILITY RELATED FACTORS  [ ] Age 41-60 years                                            (1 Point)                    [ ] Bed rest                                                        (1 Point)  [ ] Age: 61-74 years                                           (2 Points)                  [ ] Plaster cast                                                   (2 Points)  [ ] Age= 75 years                                              (3 Points)                    [ ] Bed bound for more than 72 hours                 (2 Points)    DISEASE RELATED RISK FACTORS                                               GENDER SPECIFIC FACTORS  [ ] Edema in the lower extremities                       (1 Point)              [ ] Pregnancy                                                     (1 Point)  [ ] Varicose veins                                               (1 Point)                     [ ] Post-partum < 6 weeks                                   (1 Point)             [ ] BMI > 25 Kg/m2                                            (1 Point)                     [ ] Hormonal therapy  or oral contraception          (1 Point)                 [ ] Sepsis (in the previous month)                        (1 Point)               [ ] History of pregnancy complications                 (1 point)  [ ] Pneumonia or serious lung disease                                               [ ] Unexplained or recurrent                     (1 Point)           (in the previous month)                               (1 Point)  [ ] Abnormal pulmonary function test                     (1 Point)                 SURGERY RELATED RISK FACTORS  [ ] Acute myocardial infarction                              (1 Point)               [ ]  Section                                             (1 Point)  [ ] Congestive heart failure (in the previous month)  (1 Point)      [ ] Minor surgery                                                  (1 Point)   [ ] Inflammatory bowel disease                             (1 Point)               [ ] Arthroscopic surgery                                        (2 Points)  [ ] Central venous access                                      (2 Points)                [ ] General surgery lasting more than 45 minutes (2 points)  [ ] Malignancy- Present or previous                   (2 Points)                [ ] Elective arthroplasty                                         (5 points)    [ ] Stroke (in the previous month)                          (5 Points)                                                                                                                                                           HEMATOLOGY RELATED FACTORS                                                 TRAUMA RELATED RISK FACTORS  [ ] Prior episodes of VTE                                     (3 Points)                [ ] Fracture of the hip, pelvis, or leg                       (5 Points)  [ ] Positive family history for VTE                         (3 Points)             [ ] Acute spinal cord injury (in the previous month)  (5 Points)  [ ] Prothrombin 26792 A                                     (3 Points)               [ ] Paralysis  (less than 1 month)                             (5 Points)  [ ] Factor V Leiden                                             (3 Points)                  [ ] Multiple Trauma within 1 month                        (5 Points)  [ ] Lupus anticoagulants                                     (3 Points)                                                           [ ] Anticardiolipin antibodies                               (3 Points)                                                       [ ] High homocysteine in the blood                      (3 Points)                                             [ ] Other congenital or acquired thrombophilia      (3 Points)                                                [ ] Heparin induced thrombocytopenia                  (3 Points)                                     Total Score [          ] denies loose or removable teeth  CAPRINI VTE 2.0 SCORE [CLOT updated 2019]    AGE RELATED RISK FACTORS                                                       MOBILITY RELATED FACTORS  [ ] Age 41-60 years                                            (1 Point)                    [ ] Bed rest                                                        (1 Point)  [ ] Age: 61-74 years                                           (2 Points)                  [ ] Plaster cast                                                   (2 Points)  [x] Age= 75 years                                              (3 Points)                    [ ] Bed bound for more than 72 hours                 (2 Points)    DISEASE RELATED RISK FACTORS                                               GENDER SPECIFIC FACTORS  [ ] Edema in the lower extremities                       (1 Point)              [ ] Pregnancy                                                     (1 Point)  [ ] Varicose veins                                               (1 Point)                     [ ] Post-partum < 6 weeks                                   (1 Point)             [x] BMI > 25 Kg/m2                                            (1 Point)                     [ ] Hormonal therapy  or oral contraception          (1 Point)                 [ ] Sepsis (in the previous month)                        (1 Point)               [ ] History of pregnancy complications                 (1 point)  [ ] Pneumonia or serious lung disease                                               [ ] Unexplained or recurrent                     (1 Point)           (in the previous month)                               (1 Point)  [ ] Abnormal pulmonary function test                     (1 Point)                 SURGERY RELATED RISK FACTORS  [ ] Acute myocardial infarction                              (1 Point)               [ ]  Section                                             (1 Point)  [ ] Congestive heart failure (in the previous month)  (1 Point)      [ ] Minor surgery                                                  (1 Point)   [ ] Inflammatory bowel disease                             (1 Point)               [ ] Arthroscopic surgery                                        (2 Points)  [ ] Central venous access                                      (2 Points)                [x] General surgery lasting more than 45 minutes (2 points)  [x] Malignancy- Present or previous                   (2 Points)                [ ] Elective arthroplasty                                         (5 points)    [ ] Stroke (in the previous month)                          (5 Points)                                                                                                                                                           HEMATOLOGY RELATED FACTORS                                                 TRAUMA RELATED RISK FACTORS  [ ] Prior episodes of VTE                                     (3 Points)                [ ] Fracture of the hip, pelvis, or leg                       (5 Points)  [ ] Positive family history for VTE                         (3 Points)             [ ] Acute spinal cord injury (in the previous month)  (5 Points)  [ ] Prothrombin 87320 A                                     (3 Points)               [ ] Paralysis  (less than 1 month)                             (5 Points)  [ ] Factor V Leiden                                             (3 Points)                  [ ] Multiple Trauma within 1 month                        (5 Points)  [ ] Lupus anticoagulants                                     (3 Points)                                                           [ ] Anticardiolipin antibodies                               (3 Points)                                                       [ ] High homocysteine in the blood                      (3 Points)                                             [ ] Other congenital or acquired thrombophilia      (3 Points)                                                [ ] Heparin induced thrombocytopenia                  (3 Points)                                     Total Score [ 8 ]

## 2024-02-27 NOTE — H&P PST ADULT - PROBLEM SELECTOR PLAN 1
TAVR  -labs as per PST protocol  -CT coronaries performed today  -bilateral carotid duplex ordered  -preop instructions provided  -instructed to hold metoprolol, furosemide, and metformin day of surgery  - warfarin last dose 2/26; CTS Provider made aware   -continue low dose aspirin  -surgical soap and instructions given. teach back performed  -ABO, pt/ptt/inr, and fingerstick on admit

## 2024-02-27 NOTE — H&P PST ADULT - NSICDXPASTSURGICALHX_GEN_ALL_CORE_FT
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

## 2024-02-28 NOTE — DISCHARGE NOTE PROVIDER - NSDCMRMEDTOKEN_GEN_ALL_CORE_FT
no lesions, no deformities, no traumatic injuries, no significant scars are present, chest wall non-tender, no masses present, breathing is unlabored without accessory muscle use,normal breath sounds acetaminophen 325 mg oral tablet: 2 tab(s) orally every 6 hours, As needed, Mild Pain (1 - 3), Moderate Pain (4 - 6)  amoxicillin-clavulanate 875 mg-125 mg oral tablet: 1 tab(s) orally every 12 hours MDD:Maximum daily 2 tabs; only one every 12 hours  aspirin 81 mg oral delayed release tablet: 1 tab(s) orally once a day (at bedtime)  Edarbyclor 40 mg-25 mg oral tablet: 1 tab(s) orally once a day (at bedtime)  ibuprofen 400 mg oral tablet: 1 tab(s) orally every 6 hours, As needed, Moderate Pain (4 - 6)  metFORMIN 500 mg oral tablet, extended release: 1 tab(s) orally once a day (at bedtime)  metoprolol tartrate 25 mg oral tablet: 1 tab(s) orally 2 times a day  oxyCODONE 5 mg oral tablet: 1 tab(s) orally every 6 hours MDD:Maximum daily 4 doses; ONLY one tablet every 6 hours  polyethylene glycol 3350 oral powder for reconstitution: 17 gram(s) orally once a day  rosuvastatin 5 mg oral capsule: 1 cap(s) orally once a day (at bedtime)  Sodium Chloride 1 g oral tablet: 1 tab(s) orally every 8 hours   tamsulosin 0.4 mg oral capsule: 1 cap(s) orally 2 times a day  warfarin 5 mg oral tablet: 1 tab(s) orally once a day (at bedtime)

## 2024-02-29 ENCOUNTER — TRANSCRIPTION ENCOUNTER (OUTPATIENT)
Age: 79
End: 2024-02-29

## 2024-03-01 ENCOUNTER — RESULT REVIEW (OUTPATIENT)
Age: 79
End: 2024-03-01

## 2024-03-01 ENCOUNTER — APPOINTMENT (OUTPATIENT)
Dept: CARDIOTHORACIC SURGERY | Facility: HOSPITAL | Age: 79
End: 2024-03-01

## 2024-03-01 ENCOUNTER — INPATIENT (INPATIENT)
Facility: HOSPITAL | Age: 79
LOS: 0 days | Discharge: HOME | DRG: 267 | End: 2024-03-02
Attending: STUDENT IN AN ORGANIZED HEALTH CARE EDUCATION/TRAINING PROGRAM | Admitting: STUDENT IN AN ORGANIZED HEALTH CARE EDUCATION/TRAINING PROGRAM
Payer: MEDICARE

## 2024-03-01 VITALS
HEART RATE: 59 BPM | WEIGHT: 154.1 LBS | DIASTOLIC BLOOD PRESSURE: 86 MMHG | RESPIRATION RATE: 16 BRPM | HEIGHT: 65.51 IN | SYSTOLIC BLOOD PRESSURE: 146 MMHG | TEMPERATURE: 98 F | OXYGEN SATURATION: 99 %

## 2024-03-01 DIAGNOSIS — I35.0 NONRHEUMATIC AORTIC (VALVE) STENOSIS: ICD-10-CM

## 2024-03-01 DIAGNOSIS — Z95.1 PRESENCE OF AORTOCORONARY BYPASS GRAFT: Chronic | ICD-10-CM

## 2024-03-01 DIAGNOSIS — Z95.2 PRESENCE OF PROSTHETIC HEART VALVE: Chronic | ICD-10-CM

## 2024-03-01 DIAGNOSIS — Z98.890 OTHER SPECIFIED POSTPROCEDURAL STATES: Chronic | ICD-10-CM

## 2024-03-01 DIAGNOSIS — Z95.2 PRESENCE OF PROSTHETIC HEART VALVE: ICD-10-CM

## 2024-03-01 LAB
APTT BLD: 34.6 SEC — SIGNIFICANT CHANGE UP (ref 24.5–35.6)
GLUCOSE BLDC GLUCOMTR-MCNC: 94 MG/DL — SIGNIFICANT CHANGE UP (ref 70–99)
GLUCOSE BLDC GLUCOMTR-MCNC: 97 MG/DL — SIGNIFICANT CHANGE UP (ref 70–99)
INR BLD: 1.35 RATIO — HIGH (ref 0.85–1.18)
PROTHROM AB SERPL-ACNC: 14.7 SEC — HIGH (ref 9.5–13)

## 2024-03-01 PROCEDURE — 93355 ECHO TRANSESOPHAGEAL (TEE): CPT | Mod: 26

## 2024-03-01 PROCEDURE — 93010 ELECTROCARDIOGRAM REPORT: CPT

## 2024-03-01 PROCEDURE — 93010 ELECTROCARDIOGRAM REPORT: CPT | Mod: 77

## 2024-03-01 PROCEDURE — 33361 REPLACE AORTIC VALVE PERQ: CPT | Mod: Q0,62

## 2024-03-01 PROCEDURE — 33361 REPLACE AORTIC VALVE PERQ: CPT | Mod: 62,Q0

## 2024-03-01 DEVICE — WIRE GUIDE EXCHANGE J TIP 3MM X 260CM: Type: IMPLANTABLE DEVICE | Status: FUNCTIONAL

## 2024-03-01 DEVICE — PACING CATH PACEL RIGHT HEART CURVE 5FR: Type: IMPLANTABLE DEVICE | Status: FUNCTIONAL

## 2024-03-01 DEVICE — CATH VASCULAR EXPO VENTRICULAR PIGTAIL CURVE (PIG 145) 0.045" X 5FR X 10CM: Type: IMPLANTABLE DEVICE | Status: FUNCTIONAL

## 2024-03-01 DEVICE — GUIDEWIRE AMPLATZ EXTRA-STIFF CURVED .035" X 260CM: Type: IMPLANTABLE DEVICE | Status: FUNCTIONAL

## 2024-03-01 DEVICE — VLV TRANS CATH W/COMM SYS SAPIEN 3 ULTRA 29MM: Type: IMPLANTABLE DEVICE | Status: FUNCTIONAL

## 2024-03-01 DEVICE — CATH VASCULAR EXPO EXPO AMPLATZ LEFT CURVE (AL1) 0.045" X 5FR X 100CM: Type: IMPLANTABLE DEVICE | Status: FUNCTIONAL

## 2024-03-01 DEVICE — SHEATH INTRODUCER TERUMO PINNACLE CORONARY 6FR X 10CM X 0.038" MINI WIRE: Type: IMPLANTABLE DEVICE | Status: FUNCTIONAL

## 2024-03-01 DEVICE — WIRE GD SAFARI2 275CM SML CRV BX/5: Type: IMPLANTABLE DEVICE | Status: FUNCTIONAL

## 2024-03-01 DEVICE — SUT PERCLOSE PROGLIDE 6FR: Type: IMPLANTABLE DEVICE | Status: FUNCTIONAL

## 2024-03-01 DEVICE — SHEATH INTRODUCER TERUMO PINNACLE CORONARY 8FR X 10CM X 0.038" MINI WIRE: Type: IMPLANTABLE DEVICE | Status: FUNCTIONAL

## 2024-03-01 DEVICE — CATH VASCULAR EXPO VENTRICULAR PIGTAIL CURVE (PIG) 0.045" X 5FR X 100CM: Type: IMPLANTABLE DEVICE | Status: FUNCTIONAL

## 2024-03-01 DEVICE — GWIRE STR .038X180 STIFF LONG TAPR: Type: IMPLANTABLE DEVICE | Status: FUNCTIONAL

## 2024-03-01 DEVICE — GWIRE GUID  0.035INX150CM: Type: IMPLANTABLE DEVICE | Status: FUNCTIONAL

## 2024-03-01 DEVICE — SYS VASCADE MVP VASCULAR CLOSURE 6-12F: Type: IMPLANTABLE DEVICE | Status: FUNCTIONAL

## 2024-03-01 DEVICE — CATH VASCULAR EXPO FEMORAL LEFT CURVE (FL4) 0.045" X 5FR X 100CM: Type: IMPLANTABLE DEVICE | Status: FUNCTIONAL

## 2024-03-01 RX ORDER — UBIDECARENONE 100 MG
2 CAPSULE ORAL
Refills: 0 | DISCHARGE

## 2024-03-01 RX ORDER — SODIUM CHLORIDE 9 MG/ML
3 INJECTION INTRAMUSCULAR; INTRAVENOUS; SUBCUTANEOUS EVERY 8 HOURS
Refills: 0 | Status: DISCONTINUED | OUTPATIENT
Start: 2024-03-01 | End: 2024-03-01

## 2024-03-01 RX ORDER — ROSUVASTATIN CALCIUM 5 MG/1
1 TABLET ORAL
Refills: 0 | DISCHARGE

## 2024-03-01 RX ORDER — ASPIRIN/CALCIUM CARB/MAGNESIUM 324 MG
81 TABLET ORAL
Qty: 0 | Refills: 0 | DISCHARGE

## 2024-03-01 RX ORDER — MULTIVIT-MIN/FERROUS GLUCONATE 9 MG/15 ML
1 LIQUID (ML) ORAL
Qty: 0 | Refills: 0 | DISCHARGE

## 2024-03-01 RX ORDER — ASPIRIN/CALCIUM CARB/MAGNESIUM 324 MG
1 TABLET ORAL
Qty: 0 | Refills: 0 | DISCHARGE

## 2024-03-01 RX ORDER — OMEGA-3 ACID ETHYL ESTERS 1 G
2 CAPSULE ORAL
Refills: 0 | DISCHARGE

## 2024-03-01 RX ORDER — LIDOCAINE HCL 20 MG/ML
0.2 VIAL (ML) INJECTION ONCE
Refills: 0 | Status: DISCONTINUED | OUTPATIENT
Start: 2024-03-01 | End: 2024-03-01

## 2024-03-01 RX ORDER — CEFUROXIME AXETIL 250 MG
1500 TABLET ORAL EVERY 8 HOURS
Refills: 0 | Status: COMPLETED | OUTPATIENT
Start: 2024-03-01 | End: 2024-03-02

## 2024-03-01 RX ORDER — CHLORHEXIDINE GLUCONATE 213 G/1000ML
1 SOLUTION TOPICAL ONCE
Refills: 0 | Status: DISCONTINUED | OUTPATIENT
Start: 2024-03-01 | End: 2024-03-01

## 2024-03-01 RX ORDER — WARFARIN SODIUM 2.5 MG/1
5 TABLET ORAL ONCE
Refills: 0 | Status: COMPLETED | OUTPATIENT
Start: 2024-03-01 | End: 2024-03-01

## 2024-03-01 RX ORDER — UBIDECARENONE 100 MG
2 CAPSULE ORAL
Qty: 0 | Refills: 0 | DISCHARGE

## 2024-03-01 RX ORDER — ASPIRIN/CALCIUM CARB/MAGNESIUM 324 MG
81 TABLET ORAL DAILY
Refills: 0 | Status: DISCONTINUED | OUTPATIENT
Start: 2024-03-01 | End: 2024-03-02

## 2024-03-01 RX ORDER — METFORMIN HYDROCHLORIDE 850 MG/1
1 TABLET ORAL
Refills: 0 | DISCHARGE

## 2024-03-01 RX ADMIN — Medication 100 MILLIGRAM(S): at 16:49

## 2024-03-01 RX ADMIN — Medication 100 MILLIGRAM(S): at 16:40

## 2024-03-01 RX ADMIN — WARFARIN SODIUM 5 MILLIGRAM(S): 2.5 TABLET ORAL at 20:52

## 2024-03-01 RX ADMIN — Medication 81 MILLIGRAM(S): at 16:40

## 2024-03-01 NOTE — PROGRESS NOTE ADULT - ASSESSMENT
78 year old male, with significant PMH including CAD, CABG/Mitral Valve Replacement in 2015 at [facility], PCI/stent in 1997, A-Fib, (on warfarin, last dose 2/26), HTN, HLD, and PCI in 1997, Rectal Cancer, S/P Resection/XRT in 2021, with Aortic Stenosis. S/P TAVR on 3/1/2024.

## 2024-03-01 NOTE — PRE-ANESTHESIA EVALUATION ADULT - NSANTHADDINFOFT_GEN_ALL_CORE
The patient was seen and evaluated in the holding area prior to entering the operating room; risks and benefits discussed.

## 2024-03-01 NOTE — PRE-ANESTHESIA EVALUATION ADULT - NSANTHPMHFT_GEN_ALL_CORE
78 year old male, with significant PMH including CAD, CABG/Mitral Valve Replacement in 2015 at [facility], PCI/stent in 1997, A-Fib, (on warfarin, last dose 2/26), HTN, HLD, and PCI in 1997, Rectal Cancer, S/P Resection/XRT in 2021, with Aortic Stenosis - today for TAVR

## 2024-03-01 NOTE — PATIENT PROFILE ADULT - DO YOU FEEL UNSAFE AT HOME, WORK, OR SCHOOL?

## 2024-03-01 NOTE — PATIENT PROFILE ADULT - HAVE YOU BEEN EATING POORLY BECAUSE OF A DECREASED APPETITE?
That is fine but I recommend doing it gradually.  Maybe start with 1 tablet twice a day for week or so and then increase to 3 times a day and then add the 2nd tablet at night No (0)

## 2024-03-01 NOTE — CHART NOTE - NSCHARTNOTEFT_GEN_A_CORE
HPI:  78 year old male, with significant PMH including CAD, CABG/Mitral Valve Replacement in 2015 at [facility], PCI/stent in 1997, A-Fib, (on warfarin, last dose 2/26), HTN, HLD, and PCI in 1997, Rectal Cancer, S/P Resection/XRT in 2021, with Aortic Stenosis. Patient reports known AS since August 2022 when his AS was deemed moderate. Recent TTE revealed severe Stenosis of Aortic Valve, he was subsequently referred to structural Heart Service for evaluation. Patient reports 3 month progression of exertional dyspnea both when walking quickly or climbing stairs, as well as progression of ankle edema. He denies fever, chills, chest pain, palpitations, syncope, or near syncope. He presents to PST today for evaluation prior to scheduled TAVR on 3/1/2024.      (27 Feb 2024 11:57)      Pt. is s/p  TAVR 29mm Sapien3 via RFA s/p Perclose x 2. Site soft to palpation. Site without pain, bleeding, or hematoma.  + palpable DP/PT pulses. - bruit  LFA s/p Perclose.  Site soft to palpation. Site without pain, bleeding, or hematoma.  + palpable DP/PT pulses. - bruit.  LFV temporary pacing wire removed in OR manual Vascade. Site soft to palpation. Site without pain, bleeding, or hematoma.  + palpable DP/PT pulses. - bruit  Rhythm- afib  in OR now 493 will need stepdown per Dr. Ragland  VSS  Antibiotics Cefuroxime at 0800 2 more doses  Coumadin 5 mg tonight  ASA/Plavix in 6 hrs.   Hold AV chantale blocking agents  Anesthesia -general anesthesia/sedation/IVF/pressors  TTE/MCOT in AM HPI:  78 year old male, with significant PMH including CAD, CABG/Mitral Valve Replacement in 2015 at [facility], PCI/stent in 1997, A-Fib, (on warfarin, last dose 2/26), HTN, HLD, and PCI in 1997, Rectal Cancer, S/P Resection/XRT in 2021, with Aortic Stenosis. Patient reports known AS since August 2022 when his AS was deemed moderate. Recent TTE revealed severe Stenosis of Aortic Valve, he was subsequently referred to structural Heart Service for evaluation. Patient reports 3 month progression of exertional dyspnea both when walking quickly or climbing stairs, as well as progression of ankle edema. He denies fever, chills, chest pain, palpitations, syncope, or near syncope. He presents to PST today for evaluation prior to scheduled TAVR on 3/1/2024.      (27 Feb 2024 11:57)      Pt. is s/p  TAVR 29mm Sapien3 via RFA s/p Perclose x 2. Site soft to palpation. Site without pain, bleeding, or hematoma.  + palpable DP/PT pulses. - bruit  LFA s/p Perclose.  Site soft to palpation. Site without pain, bleeding, or hematoma.  + palpable DP/PT pulses. - bruit.  LFV temporary pacing wire removed in OR manual Vascade. Site soft to palpation. Site without pain, bleeding, or hematoma.  + palpable DP/PT pulses. - bruit  Rhythm- afib  in OR now 493 will need stepdown per Dr. Ragland  VSS  Antibiotics Cefuroxime at 0800 2 more doses  Coumadin 5 mg tonight  ASA/Plavix in 6 hrs.   Hold AV chantale blocking agents  Anesthesia -sedation/IVF 500cc, IV Tylenol, Zofran  TTE/MCOT in AM

## 2024-03-01 NOTE — PROGRESS NOTE ADULT - SUBJECTIVE AND OBJECTIVE BOX
Subjective: "Hello"  Sitting up in bed  Family at bedside      Tele:  AFib 60           V/S:                    T(F): 97.9 (03-01-24 @ 16:00), Max: 97.9 (03-01-24 @ 09:30)  HR: 55 (03-01-24 @ 16:00) (51 - 80)  BP: 140/78 (03-01-24 @ 16:00) (88/53 - 146/86)  RR: 18 (03-01-24 @ 16:00) (15 - 18)  SpO2: 99% (03-01-24 @ 16:00) (97% - 100%)  Wt(kg): --      LV EF:      Labs:                  PT/INR - ( 01 Mar 2024 06:19 )   PT: 14.7 sec;   INR: 1.35 ratio         PTT - ( 01 Mar 2024 06:19 )  PTT:34.6 sec     CAPILLARY BLOOD GLUCOSE      POCT Blood Glucose.: 97 mg/dL (01 Mar 2024 09:49)  POCT Blood Glucose.: 94 mg/dL (01 Mar 2024 06:11)           CXR:    I&O's Detail    BOWEL MOVEMENT:  [ ] YES [x ] NO      Daily Height in cm: 166.4 (01 Mar 2024 07:32)    Daily   Medications:  aspirin enteric coated 81 milliGRAM(s) Oral daily  cefuroxime  IVPB 1500 milliGRAM(s) IV Intermittent every 8 hours  warfarin 5 milliGRAM(s) Oral once        Physical Exam:    Gen:  Awake, alert   RES : clear , no wheezing              CVS: Regular  rhythm. Normal S1/S2  Abd: Soft, non-distended. Bowel sounds present.  Ext:  no edema  B/L groin dsg intact             PAST MEDICAL & SURGICAL HISTORY:  CAD (coronary artery disease)      AF (atrial fibrillation)  s/p ablation 2010- on Coumadin      HTN (hypertension)      Hyperlipidemia      After-cataract of both eyes  1996      BPH (Benign Prostatic Hyperplasia)      Diverticulosis      H/O hemorrhoids      CAD (coronary artery disease)  stented coronary- LAD x 1 1997      AMD (age related macular degeneration)  Right eye      Bacteremia due to Streptococcus  9/2020- on Ceftriaxone via PICC line x 6 weeks      Rectal cancer  2020 had radiation 2021      Severe aortic stenosis  echo 4/14/22      Type 2 diabetes mellitus      Macular degeneration      Hx of coronary angioplasty  stent to LAD 5/30/1997      S/P ablation of atrial fibrillation  3/2010      S/P left inguinal hernia repair  2002      Retina disorder, 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

## 2024-03-02 ENCOUNTER — TRANSCRIPTION ENCOUNTER (OUTPATIENT)
Age: 79
End: 2024-03-02

## 2024-03-02 ENCOUNTER — RESULT REVIEW (OUTPATIENT)
Age: 79
End: 2024-03-02

## 2024-03-02 VITALS
SYSTOLIC BLOOD PRESSURE: 113 MMHG | DIASTOLIC BLOOD PRESSURE: 52 MMHG | HEART RATE: 81 BPM | RESPIRATION RATE: 97 BRPM | OXYGEN SATURATION: 98 %

## 2024-03-02 LAB
ADD ON TEST-SPECIMEN IN LAB: SIGNIFICANT CHANGE UP
ALBUMIN SERPL ELPH-MCNC: 3.6 G/DL — SIGNIFICANT CHANGE UP (ref 3.3–5)
ALP SERPL-CCNC: 61 U/L — SIGNIFICANT CHANGE UP (ref 40–120)
ALT FLD-CCNC: 15 U/L — SIGNIFICANT CHANGE UP (ref 10–45)
ANION GAP SERPL CALC-SCNC: 11 MMOL/L — SIGNIFICANT CHANGE UP (ref 5–17)
AST SERPL-CCNC: 29 U/L — SIGNIFICANT CHANGE UP (ref 10–40)
BASOPHILS # BLD AUTO: 0.02 K/UL — SIGNIFICANT CHANGE UP (ref 0–0.2)
BASOPHILS NFR BLD AUTO: 0.3 % — SIGNIFICANT CHANGE UP (ref 0–2)
BILIRUB SERPL-MCNC: 1.7 MG/DL — HIGH (ref 0.2–1.2)
BUN SERPL-MCNC: 22 MG/DL — SIGNIFICANT CHANGE UP (ref 7–23)
CALCIUM SERPL-MCNC: 8.7 MG/DL — SIGNIFICANT CHANGE UP (ref 8.4–10.5)
CHLORIDE SERPL-SCNC: 102 MMOL/L — SIGNIFICANT CHANGE UP (ref 96–108)
CO2 SERPL-SCNC: 23 MMOL/L — SIGNIFICANT CHANGE UP (ref 22–31)
CREAT SERPL-MCNC: 1.02 MG/DL — SIGNIFICANT CHANGE UP (ref 0.5–1.3)
EGFR: 74 ML/MIN/1.73M2 — SIGNIFICANT CHANGE UP
EOSINOPHIL # BLD AUTO: 0.04 K/UL — SIGNIFICANT CHANGE UP (ref 0–0.5)
EOSINOPHIL NFR BLD AUTO: 0.6 % — SIGNIFICANT CHANGE UP (ref 0–6)
GLUCOSE SERPL-MCNC: 101 MG/DL — HIGH (ref 70–99)
HCT VFR BLD CALC: 38 % — LOW (ref 39–50)
HGB BLD-MCNC: 13.2 G/DL — SIGNIFICANT CHANGE UP (ref 13–17)
IMM GRANULOCYTES NFR BLD AUTO: 0.1 % — SIGNIFICANT CHANGE UP (ref 0–0.9)
INR BLD: 1.27 RATIO — HIGH (ref 0.85–1.18)
LYMPHOCYTES # BLD AUTO: 1.13 K/UL — SIGNIFICANT CHANGE UP (ref 1–3.3)
LYMPHOCYTES # BLD AUTO: 15.6 % — SIGNIFICANT CHANGE UP (ref 13–44)
MAGNESIUM SERPL-MCNC: 1.9 MG/DL — SIGNIFICANT CHANGE UP (ref 1.6–2.6)
MCHC RBC-ENTMCNC: 33.3 PG — SIGNIFICANT CHANGE UP (ref 27–34)
MCHC RBC-ENTMCNC: 34.7 GM/DL — SIGNIFICANT CHANGE UP (ref 32–36)
MCV RBC AUTO: 96 FL — SIGNIFICANT CHANGE UP (ref 80–100)
MONOCYTES # BLD AUTO: 0.76 K/UL — SIGNIFICANT CHANGE UP (ref 0–0.9)
MONOCYTES NFR BLD AUTO: 10.5 % — SIGNIFICANT CHANGE UP (ref 2–14)
NEUTROPHILS # BLD AUTO: 5.29 K/UL — SIGNIFICANT CHANGE UP (ref 1.8–7.4)
NEUTROPHILS NFR BLD AUTO: 72.9 % — SIGNIFICANT CHANGE UP (ref 43–77)
NRBC # BLD: 0 /100 WBCS — SIGNIFICANT CHANGE UP (ref 0–0)
PLATELET # BLD AUTO: 128 K/UL — LOW (ref 150–400)
POTASSIUM SERPL-MCNC: 4.2 MMOL/L — SIGNIFICANT CHANGE UP (ref 3.5–5.3)
POTASSIUM SERPL-SCNC: 4.2 MMOL/L — SIGNIFICANT CHANGE UP (ref 3.5–5.3)
PROT SERPL-MCNC: 6.3 G/DL — SIGNIFICANT CHANGE UP (ref 6–8.3)
PROTHROM AB SERPL-ACNC: 13.9 SEC — HIGH (ref 9.5–13)
RBC # BLD: 3.96 M/UL — LOW (ref 4.2–5.8)
RBC # FLD: 12.8 % — SIGNIFICANT CHANGE UP (ref 10.3–14.5)
SODIUM SERPL-SCNC: 136 MMOL/L — SIGNIFICANT CHANGE UP (ref 135–145)
WBC # BLD: 7.25 K/UL — SIGNIFICANT CHANGE UP (ref 3.8–10.5)
WBC # FLD AUTO: 7.25 K/UL — SIGNIFICANT CHANGE UP (ref 3.8–10.5)

## 2024-03-02 PROCEDURE — C1887: CPT

## 2024-03-02 PROCEDURE — 93306 TTE W/DOPPLER COMPLETE: CPT

## 2024-03-02 PROCEDURE — C1769: CPT

## 2024-03-02 PROCEDURE — 75574 CT ANGIO HRT W/3D IMAGE: CPT

## 2024-03-02 PROCEDURE — 86901 BLOOD TYPING SEROLOGIC RH(D): CPT

## 2024-03-02 PROCEDURE — 74174 CTA ABD&PLVS W/CONTRAST: CPT

## 2024-03-02 PROCEDURE — 93355 ECHO TRANSESOPHAGEAL (TEE): CPT

## 2024-03-02 PROCEDURE — 71275 CT ANGIOGRAPHY CHEST: CPT

## 2024-03-02 PROCEDURE — 85610 PROTHROMBIN TIME: CPT

## 2024-03-02 PROCEDURE — 83735 ASSAY OF MAGNESIUM: CPT

## 2024-03-02 PROCEDURE — 86900 BLOOD TYPING SEROLOGIC ABO: CPT

## 2024-03-02 PROCEDURE — 85025 COMPLETE CBC W/AUTO DIFF WBC: CPT

## 2024-03-02 PROCEDURE — C1889: CPT

## 2024-03-02 PROCEDURE — 93005 ELECTROCARDIOGRAM TRACING: CPT

## 2024-03-02 PROCEDURE — 93880 EXTRACRANIAL BILAT STUDY: CPT

## 2024-03-02 PROCEDURE — 76000 FLUOROSCOPY <1 HR PHYS/QHP: CPT

## 2024-03-02 PROCEDURE — 86850 RBC ANTIBODY SCREEN: CPT

## 2024-03-02 PROCEDURE — 86923 COMPATIBILITY TEST ELECTRIC: CPT

## 2024-03-02 PROCEDURE — 93306 TTE W/DOPPLER COMPLETE: CPT | Mod: 26

## 2024-03-02 PROCEDURE — 80053 COMPREHEN METABOLIC PANEL: CPT

## 2024-03-02 PROCEDURE — 93010 ELECTROCARDIOGRAM REPORT: CPT

## 2024-03-02 PROCEDURE — C1760: CPT

## 2024-03-02 PROCEDURE — L8699: CPT

## 2024-03-02 PROCEDURE — 99238 HOSP IP/OBS DSCHRG MGMT 30/<: CPT

## 2024-03-02 PROCEDURE — G0463: CPT

## 2024-03-02 PROCEDURE — 82962 GLUCOSE BLOOD TEST: CPT

## 2024-03-02 PROCEDURE — C1894: CPT

## 2024-03-02 PROCEDURE — 85730 THROMBOPLASTIN TIME PARTIAL: CPT

## 2024-03-02 RX ORDER — FUROSEMIDE 40 MG
1 TABLET ORAL
Qty: 0 | Refills: 0 | DISCHARGE

## 2024-03-02 RX ORDER — METOPROLOL TARTRATE 50 MG
1 TABLET ORAL
Qty: 0 | Refills: 0 | DISCHARGE

## 2024-03-02 RX ORDER — WARFARIN SODIUM 2.5 MG/1
1 TABLET ORAL
Qty: 0 | Refills: 0 | DISCHARGE

## 2024-03-02 RX ADMIN — Medication 100 MILLIGRAM(S): at 05:03

## 2024-03-02 RX ADMIN — Medication 81 MILLIGRAM(S): at 12:37

## 2024-03-02 NOTE — DISCHARGE NOTE NURSING/CASE MANAGEMENT/SOCIAL WORK - NSDCPEFALRISK_GEN_ALL_CORE
For information on Fall & Injury Prevention, visit: [email or web address] OR  [email or web address] OR  https://www.cdc.gov/steadi/patient.html

## 2024-03-02 NOTE — DISCHARGE NOTE PROVIDER - NSDCMRMEDTOKEN_GEN_ALL_CORE_FT
aspirin 81 mg oral delayed release tablet: 1 tab(s) orally once a day (at bedtime)  CoQ10 300 mg oral capsule: 2 cap(s) orally once a day (at bedtime)  Lasix 20 mg oral tablet: 1 tab(s) orally Monday, Wednesday, and Friday  metFORMIN 500 mg oral tablet, extended release: 1 tab(s) orally once a day (at bedtime)  potassium chloride 10 mEq oral capsule, extended release: 1 cap(s) orally 3 times a week Monday, Wednesday and Friday  rosuvastatin 10 mg oral tablet: 1 tab(s) orally once a day (at bedtime)  warfarin 5 mg oral tablet: 1 tab(s) orally once a day (at bedtime) last dose 2/26/2024

## 2024-03-02 NOTE — DISCHARGE NOTE NURSING/CASE MANAGEMENT/SOCIAL WORK - PATIENT PORTAL LINK FT
You can access the FollowMyHealth Patient Portal offered by [facility] by registering at the following website: [email or web address]. By joining [employer]’s FollowMyHealth portal, you will also be able to view your health information using other applications (apps) compatible with our system.

## 2024-03-02 NOTE — DISCHARGE NOTE PROVIDER - HOSPITAL COURSE
78 year old male, with significant PMH including CAD, CABG/Mitral Valve Replacement in 2015 at [facility], PCI/stent in 1997, A-Fib, (on warfarin, last dose 2/26), HTN, HLD, and PCI in 1997, Rectal Cancer, S/P Resection/XRT in 2021, with Aortic Stenosis. S/P TAVR on 3/1/2024.  3/2 VSS ekg Qtc decreased to 462.  no bb- echo tavr well seated.  d/c plan home w/ MCOT

## 2024-03-02 NOTE — DISCHARGE NOTE PROVIDER - NSDCPNSUBOBJ_GEN_ALL_CORE
Physical Exam:    Gen:  Awake, alert   RES : clear , no wheezing              CVS: Regular  rhythm. Normal S1/S2  Abd: Soft, non-distended. Bowel sounds present.  Ext:  no edema  B/L groin dsg intact

## 2024-03-02 NOTE — DISCHARGE NOTE NURSING/CASE MANAGEMENT/SOCIAL WORK - NSDCFUADDAPPT_GEN_ALL_CORE_FT
Call our office on Monday, 3/4/24 @ 8am to inquire about the time of your visit.  follow up appt with your Cardiologist and/or Primary Care MD in 3-4 weeks    Your Care Navigator Nurse Practitioner will be in touch to see you in your home within a few days from discharge. The Follow Your Heart program can help ensure you understand your medications, discharge instructions and answer any questions you may have at that time. They are also a great source to address concerns during the day and may be reached at [phone].

## 2024-03-02 NOTE — DISCHARGE NOTE PROVIDER - CARE PROVIDER_API CALL
BOBBI PETERS  [address] [place] [address]  Phone: [phone]  Fax: [phone]  Scheduled Appointment: 03/04/2024   Kleber Thacker  Thoracic and Cardiac Surgery  [address] [place] [address]  Phone: [phone]  Fax: [phone]  Established Patient  Scheduled Appointment: 03/04/2024

## 2024-03-02 NOTE — DISCHARGE NOTE PROVIDER - PROVIDER TOKENS
PROVIDER:[TOKEN:[[id number]],SCHEDULEDAPPT:[03/04/2024]] PROVIDER:[TOKEN:[[id number]],SCHEDULEDAPPT:[03/04/2024],ESTABLISHEDPATIENT:[T]]

## 2024-03-03 ENCOUNTER — TRANSCRIPTION ENCOUNTER (OUTPATIENT)
Age: 79
End: 2024-03-03

## 2024-03-04 ENCOUNTER — APPOINTMENT (OUTPATIENT)
Dept: CARE COORDINATION | Facility: HOME HEALTH | Age: 79
End: 2024-03-04

## 2024-03-04 ENCOUNTER — TRANSCRIPTION ENCOUNTER (OUTPATIENT)
Age: 79
End: 2024-03-04

## 2024-03-04 PROBLEM — H35.30 UNSPECIFIED MACULAR DEGENERATION: Chronic | Status: ACTIVE | Noted: 2024-02-27

## 2024-03-04 RX ORDER — AZILSARTAN KAMEDOXOMIL AND CHLORTHALIDONE 40; 12.5 MG/1; MG/1
40-12.5 TABLET ORAL
Qty: 90 | Refills: 3 | Status: DISCONTINUED | COMMUNITY
Start: 2020-08-10 | End: 2024-03-04

## 2024-03-04 RX ORDER — FUROSEMIDE 20 MG/1
20 TABLET ORAL DAILY
Qty: 90 | Refills: 1 | Status: DISCONTINUED | COMMUNITY
Start: 2024-02-15 | End: 2024-03-04

## 2024-03-04 RX ORDER — 70%ISOPROPYL ALCOHOL 0.7 ML/ML
70 SWAB TOPICAL
Refills: 0 | Status: DISCONTINUED | COMMUNITY
End: 2024-03-04

## 2024-03-04 RX ORDER — FUROSEMIDE 20 MG/1
20 TABLET ORAL
Refills: 0 | Status: ACTIVE | COMMUNITY

## 2024-03-04 RX ORDER — POTASSIUM CHLORIDE 750 MG/1
10 TABLET, FILM COATED, EXTENDED RELEASE ORAL
Refills: 0 | Status: ACTIVE | COMMUNITY

## 2024-03-04 NOTE — PHYSICAL EXAM
[Neck Appearance] : the appearance of the neck was normal [Jugular Venous Distention Increased] : there was no jugular-venous distention [Respiration, Rhythm And Depth] : normal respiratory rhythm and effort [] : no respiratory distress [Exaggerated Use Of Accessory Muscles For Inspiration] : no accessory muscle use [Oriented To Time, Place, And Person] : oriented to person, place, and time [FreeTextEntry1] : b/l percutaneous groin sites c/d/i, minimal resolving ecchymosis to left groin, healing well [Impaired Insight] : insight and judgment were intact [Affect] : the affect was normal

## 2024-03-04 NOTE — COUNSELING
[Hygeine (Including Daily Shower)] : hygeine (including daily shower) [Importance of Regular Medical Follow-Up] : the importance of regular medical follow-up [No Heavy Lifting] : no heavy lifting (>15-20 lb. for 1 month or 25 lb. for 3 months from date of surgery) [Blood Pressure Control] : blood pressure control [S/S of infection] : signs and symptoms of infection (and to whom it should be reported) [Weight Management] : weight management [Progressive Ambulation/Activity] : progressive ambulation/activity [Medication/Vitamin/Herb/Food Interaction] : medication/vitamin/herb/food interaction [Low Fat/Low Cholesterol Diet] : low fat/low cholesterol diet [SBE antibiotic prophylaxis] : SBE antibiotic prophylaxis was recommended [Stress Management] : stress management [Blood Sugar Control] : blood sugar control

## 2024-03-04 NOTE — HISTORY OF PRESENT ILLNESS
[FreeTextEntry1] : [facility]: [facility]  24-hour post discharge follow up and assessment      Quoc Guillaume is a 78 year old male, with significant PMH including CAD, CABG/Mitral Valve Replacement in 2015 at [facility], PCI/stent in 1997, A-Fib, (on warfarin, last dose 2/26), HTN, HLD, and PCI in 1997, Rectal Cancer, S/P Resection/XRT in 2021, with Aortic Stenosis. S/P TAVR on 3/1/2024. 3/2 VSS ekg Qtc decreased to 462. no bb- echo tavr well seated. d/c plan home w/ MCOT. Today, he is being seen by [facility] ACP for post-discharge follow-up. On exam is sitting in chair in NAD, accompanied by spouse, endorses mild tenderness at left groin percutaneous access site, denies pain with ambulation he is MAEx4.

## 2024-03-04 NOTE — ASSESSMENT
[FreeTextEntry1] : Mr Guillaume is recovering well at home s/p TAVR, accompanied by spouse. Reviewed all medications and dosages with patient understanding. Patient has all medications in home and is taking as prescribed. Reports mild left groin tenderness is on ASA and Warfarin, discussed using Tylenol 650mg q4-5hrs prn for pain, no IBU as it may increase risk of bleeding. No other symptoms, issues or concerns. Reports ambulating around home independently, without the use if assistive device. Denies chest pain, SOB/JOSUE, nausea/vomiting, constipation/diarrhea.   PLAN OF CARE  Do not lift anything heavier than 5 pounds or do vigorous activities such as sports. These actions will put too much stress on your wound and heart. Take short walks around the house several times a day. This will help prevent blood clots.  -Shower let water run over incision use antibacterial soap and pat dry; avoid all creams, ointments or lotions leave open to air.   -Encourage increased ambulation to include outdoors; avoiding extreme temperatures.  -Start daily weights today; call immediately for weight gain >3lbs in a day/5lbs in a week.    Follow Up:  Kleber Thacker  Thoracic and Cardiac Surgery  [address] [place] [id number]  Phone: [phone]  Fax: [phone]  Established Patient  Scheduled Appointment: 03/05/2024 at 1:30pm  [facility] team will continue to follow up with patient's status. NP/CCC roles explained with patient understanding, contact information provided. Patient agrees to call with any questions, issues or concerns. Worsening symptoms reviewed with patient with reiteration and understanding.

## 2024-03-04 NOTE — END OF VISIT
[FreeTextEntry3] : Written by Victoriano Kaminski NP, acting as a scribe for Dr. Thacker The documentation recorded by the scribe accurately reflects the service I personally performed and the decisions made by me. Signature Kleber Thacker MD.

## 2024-03-04 NOTE — DATA REVIEWED
[FreeTextEntry1] : Cath 2/20/2024: Severe three vessel obstructive coronary artery disease, Severe in-stent restenosis of the left anterior descending artery stent. Patent LIMA to the left anterior descending artery, Patent SVG to the 2nd obtuse marginal artery, Normal left main coronary artery, Left dominant system   [Nl] : Integumentary

## 2024-03-04 NOTE — ASSESSMENT
[FreeTextEntry1] : I reviewed the cardiac imaging, medical records and reports with patient and discussed the case.  I discussed the risks, benefits and alternatives to both surgical aortic valve replacement (SAVR) and Transcatheter Aortic Valve Replacement (TAVR). Risks included but not limited to bleeding, stroke, Myocardial Infarction, kidney problems, blood transfusion, permanent  pacemaker implantation, infections and death. I also discussed the various approaches in detail.  I feel that the patient will benefit and is a candidate for TAVR. All questions and concerns were addressed and patient agrees to proceed with TAVR.  Plan:  - Proceed with TAVR as scheduled

## 2024-03-04 NOTE — HISTORY OF PRESENT ILLNESS
[FreeTextEntry1] : Mr. Guillaume is an 78 year old male, followed by cardiologist Dr. Draper and referred today by Dr. Brent Ragland from [facility] for evaluation of his aortic stenosis. To review, PMH includes CABG/MVR 5/13/2015 at  with Dr. Zion Weathers, AFib, HTN, HLD, BPV, and PCI in 1997. He has been followed by Dr. Ragland for his AS since August 2022 when his AS was deemed moderate.  He was seen again by Dr. Ragland 2 weeks ago with complaints of two-three-month progression of exertional dyspnea both when walking quickly or climbing stairs.  Per review of notes, he also endorses progression of ankle edema over recently as well.   TTE 2/15/2023 showed PVG 39.4, MVG 23, QUIANA 0.86cm2 consistent with low flow, low gradient severe AS. Bioprosthetic valve is present in the mitral position. Trace intravalvular mitral regurgitation. There is no paravalvular regurgitation. The transmitral peak gradient is 14.3 mmHg and mean gradient is 3.50 mmHg at a heart rate of 85 bpm. There is mild mitral regurgitation, moderate tricuspid regurgitation.   He presents today with wife.  Reports for the past few months he has noticed more SOB with exertion.  Not bad with walking flat but notices it more with inclines. Lives in a house, and reports SOB with walking up and down stairs over the past few months, gradually getting worse.  Also has Afib and he initially thought his SOB was from his afib. He also reports recent swelling to LE.  Was prescribed lasix 20mg three times a week recently with improvement in swelling.  Denies CP, palpitations, weight gain, syncope, cough, fever or chills. On Warfarin followed by Dr. Draper, has home INR kit. NYHA II- III  Had TAVR Ct this morning, cath last week.  Scheduled for PST after visit today.

## 2024-03-04 NOTE — REVIEW OF SYSTEMS
[Feeling Poorly] : feeling poorly [Feeling Tired] : feeling tired [As noted in HPI] : as noted in HPI [Lower Ext Edema] : lower extremity edema [As Noted in HPI] : as noted in HPI [Shortness Of Breath] : shortness of breath [SOB on Exertion] : shortness of breath during exertion [Negative] : Heme/Lymph [Chest Pain] : no chest pain [Palpitations] : no palpitations

## 2024-03-05 ENCOUNTER — NON-APPOINTMENT (OUTPATIENT)
Age: 79
End: 2024-03-05

## 2024-03-05 ENCOUNTER — APPOINTMENT (OUTPATIENT)
Dept: CARDIOTHORACIC SURGERY | Facility: CLINIC | Age: 79
End: 2024-03-05
Payer: MEDICARE

## 2024-03-05 VITALS
BODY MASS INDEX: 26.66 KG/M2 | HEART RATE: 68 BPM | DIASTOLIC BLOOD PRESSURE: 65 MMHG | OXYGEN SATURATION: 99 % | RESPIRATION RATE: 16 BRPM | HEIGHT: 65 IN | SYSTOLIC BLOOD PRESSURE: 117 MMHG | WEIGHT: 160 LBS

## 2024-03-05 PROCEDURE — 93005 ELECTROCARDIOGRAM TRACING: CPT

## 2024-03-05 PROCEDURE — 99213 OFFICE O/P EST LOW 20 MIN: CPT

## 2024-03-05 RX ORDER — METOPROLOL TARTRATE 25 MG/1
25 TABLET, FILM COATED ORAL TWICE DAILY
Qty: 60 | Refills: 6 | Status: COMPLETED | COMMUNITY
Start: 2020-09-14 | End: 2024-03-05

## 2024-03-05 NOTE — PHYSICAL EXAM
[] : no respiratory distress [Respiration, Rhythm And Depth] : normal respiratory rhythm and effort [Exaggerated Use Of Accessory Muscles For Inspiration] : no accessory muscle use [Apical Impulse] : the apical impulse was normal [Auscultation Breath Sounds / Voice Sounds] : lungs were clear to auscultation bilaterally [Heart Rate And Rhythm] : heart rate was normal and rhythm regular [Heart Sounds] : normal S1 and S2 [Murmurs] : no murmurs [Clean] : clean [Dry] : dry [Healing Well] : healing well [No Edema] : no edema [Bleeding] : no active bleeding [Foul Odor] : no foul smell [Purulent Drainage] : no purulent drainage [Serosanguinous Drainage] : no serosanguinous drainage [Erythema] : not erythematous [Warm] : not warm [Tender] : not tender

## 2024-03-05 NOTE — ASSESSMENT
[FreeTextEntry1] : 78 year old male, with significant PMH including CAD, CABG/Mitral Valve Replacement in 2015 at [facility], PCI/stent in 1997, A-Fib, (on warfarin, last dose 2/26), HTN, HLD, and PCI in 1997, Rectal Cancer, S/P Resection/XRT in 2021, with Aortic Stenosis. S/P TAVR on 3/1/2024.3/2 VSS ekg Qtc decreased to 462.  no bb- echo tavr well seated.  d/c plan home w/ [facility] 3/2/24.  INR/Warfarin managed by cardiologist Dr. Draper.   Post TAVR TTE shows (29mm, TAN 3 ( TAVR), no intravalvular regurgitation., no paravalvular regurgitation, peak transaortic gradient is 5.8 mmHg and mean transaortic gradient is 3.0 mmHg.   Presents today with his daughter.  Feels about the same, no better no worse compared to pre TAVR.  DC home off BB, all other meds the same.   Walking  more daily. Eating and drinking with +BM. Denies chest pain, SOB, swelling, weight gain, palpitations, cough, fever or chills.  Today on exam, patient's lungs are clear bilaterally, normal sinus rhythm and bilateral groins are clean, dry and intact. There is no hematoma. No peripheral edema noted on exam. EKG performed and reviewed. SBE antibiotic prophylaxis discussed at length.  Patient instructed to continue current medication regimen and followup with cardiology.  Plan:   - Remain off BB, continue all other meds per DC - Follow up with cardiologist Dr. Draper - INR/Warfarin MGMT as per Dr. Draper - TTE in one month - SBE antibiotic prophylaxis discussed at length - Call with any questions or concerns

## 2024-03-06 NOTE — H&P CARDIOLOGY - PSYCHIATRIC DETAILS
Regular rate and rhythm, Heart sounds S1 S2 present, no murmurs, rubs or gallops normal affect/normal behavior

## 2024-03-20 ENCOUNTER — NON-APPOINTMENT (OUTPATIENT)
Age: 79
End: 2024-03-20

## 2024-03-20 RX ORDER — METOPROLOL TARTRATE 25 MG/1
25 TABLET, FILM COATED ORAL TWICE DAILY
Refills: 0 | Status: ACTIVE | COMMUNITY
Start: 2016-12-16

## 2024-03-24 ENCOUNTER — TRANSCRIPTION ENCOUNTER (OUTPATIENT)
Age: 79
End: 2024-03-24

## 2024-04-01 ENCOUNTER — TRANSCRIPTION ENCOUNTER (OUTPATIENT)
Age: 79
End: 2024-04-01

## 2024-04-04 ENCOUNTER — RESULT REVIEW (OUTPATIENT)
Age: 79
End: 2024-04-04

## 2024-04-04 ENCOUNTER — APPOINTMENT (OUTPATIENT)
Dept: CARDIOLOGY | Facility: CLINIC | Age: 79
End: 2024-04-04
Payer: MEDICARE

## 2024-04-04 ENCOUNTER — OUTPATIENT (OUTPATIENT)
Dept: OUTPATIENT SERVICES | Facility: HOSPITAL | Age: 79
LOS: 1 days | End: 2024-04-04
Payer: MEDICARE

## 2024-04-04 VITALS
TEMPERATURE: 98 F | OXYGEN SATURATION: 99 % | DIASTOLIC BLOOD PRESSURE: 85 MMHG | HEART RATE: 77 BPM | RESPIRATION RATE: 16 BRPM | SYSTOLIC BLOOD PRESSURE: 167 MMHG

## 2024-04-04 DIAGNOSIS — I35.0 NONRHEUMATIC AORTIC (VALVE) STENOSIS: ICD-10-CM

## 2024-04-04 DIAGNOSIS — Z98.890 OTHER SPECIFIED POSTPROCEDURAL STATES: Chronic | ICD-10-CM

## 2024-04-04 DIAGNOSIS — I48.20 CHRONIC ATRIAL FIBRILLATION, UNSP: ICD-10-CM

## 2024-04-04 DIAGNOSIS — R93.1 ABNORMAL FINDINGS ON DIAGNOSTIC IMAGING OF HEART AND CORONARY CIRCULATION: ICD-10-CM

## 2024-04-04 DIAGNOSIS — Z95.1 PRESENCE OF AORTOCORONARY BYPASS GRAFT: Chronic | ICD-10-CM

## 2024-04-04 DIAGNOSIS — I25.10 ATHEROSCLEROTIC HEART DISEASE OF NATIVE CORONARY ARTERY W/OUT ANGINA PECTORIS: ICD-10-CM

## 2024-04-04 DIAGNOSIS — E78.5 HYPERLIPIDEMIA, UNSPECIFIED: ICD-10-CM

## 2024-04-04 DIAGNOSIS — Z95.2 PRESENCE OF PROSTHETIC HEART VALVE: ICD-10-CM

## 2024-04-04 DIAGNOSIS — Z95.2 PRESENCE OF PROSTHETIC HEART VALVE: Chronic | ICD-10-CM

## 2024-04-04 DIAGNOSIS — I50.30 UNSPECIFIED DIASTOLIC (CONGESTIVE) HEART FAILURE: ICD-10-CM

## 2024-04-04 PROCEDURE — 99214 OFFICE O/P EST MOD 30 MIN: CPT

## 2024-04-04 PROCEDURE — 93306 TTE W/DOPPLER COMPLETE: CPT | Mod: 26

## 2024-04-04 PROCEDURE — G2211 COMPLEX E/M VISIT ADD ON: CPT

## 2024-04-04 PROCEDURE — 93306 TTE W/DOPPLER COMPLETE: CPT

## 2024-04-28 PROBLEM — R93.1 ABNORMAL ECHOCARDIOGRAM: Status: ACTIVE | Noted: 2022-05-10

## 2024-04-28 PROBLEM — Z95.2 S/P TAVR (TRANSCATHETER AORTIC VALVE REPLACEMENT): Status: ACTIVE | Noted: 2024-03-04

## 2024-04-28 PROBLEM — I50.30 DIASTOLIC CONGESTIVE HEART FAILURE, UNSPECIFIED HF CHRONICITY: Status: ACTIVE | Noted: 2023-04-08

## 2024-04-28 PROBLEM — I35.0 AORTIC STENOSIS: Status: ACTIVE | Noted: 2022-05-10

## 2024-04-28 PROBLEM — I48.20 ATRIAL FIBRILLATION, CHRONIC: Status: ACTIVE | Noted: 2023-04-08

## 2024-04-28 NOTE — HISTORY OF PRESENT ILLNESS
[FreeTextEntry1] : Mr. Guillaume returns for follow up 30 days after TAVR (29mm Denny 3 THV) for severe symptomatic AS. Overall, he says he is feeling better but still has some dyspnea on exertion. He denies chest pain and pressure as well as lightheadedness. He has limited his activities due to the dyspnea and fear for feeling worse.   Assessment/plan Severe aortic valve stenosis status post TAVR Bioprosthetic mitral valve  --The patient is clinically doing well following TAVR.  Reviewed with patient findings from repeat TTE demonstrates EF 53% with no regional wall motion abnormalities.  TAVR valve in appropriate position with DVI 0.52 with no intra valvular or paravalvular regurgitation.  Bioprosthetic mitral valve is noted which is well-seated with trace intra valvular mitral regurgitation no paravalvular regurgitation with mean gradient 6.  PASP 38 mmHg.  No significant change on TTE from 3/2/2024. --If he continues to experience dyspnea recommend further evaluation by his internist and/or a pulmonologist.  This was explained to the patient and his wife. --Continue aspirin 81 mg daily and continue Coumadin.  Closely monitor for signs and symptoms of bleeding.  Patient was told to avoid all NSAIDs. --Continue metoprol tartrate 25 mg twice a day. --Continue furosemide 20 mg daily. --Continue rosuvastatin 10 mg daily and Lovaza. --Recommend patient stop taking coenzyme every 10 due to increased risk of bleeding. -- Recommend yearly TTE for close monitoring. -- Recommend antibiotic prophylaxis prior to any upcoming procedure.  All questions and concerns of the patient and his wife were addressed.

## 2024-04-28 NOTE — PHYSICAL EXAM
[Normal Rate] : normal [Normal S1] : normal S1 [Normal S2] : normal S2 [No Pitting Edema] : no pitting edema present [2+] : left 2+ [Normal] : alert and oriented, normal memory [Well Developed] : well developed [Well Nourished] : well nourished [No Acute Distress] : no acute distress [Normal Conjunctiva] : normal conjunctiva [Normal Venous Pressure] : normal venous pressure [No Carotid Bruit] : no carotid bruit [Normal S1, S2] : normal S1, S2 [No Murmur] : no murmur [No Rub] : no rub [No Gallop] : no gallop [Clear Lung Fields] : clear lung fields [Good Air Entry] : good air entry [No Respiratory Distress] : no respiratory distress  [Non Tender] : non-tender [Soft] : abdomen soft [No Masses/organomegaly] : no masses/organomegaly [Normal Bowel Sounds] : normal bowel sounds [Normal Gait] : normal gait [No Edema] : no edema [No Cyanosis] : no cyanosis [No Clubbing] : no clubbing [No Varicosities] : no varicosities [No Rash] : no rash [No Skin Lesions] : no skin lesions [Moves all extremities] : moves all extremities [No Focal Deficits] : no focal deficits [Normal Speech] : normal speech [Alert and Oriented] : alert and oriented [Normal memory] : normal memory

## 2024-05-25 NOTE — DISCHARGE NOTE PROVIDER - NSDCFUADDAPPT_GEN_ALL_CORE_FT
216
Call our office on Monday, 3/4/24 @ 8am to inquire about the time of your visit.  follow up appt with your Cardiologist and/or Primary Care MD in 3-4 weeks    Your Care Navigator Nurse Practitioner will be in touch to see you in your home within a few days from discharge. The Follow [facility] program can help ensure you understand your medications, discharge instructions and answer any questions you may have at that time. They are also a great source to address concerns during the day and may be reached at [phone].

## 2024-05-31 ENCOUNTER — APPOINTMENT (OUTPATIENT)
Dept: SURGERY | Facility: CLINIC | Age: 79
End: 2024-05-31
Payer: MEDICARE

## 2024-05-31 VITALS
HEIGHT: 65 IN | RESPIRATION RATE: 18 BRPM | DIASTOLIC BLOOD PRESSURE: 68 MMHG | BODY MASS INDEX: 26.33 KG/M2 | TEMPERATURE: 95.6 F | WEIGHT: 158 LBS | HEART RATE: 89 BPM | SYSTOLIC BLOOD PRESSURE: 116 MMHG | OXYGEN SATURATION: 98 %

## 2024-05-31 DIAGNOSIS — C20 MALIGNANT NEOPLASM OF RECTUM: ICD-10-CM

## 2024-05-31 PROCEDURE — 45330 DIAGNOSTIC SIGMOIDOSCOPY: CPT

## 2024-05-31 PROCEDURE — 99213 OFFICE O/P EST LOW 20 MIN: CPT | Mod: 25

## 2024-05-31 NOTE — ASSESSMENT
[FreeTextEntry1] : In summary the patient is doing well.  He has a history of rectal cancer status post transanal excision.  There is no evidence of local recurrence on flexible sigmoidoscopy.  He will follow-up with Dr. Alejandra for surveillance imaging.  I will see him in 6 months for a surveillance flexible sigmoidoscopy.

## 2024-05-31 NOTE — PACU DISCHARGE NOTE - HYDRATION STATUS:
Future Appointments   Date Time Provider Department Center   6/19/2024 11:40 AM Reji Griffith MD [id number]   8/26/2024  9:40 AM Salvador Wood MD [facility]        Satisfactory

## 2024-05-31 NOTE — HISTORY OF PRESENT ILLNESS
[FreeTextEntry1] : JOSEPHINE is a 77 year old male being seen for a follow up visit, flex sig and reevaluate inguinal hernia.  Disease: rectal cancer Pathology: adenocarcinoma TNM stage: T1, Nx, M0 AJCC Stage: 1  RT completed in 2021.   S/p transanal excision of distal rectal mass on 4/27/22. Pathology; 1. Rectum, distal, mass - Colorectal mucosa and submucosa with fibrosis and degenerative changes. Colonic glandular and adjacent squamous mucosa is negative for dysplasia. Lesion at the anorectal junction. Biopsy demonstrated adenocarcinoma. The lesion was felt to be either T1 versus early T2 on preoperative imaging. The lesion was mobile approximately 2 cm in diameter, located in the anterior position just at the level of the dentate line. There was no evidence of metastatic disease on preoperative workup, therefore transanal excision was performed on 10/15/20.  CT A+P on 5/17/22 - Postoperative changes of the lower rectum/anal canal. A subtle linear tract from the inferior anal canal anteriorly towards the perineum on the right contains a droplet of air and may correspond with patient's recurrent fistula. No associated abscess.  MR Pelvis/Rectal/Anal on 5/22/23 - Since the prior examination 12/23/2022, the primary tumor shows: Complete/near complete response. A linear focus of diffusion restriction along the left lateral lower rectal wall without corresponding mass or abnormal enhancement is nonspecific, but favored to reflect postprocedural edema. Correlate with clinical exam/direct visualization. Suspicious Mesorectal Lymph Nodes: No. Suspicious Extra Mesorectal Lymph Nodes: No.  Last seen 08/08/23- Digital rectal examination reveals normal anal rectal tone. There is no fissure fistula or abscess. There is scarring in the anterior midline with no palpable recurrent tumor. Proctoscopy reveals a scar in the anterior midline with no evidence of recurrent tumor.  In summary the patient is doing well. He has a history of rectal cancer treated with transanal excision. 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 His fistula ultimately healed. He underwent transanal excision of his scar a year ago for suspected local recurrence. This demonstrated no evidence of local recurrence and he developed a wound infection postoperatively. This ultimately healed. Most recent CT and pelvic MRI demonstrated no evidence of metastatic disease or local recurrence. Digital examination and proctoscopy in the office today reveal a benign scar in the anterior midline with no evidence of local recurrence. I will see him in 6 months for a surveillance sigmoidoscopy. He also has mild discomfort in his left groin and has a small reducible nontender recurrent left inguinal hernia. He had a previous left inguinal hernia repair in the 1990s by . I will be obtaining his previous operative note but suspect that this may have been a shoulder ice repair. Either way the patient is minimally symptomatic and the hernia is small. He is undergoing evaluation for his aortic stenosis and will be undergoing an echo in the near future. If his hernia remains minimally symptomatic I would recommend observation. If it becomes larger or more symptomatic that I will ultimately recommend elective repair. I will reevaluate his recurrent left inguinal hernia at his next office visit in 6 mo. I instructed him to call me before that should he develop r worsening pain or swelling in the left groin.  Today pt reports no pain.  Daily 3 times daily, no straining, denies pain, no bleeding, no episodes of incontinence, and denies feeling swollen or prolapsed tissue.  Denies pain or bulge of left groin area. Denies nausea or vomiting. Denies fevers or chills. Hx of LIH repair.  Takes baby aspirin and warfarin.

## 2024-05-31 NOTE — PHYSICAL EXAM
[Exam Deferred] : exam was deferred [Abdomen Tenderness] : ~T No ~M abdominal tenderness [de-identified] : Perianal inspection is normal.  There is no fissure fistula or abscess.  Anal rectal tone is normal.  Flexible sigmoidoscopy demonstrates a benign healthy scar in the distal rectum in the anterior midline.  There is minimal radiation proctitis.  There is no palpable or visible recurrent mass.

## 2024-07-01 ENCOUNTER — OUTPATIENT (OUTPATIENT)
Dept: OUTPATIENT SERVICES | Facility: HOSPITAL | Age: 79
LOS: 1 days | Discharge: HOME | End: 2024-07-01

## 2024-07-01 DIAGNOSIS — Z98.890 OTHER SPECIFIED POSTPROCEDURAL STATES: Chronic | ICD-10-CM

## 2024-07-01 DIAGNOSIS — Z95.2 PRESENCE OF PROSTHETIC HEART VALVE: Chronic | ICD-10-CM

## 2024-07-01 DIAGNOSIS — Z95.1 PRESENCE OF AORTOCORONARY BYPASS GRAFT: Chronic | ICD-10-CM

## 2024-07-01 DIAGNOSIS — C20 MALIGNANT NEOPLASM OF RECTUM: ICD-10-CM

## 2024-07-05 ENCOUNTER — RESULT REVIEW (OUTPATIENT)
Age: 79
End: 2024-07-05

## 2024-07-05 ENCOUNTER — APPOINTMENT (OUTPATIENT)
Dept: HEMATOLOGY ONCOLOGY | Facility: CLINIC | Age: 79
End: 2024-07-05

## 2024-07-05 LAB
BASOPHILS # BLD AUTO: 0.03 K/UL — SIGNIFICANT CHANGE UP (ref 0–0.2)
BASOPHILS NFR BLD AUTO: 0.6 % — SIGNIFICANT CHANGE UP (ref 0–2)
EOSINOPHIL # BLD AUTO: 0.04 K/UL — SIGNIFICANT CHANGE UP (ref 0–0.5)
EOSINOPHIL NFR BLD AUTO: 0.8 % — SIGNIFICANT CHANGE UP (ref 0–6)
HCT VFR BLD CALC: 41.5 % — SIGNIFICANT CHANGE UP (ref 39–50)
HGB BLD-MCNC: 14.5 G/DL — SIGNIFICANT CHANGE UP (ref 13–17)
IMM GRANULOCYTES NFR BLD AUTO: 0.2 % — SIGNIFICANT CHANGE UP (ref 0–0.9)
LYMPHOCYTES # BLD AUTO: 1.01 K/UL — SIGNIFICANT CHANGE UP (ref 1–3.3)
LYMPHOCYTES # BLD AUTO: 19 % — SIGNIFICANT CHANGE UP (ref 13–44)
MCHC RBC-ENTMCNC: 33.1 PG — SIGNIFICANT CHANGE UP (ref 27–34)
MCHC RBC-ENTMCNC: 34.9 G/DL — SIGNIFICANT CHANGE UP (ref 32–36)
MCV RBC AUTO: 94.7 FL — SIGNIFICANT CHANGE UP (ref 80–100)
MONOCYTES # BLD AUTO: 0.42 K/UL — SIGNIFICANT CHANGE UP (ref 0–0.9)
MONOCYTES NFR BLD AUTO: 7.9 % — SIGNIFICANT CHANGE UP (ref 2–14)
NEUTROPHILS # BLD AUTO: 3.81 K/UL — SIGNIFICANT CHANGE UP (ref 1.8–7.4)
NEUTROPHILS NFR BLD AUTO: 71.5 % — SIGNIFICANT CHANGE UP (ref 43–77)
NRBC # BLD: 0 /100 WBCS — SIGNIFICANT CHANGE UP (ref 0–0)
NRBC BLD-RTO: 0 /100 WBCS — SIGNIFICANT CHANGE UP (ref 0–0)
PLATELET # BLD AUTO: 148 K/UL — LOW (ref 150–400)
RBC # BLD: 4.38 M/UL — SIGNIFICANT CHANGE UP (ref 4.2–5.8)
RBC # FLD: 13.4 % — SIGNIFICANT CHANGE UP (ref 10.3–14.5)
WBC # BLD: 5.32 K/UL — SIGNIFICANT CHANGE UP (ref 3.8–10.5)
WBC # FLD AUTO: 5.32 K/UL — SIGNIFICANT CHANGE UP (ref 3.8–10.5)

## 2024-08-06 ENCOUNTER — RX RENEWAL (OUTPATIENT)
Age: 79
End: 2024-08-06

## 2024-08-14 NOTE — ASU PREOP CHECKLIST - LATEX ALLERGY
[facility] [place] [facility]\"" Alexander Leigh, PT   8/14/2024      no

## 2024-08-21 ENCOUNTER — NON-APPOINTMENT (OUTPATIENT)
Age: 79
End: 2024-08-21

## 2024-08-22 ENCOUNTER — NON-APPOINTMENT (OUTPATIENT)
Age: 79
End: 2024-08-22

## 2024-08-23 ENCOUNTER — RX RENEWAL (OUTPATIENT)
Age: 79
End: 2024-08-23

## 2024-08-23 DIAGNOSIS — R05.9 COUGH, UNSPECIFIED: ICD-10-CM

## 2024-08-23 RX ORDER — FLUTICASONE PROPIONATE AND SALMETEROL 250; 50 UG/1; UG/1
250-50 POWDER RESPIRATORY (INHALATION)
Qty: 180 | Refills: 0 | Status: ACTIVE | COMMUNITY
Start: 2024-08-23 | End: 1900-01-01

## 2024-08-23 RX ORDER — BENZONATATE 100 MG/1
100 CAPSULE ORAL 3 TIMES DAILY
Qty: 30 | Refills: 1 | Status: ACTIVE | COMMUNITY
Start: 2024-08-23 | End: 1900-01-01

## 2024-10-08 ENCOUNTER — APPOINTMENT (OUTPATIENT)
Dept: INTERNAL MEDICINE | Facility: CLINIC | Age: 79
End: 2024-10-08
Payer: MEDICARE

## 2024-10-08 DIAGNOSIS — Z23 ENCOUNTER FOR IMMUNIZATION: ICD-10-CM

## 2024-10-08 PROCEDURE — 90662 IIV NO PRSV INCREASED AG IM: CPT

## 2024-10-08 PROCEDURE — G0008: CPT

## 2024-11-26 NOTE — ED PROVIDER NOTE - CPE EDP ENMT NORM
[facility]/Population Health Chart Review & Patient Outreach Details For Medication Adherence Project    Reason for Outreach Encounter: 3rd Party payor non-compliance report ([employer], [employer], C, etc)  2.  Patient Outreach Method: Reviewed patient chart   3.   Medication in question:   Diabetes Medications               metFORMIN (GLUCOPHAGE) 500 MG tablet Take 1 tablet by mouth once a day with food    MOUNJARO 10 mg/0.5 mL PnIj Inject 10 mg subcutaneously once a week for 4 weeks after 7.5 mg dose    MOUNJARO 7.5 mg/0.5 mL PnIj Inject 7.5 mg subcutaneously once a week for 4 weeks    MOUNJARO 7.5 mg/0.5 mL PnIj Inject 7.5 mg subcutaneously once a week for 4 weeks    tirzepatide (MOUNJARO) 5 mg/0.5 mL PnIj Inject into the skin.                 Mounjaro  last filled  11/20/24 for 28 day   Metformin dc'ed      4.  Reviewed and or Updates Made To: Patient Chart  5. Outreach Outcomes and/or actions taken: Patient filled medication and is on track to be adherent and Medication discontinued  Additional Notes:       
normal...

## 2024-12-12 NOTE — PROGRESS NOTE ADULT - ASSESSMENT
[facility]  Cardiovascular Special Procedures  Percutaneous Nephrostomy Insertion/Exchange  Patient Discharge Instructions                                                      Avoid strenuous activities like sports, lawn mowing, or lifting more than 10lbs.    Wear loose,comfortable clothing that won’t pull or kink the tube(s).     Check dressing often to make sure the tubing is securely taped in place to avoid pulling or kinking it.     You may shower unless otherwise instructed. Do not soak in a bathtub.  Securely cover the tube and insertion site with layers of saran wrap taped into place,then you may shower. Change dressing after you shower. Remove the saran wrap and dressing carefully so you don’t pull on the tube. Discard the dressing in plastic bag.            Gently clean around the insertion site with liquid soap (like Dial) and warm water,rinse thoroughly,and pat dry with a clean towel. Check the insertion site and skin area around it for any signs of increased redness, temperature, swelling, pain tenderness, bleeding, foul drainage,or urine leakage. Do not apply ointment or alchol to the site, just keep it clean and dry. Apply clean dressing.                                                                                                                                                                                                                                        The drainage bag should allow gravity drainage. Always make sure the tube is taped securely and do not let the drainage bag hang freely to pull the tube.  If the tubing has a white plastic stopcock, make sure it is open. Measure and record the amount and color of the urine and notify your doctor if the amount increases or decreases markedly.     Wash your hands before and after emptying the drainage bag. Empty the drainage bag when it is 1/2 to 2/3 full, and before going to bed.     Drink plenty of liquids,especially  74M hx DM(a1c=6.2%), CAD, AFib, prosthetic mitral valve (2015), HTN, macular degeneration, hemorrhoids, localized rectal cancer (dx 9/2020), presenting with chills for 10 days.   S mitis oralis bacteremia-high grade  No abd symptoms  Clinically stable  Repeat Cx so far negative  abd Ct no abscess  PCn VANCE 0.03  REEMA no vegetation    A) Strep mitis bacteremia  repeat blood cx negative  Ct no abscess  REEMA no vegetation or s/s paravalvular abscess/leak  Given Prosthetic valve + immunecomproised status will recommend 6 weeks of antimicrobials  Given low VANCE + no PVE on REEMA + rapid clearance risk/benefit assessment will favor not using gentamicin (pt at significant risk for toxicity with his age)  Since getting fuull course no need for repeat REEMA unless meliza s/s worsening or repeat cx positive  Can proceed with PICC  PICC side effects,abx side effects discussed.Risks/benefits and alternatives explained.  CBC,CMP weekly,fax results to [phone].Call [phone] with critical results.  PICC care per home care.  To follow in ID clinic in 3-4 weeks ,asked to call and make appointment.      B) Prosthetic valve ? endocarditis    plan as above    C) Rectal ca  case d/w Dr Connelly  Think reasonable to proceed with surgery in 3-4 weeks-per Dr connelly not advisable to wait longer.Pt will be on abx + will ahve gotten atleast 2-3 weeks already  would recommend continuing ceftriaxone lopez-op and after for 6 weeks  and anaerobic coverage (flagyl) during surgery as prophylaxis.Other per protocol      Will tailor plan for ID issues  per course,results.Will defer to primary team on management of other issues.  Assessment, plan and recommendations as detailed above were discussed with the medical/primary  team , Dr Connelly (surgery) and daughter(who is a physycian)  For DC when OPAT setup    Will Follow.  Beeper [id number].   Wknd/afterhours/No response-[phone] or Fellow on call    Will Follow.  Beeper [id number].   Wknd/afterhours/No response-[phone] or Fellow on call

## 2024-12-15 LAB
ALBUMIN SERPL ELPH-MCNC: 4.3 G/DL
ALP BLD-CCNC: 75 U/L
ALT SERPL-CCNC: 21 U/L
ANION GAP SERPL CALC-SCNC: 12 MMOL/L
APPEARANCE: CLEAR
AST SERPL-CCNC: 26 U/L
BILIRUB SERPL-MCNC: 0.9 MG/DL
BILIRUBIN URINE: NEGATIVE
BLOOD URINE: NEGATIVE
BUN SERPL-MCNC: 15 MG/DL
CALCIUM SERPL-MCNC: 9.2 MG/DL
CEA SERPL-MCNC: 1.8 NG/ML
CHLORIDE SERPL-SCNC: 102 MMOL/L
CHOLEST SERPL-MCNC: 128 MG/DL
CO2 SERPL-SCNC: 24 MMOL/L
COLOR: YELLOW
CREAT SERPL-MCNC: 1.13 MG/DL
EGFR: 65 ML/MIN/1.73M2
ESTIMATED AVERAGE GLUCOSE: 128 MG/DL
GLUCOSE QUALITATIVE U: NEGATIVE MG/DL
GLUCOSE SERPL-MCNC: 104 MG/DL
HBA1C MFR BLD HPLC: 6.1 %
HCT VFR BLD CALC: 40 %
HDLC SERPL-MCNC: 53 MG/DL
HGB BLD-MCNC: 13.6 G/DL
KETONES URINE: NEGATIVE MG/DL
LDLC SERPL CALC-MCNC: 55 MG/DL
LEUKOCYTE ESTERASE URINE: NEGATIVE
MCHC RBC-ENTMCNC: 33.3 PG
MCHC RBC-ENTMCNC: 34 G/DL
MCV RBC AUTO: 97.8 FL
NITRITE URINE: NEGATIVE
NONHDLC SERPL-MCNC: 74 MG/DL
PH URINE: 6.5
PLATELET # BLD AUTO: 170 K/UL
POTASSIUM SERPL-SCNC: 4.5 MMOL/L
PROT SERPL-MCNC: 6.9 G/DL
PROTEIN URINE: NEGATIVE MG/DL
RBC # BLD: 4.09 M/UL
RBC # FLD: 13.5 %
SODIUM SERPL-SCNC: 137 MMOL/L
SPECIFIC GRAVITY URINE: 1.02
T4 FREE SERPL-MCNC: 1.5 NG/DL
TRIGL SERPL-MCNC: 107 MG/DL
TSH SERPL-ACNC: 2.68 UIU/ML
UROBILINOGEN URINE: 0.2 MG/DL
WBC # FLD AUTO: 6.43 K/UL

## 2024-12-18 ENCOUNTER — APPOINTMENT (OUTPATIENT)
Dept: INTERNAL MEDICINE | Facility: CLINIC | Age: 79
End: 2024-12-18
Payer: MEDICARE

## 2024-12-18 VITALS
TEMPERATURE: 97.3 F | HEART RATE: 58 BPM | SYSTOLIC BLOOD PRESSURE: 177 MMHG | OXYGEN SATURATION: 98 % | BODY MASS INDEX: 26.33 KG/M2 | WEIGHT: 158 LBS | DIASTOLIC BLOOD PRESSURE: 88 MMHG | HEIGHT: 65 IN

## 2024-12-18 DIAGNOSIS — I25.10 ATHEROSCLEROTIC HEART DISEASE OF NATIVE CORONARY ARTERY W/OUT ANGINA PECTORIS: ICD-10-CM

## 2024-12-18 DIAGNOSIS — Z00.00 ENCOUNTER FOR GENERAL ADULT MEDICAL EXAMINATION W/OUT ABNORMAL FINDINGS: ICD-10-CM

## 2024-12-18 DIAGNOSIS — I10 ESSENTIAL (PRIMARY) HYPERTENSION: ICD-10-CM

## 2024-12-18 DIAGNOSIS — E78.5 HYPERLIPIDEMIA, UNSPECIFIED: ICD-10-CM

## 2024-12-18 DIAGNOSIS — E11.9 TYPE 2 DIABETES MELLITUS W/OUT COMPLICATIONS: ICD-10-CM

## 2024-12-18 DIAGNOSIS — I48.20 CHRONIC ATRIAL FIBRILLATION, UNSP: ICD-10-CM

## 2024-12-18 DIAGNOSIS — K21.9 GASTRO-ESOPHAGEAL REFLUX DISEASE W/OUT ESOPHAGITIS: ICD-10-CM

## 2024-12-18 PROCEDURE — G0439: CPT

## 2024-12-18 RX ORDER — PANTOPRAZOLE 40 MG/1
40 TABLET, DELAYED RELEASE ORAL
Qty: 1 | Refills: 3 | Status: ACTIVE | COMMUNITY
Start: 2024-12-18 | End: 1900-01-01

## 2024-12-20 ENCOUNTER — APPOINTMENT (OUTPATIENT)
Dept: SURGERY | Facility: CLINIC | Age: 79
End: 2024-12-20
Payer: MEDICARE

## 2024-12-20 VITALS
DIASTOLIC BLOOD PRESSURE: 77 MMHG | HEART RATE: 66 BPM | OXYGEN SATURATION: 95 % | SYSTOLIC BLOOD PRESSURE: 144 MMHG | WEIGHT: 155 LBS | BODY MASS INDEX: 25.83 KG/M2 | RESPIRATION RATE: 16 BRPM | HEIGHT: 65 IN | TEMPERATURE: 94.8 F

## 2024-12-20 DIAGNOSIS — C20 MALIGNANT NEOPLASM OF RECTUM: ICD-10-CM

## 2024-12-20 PROCEDURE — 45330 DIAGNOSTIC SIGMOIDOSCOPY: CPT

## 2024-12-20 PROCEDURE — 99213 OFFICE O/P EST LOW 20 MIN: CPT | Mod: 25

## 2025-01-17 ENCOUNTER — RX RENEWAL (OUTPATIENT)
Age: 80
End: 2025-01-17

## 2025-01-22 NOTE — PRE-ANESTHESIA EVALUATION ADULT - HEIGHT IN INCHES
Patient is a 65 year old male with PMH of renal transplant 2012 (2/2 DM, s/p left nephrectomy), peripheral neuropathy, hypothyroidism, retroperitoneal fibrosis, HTN, HLD, GERD, anxiety/depression, ANGELIKA and recent admission at [facility] for ESBL E.coli urosepsis, imaging with ?sacral OM though pt with no sacral wound suspected findings likely related to mets, he was discharged on 12/18/24 to rehab, recently diagnosed DLBCL while admitted to [facility] when he was noted to have hypercalcemia and liver masses s/p biopsy 12/16/24, now s/p R mini CHOP on 12/28 who was admitted 1/17 for cycle #2 Rmini CHOP, upon admission patient is febrile will hold chemotherapy now.    Fevers- may be d/t viral URI d/t coronavirus (not COVID) vs possible post viral pneumonia, may be malignancy related    Possible UTI vs contamination   Oral thrush   - RVP with Coronavirus (229E,HKU1,NL63,OC43) detected  - 1/17 CT Chest with extensive new b/l ground glass nodular opacities, upper lobe dominant - possible pneumonia; small R and trace L effusions  - 1/17 CTAP decreased R hepatic mass since 12/9/24; known splenic mass; changes likely c/w retroperitoneal fibrosis   - UA with some pyuria, small LE, no nitrites or bacteria  - Ucx with >3 organisms - may be contaminated specimen    - 1/19 repeat Ucx with only low colony count of C.albicans, likely contaminant, no need to treat  - no hodges, condom cath noted with clear yellow urine, no gu sx, no suprapubic ttp  - 1/17 and 1/19 Bcx NGTD  - LUE PICC line with no sign of infection, no sign of SSTI, no rashes noted   - wound care eval noted for sacral and ischial DTI  - prior cultures reviewed, history of ESBL Kleb in Ucx 12/31/24, ESBL E.coli 11/29/24 Ucx   - s/p zosyn 1/17-1/20, escalated to ertapenem 1/20pm due to fevers   - temps noted Tm 103.2F overnight, WBC wnl, no neutropenia, on RA, abd mildly distended but nontender   - MRSA PCR screen negative (+MSSA)  - CXRs reviewed, no focal consolidation noted     Recommendations:   Follow repeat Bcx, Ucx, in process  Repeat CT C/A/P with contrast if able   Broaden ertapenem to meropenem pending above   Monitor temps/CBC   Supportive care  Aspiration precautions  Wound care, offloading as able, nutrition  Continue rest of care per primary team     D/w NP Nancy Ac M.D.  [facility]  Available on [employer] TEAMS - *PREFERRED*  [phone]  After 5pm on weekdays and all day on weekends - please call [phone]     Thank you for consulting us and involving us in the management of this patients case. In addition to reviewing history, imaging, documents, labs, microbiology, took into account antibiotic stewardship, local antibiogram and infection control strategies and potential transmission issues. Patient is a 65 year old male with PMH of renal transplant 2012 (2/2 DM, s/p left nephrectomy), peripheral neuropathy, hypothyroidism, retroperitoneal fibrosis, HTN, HLD, GERD, anxiety/depression, ANGELIKA and recent admission at [facility] for ESBL E.coli urosepsis, imaging with ?sacral OM though pt with no sacral wound suspected findings likely related to mets, he was discharged on 12/18/24 to rehab, recently diagnosed DLBCL while admitted to [facility] when he was noted to have hypercalcemia and liver masses s/p biopsy 12/16/24, now s/p R mini CHOP on 12/28 who was admitted 1/17 for cycle #2 Rmini CHOP, upon admission patient is febrile will hold chemotherapy now.    Fevers- may be d/t viral URI d/t coronavirus (not COVID) vs possible post viral pneumonia, may be malignancy related    Possible UTI vs contamination   Oral thrush   - RVP with Coronavirus (229E,HKU1,NL63,OC43) detected  - 1/17 CT Chest with extensive new b/l ground glass nodular opacities, upper lobe dominant - possible pneumonia; small R and trace L effusions  - 1/17 CTAP decreased R hepatic mass since 12/9/24; known splenic mass; changes likely c/w retroperitoneal fibrosis   - UA with some pyuria, small LE, no nitrites or bacteria  - Ucx with >3 organisms - may be contaminated specimen    - 1/19 repeat Ucx with only low colony count of C.albicans, likely contaminant, no need to treat  - no hodges, condom cath noted with clear yellow urine, no gu sx, no suprapubic ttp  - 1/17 and 1/19 Bcx NGTD  - LUE PICC line with no sign of infection, no sign of SSTI, no rashes noted   - wound care eval noted for sacral and ischial DTI  - prior cultures reviewed, history of ESBL Kleb in Ucx 12/31/24, ESBL E.coli 11/29/24 Ucx   - s/p zosyn 1/17-1/20, escalated to ertapenem 1/20pm due to fevers   - temps noted Tm 103.2F overnight, WBC wnl, no neutropenia, now on NC, abd mildly distended but nontender   - MRSA PCR screen negative (+MSSA)  - CXRs reviewed, no focal consolidation noted     Recommendations:   Follow repeat Bcx, Ucx, in process  Repeat CT C/A/P with contrast if able   Broaden ertapenem to meropenem pending above   Monitor temps/CBC   Supportive care  Aspiration precautions  Wound care, offloading as able, nutrition  Continue rest of care per primary team     D/w NP Nancy Ac M.D.  [facility]  Available on [employer] TEAMS - *PREFERRED*  [phone]  After 5pm on weekdays and all day on weekends - please call [phone]     Thank you for consulting us and involving us in the management of this patients case. In addition to reviewing history, imaging, documents, labs, microbiology, took into account antibiotic stewardship, local antibiogram and infection control strategies and potential transmission issues. 5.05

## 2025-01-31 RX ORDER — MONTELUKAST 10 MG/1
10 TABLET, FILM COATED ORAL
Qty: 1 | Refills: 3 | Status: ACTIVE | COMMUNITY
Start: 2025-01-31 | End: 1900-01-01

## 2025-03-12 ENCOUNTER — NON-APPOINTMENT (OUTPATIENT)
Age: 80
End: 2025-03-12

## 2025-03-12 ENCOUNTER — APPOINTMENT (OUTPATIENT)
Dept: OPHTHALMOLOGY | Facility: CLINIC | Age: 80
End: 2025-03-12
Payer: MEDICARE

## 2025-03-12 PROCEDURE — 99204 OFFICE O/P NEW MOD 45 MIN: CPT

## 2025-03-12 PROCEDURE — 92083 EXTENDED VISUAL FIELD XM: CPT

## 2025-03-12 PROCEDURE — 92133 CPTRZD OPH DX IMG PST SGM ON: CPT

## 2025-05-08 ENCOUNTER — NON-APPOINTMENT (OUTPATIENT)
Age: 80
End: 2025-05-08

## 2025-05-14 ENCOUNTER — APPOINTMENT (OUTPATIENT)
Dept: MRI IMAGING | Facility: IMAGING CENTER | Age: 80
End: 2025-05-14
Payer: MEDICARE

## 2025-05-14 ENCOUNTER — OUTPATIENT (OUTPATIENT)
Dept: OUTPATIENT SERVICES | Facility: HOSPITAL | Age: 80
LOS: 1 days | End: 2025-05-14
Payer: MEDICARE

## 2025-05-14 ENCOUNTER — RESULT REVIEW (OUTPATIENT)
Age: 80
End: 2025-05-14

## 2025-05-14 DIAGNOSIS — Z98.890 OTHER SPECIFIED POSTPROCEDURAL STATES: Chronic | ICD-10-CM

## 2025-05-14 DIAGNOSIS — H53.47 HETERONYMOUS BILATERAL FIELD DEFECTS: ICD-10-CM

## 2025-05-14 DIAGNOSIS — Z95.1 PRESENCE OF AORTOCORONARY BYPASS GRAFT: Chronic | ICD-10-CM

## 2025-05-14 DIAGNOSIS — Z95.2 PRESENCE OF PROSTHETIC HEART VALVE: Chronic | ICD-10-CM

## 2025-05-14 PROCEDURE — 70553 MRI BRAIN STEM W/O & W/DYE: CPT | Mod: 26

## 2025-05-14 PROCEDURE — 70553 MRI BRAIN STEM W/O & W/DYE: CPT | Mod: MH

## 2025-05-14 PROCEDURE — A9585: CPT

## 2025-05-25 ENCOUNTER — OUTPATIENT (OUTPATIENT)
Dept: OUTPATIENT SERVICES | Facility: HOSPITAL | Age: 80
LOS: 1 days | Discharge: HOME | End: 2025-05-25

## 2025-05-25 DIAGNOSIS — C20 MALIGNANT NEOPLASM OF RECTUM: ICD-10-CM

## 2025-05-25 DIAGNOSIS — Z98.890 OTHER SPECIFIED POSTPROCEDURAL STATES: Chronic | ICD-10-CM

## 2025-05-25 DIAGNOSIS — Z95.2 PRESENCE OF PROSTHETIC HEART VALVE: Chronic | ICD-10-CM

## 2025-05-25 DIAGNOSIS — Z95.1 PRESENCE OF AORTOCORONARY BYPASS GRAFT: Chronic | ICD-10-CM

## 2025-05-27 ENCOUNTER — NON-APPOINTMENT (OUTPATIENT)
Age: 80
End: 2025-05-27

## 2025-05-28 ENCOUNTER — NON-APPOINTMENT (OUTPATIENT)
Age: 80
End: 2025-05-28

## 2025-05-29 ENCOUNTER — APPOINTMENT (OUTPATIENT)
Dept: HEMATOLOGY ONCOLOGY | Facility: CLINIC | Age: 80
End: 2025-05-29
Payer: MEDICARE

## 2025-05-29 VITALS
WEIGHT: 160.93 LBS | OXYGEN SATURATION: 98 % | HEART RATE: 78 BPM | HEIGHT: 64.17 IN | SYSTOLIC BLOOD PRESSURE: 166 MMHG | TEMPERATURE: 97.2 F | DIASTOLIC BLOOD PRESSURE: 90 MMHG | RESPIRATION RATE: 17 BRPM | BODY MASS INDEX: 27.48 KG/M2

## 2025-05-29 DIAGNOSIS — C20 MALIGNANT NEOPLASM OF RECTUM: ICD-10-CM

## 2025-05-29 PROCEDURE — 99215 OFFICE O/P EST HI 40 MIN: CPT

## 2025-05-29 PROCEDURE — G2211 COMPLEX E/M VISIT ADD ON: CPT

## 2025-07-08 ENCOUNTER — OUTPATIENT (OUTPATIENT)
Dept: OUTPATIENT SERVICES | Facility: HOSPITAL | Age: 80
LOS: 1 days | End: 2025-07-08
Payer: MEDICARE

## 2025-07-08 ENCOUNTER — APPOINTMENT (OUTPATIENT)
Dept: MRI IMAGING | Facility: IMAGING CENTER | Age: 80
End: 2025-07-08
Payer: MEDICARE

## 2025-07-08 DIAGNOSIS — Z98.890 OTHER SPECIFIED POSTPROCEDURAL STATES: Chronic | ICD-10-CM

## 2025-07-08 DIAGNOSIS — Z95.1 PRESENCE OF AORTOCORONARY BYPASS GRAFT: Chronic | ICD-10-CM

## 2025-07-08 DIAGNOSIS — Z95.2 PRESENCE OF PROSTHETIC HEART VALVE: Chronic | ICD-10-CM

## 2025-07-08 DIAGNOSIS — C20 MALIGNANT NEOPLASM OF RECTUM: ICD-10-CM

## 2025-07-08 PROCEDURE — 72197 MRI PELVIS W/O & W/DYE: CPT | Mod: 26

## 2025-07-08 PROCEDURE — 72197 MRI PELVIS W/O & W/DYE: CPT

## (undated) DEVICE — DRAPE TOWEL BLUE 17" X 24"

## (undated) DEVICE — LAP PAD 18 X 18"

## (undated) DEVICE — NDL COUNTER FOAM AND MAGNET 40-70

## (undated) DEVICE — LONE STAR ELASTIC STAY HOOK 5MM SHARP

## (undated) DEVICE — SYR ASEPTO

## (undated) DEVICE — GLV 8 RADIATION

## (undated) DEVICE — VENODYNE/SCD SLEEVE CALF LARGE

## (undated) DEVICE — Device

## (undated) DEVICE — PACK UNIVERSAL CARDIAC

## (undated) DEVICE — ELCTR HEX BLADE

## (undated) DEVICE — DRSG OPSITE 2.5 X 2"

## (undated) DEVICE — SUT PDS II 0 27" OS-6

## (undated) DEVICE — SUT POLYSORB 2 30" GS-26

## (undated) DEVICE — DRAPE LIGHT HANDLE COVER (BLUE)

## (undated) DEVICE — GOWN TRIMAX LG

## (undated) DEVICE — SOL IRR POUR NS 0.9% 500ML

## (undated) DEVICE — NDL PERC BASEPLT 18GX7CM

## (undated) DEVICE — DRAPE IOBAN 23" X 23"

## (undated) DEVICE — POSITIONER CUSHION INSERT PRONE VIEW LG

## (undated) DEVICE — DRAPE FEMORAL ANGIOGRAPHY W TROUGH

## (undated) DEVICE — GLV 7.5 PROTEXIS (WHITE)

## (undated) DEVICE — WARMING BLANKET FULL UNDERBODY

## (undated) DEVICE — FOLEY HOLDER STATLOCK 2 WAY ADULT

## (undated) DEVICE — BLADE SCALPEL SAFETYLOCK #11

## (undated) DEVICE — DRAPE 3/4 SHEET W REINFORCEMENT 56X77"

## (undated) DEVICE — SUT PROLENE 5-0 36" RB-1

## (undated) DEVICE — GLV 6 PROTEXIS W HYDROGEL

## (undated) DEVICE — SPECIMEN CONTAINER 100ML

## (undated) DEVICE — SPONGE DISSECTOR PEANUT

## (undated) DEVICE — PACK UNIVERSAL CARDIAC SUPPLEMNTAL B

## (undated) DEVICE — PACK CARDIAC YELLOW

## (undated) DEVICE — SOL IRR POUR H2O 250ML

## (undated) DEVICE — SUT PROLENE 4-0 36" SH

## (undated) DEVICE — WARMING BLANKET UPPER ADULT

## (undated) DEVICE — STEALTH CLAMP INSERT FIBRA/FIBRA 60MM

## (undated) DEVICE — MEDICATION LABELS W MARKER

## (undated) DEVICE — SENSOR MYOCARDIAL TEMP 15MM

## (undated) DEVICE — SOL NORMOSOL-R PH7.4 1000ML

## (undated) DEVICE — DRAIN CHANNEL 19FR ROUND FULL FLUTED

## (undated) DEVICE — SUT SURGICAL STEEL 6 30" BP-1

## (undated) DEVICE — PACK GENERAL MINOR

## (undated) DEVICE — BLADE SCALPEL SAFETYLOCK #10

## (undated) DEVICE — PACING CABLE TEMP MEDTRONIC WITH PAC-LOC

## (undated) DEVICE — GLV 7 PROTEXIS (WHITE)

## (undated) DEVICE — SUMP INTRACARDIAC 20FR 1/4" ADULT

## (undated) DEVICE — SUT PDS II 3-0 36" SH

## (undated) DEVICE — GOWN TRIMAX XXL

## (undated) DEVICE — GLV 8.5 PROTEXIS (WHITE)

## (undated) DEVICE — SUCTION TUBE CARDIAC SOFT TIP 6FR SHAFT 10FR TIP 6"

## (undated) DEVICE — SUT SOFSILK 2 60" TIES

## (undated) DEVICE — CONNECTOR STRAIGHT 3/8 X 1/2"

## (undated) DEVICE — STAPLER SKIN VISI-STAT 35 WIDE

## (undated) DEVICE — DRSG TEGADERM 6"X8"

## (undated) DEVICE — WARMING BLANKET DUO-THERM HYPER/HYPOTHERM ADULT

## (undated) DEVICE — DRAPE INSTRUMENT POUCH 6.75" X 11"

## (undated) DEVICE — PREP BETADINE KIT

## (undated) DEVICE — VISITEC 4X4

## (undated) DEVICE — SUCTION YANKAUER NO CONTROL VENT

## (undated) DEVICE — CHEST DRAIN PLEUR-EVAC WET/WET ADULT-PEDS SINGLE (QUICK)

## (undated) DEVICE — ELCTR BOVIE PENCIL HANDPIECE ROCKER SWITCH 15FT

## (undated) DEVICE — SUT SILK 0 30" TIES

## (undated) DEVICE — SUT PROLENE 3-0 36" SH

## (undated) DEVICE — SUT POLYSORB 2-0 30" GS-21 UNDYED

## (undated) DEVICE — DRSG STERISTRIPS 0.5 X 4"

## (undated) DEVICE — TUBING SUCTION 20FT

## (undated) DEVICE — GLV 6.5 PROTEXIS (WHITE)

## (undated) DEVICE — CONNECTOR STRAIGHT 3/8 X 3/8" W LUER LOCK

## (undated) DEVICE — BLADE SCALPEL SAFETYLOCK #15

## (undated) DEVICE — LIGASURE SMALL JAW

## (undated) DEVICE — STEALTH CLAMP INSERT FIBRA/FIBRA 90MM

## (undated) DEVICE — ELCTR BOVIE PENCIL HANDPIECE

## (undated) DEVICE — DRAPE 1/2 SHEET 40X57"

## (undated) DEVICE — DRSG OPSITE 13.75 X 4"

## (undated) DEVICE — SUT PROLENE 5-0 30" RB-2

## (undated) DEVICE — TUBING ATS SUCTION LINE

## (undated) DEVICE — DRSG MASTISOL

## (undated) DEVICE — TUBING PRESSURE 100"

## (undated) DEVICE — TOURNIQUET SET 12FR (1 RED, 1 BLUE, 1 SNARE) 7"

## (undated) DEVICE — SUT BOOT STANDARD (ASSORTED) 5 PAIR

## (undated) DEVICE — GLV 8 PROTEXIS (WHITE)

## (undated) DEVICE — SUT BIOSYN 4-0 18" P-12

## (undated) DEVICE — DRAPE MAYO STAND 30"

## (undated) DEVICE — PACING CABLE A/V TEMP SCREW DOWN 6FT

## (undated) DEVICE — FOLEY TRAY 16FR 5CC LTX UMETER CLOSED

## (undated) DEVICE — POSITIONER HEAD REST PRONE

## (undated) DEVICE — GOWN LG

## (undated) DEVICE — BULLDOG SPRING CLIP 6MM SOFT/SOFT

## (undated) DEVICE — SUT PROLENE 4-0 36" BB

## (undated) DEVICE — LONE STAR RETRACTOR SQUARE 14.1CMX14.1CM DISP

## (undated) DEVICE — VESSEL LOOP MAXI-RED  0.120" X 16"

## (undated) DEVICE — VENTING ADAPTER "Y" (RED/BLUE) 7.5"

## (undated) DEVICE — POSITIONER FOAM EGG CRATE ULNAR 2PCS (PINK)

## (undated) DEVICE — SUT BLUNT SZ 5

## (undated) DEVICE — PREP CHLORAPREP HI-LITE ORANGE 26ML

## (undated) DEVICE — SUT PLEDGET PRE PUNCH 4.8 X 9.5 X 1.5 MM

## (undated) DEVICE — FOLEY TRAY 16FR 5CC LF LUBRISIL ADVANCE TEMP CLOSED